# Patient Record
Sex: MALE | Race: OTHER | HISPANIC OR LATINO | Employment: OTHER | URBAN - METROPOLITAN AREA
[De-identification: names, ages, dates, MRNs, and addresses within clinical notes are randomized per-mention and may not be internally consistent; named-entity substitution may affect disease eponyms.]

---

## 2018-06-24 ENCOUNTER — HOSPITAL ENCOUNTER (EMERGENCY)
Facility: HOSPITAL | Age: 63
Discharge: HOME | End: 2018-06-24
Attending: EMERGENCY MEDICINE | Admitting: EMERGENCY MEDICINE
Payer: MEDICARE

## 2018-06-24 ENCOUNTER — APPOINTMENT (EMERGENCY)
Dept: RADIOLOGY | Facility: HOSPITAL | Age: 63
End: 2018-06-24
Payer: MEDICARE

## 2018-06-24 VITALS
HEART RATE: 96 BPM | TEMPERATURE: 98.8 F | HEIGHT: 68 IN | OXYGEN SATURATION: 94 % | DIASTOLIC BLOOD PRESSURE: 59 MMHG | SYSTOLIC BLOOD PRESSURE: 125 MMHG | RESPIRATION RATE: 22 BRPM

## 2018-06-24 DIAGNOSIS — J45.901 ASTHMA EXACERBATION: Primary | ICD-10-CM

## 2018-06-24 LAB
ALBUMIN SERPL BCP-MCNC: 3.7 G/DL (ref 3.5–5)
ALP SERPL-CCNC: 70 U/L (ref 46–116)
ALT SERPL W P-5'-P-CCNC: 29 U/L (ref 12–78)
ANION GAP SERPL CALCULATED.3IONS-SCNC: 5 MMOL/L (ref 4–13)
AST SERPL W P-5'-P-CCNC: 22 U/L (ref 5–45)
ATRIAL RATE: 83 BPM
BASOPHILS # BLD AUTO: 0.05 THOUSANDS/ΜL (ref 0–0.1)
BASOPHILS NFR BLD AUTO: 1 % (ref 0–1)
BILIRUB SERPL-MCNC: 0.27 MG/DL (ref 0.2–1)
BUN SERPL-MCNC: 13 MG/DL (ref 5–25)
CALCIUM SERPL-MCNC: 9.3 MG/DL (ref 8.3–10.1)
CHLORIDE SERPL-SCNC: 101 MMOL/L (ref 100–108)
CO2 SERPL-SCNC: 30 MMOL/L (ref 21–32)
CREAT SERPL-MCNC: 1.18 MG/DL (ref 0.6–1.3)
EOSINOPHIL # BLD AUTO: 0.21 THOUSAND/ΜL (ref 0–0.61)
EOSINOPHIL NFR BLD AUTO: 4 % (ref 0–6)
ERYTHROCYTE [DISTWIDTH] IN BLOOD BY AUTOMATED COUNT: 14.1 % (ref 11.6–15.1)
GFR SERPL CREATININE-BSD FRML MDRD: 65 ML/MIN/1.73SQ M
GLUCOSE SERPL-MCNC: 87 MG/DL (ref 65–140)
HCT VFR BLD AUTO: 43.4 % (ref 36.5–49.3)
HGB BLD-MCNC: 13.9 G/DL (ref 12–17)
HIV 1+2 AB+HIV1 P24 AG SERPL QL IA: NORMAL
HIV1 P24 AG SER QL: NORMAL
IMM GRANULOCYTES # BLD AUTO: 0.01 THOUSAND/UL (ref 0–0.2)
IMM GRANULOCYTES NFR BLD AUTO: 0 % (ref 0–2)
LYMPHOCYTES # BLD AUTO: 0.72 THOUSANDS/ΜL (ref 0.6–4.47)
LYMPHOCYTES NFR BLD AUTO: 13 % (ref 14–44)
MCH RBC QN AUTO: 28 PG (ref 26.8–34.3)
MCHC RBC AUTO-ENTMCNC: 32 G/DL (ref 31.4–37.4)
MCV RBC AUTO: 88 FL (ref 82–98)
MONOCYTES # BLD AUTO: 0.85 THOUSAND/ΜL (ref 0.17–1.22)
MONOCYTES NFR BLD AUTO: 15 % (ref 4–12)
NEUTROPHILS # BLD AUTO: 3.71 THOUSANDS/ΜL (ref 1.85–7.62)
NEUTS SEG NFR BLD AUTO: 67 % (ref 43–75)
NRBC BLD AUTO-RTO: 0 /100 WBCS
P AXIS: 51 DEGREES
PLATELET # BLD AUTO: 181 THOUSANDS/UL (ref 149–390)
PMV BLD AUTO: 11.2 FL (ref 8.9–12.7)
POTASSIUM SERPL-SCNC: 4.4 MMOL/L (ref 3.5–5.3)
PR INTERVAL: 222 MS
PROT SERPL-MCNC: 7.5 G/DL (ref 6.4–8.2)
QRS AXIS: 21 DEGREES
QRSD INTERVAL: 80 MS
QT INTERVAL: 342 MS
QTC INTERVAL: 401 MS
RBC # BLD AUTO: 4.96 MILLION/UL (ref 3.88–5.62)
SODIUM SERPL-SCNC: 136 MMOL/L (ref 136–145)
T WAVE AXIS: 44 DEGREES
TROPONIN I SERPL-MCNC: <0.02 NG/ML
VENTRICULAR RATE: 83 BPM
WBC # BLD AUTO: 5.55 THOUSAND/UL (ref 4.31–10.16)

## 2018-06-24 PROCEDURE — 93005 ELECTROCARDIOGRAM TRACING: CPT

## 2018-06-24 PROCEDURE — 94640 AIRWAY INHALATION TREATMENT: CPT

## 2018-06-24 PROCEDURE — 80053 COMPREHEN METABOLIC PANEL: CPT | Performed by: EMERGENCY MEDICINE

## 2018-06-24 PROCEDURE — 36415 COLL VENOUS BLD VENIPUNCTURE: CPT

## 2018-06-24 PROCEDURE — 71046 X-RAY EXAM CHEST 2 VIEWS: CPT

## 2018-06-24 PROCEDURE — 84484 ASSAY OF TROPONIN QUANT: CPT | Performed by: EMERGENCY MEDICINE

## 2018-06-24 PROCEDURE — 85025 COMPLETE CBC W/AUTO DIFF WBC: CPT | Performed by: EMERGENCY MEDICINE

## 2018-06-24 PROCEDURE — 86803 HEPATITIS C AB TEST: CPT | Performed by: EMERGENCY MEDICINE

## 2018-06-24 PROCEDURE — 93010 ELECTROCARDIOGRAM REPORT: CPT | Performed by: INTERNAL MEDICINE

## 2018-06-24 PROCEDURE — 87340 HEPATITIS B SURFACE AG IA: CPT | Performed by: EMERGENCY MEDICINE

## 2018-06-24 PROCEDURE — 99285 EMERGENCY DEPT VISIT HI MDM: CPT

## 2018-06-24 PROCEDURE — 87806 HIV AG W/HIV1&2 ANTB W/OPTIC: CPT | Performed by: EMERGENCY MEDICINE

## 2018-06-24 PROCEDURE — 96374 THER/PROPH/DIAG INJ IV PUSH: CPT

## 2018-06-24 RX ORDER — PREDNISONE 50 MG/1
50 TABLET ORAL DAILY
Qty: 5 TABLET | Refills: 0 | Status: SHIPPED | OUTPATIENT
Start: 2018-06-24 | End: 2018-06-29

## 2018-06-24 RX ORDER — ALBUTEROL SULFATE 90 UG/1
2 AEROSOL, METERED RESPIRATORY (INHALATION) EVERY 6 HOURS PRN
Qty: 6.7 G | Refills: 0 | Status: SHIPPED | OUTPATIENT
Start: 2018-06-24 | End: 2018-09-18 | Stop reason: SDUPTHER

## 2018-06-24 RX ORDER — METHYLPREDNISOLONE SODIUM SUCCINATE 125 MG/2ML
125 INJECTION, POWDER, LYOPHILIZED, FOR SOLUTION INTRAMUSCULAR; INTRAVENOUS ONCE
Status: COMPLETED | OUTPATIENT
Start: 2018-06-24 | End: 2018-06-24

## 2018-06-24 RX ADMIN — METHYLPREDNISOLONE SODIUM SUCCINATE 125 MG: 125 INJECTION, POWDER, FOR SOLUTION INTRAMUSCULAR; INTRAVENOUS at 20:04

## 2018-06-24 RX ADMIN — IPRATROPIUM BROMIDE 0.5 MG: 0.5 SOLUTION RESPIRATORY (INHALATION) at 19:21

## 2018-06-24 RX ADMIN — ALBUTEROL SULFATE 5 MG: 2.5 SOLUTION RESPIRATORY (INHALATION) at 19:21

## 2018-06-25 ENCOUNTER — PATIENT OUTREACH (OUTPATIENT)
Dept: INTERNAL MEDICINE CLINIC | Facility: CLINIC | Age: 63
End: 2018-06-25

## 2018-06-25 LAB
HBV SURFACE AG SER QL: NORMAL
HCV AB SER QL: NORMAL

## 2018-06-25 NOTE — DISCHARGE INSTRUCTIONS
Please take the prednisone I prescribed you, 1 pill per day, for 5 days  Use the albuterol inhaler, as needed, for wheezing and difficulty breathing  Follow up with our [facility] for further management and return to the ER for new or worrisome symptoms  Asma   CUIDADO AMBULATORIO:   Asma  es rivas enfermedad pulmonar que dificulta la respiración  La inflamación crónica y las reacciones a los desencadenantes estrechan las vías respiratorias en los pulmones  El asma puede ser de peligro mortal si no se mantiene bajo control  El asma con tos variante  es un tipo de asma que causa rivas tos seca que reaparece continuamente  La tos seca podría ser banuelos único síntoma o es posible que también sienta opresión en el pecho  Estos síntomas podrían ser provocados por el ejercicio o por la exposición a olores, alérgenos o infecciones del tracto respiratorio  El asma con tos variante se trata de la misma manera que el asma típico    Los síntomas más comunes incluyen los siguientes:   · Toser     · Sibilancias     · Dificultad para respirar     · Opresión en el pecho  Busque atención médica de inmediato si:   · Usted tiene falta de aliento severa  · Leana labios y [place] se ponen azules o grises  · La piel alrededor de banuelos cecilio y costillas se hunde con cada respiración  · Usted tiene falta de [place], incluso después de olvin banuelos medicamento a corto plazo según lo indicado  · Las lecturas numéricas del medidor de banuelos flujo magda están en la delmi claritza de banuelos plan de acción para el asma  Pregúntele a banuelos [place] vitaminas y minerales son adecuados para usted  · A usted se le acaba el medicamento antes de la fecha de banuelos próximo abastecimiento  · Leana síntomas empeoran  · Usted necesita olvin más medicamento que lo acostumbrado para controlar leana síntomas  · Usted tiene preguntas o inquietudes acerca de banuelos condición o cuidado  El tratamiento para el asma  dependerá de la gravedad del asma   Es posible que los medicamentos disminuyan la inflamación, bo las vías respiratorias y faciliten banuelos respiración  Los medicamentos se pueden inhalar, olvin en forma de píldora o ser inyectados  Los medicamentos a corto plazo alivian caterina síntomas con Jessica Alert  Los medicamentos a atilio plazo sirven para evitar ataques en un futuro  Es posible que además necesite medicamento para ayudar a controlar las alergias  Controle y evite ataques futuros de asma:   · Siga banuelos plan de acción para el asma  Ida es un plan por escrito que usted y banuelos médico medina creado  Explica cuáles medicamentos necesita usted y cuándo cambiar las dosis si fuera necesario  Además explica aleks usted puede monitorear caterina síntomas y usar un medidor del flujo magda  El medidor mide qué tan nicky están funcionando los pulmones  · Controle otras afecciones de [place] , aleks las [place], el reflujo estomacal y la apnea de sueño  · Identifique y evite factores desencadenantes  Estos podrían [employer], los ácaros del [place], el moho y las cucarachas  · No fume o esté alrededor de personas que fuman  La nicotina y otras sustancias químicas que contienen los cigarrillos y cigarros pueden dañar los pulmones  Pida información a banuelos médico si usted actualmente fuma y necesita ayuda para dejar de fumar  Los cigarrillos electrónicos o tabaco sin humo todavía contienen nicotina  Consulte con banuelos médico antes de [employer]  · Pregunte sobre la vacuna contra la gripe  La gripe puede empeorar banuelos asma  Puede que usted necesite recibir rivas vacuna anual contra la gripe  Acuda a caterina consultas de control con banuelos médico según le indicaron  Usted necesitará regresar para asegurarse que banuelos medicamento está funcionando y caterina síntomas están bajo control  Es posible que lo Arlet Catalan a un especialista del asma o de las [place]   A usted podrían pedirle que [facility] un registro de los valores de banuelos flujo magda y que lo [facility] a caterina citas  Anote caterina preguntas para que se acuerde de hacerlas jono caterina visitas  © 2017 [facility] Information is for End User's use only and may not be sold, redistributed or otherwise used for commercial purposes  All illustrations and images included in CareNotes® are the copyrighted property of A D A M , Inc  or Clint Snyder  Esta información es sólo para uso en educación  Banuelos intención no es darle un consejo médico sobre enfermedades o tratamientos  Colsulte con banuelos Samaria Cables farmacéutico antes de seguir cualquier régimen médico para saber si es seguro y efectivo para usted  Prednisone (By mouth)   Prednisone (PRED-ni-sone)  Treats many diseases and conditions, especially problems related to inflammation  This medicine is a corticosteroid  Brand Name(s): Contrast Allergy PreMed Pack, Jaylin, predniSONE Intensol   There may be other brand names for this medicine  When This Medicine Should Not Be Used: This medicine is not right for everyone  Do not use if you had an allergic reaction to prednisone or if you are pregnant  How to Use This Medicine:   Liquid, Tablet, Delayed Release Tablet  · Take your medicine as directed  Your dose may need to be changed several times to find what works best for you  · It is best to take this medicine with food or milk  · Swallow the delayed-release tablet whole  Do not crush, break, or chew it  · Measure the oral liquid medicine with a marked measuring spoon, oral syringe, or medicine cup  · Missed dose: Take a dose as soon as you remember  If it is almost time for your next dose, wait until then and take a regular dose  Do not take extra medicine to make up for a missed dose  · Store the medicine in a closed container at room temperature, away from heat, moisture, and direct light  Do not freeze the oral liquid    Drugs and Foods to Avoid:   Ask your doctor or pharmacist before using any other medicine, including over-the-counter medicines, vitamins, and herbal products  · Tell your doctor if you use any of the following:  ¨ Aminoglutethimide, amphotericin B, carbamazepine, cholestyramine, cyclosporine, digoxin, isoniazid, ketoconazole, phenobarbital, phenytoin, or rifampin  ¨ Blood thinner, such as warfarin  ¨ NSAID pain or arthritis medicine, such as aspirin, diclofenac, ibuprofen, naproxen, celecoxib  ¨ Diuretic (water pill)  ¨ Diabetes medicine  ¨ Macrolide antibiotic, such as azithromycin, clarithromycin, erythromycin  ¨ Estrogen, including birth control pills or hormone replacement therapy  · This medicine may interfere with vaccines  Ask your doctor before you get a flu shot or any other vaccines  Warnings While Using This Medicine:   · It is not safe to take this medicine during pregnancy  It could harm an unborn baby  Tell your doctor right away if you become pregnant  · Tell your doctor if you are breastfeeding or if you have kidney problems, heart failure, high blood pressure, a recent heart attack, diabetes, glaucoma, osteoporosis, or thyroid problems  Tell your doctor about any infection you have  Also tell your doctor if you have had mental or emotional problems (such as depression) or stomach or bowel problems (such as an ulcer or diverticulitis)  · This medicine may cause the following problems:  ¨ Mood or behavior changes  ¨ Higher blood pressure, retaining water, changes in salt or potassium levels in your body  ¨ Cataracts or glaucoma (with long-term use)  ¨ Weak bones or osteoporosis (with long-term use)  ¨ Slow growth in children (with long-term use)  ¨ Muscle problems (with high doses, especially if you have myasthenia gravis or similar nerve and muscle problems)  · Do not stop using this medicine suddenly  Your doctor will need to slowly decrease your dose before you stop it completely  · This medicine could cause you to get infections more easily   Tell your doctor right away if you are exposed to chicken pox, measles, or other serious infection  Tell your doctor if you had a serious infection in the past, such as tuberculosis or herpes  · Tell your doctor about any extra stress or anxiety in your life  Your dose might need to be changed for a short time  · Tell any doctor or dentist who treats you that you are using this medicine  This medicine may affect certain medical test results  · Keep all medicine out of the reach of children  Never share your medicine with anyone  Possible Side Effects While Using This Medicine:   Call your doctor right away if you notice any of these side effects:  · Allergic reaction: Itching or hives, swelling in your face or hands, swelling or tingling in your mouth or throat, chest tightness, trouble breathing  · Dark freckles, skin color changes, coldness, weakness, tiredness, nausea, vomiting, weight loss  · Depression, unusual thoughts, feelings, or behaviors, trouble sleeping  · Fever, chills, cough, sore throat, and body aches  · Muscle pain or weakness  · Rapid weight gain, swelling in your hands, ankles, or feet  · Severe stomach pain, nausea, vomiting, or red or black stools  · Skin changes or growths  · Trouble seeing, eye pain, headache  If you notice these less serious side effects, talk with your doctor:   · Increased appetite  · Round, puffy face  · Weight gain around your neck, upper back, breast, face, or waist  If you notice other side effects that you think are caused by this medicine, tell your doctor  Call your doctor for medical advice about side effects  You may report side effects to FDA at 9-176-FDA-8793  © 2017 [address] Information is for End User's use only and may not be sold, redistributed or otherwise used for commercial purposes  The above information is an  only  It is not intended as medical advice for individual conditions or treatments   Talk to your doctor, nurse or pharmacist before following any medical regimen to see if it is safe and effective for you  Albuterol (By breathing)   Albuterol (al-BUE-ter-ol)  Treats or prevents bronchospasm  Brand Name(s): AccuNeb, ProAir HFA, Proventil HFA, ReliOn Ventolin HFA, Ventolin HFA   There may be other brand names for this medicine  When This Medicine Should Not Be Used: This medicine is not right for everyone  Do not use it if you had an allergic reaction to albuterol or milk proteins  How to Use This Medicine:   Aerosol, Powder Under Pressure, Suspension, Solution  · Your doctor will tell you how much medicine to use  Do not use more than directed  Ask your doctor or pharmacist if you have questions about how to use your inhaler, nebulizer, or medicine  · Solution:   ¨ You will use this medicine with an inhaler device called a nebulizer  The nebulizer turns the medicine into a fine mist that you breathe in through your mouth and to your lungs  Your caregiver will show you how to use your nebulizer  ¨ Store unopened vials of this medicine at room temperature, away from heat and direct light  Do not freeze  An open vial of medicine must be used right away  · Aerosol inhaler:   ¨ Shake the inhaler well just before each use  Avoid spraying this medicine into your eyes  ¨ Test spray in the air before using for the first time or if the inhaler has not been used for a while  ¨ If you are supposed to use more than one puff, wait 1 to 2 minutes before inhaling the second puff  Repeat these steps for the next puff, starting with shaking the inhaler  ¨ Clean the inhaler mouthpiece at least once a week with warm running water for 30 seconds  Let it air dry completely  ¨ Store the canister at room temperature, away from heat and direct light  Do not freeze  Do not keep this medicine inside a car where it could be exposed to extreme heat or cold  Do not poke holes in the canister or throw it into a fire, even if the canister is empty    · ProAir® Respiclick® inhaler: ¨ Make sure the cap is closed  Do not open the cap unless you are going to use the medicine  ¨ Hold the inhaler upright  Open the cap fully until you hear a click  Your inhaler is now ready to use  ¨ Close the cap firmly over the mouthpiece after each use  ¨ Keep the inhaler clean and dry at all times  Do not wash or put any part of the inhaler in water  ¨ To clean the mouthpiece, wipe it gently with a dry cloth or tissue  ¨ Keep the medicine in the foil pouch until you are ready to use it  Store at room temperature, away from heat and direct light  Do not freeze  · Read and follow the patient instructions that come with this medicine  Talk to your doctor or pharmacist if you have any questions  · Missed dose: Take a dose as soon as you remember  If it is almost time for your next dose, wait until then and take a regular dose  Do not take extra medicine to make up for a missed dose  Drugs and Foods to Avoid:   Ask your doctor or pharmacist before using any other medicine, including over-the-counter medicines, vitamins, and herbal products  · Some foods and medicines can affect how albuterol works  Tell your doctor if you are using any of the following:  ¨ Digoxin  ¨ Beta-blocker medicine  ¨ Diuretic (water pill)  ¨ Medicine for depression or an MAO inhibitor within the past 2 weeks  Warnings While Using This Medicine:   · Tell your doctor if you are pregnant or breastfeeding, or if you have kidney disease, diabetes, heart disease, heart rhythm problems, high blood pressure, overactive thyroid, or a history of seizures  · This medicine may cause the following problems:   ¨ Paradoxical bronchospasm (increased trouble breathing right after use)  ¨ Low potassium levels in the blood  · If you use a corticosteroid medicine to control your asthma, keep using it as instructed by your doctor  · Call your doctor if your symptoms do not improve or if they get worse    · If any of your asthma medicines do not seem to be working as well as usual, call your doctor right away  Do not change your doses or stop using your medicines without asking your doctor  · Your doctor will check your progress and the effects of this medicine at regular visits  Keep all appointments  · Keep all medicine out of the reach of children  Never share your medicine with anyone  Possible Side Effects While Using This Medicine:   Call your doctor right away if you notice any of these side effects:  · Allergic reaction: Itching or hives, swelling in your face or hands, swelling or tingling in your mouth or throat, chest tightness, trouble breathing  · Dry mouth, increased thirst, muscle cramps, nausea, vomiting  · Fast, pounding, or uneven heartbeat, chest pain  · Lightheadedness, dizziness, or fainting  · Trouble breathing, increased wheezing, cough, chest tightness  If you notice these less serious side effects, talk with your doctor:   · Cough, sore throat, runny or stuffy nose  · Headache  · Tremors, nervousness  If you notice other side effects that you think are caused by this medicine, tell your doctor  Call your doctor for medical advice about side effects  You may report side effects to FDA at 6-724-FDA-7552  © 2017 [address] Information is for End User's use only and may not be sold, redistributed or otherwise used for commercial purposes  The above information is an  only  It is not intended as medical advice for individual conditions or treatments  Talk to your doctor, nurse or pharmacist before following any medical regimen to see if it is safe and effective for you

## 2018-06-25 NOTE — ED PROVIDER NOTES
History  Chief Complaint   Patient presents with    Shortness of Breath     hx of asthma, increasing shortness of breath x 2-3 days increasing today  has been out of all medications since march  states that he has an englarged heart and when he has been short of breath before it is d/t his heart  78-year-old man with a history of asthma, diabetes, and hypertension presents for evaluation of dyspnea  Patient reports a 2-3 day history of dry cough, wheezing, and dyspnea  He recently moved from [facility] and has not been able to fill his prescriptions or see a primary doctor  No associated fevers, rhinorrhea, sore throat, chest pain, nausea, vomiting, diarrhea, abdominal pain, or urinary symptoms  No previous history of CAD or VTE  No sick contacts  On arrival, patient is afebrile hypertensive to 162/85 with otherwise normal vital signs  Physical exam shows decreased air movement throughout with diffuse wheezing  Likely asthma exacerbation in etiology  Given risk factors, will perform cardiac workup including chest x-ray to evaluate for underlying pneumonia  Heart score of 1  Will treat symptomatically with DuoNeb, Solu-Medrol, and reassess  None       Past Medical History:   Diagnosis Date    Asthma     Cardiac disease     Diabetes mellitus ([facility] [address] )     Hypertension        Past Surgical History:   Procedure Laterality Date    CARPAL TUNNEL RELEASE      KNEE ARTHROSCOPY Bilateral     TOTAL HIP ARTHROPLASTY Left        No family history on file  I have reviewed and agree with the history as documented  Social History   Substance Use Topics    Smoking status: Never Smoker    Smokeless tobacco: Not on file    Alcohol use No        Review of Systems   Constitutional: Negative for chills and fever  HENT: Negative for rhinorrhea and sore throat  Eyes: Negative for photophobia and visual disturbance  Respiratory: Positive for cough and shortness of breath      Cardiovascular: Negative for chest pain and leg swelling  Gastrointestinal: Negative for abdominal pain, diarrhea, nausea and vomiting  Genitourinary: Negative for dysuria, frequency and hematuria  Musculoskeletal: Negative for back pain and neck pain  Skin: Negative for rash and wound  Neurological: Negative for light-headedness and headaches  Physical Exam  ED Triage Vitals [06/24/18 1652]   Temperature Pulse Respirations Blood Pressure SpO2   98 8 °F (37 1 °C) 84 18 138/65 95 %      Temp Source Heart Rate Source Patient Position - Orthostatic VS BP Location FiO2 (%)   Tympanic Monitor Sitting Right arm --      Pain Score       5           Orthostatic Vital Signs  Vitals:    06/24/18 1834 06/24/18 1914 06/24/18 2000 06/24/18 2015   BP: 150/66 162/85  125/59   Pulse: 82 84 92 96   Patient Position - Orthostatic VS: Lying   Sitting       Physical Exam   Constitutional: He is oriented to person, place, and time  He appears well-developed and well-nourished  No distress  HENT:   Head: Normocephalic and atraumatic  Eyes: Conjunctivae are normal  Pupils are equal, round, and reactive to light  No scleral icterus  Neck: Neck supple  No JVD present  Cardiovascular: Normal rate, regular rhythm and normal heart sounds  Exam reveals no gallop and no friction rub  No murmur heard  Pulmonary/Chest: No respiratory distress  He has wheezes (Diffuse)  He has no rales  Decreased air movement throughout   Abdominal: Soft  He exhibits no distension  There is no tenderness  There is no rebound and no guarding  Musculoskeletal: He exhibits edema (Mild nonpitting bilateral lower extremity)  He exhibits no tenderness  Neurological: He is alert and oriented to person, place, and time  Skin: Skin is warm and dry  He is not diaphoretic  Psychiatric: He has a normal mood and affect  His behavior is normal  Thought content normal    Vitals reviewed        ED Medications  Medications   albuterol inhalation solution 5 mg (5 mg Nebulization Given 6/24/18 1921)   ipratropium (ATROVENT) 0 02 % inhalation solution 0 5 mg (0 5 mg Nebulization Given 6/24/18 1921)   methylPREDNISolone sodium succinate (Solu-MEDROL) injection 125 mg (125 mg Intravenous Given 6/24/18 2004)       Diagnostic Studies  Results Reviewed     Procedure Component Value Units Date/Time    Rapid HIV 1/2 AB-AG Combo [[id number]]  (Normal) Collected:  06/24/18 1842    Lab Status:  Final result Specimen:  Blood from Arm, Left Updated:  06/24/18 1933     Rapid HIV 1 AND 2 Non-Reactive     HIV-1 P24 Ag Screen Non-Reactive    Narrative:         Negative for HIV-1 p24 Antigen  Negative for HIV-1 and/or HIV-2 Antibody  Hepatitis B surface antigen [[id number]] Collected:  06/24/18 1842    Lab Status: In process Specimen:  Blood from Arm, Left Updated:  06/24/18 1851    Hepatitis C antibody [[id number]] Collected:  06/24/18 1842    Lab Status: In process Specimen:  Blood from Arm, Left Updated:  06/24/18 1851    Comprehensive metabolic panel [[id number]] Collected:  06/24/18 1750    Lab Status:  Final result Specimen:  Blood from Arm, Left Updated:  06/24/18 1849     Sodium 136 mmol/L      Potassium 4 4 mmol/L      Chloride 101 mmol/L      CO2 30 mmol/L      Anion Gap 5 mmol/L      BUN 13 mg/dL      Creatinine 1 18 mg/dL      Glucose 87 mg/dL      Calcium 9 3 mg/dL      AST 22 U/L      ALT 29 U/L      Alkaline Phosphatase 70 U/L      Total Protein 7 5 g/dL      Albumin 3 7 g/dL      Total Bilirubin 0 27 mg/dL      eGFR 65 ml/min/1 73sq m     Narrative:         National Kidney Disease Education Program recommendations are as follows:  GFR calculation is accurate only with a steady state creatinine  Chronic Kidney disease less than 60 ml/min/1 73 sq  meters  Kidney failure less than 15 ml/min/1 73 sq  meters      Troponin I [[id number]]  (Normal) Collected:  06/24/18 1750    Lab Status:  Final result Specimen:  Blood from Arm, Left Updated:  06/24/18 1827     Troponin I <0 02 ng/mL CBC and differential [[id number]]  (Abnormal) Collected:  06/24/18 1750    Lab Status:  Final result Specimen:  Blood from Arm, Left Updated:  06/24/18 1811     WBC 5 55 Thousand/uL      RBC 4 96 Million/uL      Hemoglobin 13 9 g/dL      Hematocrit 43 4 %      MCV 88 fL      MCH 28 0 pg      MCHC 32 0 g/dL      RDW 14 1 %      MPV 11 2 fL      Platelets 436 Thousands/uL      nRBC 0 /100 WBCs      Neutrophils Relative 67 %      Immat GRANS % 0 %      Lymphocytes Relative 13 (L) %      Monocytes Relative 15 (H) %      Eosinophils Relative 4 %      Basophils Relative 1 %      Neutrophils Absolute 3 71 Thousands/µL      Immature Grans Absolute 0 01 Thousand/uL      Lymphocytes Absolute 0 72 Thousands/µL      Monocytes Absolute 0 85 Thousand/µL      Eosinophils Absolute 0 21 Thousand/µL      Basophils Absolute 0 05 Thousands/µL                  X-ray chest 2 views   ED Interpretation by Yasmin Soriano MD (06/24 2001)   No acute disease            Procedures  Procedures      Phone Consults  ED Phone Contact    ED Course  ED Course as of Jun 24 2058   Sun Jun 24, 2018 2005 Procedure Note: EKG  Date/Time: 06/24/18 8:05 PM   Indications / Diagnosis: Dyspnea  ECG reviewed by me, the ED Provider: yes   The EKG demonstrates:  Rhythm: normal sinus  Intervals:  ms  Axis: normal axis  QRS/Blocks: normal QRS, 1st degree AV block  ST Changes: No acute ST Changes, no STD/JESSICA       2056 On re-evaluation, patient with significant symptomatic improvement after DuoNeb and Solu-Medrol  He has significantly increased air movement and decreased wheezing            HEART Risk Score      Most Recent Value   History  0 Filed at: 06/24/2018 1932   ECG  0 Filed at: 06/24/2018 1932   Age  1 Filed at: 06/24/2018 1932   Risk Factors  0 Filed at: 06/24/2018 1932   Troponin  0 Filed at: 06/24/2018 1932   Heart Score Risk Calculator   History  0 Filed at: 06/24/2018 1932   ECG  0 Filed at: 06/24/2018 1932   Age  1 Filed at: 06/24/2018 1932   Risk Factors  0 Filed at: 06/24/2018 1932   Troponin  0 Filed at: 06/24/2018 1932   HEART Score  1 Filed at: 06/24/2018 1932   HEART Score  1 Filed at: 06/24/2018 1932                            [facility]  Number of Diagnoses or Management Options  Asthma exacerbation:   Diagnosis management comments: Likely mild asthma exacerbation in etiology  Patient with significant symptomatic improvement after DuoNeb and Solu-Medrol  He was discharged with an albuterol inhaler, prednisone burst, and outpatient PCP follow-up at the [facility]  Patient and his son were given strict return precautions for worsening symptoms  Cardiac workup included chest x-ray unremarkable  CritCare Time    Disposition  Final diagnoses:   Asthma exacerbation     Time reflects when diagnosis was documented in both MDM as applicable and the Disposition within this note     Time User Action Codes Description Comment    6/24/2018  8:38 PM Darya Muñiz Add [J45 901] Asthma exacerbation       ED Disposition     ED Disposition Condition Comment    Discharge  Ollie Jalloh discharge to home/self care      Condition at discharge: Good        Follow-up Information     Follow up With Specialties Details Why Contact Info Additional Information    [facility]  Schedule an appointment as soon as possible for a visit  [facility] [address] [place] [address] Emergency Department Emergency Medicine  As needed [address]  [facility], [address] [place], [place], [address]          Patient's Medications   Discharge Prescriptions    ALBUTEROL (PROVENTIL HFA) 90 MCG/ACT INHALER    Inhale 2 puffs every 6 (six) hours as needed for wheezing or shortness of breath       Start Date: 6/24/2018 End Date: --       Order Dose: 2 puffs       Quantity: 6 7 g    Refills: 0    PREDNISONE 50 MG TABLET    Take 1 tablet (50 mg total) by mouth daily for 5 days       Start Date: 6/24/2018 End Date: 6/29/2018       Order Dose: 50 mg       Quantity: 5 tablet    Refills: 0     No discharge procedures on file  ED Provider  Attending physically available and evaluated Dyanantionette Ariasaneta AMBROCIO managed the patient along with the ED Attending      Electronically Signed by         Sammy Rosas MD  06/24/18 8890

## 2018-07-09 ENCOUNTER — OFFICE VISIT (OUTPATIENT)
Dept: INTERNAL MEDICINE CLINIC | Facility: CLINIC | Age: 63
End: 2018-07-09
Payer: MEDICARE

## 2018-07-09 ENCOUNTER — HOSPITAL ENCOUNTER (EMERGENCY)
Facility: HOSPITAL | Age: 63
Discharge: HOME | End: 2018-07-09
Attending: EMERGENCY MEDICINE
Payer: MEDICARE

## 2018-07-09 ENCOUNTER — PATIENT OUTREACH (OUTPATIENT)
Dept: INTERNAL MEDICINE CLINIC | Facility: CLINIC | Age: 63
End: 2018-07-09

## 2018-07-09 VITALS
HEIGHT: 69 IN | DIASTOLIC BLOOD PRESSURE: 64 MMHG | HEART RATE: 75 BPM | SYSTOLIC BLOOD PRESSURE: 138 MMHG | TEMPERATURE: 97.9 F | WEIGHT: 252 LBS | BODY MASS INDEX: 37.33 KG/M2 | RESPIRATION RATE: 18 BRPM | OXYGEN SATURATION: 97 %

## 2018-07-09 VITALS
DIASTOLIC BLOOD PRESSURE: 66 MMHG | TEMPERATURE: 98.2 F | HEIGHT: 68 IN | SYSTOLIC BLOOD PRESSURE: 130 MMHG | WEIGHT: 251.77 LBS | HEART RATE: 92 BPM | BODY MASS INDEX: 38.16 KG/M2
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
## 2018-07-09 DIAGNOSIS — R07.9 CHEST PAIN IN ADULT: ICD-10-CM

## 2018-07-09 DIAGNOSIS — R06.01 ORTHOPNEA: ICD-10-CM

## 2018-07-09 DIAGNOSIS — L03.90 CELLULITIS: ICD-10-CM

## 2018-07-09 DIAGNOSIS — R73.03 PREDIABETES: ICD-10-CM

## 2018-07-09 DIAGNOSIS — Z00.00 ENCOUNTER FOR MEDICAL EXAMINATION TO ESTABLISH CARE: Primary | ICD-10-CM

## 2018-07-09 DIAGNOSIS — F33.9 RECURRENT MAJOR DEPRESSIVE DISORDER, REMISSION STATUS UNSPECIFIED (HCC): ICD-10-CM

## 2018-07-09 DIAGNOSIS — J45.20 MILD INTERMITTENT ASTHMA WITHOUT COMPLICATION: ICD-10-CM

## 2018-07-09 DIAGNOSIS — Z20.3 RABIES EXPOSURE: Primary | ICD-10-CM

## 2018-07-09 DIAGNOSIS — Z23 NEED FOR DIPHTHERIA-TETANUS-PERTUSSIS (TDAP) VACCINE: ICD-10-CM

## 2018-07-09 DIAGNOSIS — N18.2 CKD (CHRONIC KIDNEY DISEASE) STAGE 2, GFR 60-89 ML/MIN: ICD-10-CM

## 2018-07-09 DIAGNOSIS — E66.9 OBESITY (BMI 30-39.9): ICD-10-CM

## 2018-07-09 PROBLEM — E11.9 DM (DIABETES MELLITUS) (HCC): Status: RESOLVED | Noted: 2018-07-09 | Resolved: 2018-07-09

## 2018-07-09 PROBLEM — I10 ESSENTIAL HYPERTENSION: Status: ACTIVE | Noted: 2018-07-09

## 2018-07-09 PROBLEM — J45.909 ASTHMA: Status: ACTIVE | Noted: 2018-07-09

## 2018-07-09 PROBLEM — E11.9 DM (DIABETES MELLITUS) (HCC): Status: ACTIVE | Noted: 2018-07-09

## 2018-07-09 PROCEDURE — 99203 OFFICE O/P NEW LOW 30 MIN: CPT | Performed by: INTERNAL MEDICINE

## 2018-07-09 PROCEDURE — 99283 EMERGENCY DEPT VISIT LOW MDM: CPT

## 2018-07-09 PROCEDURE — 90376 RABIES IG HEAT TREATED: CPT | Performed by: EMERGENCY MEDICINE

## 2018-07-09 PROCEDURE — 90715 TDAP VACCINE 7 YRS/> IM: CPT | Performed by: EMERGENCY MEDICINE

## 2018-07-09 PROCEDURE — 90675 RABIES VACCINE IM: CPT | Performed by: EMERGENCY MEDICINE

## 2018-07-09 PROCEDURE — 90472 IMMUNIZATION ADMIN EACH ADD: CPT

## 2018-07-09 PROCEDURE — 96372 THER/PROPH/DIAG INJ SC/IM: CPT

## 2018-07-09 PROCEDURE — 90471 IMMUNIZATION ADMIN: CPT

## 2018-07-09 RX ORDER — AMOXICILLIN AND CLAVULANATE POTASSIUM 875; 125 MG/1; MG/1
1 TABLET, FILM COATED ORAL
Qty: 14 TABLET | Refills: 0 | Status: SHIPPED | OUTPATIENT
Start: 2018-07-09 | End: 2018-07-15 | Stop reason: SDUPTHER

## 2018-07-09 RX ADMIN — RABIES VACCINE 1 ML: KIT at 12:32

## 2018-07-09 RX ADMIN — TETANUS TOXOID, REDUCED DIPHTHERIA TOXOID AND ACELLULAR PERTUSSIS VACCINE, ADSORBED 0.5 ML: 5; 2.5; 8; 8; 2.5 SUSPENSION INTRAMUSCULAR at 12:33

## 2018-07-09 RX ADMIN — HUMAN RABIES VIRUS IMMUNE GLOBULIN 2280 UNITS: 150 INJECTION, SOLUTION INTRAMUSCULAR at 12:45

## 2018-07-09 NOTE — PROGRESS NOTES
BREANN/CM met briefly with pt and son prior to transfer to ED  Breann provided son with a [employer] application as well as instruction   Son to complete it and mail it in  Info also provided on housing resources ie [employer] [facility] in the area   Breann has also reviewed insurance programs  Sw to remain avaialbe to support and assist as indcated

## 2018-07-09 NOTE — PROGRESS NOTES
Assessment/Plan:    1  Dog bite  -patient will need to go to the emergency department at for proper vaccination and antibiotic therapy  -patient currently afebrile, though he does refer chills    2  Pre diabetes  -patient with polyuria, polydipsia and historic diagnosis of pre diabetes in [place]  -will order A1c  -pt will need diet counselling    3  Mild intermittent asthma  -patient states he has exacerbations once every 3-6 months  -patient will need proper PFTs to rule out COPD in the setting of a 72 pack year history  -patient has albuterol inhaler at home    4  Positive depression screening with history of depression  -will restart previous depression medication Zoloft  -patient also took Cymbalta and Klonopin in the past, will reassess need to restart 2nd medication if he does not respond to Zoloft alone    5  Dyspnea on exertion and orthopnea  -differential diagnosis includes CHF versus CAD versus COPD  -patient consent only 4 stairs prior to chest pain and shortness of breath  -will order echocardiogram  -will consider cardiac stress test  -patient will need to follow up with PFTs  -will order lipid panel for ascvd risk assessment for need to start statin    6  History hypertension  -will readdress in 1 week follow-up    7  CKD stage 2  -likely in the setting of the hypertension  -will reassess at next visit    8  Need for colonoscopy  -will address at next visit    No problem-specific Assessment & Plan notes found for this encounter  Diagnoses and all orders for this visit:    Encounter for medical examination to establish care    CKD (chronic kidney disease) stage 2, GFR 60-89 ml/min    Obesity (BMI 30-39  9)    Need for diphtheria-tetanus-pertussis (Tdap) vaccine  -     TDAP VACCINE GREATER THAN OR EQUAL TO 8YO IM    Dog bite, initial encounter    Mild intermittent asthma without complication          Subjective:      Patient ID: Lakshmi Mendoza is a 57 y o  male      HPI     58 yo M with pmhx of asthma, preDM, HTN who recently moved here from [facility]  Pt was recently seen in the ED for SOB, where he was found to have asthma exacerbation  He recovered with duoneb and solumedrol  Pt was discharged with short course of oral steroids and albuterol HFA  Pt is in clinic today to establish care with PCP  Patient currently is not taking his home meds as he has not had a PCP since moving to [place]  Today in clinic, patient referred a dog bite that occurred on Saturday of an unidentified dog  Patient states he has not seek any medical treatment since this dog bite  Patient refers swelling, pain, erythema, decreased range of motion of the right 1st digit  Patient also refers chills and is unaware if he has had fevers  Patient does not know his vaccination status  In terms of chronic conditions, patient is describing orthopnea and dyspnea on exertion  Patient states that he can only ascend 4 stairs before getting dyspneic and with chest pain  Patient states he needs use 4 pillows at night so he did not become short of breath while sleeping  Patient also refers a swelling in his lower extremities  Patient states he was diagnosed with prediabetes about 2 years ago in [facility] and was taking metformin  Patient also states that he gets paresthesias in his right lower extremity  He refers polyuria and polydipsia  He also states that he has visual changes and eye pain bilaterally  Family hx significant for sister with DM and Kidney cancer on dialysis  Social hx significant for 72 pack year hx, quit in 2006  Drinks 4 beers monthly  No illicit drug use  Pt does not work  Pt states he eats whatever he wants and follows no specific diet        The following portions of the patient's history were reviewed and updated as appropriate: allergies, current medications, past family history, past medical history, past social history, past surgical history and problem list     Review of Systems   Constitutional: Positive for activity change and chills  Negative for diaphoresis and fever  Respiratory: Positive for cough, shortness of breath and wheezing  Cardiovascular: Positive for chest pain and leg swelling  Negative for palpitations  Gastrointestinal: Positive for abdominal distention  Negative for abdominal pain, diarrhea, nausea and vomiting  Endocrine: Positive for polydipsia, polyphagia and polyuria  Genitourinary: Negative for dysuria, hematuria and urgency  Musculoskeletal: Positive for back pain  Skin: Negative for color change  Neurological: Negative for dizziness, seizures, syncope, weakness, light-headedness and headaches  Psychiatric/Behavioral: Negative for confusion  Objective:      /66   Pulse 92   Temp 98 2 °F (36 8 °C)   Ht 5' 8" (1 727 m)   Wt 114 kg (251 lb 12 3 oz)   BMI 38 28 kg/m²          Physical Exam   Constitutional: He is oriented to person, place, and time  He appears well-developed and well-nourished  56-year-old male with BMI of 38 in no acute distress   HENT:   Head: Normocephalic and atraumatic  Mouth/Throat: No oropharyngeal exudate  Missing several teeth   Eyes: EOM are normal  Pupils are equal, round, and reactive to light  No scleral icterus  Conjunctival injection bilaterally   Neck: Normal range of motion  Neck supple  No JVD present  No thyromegaly present  Cardiovascular: Normal rate, regular rhythm, normal heart sounds and intact distal pulses  Exam reveals no gallop and no friction rub  No murmur heard  Pulmonary/Chest: Breath sounds normal  No respiratory distress  He has no wheezes  He has no rales  He exhibits no tenderness  Patient unable to take in large inspiratory volumes, cough with deep inspiration   Abdominal: Soft  Bowel sounds are normal  He exhibits distension  He exhibits no mass  There is no tenderness  There is no rebound and no guarding  Musculoskeletal: Normal range of motion   He exhibits edema (1+ pitting edema in the lower extremities bilaterally)  Large mobile, nontender mass consistent with lipoma on upper right back, multiple surgical scars along spine well-healed without erythema or discharge   Neurological: He is alert and oriented to person, place, and time  No cranial nerve deficit  He exhibits normal muscle tone  Skin: Skin is warm and dry  Varicose veins in the lower extremities bilateral   Psychiatric:   Depression score 12   Nursing note and vitals reviewed

## 2018-07-09 NOTE — DISCHARGE INSTRUCTIONS
- The patient has been exposed to rabies and has never been vaccinated against rabies  Therefore the patient should get 4 doses of rabies vaccine - one dose right away, and additional doses on the 3rd, 7th, and 14th days  (Rabavert, 1 Kit)  Four doses (1 mL each) of either HDCV or PCECV vaccine should be administered on days 0, 3, 7, and 14  The first dose should be administered as soon as possible after exposure  It should be given intramuscularly into the deltoid muscle of adults  In children, it should be administered into either the deltoid muscle or the anterolateral aspect of the thigh  Will not use the gluteal region, because this could result in a decreased immunologic response  - They should also get Rabies Immune Globulin at the same time as the first dose (Rabies immunoglobulin; 20 IU/kg)  If not immediately available, the HRIG should be administered as soon as it becomes available up until and including day 7 of treatment  Concentrate as much of the dose as possible in and around the wound (if wound location allows)  The remaining HRIG should be administered intramuscularly at a site distance from the vaccine administration  Case reports have documented safe administration of HRIG and HDCV during pregnancy  Mordedura de animal   LO QUE NECESITA SABER:   Las heridas resultantes de la mordedura de un animal pueden ser de un melisa superficial hasta heridas profundas que ponen en peligro la sabi  La mordedura de un animal puede cortar o perforar la piel  Puede arrancarle la piel del cuerpo  Hallman piel se puede inflamar o estar amoratada, aún si la mordida no le abrió la piel  Elizabeth Loupe de animales son más frecuentes en las harman, los brazos, las piernas y el sravan  Elizabeth Loupe de perros y gatos son las más comunes  INSTRUCCIONES SOBRE EL ILYA HOSPITALARIA:   Regrese a la carlos de emergencias si:   · Usted tiene fiebre  · Hallman herida está claritza, inflamada y drena pus      · Usted nota líneas nelson en la piel que rodea la herida  · Ya no puede  el área de la mordida  · Banuelos ritmo cardíaco y banuelos respiración son mucho más acelerados que de costumbre  · Se siente mareado y confundido  Pregúntele a banuelos [place]ie [place] vitaminas y minerales son adecuados para usted  · Banuelos dolor no mejora, incluso después de olvin el medicamento para el dolor  · Tiene pesadillas o recuerdos recurrentes sobre la mordida del animal      · Usted tiene preguntas o inquietudes acerca de banuelos condición o cuidado  Medicamentos:  Es posible que usted necesite alguno de los siguientes:  · Antibióticos  sirven para prevenir o tratar rivas infección bacteriana  · Un medicamento con receta para el dolor  podrían ser Alex Guild  Pregunte cómo olvin estos medicamentos de rivas forma burden  · Rivas vacuna antitetánica  puede aplicarse para prevenir el tétano  El tétano es rivas infección bacterial letal que afecta los nervios y los músculos  La bacteria puede contagiarse por medio de mordeduras de [place]  · Rivas vacuna contra la fernando  puede necesitarse para prevenir la fernando  La fernando es rivas infección viral letal  El virus puede contagiarse por medio de mordeduras [place]  · [place] caterina medicamentos aleks se le haya indicado  Consulte con banuelos médico si usted merrill que banuelos medicamento no le está ayudando o si presenta efectos secundarios  Infórmele si es alérgico a algún medicamento  Mantenga rivas lista actualizada de los [place], las vitaminas y los productos herbales que ace  Incluya los siguientes datos de los medicamentos: cantidad, frecuencia y motivo de administración  Traiga con usted la lista o los envases de la píldoras a caterina citas de seguimiento  Lleve la lista de los medicamentos con usted en jordi de rivas emergencia  Acuda dentro de 1 o 2 días a la consulta de control con banuelos médico:  Es posible que usted necesite regresar para que le quiten los puntos de [place]   Anote caterina preguntas para que se acuerde de hacerlas jono caterina visitas  Cuidados personales:   · Aplique un ungüento antibiótico aleks se indica  Fanshawe ayuda a prevenir rivas infección en las heridas menores de la piel  Está disponible sin receta médica  · Mantenga la herida limpia y [place]  Lave la herida todos los días con agua y jabón o rivas solución antiséptica  Pregúntele a hallman médico sobre los tipos de vendaje que usted puede usar  · Aplique hielo a hallman herida  El hielo ayuda a disminuir la inflamación y el dolor  El hielo también puede contribuir a evitar el daño de los tejidos  Use un paquete de hielo o ponga hielo molido dentro de [employer]  Cúbrala con rivas toalla y colóquela en hallman herida por 15 a 20 minutos cada hora o según indicaciones  · Eleve el área de la herida  Eleve hallman herida por encima del nivel de hallman corazón tan a menudo aleks pueda  Fanshawe va a disminuir inflamación y el dolor  [place] hallman herida sobre almohadas o cobijas dobladas para mantenerla elevada y cómoda  Prevenga otra mordedura de animal:   · Aprenda a darse cuenta de cuando rivas mascota está asustada o enojada  No bia movimientos rápidos y bruscos  · No se meta entre animales que estén peleando  · No deje a un wendi pequeño solo con [employer]  · No moleste a un animal mientras come, duerme o cuida a caterina crías  · No se acerque a un animal que no conoce, especialmente si está amarrado o en [place]  · No se acerque a los [employer] estén enfermos o que actúen de [employer] extraña  · No alimente ni atrape animales salvajes  © 2017 [address] Information is for End User's use only and may not be sold, redistributed or otherwise used for commercial purposes  All illustrations and images included in CareNotes® are the copyrighted property of A D A M , Inc  or Clint Snyder  Esta información es sólo para uso en educación  Hallman intención no es darle un consejo médico sobre enfermedades o tratamientos   Colsulte con hallman Sameul Millin o farmacéutico antes de seguir cualquier régimen médico para saber si es seguro y efectivo para usted

## 2018-07-09 NOTE — ED ATTENDING ATTESTATION
Ella Thorpe MD, saw and evaluated the patient  All available labs and X-rays were ordered by me or the resident and have been reviewed by myself  I discussed the patient with the resident / non-physician and agree with the resident's / non-physician practitioner's findings and plan as documented in the resident's / non-physician practicitioner's note, except where noted  At this point, I agree with the current assessment done in the ED  Chief Complaint   Patient presents with    Dog Bite     Baljinderchaz Webster to pet a dog on Saturday and it bit him  He was out on a walk  Did not get any information from owner  Baez mix  Bite to right hand below thumb     This is a 57-year-old male presents for evaluation of dog bite  The patient states that he was walking, he saw Mann Sanju the he wanted to Mick Murguia, the dog and bit him on the right hand  It but then over the thenar eminence  He had immediate onset of pain  He was able to move the thumb but since then he has had increased swelling, pain, warmth  His tetanus is unknown, rabies status of the dog is unknown  He saw his primary care doctor today because of the symptoms so was sent here for further evaluation  No fevers chills nausea vomiting chest pain shortness of breath  PE:  Vitals:    07/09/18 1029   BP: 138/64   BP Location: Left arm   Pulse: 75   Resp: 18   Temp: 97 9 °F (36 6 °C)   TempSrc: Tympanic   SpO2: 97%   Weight: 114 kg (252 lb)   Height: 5' 9" (1 753 m)   General: VS reviewed  Appears in NAD  awake, alert  Well-nourished, well-developed  Appears stated age  Speaking normally in full sentences  Head: Normocephalic, atraumatic, nontender  Eyes: EOM-I  No diplopia  No hyphema  No subconjunctival hemorrhages  Symmetrical lids  ENT: Atraumatic external nose and ears  MMM  No malocclusion  No stridor  Normal phonation  No drooling  Normal swallowing  Neck: No JVD  CV: No pallor noted  Peripheral pulses +2 throughout     No chest wall tenderness  Lungs:   No tachypnea  No respiratory distress  MSK:   RIGHT:  M/U/R/A nerve sensation intact   strength 5/5  Able to 1+2 normally  Limit to 1+5 2/2 swelling preventing him from doing it  Finger abduction/adduction/opposition intact  Suppination/Pronation intact without reproduction of pain  Good capillary refill  2+ radial pulses, bilaterally equal    WE/WF/WRD/WUD 5/5, intact, without pain  BICEPS/TRICEPS 5/5  Shoulder abduction/adduction 5/5  Skin: Dry, intact  Neuro: Awake, alert, GCS15, CN II-XII grossly intact  Motor grossly intact  Psychiatric/Behavioral: Appropriate mood and affect   Exam: deferred  A:  - dog bite  P:  - tetanus  - rabies  - abx  - close return precautions / RTER reviewed orally  - 13 point ROS was performed and all are normal unless stated in the history above  - Nursing note reviewed  Vitals reviewed  - Orders placed by myself and/or advanced practitioner / resident     - Previous chart was not reviewed  - No language barrier    - History obtained from patient  - There are no limitations to the history obtained  - Critical care time: Not applicable for this patient  Final Diagnosis:  1  Rabies exposure    2  Dog bite    3  Cellulitis         Medications   tetanus-diphtheria-acellular pertussis (BOOSTRIX) IM injection 0 5 mL (0 5 mL Intramuscular Given 7/9/18 1233)   rabies vaccine, chick embryo (RABAVERT) IM injection 1 mL (1 mL Intramuscular Given 7/9/18 1232)   rabies immune globulin, human (IMOGAM RABIES-HT) IM injection 2,280 Units (2,280 Units Infiltration Given 7/9/18 1245)     No orders to display     No orders of the defined types were placed in this encounter      Labs Reviewed - No data to display  Time reflects when diagnosis was documented in both MDM as applicable and the Disposition within this note     Time User Action Codes Description Comment    7/9/2018 12:35 PM Marval Sprain Add [Z20 3] Rabies exposure 7/9/2018 12:35 PM [place] Ee Add Erato Ana  0HUU] Dog bite     7/9/2018 12:35 PM Jose Holder Add [L03 90] Cellulitis       ED Disposition     ED Disposition Condition Comment    Discharge  Nicolas Ferguson discharge to home/self care  Condition at discharge: Stable        Follow-up Information     Follow up With Specialties Details Why [address] Emergency Department Emergency Medicine Go in 3 days for second dose of rabies vaccine [address] [facility] ED, [address], [address]        Discharge Medication List as of 7/9/2018 12:39 PM      START taking these medications    Details   amoxicillin-clavulanate (AUGMENTIN) 875-125 mg per tablet Take 1 tablet by mouth 2 (two) times a day for 7 days, Starting Mon 7/9/2018, Until Mon 7/16/2018, Print         CONTINUE these medications which have NOT CHANGED    Details   albuterol (PROVENTIL HFA) 90 mcg/act inhaler Inhale 2 puffs every 6 (six) hours as needed for wheezing or shortness of breath, Starting Sun 6/24/2018, Print      sertraline (ZOLOFT) 50 mg tablet Take 1 tablet (50 mg total) by mouth daily, Starting Mon 7/9/2018, Normal           No discharge procedures on file  Prior to Admission Medications   Prescriptions Last Dose Informant Patient Reported? Taking? albuterol (PROVENTIL HFA) 90 mcg/act inhaler 7/9/2018 at 0900  No Yes   Sig: Inhale 2 puffs every 6 (six) hours as needed for wheezing or shortness of breath   sertraline (ZOLOFT) 50 mg tablet 7/9/2018 at 0900  No Yes   Sig: Take 1 tablet (50 mg total) by mouth daily      Facility-Administered Medications: None       Portions of the record may have been created with voice recognition software  Occasional wrong word or "sound a like" substitutions may have occurred due to the inherent limitations of voice recognition software   Read the chart carefully and recognize, using context, where substitutions have occurred      Electronically signed by:  Becki Machuca

## 2018-07-09 NOTE — ED PROVIDER NOTES
History  Chief Complaint   Patient presents with    Dog Bite     Went to pet a dog on Saturday and it bit him  He was out on a walk  Did not get any information from owner  Baez mix  Bite to right hand below thumb     HPI   patient comes in for evaluation of swollen right hand  He went to pet and unknown dog that was on a leash and bit him in his right hand  Patient denies any fevers chills or discharge from the wounds  Patient denies any rabies vaccination  Patient otherwise denies fevers chills sweats  Denies any history of diabetes  Prior to Admission Medications   Prescriptions Last Dose Informant Patient Reported? Taking? albuterol (PROVENTIL HFA) 90 mcg/act inhaler 7/9/2018 at 0900  No Yes   Sig: Inhale 2 puffs every 6 (six) hours as needed for wheezing or shortness of breath   sertraline (ZOLOFT) 50 mg tablet 7/9/2018 at 0900  No Yes   Sig: Take 1 tablet (50 mg total) by mouth daily      Facility-Administered Medications: None       Past Medical History:   Diagnosis Date    Asthma     Cardiac disease     [facility] mellitus ([facility] [address] )     Hypertension        Past Surgical History:   Procedure Laterality Date    CARPAL TUNNEL RELEASE      KNEE ARTHROSCOPY Bilateral     TOTAL HIP ARTHROPLASTY Left        Family History   Problem Relation Age of Onset    Cancer Sister      I have reviewed and agree with the history as documented  Social History   Substance Use Topics    Smoking status: Former Smoker    Smokeless tobacco: Never Used    Alcohol use Yes      Comment: occasionally        Review of Systems   Constitutional: Negative  HENT: Negative  Eyes: Negative  Respiratory: Negative  Cardiovascular: Negative  Gastrointestinal: Negative  Endocrine: Negative  Genitourinary: Negative  Musculoskeletal: Positive for joint swelling and myalgias  Skin: Positive for wound  Allergic/Immunologic: Negative  Neurological: Negative  Hematological: Negative  Psychiatric/Behavioral: Negative  Physical Exam  ED Triage Vitals [07/09/18 1029]   Temperature Pulse Respirations Blood Pressure SpO2   97 9 °F (36 6 °C) 75 18 138/64 97 %      Temp Source Heart Rate Source Patient Position - Orthostatic VS BP Location FiO2 (%)   Tympanic Monitor Sitting Left arm --      Pain Score       8           Orthostatic Vital Signs  Vitals:    07/09/18 1029   BP: 138/64   Pulse: 75   Patient Position - Orthostatic VS: Sitting       Physical Exam   Constitutional: He is oriented to person, place, and time  He appears well-developed and well-nourished  No distress  HENT:   Head: Normocephalic and atraumatic  Right Ear: External ear normal    Left Ear: External ear normal    Mouth/Throat: Oropharynx is clear and moist    Eyes: Conjunctivae and EOM are normal  Pupils are equal, round, and reactive to light  Right eye exhibits no discharge  Left eye exhibits no discharge  No scleral icterus  Neck: Normal range of motion  Neck supple  No tracheal deviation present  No thyromegaly present  Cardiovascular: Normal rate, regular rhythm and intact distal pulses  Exam reveals no gallop and no friction rub  No murmur heard  Pulmonary/Chest: Effort normal and breath sounds normal  No stridor  No respiratory distress  He has no wheezes  He has no rales  Abdominal: Soft  Bowel sounds are normal  He exhibits no distension  There is no tenderness  There is no rebound and no guarding  Musculoskeletal: He exhibits edema and tenderness  He exhibits no deformity  Right hand is red hot and tender  There is a puncture on the right thenar eminence as well as dorsum of the right hand just adjacent to the thumb, there is no induration or discharge  Neurological: He is alert and oriented to person, place, and time  No cranial nerve deficit  Skin: Skin is warm and dry  No rash noted  He is not diaphoretic  No erythema  Psychiatric: He has a normal mood and affect   His behavior is normal  Thought content normal    Nursing note and vitals reviewed  ED Medications  Medications   tetanus-diphtheria-acellular pertussis (BOOSTRIX) IM injection 0 5 mL (0 5 mL Intramuscular Given 7/9/18 1233)   rabies vaccine, chick embryo (RABAVERT) IM injection 1 mL (1 mL Intramuscular Given 7/9/18 1232)   rabies immune globulin, human (IMOGAM RABIES-HT) IM injection 2,280 Units (2,280 Units Infiltration Given 7/9/18 1245)       Diagnostic Studies  Results Reviewed     None                 No orders to display         Procedures  Procedures      Phone Consults  ED Phone Contact    ED Course       Tetanus was updated, patient received the 1st dose of rabies vaccination, rabies immunoglobulin was injected around the wounds as well as remainder given an IM injections  Return precautions were discussed the patient verbalized understanding patient was discharged  MDM  Number of Diagnoses or Management Options  Cellulitis:   Dog bite:   Rabies exposure:   Diagnosis management comments: This 1year-old man comes in with red hot swollen and he to his right hand  Will treat with Augmentin  Will also treat for rabies exposure given patient does not know the dog and cannot find the dog  The    - The patient has been exposed to rabies and has never been vaccinated against rabies  Therefore the patient should get 4 doses of rabies vaccine - one dose right away, and additional doses on the 3rd, 7th, and 14th days  (Rabavert, 1 Kit)  Four doses (1 mL each) of either HDCV or PCECV vaccine should be administered on days 0, 3, 7, and 14  The first dose should be administered as soon as possible after exposure  It should be given intramuscularly into the deltoid muscle of adults  In children, it should be administered into either the deltoid muscle or the anterolateral aspect of the thigh  Will not use the gluteal region, because this could result in a decreased immunologic response       - They should also get Rabies Immune Globulin at the same time as the first dose (Rabies immunoglobulin; 20 IU/kg)  If not immediately available, the HRIG should be administered as soon as it becomes available up until and including day 7 of treatment  Concentrate as much of the dose as possible in and around the wound (if wound location allows)  The remaining HRIG should be administered intramuscularly at a site distance from the vaccine administration  Case reports have documented safe administration of HRIG and HDCV during pregnancy  patient will follow up in 3 days  CritCare Time    Disposition  Final diagnoses:   Rabies exposure   Dog bite   Cellulitis     Time reflects when diagnosis was documented in both MDM as applicable and the Disposition within this note     Time User Action Codes Description Comment    7/9/2018 12:35 PM Kathlynn Brain Add [Z20 3] Rabies exposure     7/9/2018 12:35 PM Kathlynn Brain Add Marry Zuritaon  1OLR] Dog bite     7/9/2018 12:35 PM Kathlynn Brain Add [L03 90] Cellulitis       ED Disposition     ED Disposition Condition Comment    Discharge  Nini Colbert discharge to home/self care      Condition at discharge: Stable        Follow-up Information     Follow up With Specialties Details Why 1503 [facility] Emergency Department Emergency Medicine Go in 3 days for second dose of rabies vaccine [address] [facility] 809 [facility] ED, [address] [place] [address], [id number]          Discharge Medication List as of 7/9/2018 12:39 PM      START taking these medications    Details   amoxicillin-clavulanate (AUGMENTIN) 875-125 mg per tablet Take 1 tablet by mouth 2 (two) times a day for 7 days, Starting Mon 7/9/2018, Until Mon 7/16/2018, Print         CONTINUE these medications which have NOT CHANGED    Details   albuterol (PROVENTIL HFA) 90 mcg/act inhaler Inhale 2 puffs every 6 (six) hours as needed for wheezing or shortness of breath, Starting Sun 6/24/2018, Print      sertraline (ZOLOFT) 50 mg tablet Take 1 tablet (50 mg total) by mouth daily, Starting Mon 7/9/2018, Normal           No discharge procedures on file  ED Provider  Attending physically available and evaluated Wynetta Cowden I managed the patient along with the ED Attending      Electronically Signed by         Claudia Valenzuela MD  07/12/18 6625

## 2018-07-10 ENCOUNTER — APPOINTMENT (OUTPATIENT)
Dept: LAB | Facility: CLINIC | Age: 63
End: 2018-07-10
Payer: MEDICARE

## 2018-07-10 DIAGNOSIS — E66.9 OBESITY (BMI 30-39.9): ICD-10-CM

## 2018-07-10 DIAGNOSIS — R73.03 PREDIABETES: ICD-10-CM

## 2018-07-10 LAB
CHOLEST SERPL-MCNC: 253 MG/DL (ref 50–200)
HDLC SERPL-MCNC: 46 MG/DL (ref 40–60)
LDLC SERPL CALC-MCNC: 182 MG/DL (ref 0–100)
NONHDLC SERPL-MCNC: 207 MG/DL
TRIGL SERPL-MCNC: 124 MG/DL

## 2018-07-10 PROCEDURE — 80061 LIPID PANEL: CPT

## 2018-07-10 PROCEDURE — 36415 COLL VENOUS BLD VENIPUNCTURE: CPT

## 2018-07-10 PROCEDURE — 83036 HEMOGLOBIN GLYCOSYLATED A1C: CPT

## 2018-07-11 LAB
EST. AVERAGE GLUCOSE BLD GHB EST-MCNC: 140 MG/DL
HBA1C MFR BLD: 6.5 % (ref 4.2–6.3)

## 2018-07-12 ENCOUNTER — HOSPITAL ENCOUNTER (EMERGENCY)
Facility: HOSPITAL | Age: 63
Discharge: HOME | End: 2018-07-12
Admitting: EMERGENCY MEDICINE
Payer: MEDICARE

## 2018-07-12 VITALS
WEIGHT: 251.32 LBS | SYSTOLIC BLOOD PRESSURE: 136 MMHG | TEMPERATURE: 97.6 F | OXYGEN SATURATION: 97 % | RESPIRATION RATE: 18 BRPM | BODY MASS INDEX: 37.11 KG/M2 | HEART RATE: 81 BPM | DIASTOLIC BLOOD PRESSURE: 80 MMHG
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
## 2018-07-12 DIAGNOSIS — Z23 NEED FOR RABIES VACCINATION: Primary | ICD-10-CM

## 2018-07-12 PROCEDURE — 99282 EMERGENCY DEPT VISIT SF MDM: CPT

## 2018-07-12 PROCEDURE — 90675 RABIES VACCINE IM: CPT | Performed by: PHYSICIAN ASSISTANT

## 2018-07-12 PROCEDURE — 90471 IMMUNIZATION ADMIN: CPT

## 2018-07-12 RX ORDER — AMOXICILLIN AND CLAVULANATE POTASSIUM 875; 125 MG/1; MG/1
1 TABLET, FILM COATED ORAL EVERY 12 HOURS SCHEDULED
Qty: 14 TABLET | Refills: 0 | Status: SHIPPED | OUTPATIENT
Start: 2018-07-12 | End: 2018-07-19

## 2018-07-12 RX ADMIN — RABIES VACCINE 1 ML: KIT at 09:21

## 2018-07-12 NOTE — ED PROVIDER NOTES
History  Chief Complaint   Patient presents with    Follow Up Rabies     here for second rabies vaccination      Patient is a 49-year-old male who presents for his 2nd rabies vaccination  He states was here 07/09/2018 after he was bit on the right hand by a dog  He had the rabies immune globulin as well as the vaccine  He was given a prescription for Augmentin  There is still some mild swelling noted surrounding the puncture wounds  No redness, warmth, bleeding or drainage  He states that he was told to return today for her 2nd rabies vaccination shot  He states that he did not get the antibiotics filled because they are too expensive  He denies any complications after the rabies vaccination  He denies fever, chills, nausea vomiting diarrhea, chest pain, shortness breath, abdominal pain, rash, numbness or weakness  He is right-handed  Prior to Admission Medications   Prescriptions Last Dose Informant Patient Reported? Taking? albuterol (PROVENTIL HFA) 90 mcg/act inhaler   No No   Sig: Inhale 2 puffs every 6 (six) hours as needed for wheezing or shortness of breath   amoxicillin-clavulanate (AUGMENTIN) 875-125 mg per tablet   No No   Sig: Take 1 tablet by mouth 2 (two) times a day for 7 days   sertraline (ZOLOFT) 50 mg tablet   No No   Sig: Take 1 tablet (50 mg total) by mouth daily      Facility-Administered Medications: None       Past Medical History:   Diagnosis Date    Asthma     Cardiac disease     Diabetes mellitus ([facility] [address] )     Hypertension        Past Surgical History:   Procedure Laterality Date    CARPAL TUNNEL RELEASE      KNEE ARTHROSCOPY Bilateral     TOTAL HIP ARTHROPLASTY Left        Family History   Problem Relation Age of Onset    Cancer Sister      I have reviewed and agree with the history as documented      Social History   Substance Use Topics    Smoking status: Former Smoker    Smokeless tobacco: Never Used    Alcohol use Yes      Comment: occasionally Review of Systems   Constitutional: Negative for chills and fever  Respiratory: Negative for shortness of breath  Cardiovascular: Negative for chest pain  Gastrointestinal: Negative for constipation, diarrhea and vomiting  Musculoskeletal: Positive for joint swelling  Negative for arthralgias  Skin: Positive for wound  Neurological: Negative for weakness and numbness  All other systems reviewed and are negative  Physical Exam  Physical Exam   Constitutional: He appears well-developed and well-nourished  No distress  HENT:   Head: Normocephalic and atraumatic  Right Ear: External ear normal    Left Ear: External ear normal    Nose: Nose normal    Cardiovascular: Normal rate, regular rhythm and normal heart sounds  Exam reveals no gallop and no friction rub  No murmur heard  Pulmonary/Chest: Effort normal and breath sounds normal  No respiratory distress  He has no wheezes  He has no rales  Musculoskeletal: Normal range of motion  Neurological: He is alert  Skin: Skin is warm and dry  Capillary refill takes less than 2 seconds  He is not diaphoretic  No erythema  Right hand with two puncture wounds noted at the base of the thumb  There is mild swelling without erythema, warmth, bleeding or drainage  NVI distally  Capillary refill intact  Radial pulse intact   strength intact  No sign of tenosynovitis  Psychiatric: He has a normal mood and affect  His behavior is normal    Nursing note and vitals reviewed        Vital Signs  ED Triage Vitals [07/12/18 0743]   Temperature Pulse Respirations Blood Pressure SpO2   97 6 °F (36 4 °C) 81 18 136/80 97 %      Temp Source Heart Rate Source Patient Position - Orthostatic VS BP Location FiO2 (%)   Oral Monitor Sitting Right arm --      Pain Score       --           Vitals:    07/12/18 0743   BP: 136/80   Pulse: 81   Patient Position - Orthostatic VS: Sitting       Visual Acuity      ED Medications  Medications   rabies vaccine, chick embryo (RABAVERT) IM injection 1 mL (1 mL Intramuscular Given 7/12/18 7057)       Diagnostic Studies  Results Reviewed     None                 No orders to display              Procedures  Procedures       Phone Contacts  ED Phone Contact    ED Course                               MDM  Number of Diagnoses or Management Options  Dog bite, initial encounter:   Need for rabies vaccination:   Diagnosis management comments: Plan will be to give the 2nd rabies vaccination shot here  Consulted with case management to get the patient's Augmentin prescription covered  He was given a voucher and prescription for the Augmentin and instructed to start taking immediately  Discussed return for 3rd rabies vaccination (day 7)  Return to the ED sooner if symptoms worsen or new symptoms arise  Patient states understanding and agrees with plan  Risk of Complications, Morbidity, and/or Mortality  Presenting problems: low  Diagnostic procedures: low  Management options: low    Patient Progress  Patient progress: stable    CritCare Time    Disposition  Final diagnoses:   Need for rabies vaccination   Dog bite, initial encounter     Time reflects when diagnosis was documented in both MDM as applicable and the Disposition within this note     Time User Action Codes Description Comment    7/12/2018  9:53 AM Americo Public Add [Z23] Need for rabies vaccination     7/12/2018  9:53 AM Americo Public Add Hermaphroditus Ran  0XXA] Dog bite, initial encounter       ED Disposition     ED Disposition Condition Comment    Discharge  iM Taylor discharge to home/self care      Condition at discharge: Good        Follow-up Information     Follow up With Specialties Details Why Contact Info Additional [address] Emergency Department Emergency Medicine  return on the 16th for your next rabies shot  [address] [place] [address] ED, [address] [place] [address], [address] Discharge Medication List as of 7/12/2018  9:54 AM      START taking these medications    Details   !! amoxicillin-clavulanate (AUGMENTIN) 875-125 mg per tablet Take 1 tablet by mouth every 12 (twelve) hours for 7 days, Starting Thu 7/12/2018, Until Thu 7/19/2018, Print       !! - Potential duplicate medications found  Please discuss with provider  CONTINUE these medications which have NOT CHANGED    Details   albuterol (PROVENTIL HFA) 90 mcg/act inhaler Inhale 2 puffs every 6 (six) hours as needed for wheezing or shortness of breath, Starting Sun 6/24/2018, Print      !! amoxicillin-clavulanate (AUGMENTIN) 875-125 mg per tablet Take 1 tablet by mouth 2 (two) times a day for 7 days, Starting Mon 7/9/2018, Until Mon 7/16/2018, Print      sertraline (ZOLOFT) 50 mg tablet Take 1 tablet (50 mg total) by mouth daily, Starting Mon 7/9/2018, Normal       !! - Potential duplicate medications found  Please discuss with provider  No discharge procedures on file      ED Provider  Electronically Signed by           mEa Rg PA-C  07/12/18 5811

## 2018-07-12 NOTE — SOCIAL WORK
CM received a consult that patient cannot afford the antibiotic medication  CM provided GoodRx discount prescription at preferred pharmacy, [facility]  Patient was instructed to give the prescription and discount print out to the pharmacy  Patient identified no other needs at this time

## 2018-07-12 NOTE — DISCHARGE INSTRUCTIONS
Mordedura de animal   LO QUE NECESITA SABER:   Las heridas resultantes de la mordedura de un animal pueden ser de un melisa superficial hasta heridas profundas que ponen en peligro la sabi  La mordedura de un animal puede cortar o perforar la piel  Puede arrancarle la piel del cuerpo  Banuelos piel se puede inflamar o estar amoratada, aún si la mordida no le abrió la piel  Estrellita Dash de animales son más frecuentes en las harman, los brazos, las piernas y el sravan  Estrellita Dash de perros y gatos son las más comunes  INSTRUCCIONES SOBRE EL ILYA HOSPITALARIA:   Regrese a la carlos de emergencias si:   · Usted tiene fiebre  · Banuelos herida está claritza, inflamada y drena pus  · Usted nota líneas nelson en la piel que rodea la herida  · Ya no puede  el área de la mordida  · Banuelos ritmo cardíaco y banuelos respiración son mucho más acelerados que de costumbre  · Se siente mareado y confundido  Pregúntele a banuelos Quenten Mighty vitaminas y minerales son adecuados para usted  · Banuelos dolor no mejora, incluso después de olvin el medicamento para el dolor  · Tiene pesadillas o recuerdos recurrentes sobre la mordida del animal      · Usted tiene preguntas o inquietudes acerca de banuelos condición o cuidado  Medicamentos:  Es posible que usted necesite alguno de los siguientes:  · Antibióticos  sirven para prevenir o tratar rivas infección bacteriana  · Un medicamento con receta para el dolor  podrían ser [place]  Pregunte cómo olvin estos medicamentos de rivas forma burden  · Rivas vacuna antitetánica  puede aplicarse para prevenir el tétano  El tétano es rivas infección bacterial letal que afecta los nervios y los músculos  La bacteria puede contagiarse por medio de mordeduras de [place]  · Rivas vacuna contra la fernando  puede necesitarse para prevenir la fernando  La fernando es rivas infección viral letal  El virus puede contagiarse por medio de mordeduras [place]  · [place] caterina medicamentos aleks se le haya indicado    Consulte con banuelos médico si usted merrill que banuelos medicamento no le está ayudando o si presenta efectos secundarios  Infórmele si es alérgico a algún medicamento  Mantenga rivas lista actualizada de los [place], las vitaminas y los productos herbales que ace  Incluya los siguientes datos de los medicamentos: cantidad, frecuencia y motivo de administración  Traiga con usted la lista o los envases de la píldoras a caterina citas de seguimiento  Lleve la lista de los medicamentos con usted en jordi de rivas emergencia  Acuda dentro de 1 o 2 días a la consulta de control con banuelos médico:  Es posible que usted necesite regresar para que le quiten los puntos de [place]  Anote caterina preguntas para que se acuerde de hacerlas jono caterina visitas  Cuidados personales:   · Aplique un ungüento antibiótico aleks se indica  [place] ayuda a prevenir rivas infección en las heridas menores de la piel  Está disponible sin receta médica  · Mantenga la herida limpia y [place]  Lave la herida todos los días con agua y jabón o rivas solución antiséptica  Pregúntele a banuelos médico sobre los tipos de vendaje que usted puede usar  · Aplique hielo a banuelos herida  El hielo ayuda a disminuir la inflamación y el dolor  El hielo también puede contribuir a evitar el daño de los tejidos  Use un paquete de hielo o ponga hielo molido dentro de [employer]  Cúbrala con rivas toalla y colóquela en banuelos herida por 15 a 20 minutos cada hora o según indicaciones  · Eleve el área de la herida  Eleve banuelos herida por encima del nivel de banuelos corazón tan a menudo aleks pueda  [place] va a disminuir inflamación y el dolor  Apoy[place] banuelos herida sobre almohadas o cobijas dobladas para mantenerla elevada y cómoda  Prevenga otra mordedura de animal:   · Aprenda a darse cuenta de cuando rivas mascota está asustada o enojada  No bia movimientos rápidos y bruscos  · No se meta entre animales que estén peleando  · No deje a un wendi pequeño solo con [employer]      · No moleste a un animal mientras come, duerme o cuida a caterina crías  · No se acerque a un animal que no conoce, especialmente si está amarrado o en [place]  · No se acerque a los [employer] estén enfermos o que actúen de El[place] extraña  · No alimente ni atrape animales salvajes  © 2017 [address] Information is for End User's use only and may not be sold, redistributed or otherwise used for commercial purposes  All illustrations and images included in CareNotes® are the copyrighted property of A D A M , Inc  or Clint Snyder  Esta información es sólo para uso en educación  [place] intención no es darle un consejo médico sobre enfermedades o tratamientos  Colsulte con hallman Ladean Artist farmacéutico antes de seguir cualquier régimen médico para saber si es seguro y efectivo para usted

## 2018-07-14 ENCOUNTER — HOSPITAL ENCOUNTER (EMERGENCY)
Facility: HOSPITAL | Age: 63
Discharge: HOME | End: 2018-07-14
Attending: EMERGENCY MEDICINE | Admitting: EMERGENCY MEDICINE
Payer: MEDICARE

## 2018-07-14 ENCOUNTER — APPOINTMENT (EMERGENCY)
Dept: RADIOLOGY | Facility: HOSPITAL | Age: 63
End: 2018-07-14
Payer: MEDICARE

## 2018-07-14 VITALS
SYSTOLIC BLOOD PRESSURE: 136 MMHG | OXYGEN SATURATION: 96 % | DIASTOLIC BLOOD PRESSURE: 74 MMHG | HEART RATE: 66 BPM | TEMPERATURE: 97.9 F | RESPIRATION RATE: 18 BRPM

## 2018-07-14 DIAGNOSIS — M25.50 ARTHRALGIA: Primary | ICD-10-CM

## 2018-07-14 DIAGNOSIS — M19.90 OSTEOARTHRITIS: ICD-10-CM

## 2018-07-14 PROCEDURE — 99284 EMERGENCY DEPT VISIT MOD MDM: CPT

## 2018-07-14 PROCEDURE — 96372 THER/PROPH/DIAG INJ SC/IM: CPT

## 2018-07-14 PROCEDURE — 73560 X-RAY EXAM OF KNEE 1 OR 2: CPT

## 2018-07-14 PROCEDURE — 73502 X-RAY EXAM HIP UNI 2-3 VIEWS: CPT

## 2018-07-14 RX ORDER — SENNOSIDES 8.6 MG
650 CAPSULE ORAL EVERY 8 HOURS PRN
Qty: 15 TABLET | Refills: 0 | Status: SHIPPED | OUTPATIENT
Start: 2018-07-14 | End: 2018-07-19

## 2018-07-14 RX ORDER — ACETAMINOPHEN 325 MG/1
650 TABLET ORAL ONCE
Status: COMPLETED | OUTPATIENT
Start: 2018-07-14 | End: 2018-07-14

## 2018-07-14 RX ORDER — KETOROLAC TROMETHAMINE 30 MG/ML
15 INJECTION, SOLUTION INTRAMUSCULAR; INTRAVENOUS ONCE
Status: COMPLETED | OUTPATIENT
Start: 2018-07-14 | End: 2018-07-14

## 2018-07-14 RX ADMIN — KETOROLAC TROMETHAMINE 15 MG: 30 INJECTION, SOLUTION INTRAMUSCULAR at 14:05

## 2018-07-14 RX ADMIN — ACETAMINOPHEN 650 MG: 325 TABLET, FILM COATED ORAL at 14:04

## 2018-07-14 NOTE — ED ATTENDING ATTESTATION
Valentine Zhu DO, saw and evaluated the patient  I have discussed the patient with the resident/non-physician practitioner and agree with the resident's/non-physician practitioner's findings, Plan of Care, and MDM as documented in the resident's/non-physician practitioner's note, except where noted  All available labs and Radiology studies were reviewed  At this point I agree with the current assessment done in the Emergency Department  I have conducted an independent evaluation of this patient a history and physical is as follows:    58 yo male presents for evaluation of acute onset sharp L sided knee pain which has been progressively worse over last 10 days which is when it started  Pain improves throughout the day  radiation of pain to L hip, fever, focal weakness/numbness/tingling  No other c/o at this time  Painful ROM L knee with +patellar grind  No skin color changes  Minimal if any L knee effusion  Imp: L knee pain, subacute plan: films, analgesia, reassess        Critical Care Time  CritCare Time    Procedures

## 2018-07-14 NOTE — DISCHARGE INSTRUCTIONS
Osteoartritis, cuidados ambulatorios   INFORMACIÓN GENERAL:   La osteoartritis  ocurre cuando el cartílago (tejido que amortigua rivas articulación) se desgasta lentamente y causa que los huesos rocen entre ellos  La osteoartritis es rivas condición que por lo general afecta las harman, cecilio, lumbago, rodillas y caderas  La osteoartritis también se conoce aleks artrosis o enfermedad degenerativa de las articulaciones  Los siguientes son los síntomas más comunes:   · Dolor en la articulación que empeora cuando usted mueve la articulación    · Rigidez en la articulación que disminuye después que usted mueve la articulación    · Disminución del rango de movimiento     · Un crecimiento lisette y huesudo de los dedos de las harman y pies    · Un celi de raspado o crujido cuando usted mueve banuelos articulación  Busque atención médica inmediatamente al presentar los siguientes síntomas:   · Dolor intenso    · Incapaz de  banuelos articulación  El tratamiento para la osteoartritis  puede incluir cualquiera de los siguientes:  · El acetaminofén  se [place] para disminuir el dolor  Está disponible sin receta médica  Pregunte cuál es la cantidad que debe olvin, así aleks la frecuencia  Siga indicaciones  El acetaminofén puede provocar daño al hígado al no tomarlo correctamente  · Los antiiflamatorios no esteroideos (AINEs) ayudan a reducir inflamación y dolor o [place]  Ida medicamento esta disponible con o sin rivas receta médica  Los AINEs podrían causar sangrado estomacal o problemas en los riñones en [employer]  Si usted esta tomando un anticoágulante, siempre  pregunte a banuelos proveedor de katherine si los AINEs son seguros para usted  Antes de [employer], mamta siempre la etiqueta de información y siga caterina indicaciones  · La crema de capsaicina  puede disminuir el dolor de caterina articulaciones      · El medicamento para el dolor con receta  puede ser administrado para disminuir el dolor intenso si otros medicamentos no funcionan  [place] indicaciones  No espere hasta que el dolor sea intenso para entonces olvin el medicamento  · Rivas inyección de esteroides  puede ser administrada si caterina síntomas empeoran  · La fisioterapia  puede ser Clement Rung por banuelos proveedor de [place]  Un fisioterapeuta le enseña los ejercicios para mejorar el rango de movimiento y la fortaleza con el fin de disminuir el dolor en las articulaciones  · Jaime Lessen cirugía  puede ser necesaria si otros tratamientos no funcionan  Controle banuelos osteoartritis   · Manténgase activo  La actividad física puede reducir el dolor y mejorar banuelos habilidad de hacer caterina actividades diarias  Evite actividades que causan dolor  Pregúntele a banuelos proveedor de katherine qué tipos de ejercicios son los adecuados para usted  · Mantenga un peso saludable  [place] ayuda a disminuir la presión en las articulaciones en banuelos espalda, caderas, rodillas, tobillos y pies  Pregúntele a banuelos proveedor de katherine cuál debería ser [employer]  [place] que le ayude a crear un plan para perder peso si usted tiene sobrepeso  · Aplique calor o hielo  en las articulaciones según indicaciones  El calor o el hielo pueden ayudar a disminuir el dolor, la inflamación y los espasmos musculares  Use rivas almohadilla eléctrica en temperatura baja o tome rivas ducha tibia  Use rivas compresa de hielo o coloque hielo bob en rivas bolsa plástica y cúbrala con irvas toalla  · Dé un masaje  a los músculos alrededor de la articulación para aliviar el dolor y la rigidez  · Use un bastón, muletas o un caminador  para proteger y aliviar la presión en banuelos Hailey Netters y articulaciones de la cadera  También le pueden ordenar plantillas ortopédicas para caterina zapatos para disminuir la presión de caterina articulaciones  · Use zapatos sin tacones o de tacones bajos  [place] ayudará a disminuir el dolor y reducirá la presión en banuelos Hailey Netters y articulaciones de caterina [address]    Programe rivas cesar con banuelos proveedor de katherine aleks se le haya indicado: Anote caterina preguntas para que se acuerde de hacerlas jono caterina visitas  ACUERDOS SOBRE ROMERO CUIDADO:   Usted tiene el derecho de participar en la planificación de romero cuidado  Aprenda todo lo que pueda sobre romero condición y aleks darle tratamiento  Discuta con caterina médicos caterina opciones de tratamiento para juntos decidir el cuidado que usted quiere recibir  Usted siempre tiene el derecho a rechazar romero tratamiento  Esta información es sólo para uso en educación  Romero intención no es darle un consejo médico sobre enfermedades o tratamientos  Colsulte con romero Elmo Peña farmacéutico antes de seguir cualquier régimen médico para saber si es seguro y efectivo para usted  © 2014 [address] is for End User's use only and may not be sold, redistributed or otherwise used for commercial purposes  All illustrations and images included in CareNotes® are the copyrighted property of A D A NAMRATA , Inc  or Clint Snyder

## 2018-07-14 NOTE — ED PROVIDER NOTES
History  Chief Complaint   Patient presents with    Hip Pain     L hip pain after getting out of bed  Swelling to L knee  S/P hip replacement  HPI     77-year-old male presenting with left hip pain and left knee pain  Patient states this has been going on for number of weeks  Patient does have history of gout  Patient states this feels like a flare of his arthritis  Patient states the pain is worse in the morning  Patient states the pain is better throughout the day  Pain is described as dull and achy pain in the morning and becomes more sharp when he attempts to move his knee  Patient states he also has left hip pain  Left hip pain is chronic  Patient has a left hip arthroplasty  Hip pain is lateral nature  It radiates down to his lateral thigh  The pain is global   Currently the pain while sitting in bed is 5/10 with attempting walk from is a 10/10  Patient has prior injections to his knee down in [facility]   Patient does have a history of CKD CAD and asthma  Patient admits the left knee swelling  Patient denies fever chills rigors headache lightheadedness dizziness chest pain palpitations shortness of breath cough pleurisy abdominal pain nausea vomiting diarrhea constipation urinary symptoms motor weakness numbness and tingling  Prior to Admission Medications   Prescriptions Last Dose Informant Patient Reported? Taking?    albuterol (PROVENTIL HFA) 90 mcg/act inhaler   No Yes   Sig: Inhale 2 puffs every 6 (six) hours as needed for wheezing or shortness of breath   amoxicillin-clavulanate (AUGMENTIN) 875-125 mg per tablet   No No   Sig: Take 1 tablet by mouth every 12 (twelve) hours for 7 days   sertraline (ZOLOFT) 50 mg tablet   No No   Sig: Take 1 tablet (50 mg total) by mouth daily      Facility-Administered Medications: None       Past Medical History:   Diagnosis Date    Asthma     Cardiac disease     Diabetes mellitus ([facility] [address] )     Hypertension        Past Surgical History: Procedure Laterality Date    CARPAL TUNNEL RELEASE      KNEE ARTHROSCOPY Bilateral     TOTAL HIP ARTHROPLASTY Left        Family History   Problem Relation Age of Onset    Cancer Sister      I have reviewed and agree with the history as documented  Social History   Substance Use Topics    Smoking status: Former Smoker    Smokeless tobacco: Never Used    Alcohol use Yes      Comment: occasionally        Review of Systems   Constitutional: Negative for activity change, appetite change, chills, diaphoresis, fatigue, fever and unexpected weight change  HENT: Negative for congestion, ear discharge, ear pain, facial swelling, hearing loss, nosebleeds, postnasal drip, rhinorrhea, sinus pressure, sneezing, sore throat, tinnitus and trouble swallowing  Eyes: Negative for photophobia, pain, redness, itching and visual disturbance  Respiratory: Negative for cough, chest tightness, shortness of breath, wheezing and stridor  Cardiovascular: Negative for chest pain, palpitations and leg swelling  Gastrointestinal: Negative for abdominal distention, abdominal pain, blood in stool, constipation, diarrhea, nausea and vomiting  Endocrine: Negative for polydipsia and polyuria  Genitourinary: Negative for decreased urine volume, difficulty urinating, dysuria, enuresis, flank pain, frequency, hematuria and urgency  Musculoskeletal: Positive for arthralgias and gait problem  Negative for back pain, joint swelling, myalgias, neck pain and neck stiffness  Skin: Negative for rash and wound  Allergic/Immunologic: Negative for immunocompromised state  Neurological: Negative for dizziness, seizures, syncope, facial asymmetry, speech difficulty, weakness, light-headedness, numbness and headaches  Hematological: Negative for adenopathy  Psychiatric/Behavioral: Negative for agitation, behavioral problems, confusion, dysphoric mood, hallucinations, self-injury and suicidal ideas   The patient is not nervous/anxious and is not hyperactive  Physical Exam  ED Triage Vitals   Temperature Pulse Respirations Blood Pressure SpO2   07/14/18 1201 07/14/18 1201 07/14/18 1201 07/14/18 1201 07/14/18 1201   97 9 °F (36 6 °C) 61 18 134/61 97 %      Temp Source Heart Rate Source Patient Position - Orthostatic VS BP Location FiO2 (%)   07/14/18 1201 07/14/18 1201 -- 07/14/18 1201 --   Tympanic Monitor  Left arm       Pain Score       07/14/18 1404       Worst Possible Pain           Orthostatic Vital Signs  Vitals:    07/14/18 1201 07/14/18 1409   BP: 134/61 136/74   Pulse: 61 66       Physical Exam   Constitutional: He is oriented to person, place, and time  He appears well-developed and well-nourished  No distress  HENT:   Head: Normocephalic and atraumatic  Right Ear: External ear normal    Left Ear: External ear normal    Nose: Nose normal    Mouth/Throat: Oropharynx is clear and moist  No oropharyngeal exudate  Eyes: Conjunctivae and EOM are normal  Pupils are equal, round, and reactive to light  Right eye exhibits no discharge  Left eye exhibits no discharge  No scleral icterus  Neck: Normal range of motion  Neck supple  No JVD present  No tracheal deviation present  No thyromegaly present  Cardiovascular: Normal rate, regular rhythm, normal heart sounds and intact distal pulses  No murmur heard  Pulmonary/Chest: Effort normal and breath sounds normal  No stridor  No respiratory distress  He has no wheezes  He has no rales  Abdominal: Soft  Bowel sounds are normal  He exhibits no distension and no mass  There is no tenderness  There is no rebound and no guarding  Musculoskeletal: Normal range of motion  He exhibits no edema or deformity  Right hip: He exhibits normal range of motion, normal strength, no tenderness, no bony tenderness, no swelling, no crepitus, no deformity and no laceration  Left hip: He exhibits tenderness   He exhibits normal range of motion, normal strength, no bony tenderness, no swelling, no crepitus, no deformity and no laceration  Right knee: He exhibits normal range of motion, no swelling, no effusion, no ecchymosis, no deformity, no laceration, no erythema, normal alignment, no LCL laxity, normal patellar mobility, no bony tenderness, normal meniscus and no MCL laxity  No tenderness found  No medial joint line, no lateral joint line, no MCL, no LCL and no patellar tendon tenderness noted  Left knee: He exhibits swelling and effusion  He exhibits normal range of motion, no ecchymosis, no deformity, no laceration, no erythema, normal alignment, no LCL laxity, normal patellar mobility, no bony tenderness, normal meniscus and no MCL laxity  Tenderness found  Medial joint line and lateral joint line tenderness noted  No MCL, no LCL and no patellar tendon tenderness noted  Cervical back: He exhibits normal range of motion, no tenderness, no bony tenderness and no swelling  Thoracic back: He exhibits normal range of motion, no tenderness and no bony tenderness  Lumbar back: He exhibits normal range of motion, no tenderness and no bony tenderness  Legs:  Calves are nontender bilaterally, Homans sign negative bilaterally, symmetrical calf diameter   Lymphadenopathy:     He has no cervical adenopathy  Neurological: He is alert and oriented to person, place, and time  No cranial nerve deficit  He exhibits normal muscle tone  Coordination normal    Skin: Skin is warm and dry  No rash noted  He is not diaphoretic  No erythema  Psychiatric: He has a normal mood and affect  His behavior is normal  Judgment and thought content normal    Nursing note and vitals reviewed        ED Medications  Medications   ketorolac (TORADOL) injection 15 mg (15 mg Intramuscular Given 7/14/18 1405)   acetaminophen (TYLENOL) tablet 650 mg (650 mg Oral Given 7/14/18 1404)       Diagnostic Studies  Results Reviewed     None                 XR hip/pelv 2-3 vws left if performed   ED Interpretation by Rebeka White DO (07/14 1606)   Intact hardware, no fracture      Final Result by Marilyn Field MD (07/15 2229)      No acute osseous abnormality  Satisfactory alignment and appearance of left total hip arthroplasty            Workstation performed: [id number]         XR knee 1 or 2 vw left   ED Interpretation by Rebeka White DO (07/14 1606)   Osteoarthritis, distal femur scarring       Final Result by Marilyn Field MD (07/15 8742)      There is evidence of a joint effusion without discrete fracture  Clinically warranted further evaluation with MRI could be performed  Workstation performed: [id number]               Procedures  Procedures      Phone Consults  ED Phone Contact    ED Course  ED Course as of Jul 15 1207   Sat Jul 14, 2018   1604 Pain feels better                                MDM  Number of Diagnoses or Management Options  Arthralgia:   Osteoarthritis:   Diagnosis management comments: 70-year-old male presenting with chief complaint of left knee pain  Patient has a history of osteoarthritis and gout  On exam vital signs are adequate  Patient has a mild joint effusion to left knee  Patient also has left hip pain  Exam consistent with arthritis, doubt gout at this time  Doubt septic arthritis  Joint effusion related to inflammatory arthropathy from advancing arthritis most likely, x-ray support this finding  Doubt ligamentous injury, knee is stable  This is atraumatic pain  No signs of DVT  Clinically excluded  Patient will follow up with Orthopedics  Patient given Toradol Tylenol, patient felt better  Patient given Tylenol for home, considered NSAIDs reviewed prior GFR 65 will try Tylenol at this time for therapy  Patient recommended follow up with Orthopedics and  physical therapy  Physical therapy prescription given from the emergency department      CritCare Time    Disposition  Final diagnoses:   Arthralgia Osteoarthritis     Time reflects when diagnosis was documented in both MDM as applicable and the Disposition within this note     Time User Action Codes Description Comment    7/14/2018  4:08 PM Robisharif Rafa Add [M25 50] Arthralgia     7/14/2018  4:08 PM Regan Ware 17 [M19 90] Osteoarthritis       ED Disposition     ED Disposition Condition Comment    Discharge  Francene Heimlich discharge to home/self care  Condition at discharge: Good    Return precautions were discussed with patient  Patient understands when to return to  Emergency department  Patient agrees to discharge plan and follow up care             Follow-up Information     Follow up With Specialties Details Why Contact Info Additional Information    Puneet Daniel MD Orthopedic Surgery Go in 1 week  [address] Emergency Department Emergency Medicine Go to As needed, If symptoms worsen [address]  [phone]  ED, [address] [place] [address], [id number]          Discharge Medication List as of 7/14/2018  4:10 PM      START taking these medications    Details   acetaminophen (TYLENOL) 650 mg CR tablet Take 1 tablet (650 mg total) by mouth every 8 (eight) hours as needed for mild pain for up to 5 days, Starting Sat 7/14/2018, Until Thu 7/19/2018, Print         CONTINUE these medications which have NOT CHANGED    Details   albuterol (PROVENTIL HFA) 90 mcg/act inhaler Inhale 2 puffs every 6 (six) hours as needed for wheezing or shortness of breath, Starting Sun 6/24/2018, Print      !! amoxicillin-clavulanate (AUGMENTIN) 875-125 mg per tablet Take 1 tablet by mouth every 12 (twelve) hours for 7 days, Starting Thu 7/12/2018, Until Thu 7/19/2018, Print      sertraline (ZOLOFT) 50 mg tablet Take 1 tablet (50 mg total) by mouth daily, Starting Mon 7/9/2018, Normal      !! amoxicillin-clavulanate (AUGMENTIN) 875-125 mg per tablet Take 1 tablet by mouth 2 (two) times a day for 7 days, Starting Mon 7/9/2018, Until Mon 7/16/2018, Print       !! - Potential duplicate medications found  Please discuss with provider  Outpatient Discharge Orders  Ambulatory referral to Physical Therapy   Standing Status: Future  Standing Exp  Date: 01/14/19         ED Provider  Attending physically available and evaluated Konstantin Ramirez I managed the patient along with the ED Attending      Electronically Signed by         Jonny North DO  07/15/18 7302

## 2018-07-15 ENCOUNTER — HOSPITAL ENCOUNTER (EMERGENCY)
Facility: HOSPITAL | Age: 63
Discharge: HOME | End: 2018-07-15
Attending: EMERGENCY MEDICINE
Payer: MEDICARE

## 2018-07-15 VITALS
RESPIRATION RATE: 16 BRPM | OXYGEN SATURATION: 99 % | BODY MASS INDEX: 37.66 KG/M2 | WEIGHT: 255 LBS | DIASTOLIC BLOOD PRESSURE: 63 MMHG | SYSTOLIC BLOOD PRESSURE: 114 MMHG | TEMPERATURE: 97.7 F | HEART RATE: 56 BPM

## 2018-07-15 DIAGNOSIS — M10.9 GOUT ATTACK: Primary | ICD-10-CM

## 2018-07-15 PROCEDURE — 99283 EMERGENCY DEPT VISIT LOW MDM: CPT

## 2018-07-15 RX ORDER — IBUPROFEN 600 MG/1
600 TABLET ORAL ONCE
Status: COMPLETED | OUTPATIENT
Start: 2018-07-15 | End: 2018-07-15

## 2018-07-15 RX ADMIN — IBUPROFEN 600 MG: 600 TABLET ORAL at 10:54

## 2018-07-15 NOTE — DISCHARGE INSTRUCTIONS
Gota   LO QUE NECESITA SABER:   La gota es un tipo de artritis que causa un intenso dolor y rigidez en las articulaciones  El dolor de gota aguda empieza repentinamente, se empeora rápidamente y cesa por banuelos Alejandro Favia  La gota aguda puede llegar a ser Genora Singh y causar daño permanente en las articulaciones  INSTRUCCIONES SOBRE EL ILYA HOSPITALARIA:   Regrese a la carlos de emergencias si:   · Usted tiene dolor intenso en rivas o más articulaciones que no puede tolerar  · Usted tiene fiebre o enrojecimiento que se propaga más allá del área de la articulación  Pregúntele a banuelos Abbott Foyer vitaminas y minerales son adecuados para usted  · Usted tiene síntomas nuevos, aleks sarpullido, después de comenzar el tratamiento para la gota  · El dolor e inflamación en banuelos articulación no se desaparece, aún después del tratamiento  · Usted no está orinando tanto o con la frecuencia con que suele hacerlo  · Usted tiene problemas para olvin caterina medicamentos para la gota  · Usted tiene preguntas o inquietudes acerca de banuelos condición o cuidado  Medicamentos:  Es posible que usted necesite alguno de los siguientes:  · Un medicamento con receta para el dolor  podrían ser Amarilys Sample  Pregunte al médico cómo debe olvin annita medicamento de forma burden  Algunos medicamentos recetados para el dolor contienen acetaminofén  No tome otros medicamentos que contengan acetaminofén sin consultarlo con banuelos médico  Demasiado acetaminofeno puede causar daño al hígado  Los medicamentos recetados para el dolor podrían causar estreñimiento  Pregunte a banuelos médico aleks prevenir o tratar estreñimiento  · AINEs (Analgésicos antiinflamatorios no esteroides) aleks el ibuprofeno, ayudan a disminuir la inflamación, el dolor y la [place]  Annita medicamento esta disponible con o sin rivas receta médica  Los AINEs pueden causar sangrado estomacal o problemas renales en ciertas personas   Si usted ace un medicamento anticoagulante, siempre pregúntele a banuelos médico si los [place] son seguros para usted  Siempre mamta la etiqueta de annita medicamento y [place] instrucciones  · El medicamento para la gota  disminuye el dolor e inflamación en las articulaciones  También se puede administrar para prevenir nuevos ataques de gota  · Esteroides  reducen la inflamación y pueden ayudarlo con la rigidez y el dolor en caterina articulaciones jono los ataques de [place]  · El medicamento para ácido úrico  puede administrarse para reducir la cantidad de ácido úrico que banuelos cuerpo produce  Algunos medicamentos pueden ayudar a eliminar más ácido úrico al [place]  · [place] caterina medicamentos aleks se le haya indicado  Consulte con banuelos médico si usted merrill que banuelos medicamento no le está ayudando o si presenta efectos secundarios  Infórmele si es alérgico a cualquier medicamento  Mantenga rivas lista actualizada de los [place], las vitaminas y los productos herbales que ace  Incluya los siguientes datos de los medicamentos: cantidad, frecuencia y motivo de administración  Traiga con usted la lista o los envases de la píldoras a caterina citas de seguimiento  Lleve la lista de los medicamentos con usted en jordi de rivas emergencia  Acuda a caterina consultas de control con banuelos médico según le indicaron  Anote caterina preguntas para que se acuerde de hacerlas jono caterina visitas  Controle la gota:   · Descanse banuelos articulación adolorida para que pueda recuperarse  Banueols médico podría recomendarle que use muletas o un caminador si la articulación afectada está en rivas pierna  · Aplique hielo a banuelos articulación  El hielo disminuye el dolor y la inflamación  Use un paquete de hielo o ponga hielo molido dentro de [employer]  [place] la bolsa o el paquete de hielo con rivas toalla antes de aplicarla en la articulación adolorida  Aplique hielo jono 15 a 20 minutos por hora o según indicaciones  · Eleve banuelos articulación  [place] le ayudará a disminuir la inflamación y el dolor   Eleve banuelos articulación por encima del nivel del corazón con la frecuencia que pueda  [place] banuelos articulación adolorida sobre almohadas para mantenerla cómodamente por encima del nivel del corazón  · Vaya a fisioterapia según le indicaron  Un fisioterapeuta le puede enseñar ejercicios para mejorar la flexibilidad y el rango de [place]  Prevenga ataques de gota:   · No consuma alimentos altos en purinas  Estos alimentos incluyen:amarilis, mariscos, espárragos, espinacas, coliflor, y algunos tipos de legumbres  Puede que los [employer] indiquen que coma más productos lácteos bajos en grasa, aleks el yogur  Los productos lácteos pueden disminuir el riesgo de presentar ataques de [place]  La vitamina C y el café también puedan ayudar  Banuelos médico o un dietista puede ayudarle a crear un plan de comidas  · [employer] líquidos aleks se le indique  Los líquidos aleks el agua ayudan a eliminar el ácido úrico del cuerpo  Pregunte cuánto líquido debe olvin cada día y cuáles líquidos son los más adecuados para usted  · Controle banuelos peso  Bajar de peso puede disminuir la cantidad de ácido úrico en banuelos cuerpo  El ejercicio lo puede ayudar con la pérdida de [place]  Consulte con banuelos médico acerca de los ejercicios adecuados para usted  · Controle caterina niveles de azúcar en la pramod si usted tiene diabetes  Mantenga el nivel de azúcar en banuelos pramod en un margen normal  [place] puede ayudar a prevenir ataques de gota  · Limite o no consuma bebidas alcohólicas, según indicaciones  El alcohol puede provocar un ataque de [place]  El alcohol también aumenta el riesgo de presentar deshidratación  Pregúntele a banuelos médico si usted puede olvin alcohol  © 2017 [address]  Information is for End User's use only and may not be sold, redistributed or otherwise used for commercial purposes  All illustrations and images included in CareNotes® are the copyrighted property of A D A M , Inc  or Thermalin Diabetes    Esta información es sólo para uso en educación  Banuelos intención no es darle un consejo médico sobre enfermedades o tratamientos  Colsulte con banuelos Dewain Racer farmacéutico antes de seguir cualquier régimen médico para saber si es seguro y efectivo para usted

## 2018-07-15 NOTE — ED PROVIDER NOTES
History  Chief Complaint   Patient presents with    Knee Pain     per EMS "left knee paIn, was seen treated and released yesterday for the same"     Pt is a 56 yo M w/ PMH of gout presenting to the ED today for left knee pain, the pain started yesterday and he was seen here in the ED where he was given toradol/tyleno which he states did not help at all  Patient states he has a long history of gout when he was living in [facility] and this feels exactly the same and is in the same location as where he always used to get his attacks  The patient states that he moved to the [facility] approximately 2 months ago and stopped taking his allopurinol a little before moving here because he doesn't have a doctor and therefore does not have a prescription for it anymore, he states that it maintained him well there  When seen yesterday in the ED he was given toradol/tylenol and states that it really did not help him very much with the pain, he has an appointment with Ortho on the 20th        History provided by:  Medical records and patient   used: Yes Rowan Parra, [id number])        Prior to Admission Medications   Prescriptions Last Dose Informant Patient Reported?  Taking?   acetaminophen (TYLENOL) 650 mg CR tablet 7/14/2018 at Unknown time  No Yes   Sig: Take 1 tablet (650 mg total) by mouth every 8 (eight) hours as needed for mild pain for up to 5 days   albuterol (PROVENTIL HFA) 90 mcg/act inhaler Unknown at Unknown time  No No   Sig: Inhale 2 puffs every 6 (six) hours as needed for wheezing or shortness of breath   amoxicillin-clavulanate (AUGMENTIN) 875-125 mg per tablet 7/14/2018 at Unknown time  No Yes   Sig: Take 1 tablet by mouth every 12 (twelve) hours for 7 days   sertraline (ZOLOFT) 50 mg tablet 7/14/2018 at Unknown time  No Yes   Sig: Take 1 tablet (50 mg total) by mouth daily      Facility-Administered Medications: None       Past Medical History:   Diagnosis Date    Asthma     Cardiac disease     Diabetes mellitus ([facility] [address] )     Hypertension        Past Surgical History:   Procedure Laterality Date    CARPAL TUNNEL RELEASE      KNEE ARTHROSCOPY Bilateral     TOTAL HIP ARTHROPLASTY Left        Family History   Problem Relation Age of Onset    Cancer Sister      I have reviewed and agree with the history as documented  Social History   Substance Use Topics    Smoking status: Former Smoker    Smokeless tobacco: Never Used    Alcohol use Yes      Comment: occasionally        Review of Systems   Constitutional: Negative for appetite change, chills, diaphoresis, fatigue and fever  HENT: Negative for congestion, ear discharge, ear pain, nosebleeds, postnasal drip, rhinorrhea, sinus pain, sinus pressure and sore throat  Eyes: Negative for pain  Respiratory: Negative for apnea, cough, chest tightness, shortness of breath, wheezing and stridor  Cardiovascular: Negative for chest pain, palpitations and leg swelling  Gastrointestinal: Negative for abdominal distention, abdominal pain, anal bleeding, constipation, diarrhea, nausea and vomiting  Musculoskeletal: Positive for arthralgias, gait problem and joint swelling  Negative for back pain, myalgias, neck pain and neck stiffness  Skin: Negative for color change, pallor and wound  Neurological: Negative for dizziness, weakness, light-headedness, numbness and headaches  Physical Exam  ED Triage Vitals [07/15/18 0758]   Temperature Pulse Respirations Blood Pressure SpO2   97 7 °F (36 5 °C) 66 18 114/55 100 %      Temp Source Heart Rate Source Patient Position - Orthostatic VS BP Location FiO2 (%)   Oral Monitor -- Right arm --      Pain Score       7           Orthostatic Vital Signs  Vitals:    07/15/18 0930 07/15/18 0945 07/15/18 1000 07/15/18 1015   BP:       Pulse: 58 58 (!) 54 (!) 54       Physical Exam   Constitutional: He appears well-developed and well-nourished  No distress  HENT:   Head: Normocephalic and atraumatic     Right Ear: External ear normal    Left Ear: External ear normal    Nose: Nose normal    Eyes: Conjunctivae are normal  Right eye exhibits no discharge  Left eye exhibits no discharge  No scleral icterus  Neck: Normal range of motion  No tracheal deviation present  Cardiovascular: Normal rate, regular rhythm, normal heart sounds and intact distal pulses  Exam reveals no gallop and no friction rub  No murmur heard  Pulses:       Dorsalis pedis pulses are 2+ on the left side  Posterior tibial pulses are 2+ on the left side  Pulmonary/Chest: Effort normal and breath sounds normal  No respiratory distress  He has no wheezes  He has no rales  He exhibits no tenderness  Abdominal: Soft  Bowel sounds are normal  He exhibits no distension and no mass  There is no tenderness  There is no guarding  Musculoskeletal: He exhibits tenderness  He exhibits no deformity  Left knee: He exhibits decreased range of motion (due to pain), swelling and bony tenderness  He exhibits no effusion, no ecchymosis, no deformity, no laceration and no erythema  Tenderness found  No medial joint line, no lateral joint line, no MCL, no LCL and no patellar tendon tenderness noted  Feet:   Right Foot:   Skin Integrity: Negative for ulcer, blister, skin breakdown or erythema  Left Foot:   Skin Integrity: Negative for ulcer, blister, skin breakdown or erythema  Neurological: He is alert  No sensory deficit  Skin: Skin is warm and dry  He is not diaphoretic  No erythema  Psychiatric: He has a normal mood and affect   His behavior is normal  Judgment and thought content normal        ED Medications  Medications   ibuprofen (MOTRIN) tablet 600 mg (not administered)       Diagnostic Studies  Results Reviewed     None                 No orders to display         Procedures  Procedures      Phone Consults  ED Phone Contact    ED Course                               MDM  Number of Diagnoses or Management Options  Gout attack:   Risk of Complications, Morbidity, and/or Mortality  Presenting problems: low  Diagnostic procedures: low  Management options: low  General comments: Pt presenting to ed after being seen yesterday in the ED for  Gouty attack, patient given 600mg ibuprofen and knee immobilizer  Has follow-up tomorrow with [facility] ortho kalao-kira 7/21    Patient Progress  Patient progress: stable    CritCare Time    Disposition  Final diagnoses:   Gout attack     Time reflects when diagnosis was documented in both MDM as applicable and the Disposition within this note     Time User Action Codes Description Comment    7/15/2018 10:27 AM Richie Amaya Add [M10 9] Gout attack       ED Disposition     ED Disposition Condition Comment    Discharge  Francene Heimlich discharge to home/self care  Condition at discharge: Stable        Follow-up Information     Follow up With Specialties Details Why Contact Info Additional [address] Emergency Department Emergency Medicine  If symptoms worsen, As needed 1982 On license of [facility] ED, [facility]  Schedule an appointment as soon as possible for a visit in 2 days  [facility] [address] [place] [address] [address]           Patient's Medications   Discharge Prescriptions    No medications on file     No discharge procedures on file  ED Provider  Attending physically available and evaluated Francene Heimlich I managed the patient along with the ED Attending      Electronically Signed by         Karina Keene DO  07/15/18 3161

## 2018-07-15 NOTE — ED ATTENDING ATTESTATION
Robert Gannon MD, saw and evaluated the patient  I have discussed the patient with the resident/non-physician practitioner and agree with the resident's/non-physician practitioner's findings, Plan of Care, and MDM as documented in the resident's/non-physician practitioner's note, except where noted  All available labs and Radiology studies were reviewed  At this point I agree with the current assessment done in the Emergency Department  I have conducted an independent evaluation of this patient including a focused history and a physical exam       77-year-old male, history of gout, in the past has taken allopurinol, colchicine, indomethacin for his gout, presents to the emergency department for a  1 day history of left knee pain  Patient states that this is the same knee that he typically gets gouty flares in  Patient was seen in the emergency department yesterday, was evaluated with an x-ray that demonstrated an infusion but no acute bony abnormalities, was treated in the emergency department with Toradol and prescribed Tylenol  Patient states that he had not gotten his prescription filled  Patient states that he in the past had not been able to get allopurinol, colchicine, indomethacin because of his Medicaid insurance not covering it  Ten systems reviewed negative except as noted in the history of present illness  On examination patient is lying comfortably in the stretcher no acute distress  Examination of the left lower extremity reveals intact dorsalis pedis and posterior tibial pulses  Sensation and motor in the left lower extremity is intact  The patient has a moderate left knee effusion with limited range of motion of the left knee joint secondary to pain  There is mild warmth of the left knee  There is no overlying erythema  Given history of previous gouty flares with this presenting with similar presentation to gouty flares, this likely represents an acute flare of gout    We contacted [facility] pharmacy to see about getting appropriate prescriptions for the patient, and they stated that they had no insurance information on file  Patient will be given a prescription for ibuprofen with instructions to follow up in the [address]

## 2018-07-18 ENCOUNTER — HOSPITAL ENCOUNTER (EMERGENCY)
Facility: HOSPITAL | Age: 63
Discharge: HOME | End: 2018-07-18
Attending: EMERGENCY MEDICINE
Payer: MEDICARE

## 2018-07-18 VITALS
WEIGHT: 254 LBS | DIASTOLIC BLOOD PRESSURE: 75 MMHG | OXYGEN SATURATION: 99 % | HEART RATE: 80 BPM | RESPIRATION RATE: 18 BRPM | SYSTOLIC BLOOD PRESSURE: 115 MMHG | BODY MASS INDEX: 37.51 KG/M2 | TEMPERATURE: 98.8 F

## 2018-07-18 DIAGNOSIS — M10.9 GOUT ATTACK: Primary | ICD-10-CM

## 2018-07-18 LAB — GLUCOSE SERPL-MCNC: 98 MG/DL (ref 65–140)

## 2018-07-18 PROCEDURE — 82948 REAGENT STRIP/BLOOD GLUCOSE: CPT

## 2018-07-18 PROCEDURE — 96372 THER/PROPH/DIAG INJ SC/IM: CPT

## 2018-07-18 PROCEDURE — 99284 EMERGENCY DEPT VISIT MOD MDM: CPT

## 2018-07-18 RX ORDER — BLOOD-GLUCOSE METER
EACH MISCELLANEOUS DAILY
Qty: 1 EACH | Refills: 0 | Status: SHIPPED | OUTPATIENT
Start: 2018-07-18 | End: 2019-03-08

## 2018-07-18 RX ORDER — NAPROXEN 375 MG/1
375 TABLET ORAL 2 TIMES DAILY WITH MEALS
Qty: 14 TABLET | Refills: 0 | Status: SHIPPED | OUTPATIENT
Start: 2018-07-18 | End: 2018-10-03

## 2018-07-18 RX ORDER — PREDNISONE 10 MG/1
40 TABLET ORAL DAILY
Qty: 16 TABLET | Refills: 0 | Status: SHIPPED | OUTPATIENT
Start: 2018-07-18 | End: 2018-09-18

## 2018-07-18 RX ORDER — LANCETS
EACH MISCELLANEOUS
Qty: 100 EACH | Refills: 0 | Status: SHIPPED | OUTPATIENT
Start: 2018-07-18 | End: 2019-03-08

## 2018-07-18 RX ORDER — KETOROLAC TROMETHAMINE 30 MG/ML
15 INJECTION, SOLUTION INTRAMUSCULAR; INTRAVENOUS ONCE
Status: COMPLETED | OUTPATIENT
Start: 2018-07-18 | End: 2018-07-18

## 2018-07-18 RX ADMIN — KETOROLAC TROMETHAMINE 15 MG: 30 INJECTION, SOLUTION INTRAMUSCULAR at 15:04

## 2018-07-18 NOTE — DISCHARGE INSTRUCTIONS
Gota   LO QUE NECESITA SABER:   La gota es un tipo de artritis que causa un intenso dolor y rigidez en las articulaciones  El dolor de gota aguda empieza repentinamente, se empeora rápidamente y cesa por banuelos Manny Mele  La gota aguda puede llegar a ser Plummer Grates y causar daño permanente en las articulaciones  INSTRUCCIONES SOBRE EL ILYA HOSPITALARIA:   Regrese a la carlos de emergencias si:   · Usted tiene dolor intenso en rivas o más articulaciones que no puede tolerar  · Usted tiene fiebre o enrojecimiento que se propaga más allá del área de la articulación  Pregúntele a banuelos Pam Hernandez vitaminas y minerales son adecuados para usted  · Usted tiene síntomas nuevos, aleks sarpullido, después de comenzar el tratamiento para la gota  · El dolor e inflamación en banuelos articulación no se desaparece, aún después del tratamiento  · Usted no está orinando tanto o con la frecuencia con que suele hacerlo  · Usted tiene problemas para olvin caterina medicamentos para la gota  · Usted tiene preguntas o inquietudes acerca de banuelos condición o cuidado  Medicamentos:  Es posible que usted necesite alguno de los siguientes:  · Un medicamento con receta para el dolor  podrían ser Brett Resides  Pregunte al médico cómo debe olvin annita medicamento de forma burden  Algunos medicamentos recetados para el dolor contienen acetaminofén  No tome otros medicamentos que contengan acetaminofén sin consultarlo con banuelos médico  Demasiado acetaminofeno puede causar daño al hígado  Los medicamentos recetados para el dolor podrían causar estreñimiento  Pregunte a banuelos médico aleks prevenir o tratar estreñimiento  · AINEs (Analgésicos antiinflamatorios no esteroides) aleks el ibuprofeno, ayudan a disminuir la inflamación, el dolor y la [place]  Annita medicamento esta disponible con o sin rivas receta médica  Los AINEs pueden causar sangrado estomacal o problemas renales en ciertas personas   Si usted ace un medicamento anticoagulante, siempre pregúntele a banuelos médico si los [place] son seguros para usted  Siempre mamta la etiqueta de annita medicamento y [place] instrucciones  · El medicamento para la gota  disminuye el dolor e inflamación en las articulaciones  También se puede administrar para prevenir nuevos ataques de gota  · Esteroides  reducen la inflamación y pueden ayudarlo con la rigidez y el dolor en caterina articulaciones jono los ataques de [place]  · El medicamento para ácido úrico  puede administrarse para reducir la cantidad de ácido úrico que banuelos cuerpo produce  Algunos medicamentos pueden ayudar a eliminar más ácido úrico al Zaynab Sravan  · [place] caterina medicamentos aleks se le haya indicado  Consulte con banuelos médico si usted merrill que banuelos medicamento no le está ayudando o si presenta efectos secundarios  Infórmele si es alérgico a cualquier medicamento  Mantenga rivas lista actualizada de los [place], las vitaminas y los productos herbales que ace  Incluya los siguientes datos de los medicamentos: cantidad, frecuencia y motivo de administración  Traiga con usted la lista o los envases de la píldoras a caterina citas de seguimiento  Lleve la lista de los medicamentos con usted en jordi de rivas emergencia  Acuda a caterina consultas de control con banuelos médico según le indicaron  Anote caterina preguntas para que se acuerde de hacerlas jono caterina visitas  Controle la gota:   · Descanse banuelos articulación adolorida para que pueda recuperarse  Banuelos médico podría recomendarle que use muletas o un caminador si la articulación afectada está en rivas pierna  · Aplique hielo a banuelos articulación  El hielo disminuye el dolor y la inflamación  Use un paquete de hielo o ponga hielo molido dentro de [employer]  Theoplis Righter la bolsa o el paquete de hielo con rivas toalla antes de aplicarla en la articulación adolorida  Aplique hielo jono 15 a 20 minutos por hora o según indicaciones  · Eleve banuelos articulación  [place] le ayudará a disminuir la inflamación y el dolor   Eleve banuelos articulación por encima del nivel del corazón con la frecuencia que pueda  [place] banuelos articulación adolorida sobre almohadas para mantenerla cómodamente por encima del nivel del corazón  · Vaya a fisioterapia según le indicaron  Un fisioterapeuta le puede enseñar ejercicios para mejorar la flexibilidad y el rango de [place]  Prevenga ataques de gota:   · No consuma alimentos altos en purinas  Estos alimentos incluyen:amarilis, mariscos, espárragos, espinacas, coliflor, y algunos tipos de legumbres  Puede que los [employer] indiquen que coma más productos lácteos bajos en grasa, aleks el yogur  Los productos lácteos pueden disminuir el riesgo de presentar ataques de [place]  La vitamina C y el café también puedan ayudar  Banuelos médico o un dietista puede ayudarle a crear un plan de comidas  · [employer] líquidos aleks se le indique  Los líquidos aleks el agua ayudan a eliminar el ácido úrico del cuerpo  Pregunte cuánto líquido debe olvin cada día y cuáles líquidos son los más adecuados para usted  · Controle banuelos peso  Bajar de peso puede disminuir la cantidad de ácido úrico en banuelos cuerpo  El ejercicio lo puede ayudar con la pérdida de [place]  Consulte con banuelos médico acerca de los ejercicios adecuados para usted  · Controle caterina niveles de azúcar en la pramod si usted tiene diabetes  Mantenga el nivel de azúcar en banuelos pramod en un margen normal  [place] puede ayudar a prevenir ataques de gota  · Limite o no consuma bebidas alcohólicas, según indicaciones  El alcohol puede provocar un ataque de [place]  El alcohol también aumenta el riesgo de presentar deshidratación  Pregúntele a banuelos médico si usted puede olvin alcohol  © 2017 [address] Richie Chen Information is for End User's use only and may not be sold, redistributed or otherwise used for commercial purposes  All illustrations and images included in CareNotes® are the copyrighted property of A ULISES A M , Inc  or Clint Snyder    Esta información es sólo para uso en educación  Banuelos intención no es darle un consejo médico sobre enfermedades o tratamientos  Colsulte con banuelos Alben Edil farmacéutico antes de seguir cualquier régimen médico para saber si es seguro y efectivo para usted

## 2018-07-18 NOTE — ED PROVIDER NOTES
History  Chief Complaint   Patient presents with    Gout Pain     Patient reports he was seen on Saturday and Sunday for gout pain in E, states he was told to follow up with his PCP but if he could not get appointment to come back, patient could not get appointment with PCP, patient also could not get his prescription filled       Knee Pain   Location:  Knee  Time since incident:  5 days  Injury: no    Knee location:  L knee  Pain details:     Quality:  Aching    Radiates to:  Does not radiate    Severity:  Moderate    Onset quality:  Gradual    Duration:  5 days    Timing:  Constant    Progression:  Worsening  Chronicity:  Recurrent  Dislocation: no    Prior injury to area:  No  Relieved by:  None tried  Worsened by:  Bearing weight and flexion  Ineffective treatments:  None tried  Associated symptoms: swelling    Associated symptoms: no back pain, no decreased ROM, no fatigue, no fever, no itching, no muscle weakness, no neck pain, no numbness, no stiffness and no tingling        Prior to Admission Medications   Prescriptions Last Dose Informant Patient Reported?  Taking?   acetaminophen (TYLENOL) 650 mg CR tablet   No No   Sig: Take 1 tablet (650 mg total) by mouth every 8 (eight) hours as needed for mild pain for up to 5 days   albuterol (PROVENTIL HFA) 90 mcg/act inhaler   No No   Sig: Inhale 2 puffs every 6 (six) hours as needed for wheezing or shortness of breath   amoxicillin-clavulanate (AUGMENTIN) 875-125 mg per tablet   No No   Sig: Take 1 tablet by mouth every 12 (twelve) hours for 7 days   sertraline (ZOLOFT) 50 mg tablet   No No   Sig: Take 1 tablet (50 mg total) by mouth daily      Facility-Administered Medications: None       Past Medical History:   Diagnosis Date    Asthma     Cardiac disease     Diabetes mellitus ([facility] [address] )     Hypertension        Past Surgical History:   Procedure Laterality Date    CARPAL TUNNEL RELEASE      KNEE ARTHROSCOPY Bilateral     TOTAL HIP ARTHROPLASTY Left Family History   Problem Relation Age of Onset    Cancer Sister      I have reviewed and agree with the history as documented  Social History   Substance Use Topics    Smoking status: Former Smoker    Smokeless tobacco: Never Used    Alcohol use Yes      Comment: occasionally        Review of Systems   Constitutional: Negative for activity change, appetite change, chills, fatigue and fever  HENT: Negative for ear pain, sneezing and sore throat  Eyes: Negative for pain and visual disturbance  Respiratory: Negative for cough and shortness of breath  Cardiovascular: Negative for chest pain and palpitations  Gastrointestinal: Negative for abdominal pain, blood in stool, constipation, diarrhea, nausea and vomiting  Genitourinary: Negative for dysuria and hematuria  Musculoskeletal: Negative for arthralgias, back pain, neck pain, neck stiffness and stiffness  Skin: Negative for itching, rash and wound  Neurological: Negative for dizziness, weakness, light-headedness, numbness and headaches  All other systems reviewed and are negative  Physical Exam  Physical Exam   Constitutional: He is oriented to person, place, and time  He appears well-developed and well-nourished  No distress  HENT:   Head: Normocephalic and atraumatic  Nose: Nose normal    Eyes: Conjunctivae and EOM are normal  Pupils are equal, round, and reactive to light  Neck: Normal range of motion  Neck supple  Cardiovascular: Normal rate, regular rhythm, normal heart sounds and intact distal pulses  Exam reveals no gallop and no friction rub  No murmur heard  Pulses:       Dorsalis pedis pulses are 2+ on the right side, and 2+ on the left side  Pulmonary/Chest: Effort normal and breath sounds normal  No respiratory distress  He has no wheezes  He has no rales  Abdominal: Soft  He exhibits no distension  There is no tenderness  Musculoskeletal: He exhibits tenderness  He exhibits no edema or deformity  Right hip: Normal         Left hip: Normal  He exhibits normal range of motion, normal strength, no tenderness and no bony tenderness  Right knee: Normal         Left knee: He exhibits swelling  He exhibits normal range of motion, no effusion, no ecchymosis, no erythema and no bony tenderness  Tenderness found  Right ankle: Normal         Left ankle: He exhibits decreased range of motion and swelling  Tenderness  Right upper leg: Normal         Left upper leg: Normal         Right lower leg: Normal         Left lower leg: Normal         Legs:       Feet:    Swelling noted to left suprapatellar pouch, no erythema or brusing noted  Joint not hot to touch  Mild swelling noted at left ankle  No swelling of calf  Limited ROM of left knee 2/2 pain  Lymphadenopathy:     He has no cervical adenopathy  Neurological: He is alert and oriented to person, place, and time  No cranial nerve deficit or sensory deficit  He exhibits normal muscle tone  Skin: Skin is warm and dry  Capillary refill takes less than 2 seconds  He is not diaphoretic  No erythema  No pallor  Nursing note and vitals reviewed        Vital Signs  ED Triage Vitals [07/18/18 1306]   Temperature Pulse Respirations Blood Pressure SpO2   98 8 °F (37 1 °C) 74 18 118/66 96 %      Temp Source Heart Rate Source Patient Position - Orthostatic VS BP Location FiO2 (%)   Tympanic Monitor Sitting Right arm --      Pain Score       9           Vitals:    07/18/18 1306 07/18/18 1508 07/18/18 1608   BP: 118/66 122/67 115/75   Pulse: 74 76 80   Patient Position - Orthostatic VS: Sitting Lying Sitting       Visual Acuity      ED Medications  Medications   ketorolac (TORADOL) injection 15 mg (15 mg Intramuscular Given 7/18/18 1504)       Diagnostic Studies  Results Reviewed     Procedure Component Value Units Date/Time    Fingerstick Glucose (POCT) [[id number]]  (Normal) Collected:  07/18/18 1506    Lab Status:  Final result Updated:  07/18/18 1508     POC Glucose 98 mg/dl                  No orders to display              Procedures  Procedures       Phone Contacts  ED Phone Contact    ED Course  ED Course as of Jul 19 1113   Wed Jul 18, 2018   1540  I spoke with provider who saw patient at last visit  He states that the discussed multiple options of treatment  The or unable to start colchicine as patient did not have insurance and was too expensive  They discussed steroids but also decided to not start that this time as well, there was a question about patient diabetes status  Patient was given ibuprofen to go home with which she did not start taking  Patient states that he has been having worsening pain                                MDM  Number of Diagnoses or Management Options  Gout attack: new and requires workup  Diagnosis management comments:   Patient is a 77-year-old male past medical history of gout, hypertension, hyperlipidemia,  Prediabetes presents to the emergency department for evaluation of gout pain  Patient has been seen twice this past week for the same pain  He states he was prescribed multiple medications but was not able to pick them up  He states that he has not taken anything for this pain  He states that when he woke up this morning could not get out of bed secondary to pain and was able to go to his PCP appointment to address it gout, he called ambulance come to the emergency department  Patient states that   The pain is the same just more severe and he has some swelling is of ankle that he did not have yesterday  He states in [place]  He would be treated with colchicine and allopurinol for preventative  He has been of allopurinol recently  No fevers, chills, night sweats  On exam patient does have swelling noted to left suprapatellar pouch  Mild tenderness palpation  Mild swelling noted left ankle  No swelling to left lower leg     After discussion with provider who saw patient during his last visit  And treatment options currently the patient we developed a plan  He would not like culture seen now he does not have insurance and is too expensive  We will treat with naproxen as NSAID and give patient short course of steroids  Upon chart review patient is only a prediabetic not a current diabetic  His random blood sugar was 98 with a recent fasting sugar in the 80s  However I will give patient a glucose testing kit to take home to test his blood sugar daily while on steroids  Patient should follow up with his PCP  I will give patient shot of Toradol in the emergency department to help with pain now  He will do naproxen twice a day for 5-7 days as well as steroids for 4 days  He should follow up with his PCP to start allopurinol again  Patient is comfortable with this plan and will follow up with his PCP  Risk of Complications, Morbidity, and/or Mortality  Presenting problems: low  Diagnostic procedures: low  Management options: low  General comments: Pt witnessed standing and able to wear weight and ambulate to wheelchair prior to discharge after toradol       CritCare Time    Disposition  Final diagnoses:   Gout attack     Time reflects when diagnosis was documented in both MDM as applicable and the Disposition within this note     Time User Action Codes Description Comment    7/18/2018  3:55 PM Rk Lee Add [M10 9] Gout attack       ED Disposition     ED Disposition Condition Comment    Discharge  Paralee Seed discharge to home/self care      Condition at discharge: Stable        Follow-up Information     Follow up With Specialties Details Why Contact Info Additional [address] Internal Medicine Schedule an appointment as soon as possible for a visit in 3 days ED follow up  [address] [place] [address] [address] [place] [phone] [address] Emergency Department Emergency Medicine  If symptoms worsen [address] [place] [address]  [phone]  ED, [address] [address], [place], [place], [address]          Discharge Medication List as of 7/18/2018  3:59 PM      START taking these medications    Details   Blood Glucose Monitoring Suppl (ONE TOUCH ULTRA 2) w/Device KIT by Does not apply route daily, Starting Wed 7/18/2018, Print      Lancets (ONETOUCH ULTRASOFT) lancets Use as instructed, Normal      naproxen (NAPROSYN) 375 mg tablet Take 1 tablet (375 mg total) by mouth 2 (two) times a day with meals for 7 days, Starting Wed 7/18/2018, Until Wed 7/25/2018, Print      predniSONE 10 mg tablet Take 4 tablets (40 mg total) by mouth daily, Starting Wed 7/18/2018, Print         CONTINUE these medications which have NOT CHANGED    Details   acetaminophen (TYLENOL) 650 mg CR tablet Take 1 tablet (650 mg total) by mouth every 8 (eight) hours as needed for mild pain for up to 5 days, Starting Sat 7/14/2018, Until Thu 7/19/2018, Print      albuterol (PROVENTIL HFA) 90 mcg/act inhaler Inhale 2 puffs every 6 (six) hours as needed for wheezing or shortness of breath, Starting Sun 6/24/2018, Print      amoxicillin-clavulanate (AUGMENTIN) 875-125 mg per tablet Take 1 tablet by mouth every 12 (twelve) hours for 7 days, Starting Thu 7/12/2018, Until Thu 7/19/2018, Print      sertraline (ZOLOFT) 50 mg tablet Take 1 tablet (50 mg total) by mouth daily, Starting Mon 7/9/2018, Normal           No discharge procedures on file      ED Provider  Electronically Signed by           Leelee Crocker PA-C  07/19/18 5834

## 2018-07-22 NOTE — PROGRESS NOTES
INTERNAL MEDICINE FOLLOW-UP OFFICE VISIT  [facility]  [address] [address]    NAME: Gricel Rosales  AGE: 57 y o  SEX: male    DATE OF ENCOUNTER: 7/23/2018    Assessment and Plan     1  CKD (chronic kidney disease) stage 2, GFR 60-89 ml/min  Order microalbumin at next visit as patient cannot afford lab testing at this time     2  Essential hypertension  Follow up BP at next visit   - lisinopril (ZESTRIL) 10 mg tablet; Take 1 tablet (10 mg total) by mouth daily  Dispense: 90 tablet; Refill: 0    3  Asthma  PFTs at future visit     4  Diabetes (HCC)  HgA1c 6 5  Recheck at next visit   - metFORMIN (GLUCOPHAGE-XR) 500 mg 24 hr tablet; Take 1 tablet (500 mg total) by mouth daily with dinner  Dispense: 90 tablet; Refill: 0    5  Dog bite  Continue to obtain vaccination series through ED    6  Depression  Patient unable to afford medications    to offer assistance and mental health options    Will closely follow at next visit   Any SI/HI patient to go to ED    7  Hyperlipidemia  Unable to afford high intensity statin, [facility] 4$ list is option at this point     - lovastatin (MEVACOR) 20 mg tablet; Take 1 tablet (20 mg total) by mouth daily at bedtime  Dispense: 90 tablet;  Refill: 0    Patient is to make follow up appointment for 5 weeks from now     No orders of the defined types were placed in this encounter       - Counseling Documentation: patient was counseled regarding: diagnostic results, instructions for management and risks and benefits of treatment options    Chief Complaint     Chief Complaint   Patient presents with    Follow-up     Manuel Allen paperwork"       History of Present Illness     15-year-old male with past medical history asthma, pre diabetes, and hypertension who recently moved to [place] from [facility]   Patient 1st presented to clinic 7/9/2018 follow-up from ED visit for asthma exacerbation which resolved with DuoNeb and Solu-Medrol  That time, patient noted that he received a dog bite  Patient was sent to ED to start rabies vaccination series as well as immunoglobulin  Additionally, patient described orthopnea, dyspnea on exertion, chest pain with minimal exertion, pillow orthopnea of 4 pillows, swelling of lower extremities  Patient also notes that he had pre diabetes 2 years ago and was taking metformin, complains of paresthesias in right lower extremity, polyuria, polydipsia as well as chronic visual changes  Today, He describes that his legs hurt due to his gout  Monday and Tuesday he was home in pain and returned to ED and received Toradol which helped his pain  Patient now notes improvement in pain following steroids and naprosyn  Reviewed labs with patient and discussed that he would benefit from multiple prescription medications   discussed with patient his prescription plan  At this time Will print prescriptions that are available on the [facility] list           The following portions of the patient's history were reviewed and updated as appropriate: allergies, current medications, past family history, past medical history, past social history, past surgical history and problem list     Review of Systems     Review of Systems   Constitutional: Negative for chills, diaphoresis and fever  HENT: Negative for sore throat and trouble swallowing  Eyes: Negative for photophobia and visual disturbance  Respiratory: Positive for shortness of breath  Negative for wheezing  Cardiovascular: Negative for chest pain and palpitations  Gastrointestinal: Negative for abdominal distention, nausea and vomiting  Genitourinary: Negative for difficulty urinating  Musculoskeletal: Positive for arthralgias, gait problem, joint swelling and myalgias  Skin: Negative for rash and wound  Neurological: Negative for dizziness and headaches         Active Problem List     Patient Active Problem List   Diagnosis    Essential hypertension    Diabetes ([facility] [address] )    Asthma    CKD (chronic kidney disease) stage 2, GFR 60-89 ml/min    Obesity (BMI 30-39  9)    Prediabetes    Hyperlipidemia    Depression    Dog bite       Objective     /86 (BP Location: Left arm, Patient Position: Sitting, Cuff Size: Adult)   Pulse 60   Temp 98 °F (36 7 °C) (Oral)   Ht 5' 9 5" (1 765 m)   Wt 116 kg (255 lb 11 7 oz)   BMI 37 22 kg/m²     Physical Exam   Constitutional: He appears well-developed and well-nourished  No distress  HENT:   Head: Normocephalic and atraumatic  Mouth/Throat: No oropharyngeal exudate  Eyes: EOM are normal  No scleral icterus  B/l injected conjunctivae    Cardiovascular: Normal rate and regular rhythm  No murmur heard  Pulmonary/Chest: Effort normal  He has no wheezes  He has no rales  Abdominal: Soft  Bowel sounds are normal  He exhibits no distension  There is no tenderness  Musculoskeletal: He exhibits no edema, tenderness or deformity  Skin: Skin is warm and dry  He is not diaphoretic  Psychiatric: He has a normal mood and affect   His behavior is normal  Judgment and thought content normal        Pertinent Laboratory/Diagnostic Studies:  Reviewed     Current Medications     Current Outpatient Prescriptions:     albuterol (PROVENTIL HFA) 90 mcg/act inhaler, Inhale 2 puffs every 6 (six) hours as needed for wheezing or shortness of breath, Disp: 6 7 g, Rfl: 0    Blood Glucose Monitoring Suppl (ONE TOUCH ULTRA 2) w/Device KIT, by Does not apply route daily, Disp: 1 each, Rfl: 0    Lancets (ONETOUCH ULTRASOFT) lancets, Use as instructed, Disp: 100 each, Rfl: 0    lisinopril (ZESTRIL) 10 mg tablet, Take 1 tablet (10 mg total) by mouth daily, Disp: 90 tablet, Rfl: 0    lovastatin (MEVACOR) 20 mg tablet, Take 1 tablet (20 mg total) by mouth daily at bedtime, Disp: 90 tablet, Rfl: 0    metFORMIN (GLUCOPHAGE-XR) 500 mg 24 hr tablet, Take 1 tablet (500 mg total) by mouth daily with dinner, Disp: 90 tablet, Rfl: 0    naproxen (NAPROSYN) 375 mg tablet, Take 1 tablet (375 mg total) by mouth 2 (two) times a day with meals for 7 days, Disp: 14 tablet, Rfl: 0    predniSONE 10 mg tablet, Take 4 tablets (40 mg total) by mouth daily, Disp: 16 tablet, Rfl: 0    sertraline (ZOLOFT) 50 mg tablet, Take 1 tablet (50 mg total) by mouth daily, Disp: 90 tablet, Rfl: 1    Health Maintenance     Health Maintenance   Topic Date Due    HIV SCREENING  1955    CRC Screening: Colonoscopy  1955    INFLUENZA VACCINE  09/01/2018    HEMOGLOBIN A1C  01/10/2019    DTaP,Tdap,and Td Vaccines (2 - Td) 07/09/2028    Hepatitis C Screening  Completed     Immunization History   Administered Date(s) Administered    Rabies 07/09/2018, 07/12/2018    Rabies, Intramuscular 07/09/2018, 07/12/2018    Tdap 07/09/2018       Aftab Browne  Internal Medicine PGY-2  [address] [address] [address]   [facility] , [address] [address] [facility], [address] [place] [address]  Office: [phone]  Fax: [phone]

## 2018-07-23 ENCOUNTER — PATIENT OUTREACH (OUTPATIENT)
Dept: INTERNAL MEDICINE CLINIC | Facility: CLINIC | Age: 63
End: 2018-07-23

## 2018-07-23 ENCOUNTER — OFFICE VISIT (OUTPATIENT)
Dept: INTERNAL MEDICINE CLINIC | Facility: CLINIC | Age: 63
End: 2018-07-23
Payer: MEDICARE

## 2018-07-23 VITALS
BODY MASS INDEX: 36.61 KG/M2 | DIASTOLIC BLOOD PRESSURE: 86 MMHG | HEART RATE: 60 BPM | WEIGHT: 255.73 LBS | HEIGHT: 70 IN | TEMPERATURE: 98 F | SYSTOLIC BLOOD PRESSURE: 144 MMHG
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
## 2018-07-23 DIAGNOSIS — E11.9 DIABETES (HCC): ICD-10-CM

## 2018-07-23 DIAGNOSIS — J45.909 ASTHMA: ICD-10-CM

## 2018-07-23 DIAGNOSIS — E78.5 HYPERLIPIDEMIA: ICD-10-CM

## 2018-07-23 DIAGNOSIS — I10 ESSENTIAL HYPERTENSION: ICD-10-CM

## 2018-07-23 DIAGNOSIS — N18.2 CKD (CHRONIC KIDNEY DISEASE) STAGE 2, GFR 60-89 ML/MIN: Primary | ICD-10-CM

## 2018-07-23 DIAGNOSIS — F32.A DEPRESSION: ICD-10-CM

## 2018-07-23 PROCEDURE — 99213 OFFICE O/P EST LOW 20 MIN: CPT | Performed by: INTERNAL MEDICINE

## 2018-07-23 RX ORDER — LOVASTATIN 20 MG/1
20 TABLET ORAL
Qty: 90 TABLET | Refills: 0 | Status: SHIPPED | OUTPATIENT
Start: 2018-07-23 | End: 2018-07-23 | Stop reason: ALTCHOICE

## 2018-07-23 RX ORDER — ATORVASTATIN CALCIUM 40 MG/1
40 TABLET, FILM COATED ORAL DAILY
Qty: 90 TABLET | Refills: 0 | Status: SHIPPED | OUTPATIENT
Start: 2018-07-23 | End: 2018-10-14 | Stop reason: SDUPTHER

## 2018-07-23 RX ORDER — LISINOPRIL 10 MG/1
10 TABLET ORAL DAILY
Qty: 90 TABLET | Refills: 0 | Status: ON HOLD | OUTPATIENT
Start: 2018-07-23 | End: 2019-01-10

## 2018-07-23 RX ORDER — METFORMIN HYDROCHLORIDE 500 MG/1
500 TABLET, EXTENDED RELEASE ORAL
Qty: 90 TABLET | Refills: 0 | Status: SHIPPED | OUTPATIENT
Start: 2018-07-23 | End: 2019-03-08

## 2018-07-23 NOTE — Clinical Note
Dr John Miller, Pt should  his medications by tomorrow and he does have [address] scheduled  SW has referred him for housing help will remain avialblle

## 2018-07-23 NOTE — PROGRESS NOTES
ALEXUS/DION has met with pt as per his request to f/u with a [facility] application  Sw spoke via [employer]  SW has reviewed application and will mail it into [employer] this date  SW also spoke with pt re his malika to obtain prescriptions  He has not taken any due to cost and he thought he had no coverage  SW assisted with call to 1 -800 MEDICARE and they relates pt does have coverage  At present pt has coverage from [facility]) [facility] # [id number] PCN # [id number] ID # [id number]  Co pays are $1 25 and $3 70  Pt and MD notified of same  Come September 1 st he will have [facility] # [id number] PCN # [id number]  ID # [id number]  Group # [id number]  [facility] pharmacist [phone]  informed of same and pt to go and get prescriptions tomorrow  Pt also refereed to Jenny Campos our Financial counselor for [place] application after Richi VAUGHAN also spoke to pt re MH f/u  Pt admits to feeling depressed and to prior [address] receptive to OP [address] referral and pt has obtained an appointment  at [address] [place] [address] for 8/6/18 at 10:00 am       In addition, ALEXUS has discussed housing options with pt and has suggested meeting with  at the [address]  Pt has lost his home in [place] due to the [place] last year  Supportive counseling provided  ALEXUS also requested he f/u with SW re additional housing or other needs

## 2018-07-23 NOTE — PROGRESS NOTES
at [employer] was able to attain information for prescription insurance for patient  Due to this change, will prescribe atorvastatin 40 mg daily as now this will be an affordable option and he meets indications for high-intensity statin

## 2018-09-18 ENCOUNTER — OFFICE VISIT (OUTPATIENT)
Dept: INTERNAL MEDICINE CLINIC | Facility: CLINIC | Age: 63
End: 2018-09-18
Payer: COMMERCIAL

## 2018-09-18 VITALS
BODY MASS INDEX: 37.24 KG/M2 | DIASTOLIC BLOOD PRESSURE: 62 MMHG | SYSTOLIC BLOOD PRESSURE: 118 MMHG | HEIGHT: 70 IN | HEART RATE: 68 BPM | TEMPERATURE: 97.9 F | WEIGHT: 260.14 LBS

## 2018-09-18 DIAGNOSIS — R06.00 DYSPNEA ON EXERTION: ICD-10-CM

## 2018-09-18 DIAGNOSIS — J45.901 EXACERBATION OF ASTHMA, UNSPECIFIED ASTHMA SEVERITY, UNSPECIFIED WHETHER PERSISTENT: ICD-10-CM

## 2018-09-18 DIAGNOSIS — M19.90 OSTEOARTHRITIS, UNSPECIFIED OSTEOARTHRITIS TYPE, UNSPECIFIED SITE: ICD-10-CM

## 2018-09-18 DIAGNOSIS — Z12.12 ENCOUNTER FOR COLORECTAL CANCER SCREENING: ICD-10-CM

## 2018-09-18 DIAGNOSIS — Z23 NEED FOR PNEUMOCOCCAL VACCINATION: ICD-10-CM

## 2018-09-18 DIAGNOSIS — E11.42 DIABETIC POLYNEUROPATHY ASSOCIATED WITH TYPE 2 DIABETES MELLITUS (HCC): ICD-10-CM

## 2018-09-18 DIAGNOSIS — Z23 NEED FOR PROPHYLACTIC VACCINATION AND INOCULATION AGAINST INFLUENZA: ICD-10-CM

## 2018-09-18 DIAGNOSIS — Z11.4 ENCOUNTER FOR SCREENING FOR HIV: ICD-10-CM

## 2018-09-18 DIAGNOSIS — M1A.0620 CHRONIC GOUT OF LEFT KNEE, UNSPECIFIED CAUSE: Primary | ICD-10-CM

## 2018-09-18 DIAGNOSIS — Z12.11 ENCOUNTER FOR COLORECTAL CANCER SCREENING: ICD-10-CM

## 2018-09-18 PROCEDURE — G0009 ADMIN PNEUMOCOCCAL VACCINE: HCPCS | Performed by: INTERNAL MEDICINE

## 2018-09-18 PROCEDURE — 90670 PCV13 VACCINE IM: CPT | Performed by: INTERNAL MEDICINE

## 2018-09-18 PROCEDURE — 94150 VITAL CAPACITY TEST: CPT | Performed by: INTERNAL MEDICINE

## 2018-09-18 PROCEDURE — 94060 EVALUATION OF WHEEZING: CPT | Performed by: INTERNAL MEDICINE

## 2018-09-18 PROCEDURE — 99214 OFFICE O/P EST MOD 30 MIN: CPT | Performed by: INTERNAL MEDICINE

## 2018-09-18 PROCEDURE — 90662 IIV NO PRSV INCREASED AG IM: CPT | Performed by: INTERNAL MEDICINE

## 2018-09-18 PROCEDURE — G0008 ADMIN INFLUENZA VIRUS VAC: HCPCS | Performed by: INTERNAL MEDICINE

## 2018-09-18 RX ORDER — GABAPENTIN 100 MG/1
300 CAPSULE ORAL 3 TIMES DAILY
Qty: 90 CAPSULE | Refills: 2 | Status: SHIPPED | OUTPATIENT
Start: 2018-09-18 | End: 2019-03-08

## 2018-09-18 RX ORDER — ALBUTEROL SULFATE 90 UG/1
2 AEROSOL, METERED RESPIRATORY (INHALATION) EVERY 6 HOURS PRN
Qty: 6.7 G | Refills: 5 | Status: SHIPPED | OUTPATIENT
Start: 2018-09-18 | End: 2018-10-23 | Stop reason: SDUPTHER

## 2018-09-18 RX ORDER — ALLOPURINOL 100 MG/1
100 TABLET ORAL DAILY
Qty: 30 TABLET | Refills: 5 | Status: SHIPPED | OUTPATIENT
Start: 2018-09-18 | End: 2019-02-05 | Stop reason: SDUPTHER

## 2018-09-18 RX ORDER — ALBUTEROL SULFATE 2.5 MG/3ML
2.5 SOLUTION RESPIRATORY (INHALATION) ONCE
Status: COMPLETED | OUTPATIENT
Start: 2018-09-18 | End: 2018-09-18

## 2018-09-18 RX ADMIN — ALBUTEROL SULFATE 2.5 MG: 2.5 SOLUTION RESPIRATORY (INHALATION) at 15:51

## 2018-09-18 NOTE — PROGRESS NOTES
Assessment/Plan:      Chronic gout of left knee, unspecified cause  -     allopurinol (ZYLOPRIM) 100 mg tablet; Take 1 tablet (100 mg total) by mouth daily  -     Uric acid; Future    Need for prophylactic vaccination and inoculation against influenza  -     influenza vaccine, 3603-6493, high-dose, PF 0 5 mL, for patients 65 yr+ (FLUZONE HIGH-DOSE)    Need for pneumococcal vaccination  -     PNEUMOCOCCAL CONJUGATE VACCINE 13-VALENT GREATER THAN 6 MONTHS    Diabetic polyneuropathy associated with type 2 [facility] ([facility] )  -     Ambulatory referral to Podiatry; Future  -     gabapentin (NEURONTIN) 100 mg capsule; Take 3 capsules (300 mg total) by mouth 3 (three) times a day Start with one pill HS x1d, then 1 pill BID x1d, then 1 pill TID    Dyspnea on exertion        -      Will order POC spirometry for this visit    Osteoarthritis, unspecified osteoarthritis type, unspecified site  -     Ambulatory referral to Orthopedic Surgery; Future    Encounter for screening for HIV  -     HIV 1/2 AG-AB combo; Future    Encounter for colorectal cancer screening  -     Ambulatory referral to Gastroenterology; Future for colonoscopy      Patient will need AAA screening with ultrasound and low-dose CT for lung cancer screening  Would also consider polysomnography  Subjective:      Patient ID: Genaro Doty is a 70 y o  male  Case of a 58-year-old male with past medical history significant for asthma, pre diabetes, hypertension, 70 pack year history of smoking with tobacco cessation 2006  Patient recently moved to [place] from [facility]   Patient was last seen on 07/09/2018 to establish care  At the time the patient had a dog bite from around on for which he was since emergency department where he was given Augmentin x7 days every piece immunoglobulin and rabies vaccines  Patient states he followed up with to the for rabies vaccines    At the time patient was found to have an A1c of 6 5 for which he was started on metformin 500 mg daily  Patient was started on atorvastatin 40 mg daily the setting of ASCVD risk 37 9% over the next 10 years  Patient was found to have dyspnea on exertion and orthopnea for which an echocardiogram was scheduled however was not completed yet  Patient went to the emergency department on 07/18/2018 for gout of the left knee  Patient states he was diagnosed with gout in [facility] and was on allopurinol  In the emergency department there was an effusion however no arthrocentesis was performed secondary to fear insurance would not cover procedure  Patient was given naproxen x7 days and prednisone 40 mg x4 days  On examination, patient states that his dog bite has completely healed  Patient states he still needs 4 pillows at night to sleep  Patient states he becomes dyspneic on exertion, stating that he becomes very short of breath when climbing 1 flight of stairs  Patient denies any PND  Patient states he is not using his albuterol inhaler at home when he gets short of breath  Patient states he would like to be tested for HIV, though he states he has no risk factors  Patient refers peripheral neuropathy in the lower extremities bilaterally  The following portions of the patient's history were reviewed and updated as appropriate: allergies, current medications, past family history, past medical history, past social history, past surgical history and problem list     Review of Systems   Constitutional: Negative for appetite change, chills, diaphoresis, fatigue, fever and unexpected weight change  HENT: Negative for sore throat  Eyes: Negative for visual disturbance  Respiratory: Positive for shortness of breath (On exertion)  Negative for cough, chest tightness and wheezing  Cardiovascular: Negative for chest pain, palpitations and leg swelling     Gastrointestinal: Negative for abdominal distention, abdominal pain, blood in stool, constipation, diarrhea, nausea and vomiting  Genitourinary: Negative for difficulty urinating, flank pain and urgency  Musculoskeletal: Negative for arthralgias and myalgias  Skin: Negative for pallor and rash  Neurological: Negative for dizziness, weakness, light-headedness and headaches  Objective:      /62   Pulse 68   Temp 97 9 °F (36 6 °C)   Ht 5' 9 5" (1 765 m)   Wt 118 kg (260 lb 2 3 oz)   BMI 37 87 kg/m²          Physical Exam   Constitutional: He is oriented to person, place, and time  He appears well-developed and well-nourished  No distress  HENT:   Head: Normocephalic and atraumatic  Mouth/Throat: No oropharyngeal exudate  Eyes: Conjunctivae are normal  No scleral icterus  Neck: Normal range of motion  Neck supple  No JVD present  No thyromegaly present  Cardiovascular: Normal rate, regular rhythm, normal heart sounds and intact distal pulses  Exam reveals no gallop and no friction rub  Pulses are no weak pulses  No murmur heard  Pulses:       Dorsalis pedis pulses are 2+ on the right side, and 2+ on the left side  Posterior tibial pulses are 2+ on the right side, and 2+ on the left side  Pulmonary/Chest: Effort normal and breath sounds normal  No respiratory distress  He has no wheezes  Abdominal: Soft  Bowel sounds are normal  He exhibits no distension  There is no tenderness  Musculoskeletal: Normal range of motion  He exhibits edema (Trace pitting edema lower extremities, varicose veins)  He exhibits no deformity  Feet:   Right Foot:   Skin Integrity: Negative for ulcer, skin breakdown, erythema, warmth, callus or dry skin  Left Foot:   Skin Integrity: Negative for ulcer, skin breakdown, erythema, warmth, callus or dry skin  Lymphadenopathy:     He has no cervical adenopathy  Neurological: He is alert and oriented to person, place, and time  No cranial nerve deficit  He exhibits normal muscle tone  Skin: Skin is warm and dry  No rash noted   He is not diaphoretic  No erythema  No pallor  Dog bite wound well healed without erythema or discharge, warmth   Psychiatric: He has a normal mood and affect  His behavior is normal    Nursing note and vitals reviewed  Patient's shoes and socks removed  Right Foot/Ankle   Right Foot Inspection  Skin Exam: skin normal and skin intact no dry skin, no warmth, no callus, no erythema, no maceration, no abnormal color, no pre-ulcer, no ulcer and no callus                          Toe Exam: ROM and strength within normal limits  Sensory   Vibration: absent  Proprioception: absent   Monofilament testing: absent  Vascular  Capillary refills: < 3 seconds  The right DP pulse is 2+  The right PT pulse is 2+  Left Foot/Ankle  Left Foot Inspection  Skin Exam: skin normal and skin intactno dry skin, no warmth, no erythema, no maceration, normal color, no pre-ulcer, no ulcer and no callus                         Toe Exam: ROM and strength within normal limits                   Sensory   Vibration: absent  Proprioception: absent  Monofilament: absent  Vascular  Capillary refills: < 3 seconds  The left DP pulse is 2+  The left PT pulse is 2+  Assign Risk Category:  No deformity present; Loss of protective sensation;  No weak pulses       Risk: 1

## 2018-09-19 ENCOUNTER — APPOINTMENT (OUTPATIENT)
Dept: LAB | Facility: CLINIC | Age: 63
End: 2018-09-19
Payer: COMMERCIAL

## 2018-09-19 DIAGNOSIS — M1A.0620 CHRONIC GOUT OF LEFT KNEE, UNSPECIFIED CAUSE: ICD-10-CM

## 2018-09-19 DIAGNOSIS — Z11.4 ENCOUNTER FOR SCREENING FOR HIV: ICD-10-CM

## 2018-09-19 LAB — URATE SERPL-MCNC: 7 MG/DL (ref 4.2–8)

## 2018-09-19 PROCEDURE — 84550 ASSAY OF BLOOD/URIC ACID: CPT

## 2018-09-19 PROCEDURE — 87389 HIV-1 AG W/HIV-1&-2 AB AG IA: CPT

## 2018-09-19 PROCEDURE — 36415 COLL VENOUS BLD VENIPUNCTURE: CPT

## 2018-09-20 ENCOUNTER — OFFICE VISIT (OUTPATIENT)
Dept: OBGYN CLINIC | Facility: HOSPITAL | Age: 63
End: 2018-09-20
Payer: COMMERCIAL

## 2018-09-20 ENCOUNTER — HOSPITAL ENCOUNTER (OUTPATIENT)
Dept: RADIOLOGY | Facility: HOSPITAL | Age: 63
Discharge: HOME | End: 2018-09-20
Payer: COMMERCIAL

## 2018-09-20 ENCOUNTER — HOSPITAL ENCOUNTER (OUTPATIENT)
Dept: RADIOLOGY | Facility: HOSPITAL | Age: 63
Discharge: HOME | End: 2018-09-20

## 2018-09-20 VITALS
BODY MASS INDEX: 38.51 KG/M2 | HEIGHT: 69 IN | DIASTOLIC BLOOD PRESSURE: 71 MMHG | HEART RATE: 59 BPM | SYSTOLIC BLOOD PRESSURE: 123 MMHG | WEIGHT: 260 LBS

## 2018-09-20 DIAGNOSIS — M25.561 RIGHT KNEE PAIN, UNSPECIFIED CHRONICITY: ICD-10-CM

## 2018-09-20 DIAGNOSIS — M25.562 LEFT KNEE PAIN, UNSPECIFIED CHRONICITY: ICD-10-CM

## 2018-09-20 DIAGNOSIS — M25.562 LEFT KNEE PAIN, UNSPECIFIED CHRONICITY: Primary | ICD-10-CM

## 2018-09-20 PROCEDURE — 20610 DRAIN/INJ JOINT/BURSA W/O US: CPT | Performed by: PHYSICIAN ASSISTANT

## 2018-09-20 PROCEDURE — 99203 OFFICE O/P NEW LOW 30 MIN: CPT | Performed by: PHYSICIAN ASSISTANT

## 2018-09-20 PROCEDURE — 73562 X-RAY EXAM OF KNEE 3: CPT

## 2018-09-20 RX ORDER — TRIAMCINOLONE ACETONIDE 40 MG/ML
40 INJECTION, SUSPENSION INTRA-ARTICULAR; INTRAMUSCULAR
Status: COMPLETED | OUTPATIENT
Start: 2018-09-20 | End: 2018-09-20

## 2018-09-20 RX ORDER — BUPIVACAINE HYDROCHLORIDE 5 MG/ML
2 INJECTION, SOLUTION EPIDURAL; INTRACAUDAL
Status: COMPLETED | OUTPATIENT
Start: 2018-09-20 | End: 2018-09-20

## 2018-09-20 RX ADMIN — BUPIVACAINE HYDROCHLORIDE 2 ML: 5 INJECTION, SOLUTION EPIDURAL; INTRACAUDAL at 19:31

## 2018-09-20 RX ADMIN — BUPIVACAINE HYDROCHLORIDE 2 ML: 5 INJECTION, SOLUTION EPIDURAL; INTRACAUDAL at 19:32

## 2018-09-20 RX ADMIN — TRIAMCINOLONE ACETONIDE 40 MG: 40 INJECTION, SUSPENSION INTRA-ARTICULAR; INTRAMUSCULAR at 19:31

## 2018-09-20 RX ADMIN — TRIAMCINOLONE ACETONIDE 40 MG: 40 INJECTION, SUSPENSION INTRA-ARTICULAR; INTRAMUSCULAR at 19:32

## 2018-09-20 NOTE — PATIENT INSTRUCTIONS
Ice Pack Application   WHAT YOU NEED TO KNOW:   Ice can be used to decrease swelling and pain after an injury or surgery  Common injuries that may benefit from ice therapy are sprains, strains, and bruises  The use of ice is most effective in the first 1 to 3 days after an injury  DISCHARGE INSTRUCTIONS:   How to apply ice:   · Fill a bag with crushed ice about half full  Remove the air from the bag before you close it  You can also use a bag of frozen vegetables  · Wrap the ice pack in a cloth to protect your skin from frostbite or other injury  · Put the ice over the injured area for 20 to 30 minutes or as long as directed  · Check your skin after about 30 seconds for color changes or blistering  Remove the ice if you notice skin changes or you feel burning or numbness in the area  · Throw the ice pack away after use  · Apply ice to your injured area 4 times each day or as directed  Ask your healthcare provider how many days you should apply ice  Contact your healthcare provider if:   · You see blisters, whitening of your skin, or a bluish color to your skin after using ice  · You feel burning or numbness when using ice  · You have questions about the use of ice packs  © 2017 [facility] Information is for End User's use only and may not be sold, redistributed or otherwise used for commercial purposes  All illustrations and images included in CareNotes® are the copyrighted property of [employer] , [employer]  or [facility]  The above information is an  only  It is not intended as medical advice for individual conditions or treatments  Talk to your doctor, nurse or pharmacist before following any medical regimen to see if it is safe and effective for you  Safe Use of NSAIDs   WHAT YOU NEED TO KNOW:   NSAIDs are medicines that are used to decrease pain, swelling, and fever  NSAIDs are available with or without a doctor's order   NSAIDs that you can buy without a doctor's order include aspirin, ibuprofen, and naproxen  DISCHARGE INSTRUCTIONS:   Return to the emergency department if:   · You have swelling around your mouth or trouble breathing  · You are breathing fast or you have a fast heartbeat  · You have nausea, vomiting, or abdominal pain  · You have blood in your vomit or bowel movements  · You have a seizure  Contact your healthcare provider if:   · You have a headache or become confused  · You develop hearing loss or ringing in your ears  · You develop itching, a rash, or hives  · You have swelling around your lower legs, feet, ankles, and hands  · You do not know how much NSAIDs to give to your child  · You have questions or concerns about your condition or care  How to give NSAIDs to your child safely:   · Read the directions on the label  Find out if the medicine is right for your child's age and how much to give to your child  The dose for your child's weight or age should be listed  Do not  give your child more than the recommended amount  · Use the measuring tool that came with the medicine  Do not  use another measuring tool, such as a kitchen spoon  Other measuring tools do not provide the right amount of medicine  How to take NSAIDs safely:   · Read the directions on the label to learn how much medicine you should take and often to take it  Do not take more than the recommended amount  · Talk to your healthcare provider if you need take NSAIDs for more than 30 days  The longer you take NSAIDs, the higher your risk of side effects will be  You may need to take other medicines to decrease your risk of side effects such as stomach bleeding  · Do not take an over-the-counter NSAIDs with prescription NSAIDs  The combined amount of NSAIDs may be too high  · Tell your healthcare provider about other medicines you take  Some medicines can increase the risk of side effects from NSAIDs   Your healthcare provider will tell you if it is okay to take NSAIDs and how to take them  Who should not take NSAIDs:  Certain people should avoid or limit NSAIDs  Do not  give NSAIDs to children under 10months of age without direction from your child's doctor  Do not give aspirin to children under 25years of age  Your child could develop Reye syndrome if he takes aspirin  Reye syndrome can cause life-threatening brain and liver damage  Check your child's medicine labels for aspirin, salicylates, or oil of wintergreen  Talk to your healthcare provider before you take NSAIDs if any of the following apply to you:  · You have reflux disease, a peptic ulcer, H pylori infection, or bleeding in your stomach or intestines  · You have a bleeding disorder, or you take blood-thinning medicine  · You are allergic to aspirin or other NSAIDs  · You have liver or kidney or disease  · You have high blood pressure or heart disease  · You have 3 or more alcoholic drinks each day  · You are pregnant  What you need to know about an NSAID overdose:  Certain health problems can occur if you take too much NSAID medicine at one time or over time  Problems include nausea, vomiting, and abdominal pain  You may develop gastritis, peptic ulcers, and stomach bleeding  You may also develop fluid retention, heart problems, and kidney problems  NSAIDs can worsen high blood pressure  You may become confused, or you may have a headache, hearing loss, or hallucinations  An overdose of aspirin may also cause rapid breathing, a rapid heartbeat, or seizures  What to do if you think you or your child took too much NSAID medicine:  Call the [facility] at [phone] immediately  © 2017 [address] [employer]  Information is for End User's use only and may not be sold, redistributed or otherwise used for commercial purposes   All illustrations and images included in CareNotes® are the copyrighted property of [employer]  or [employer]  The above information is an  only  It is not intended as medical advice for individual conditions or treatments  Talk to your doctor, nurse or pharmacist before following any medical regimen to see if it is safe and effective for you

## 2018-09-20 NOTE — PROGRESS NOTES
Assessment/Plan   Diagnoses and all orders for this visit:    Left knee pain, unspecified chronicity  -     XR knee 3 vw left non injury; Future    Right knee pain, unspecified chronicity  -     XR knee 3 vw right non injury; Future          Subjective   Patient ID: Verona Magallanes is a 70 y o  male  Vitals:    09/20/18 1123   BP: 123/71   Pulse: 61     64yo male comes in for an evaluation of his b/l knees  He has been having ongoing pain in both knees, left > right  Back in [place], he had a steroid injection last December that lasted until last month  The pain is dull in character, mild in severity, pain does not radiate and is not associated with numbness  No clicking or catching  No swelling  The following portions of the patient's history were reviewed and updated as appropriate: allergies, current medications, past family history, past medical history, past social history, past surgical history and problem list     Review of Systems  Ortho Exam  Past Medical History:   Diagnosis Date    Asthma     Cardiac disease     Diabetes mellitus [facility] )     Hypertension      Past Surgical History:   Procedure Laterality Date    CARPAL TUNNEL RELEASE      KNEE ARTHROSCOPY Bilateral     TOTAL HIP ARTHROPLASTY Left     TRACHEOSTOMY       Family History   Problem Relation Age of Onset    Cancer Sister      Social History     Occupational History    Not on file  Social History Main Topics    Smoking status: Former Smoker     Quit date: 2006    Smokeless tobacco: Never Used    Alcohol use Yes      Comment: occasionally    Drug use: No    Sexual activity: No       Review of Systems   Constitutional: Negative  HENT: Negative  Eyes: Negative  Respiratory: Negative  Cardiovascular: Negative  Gastrointestinal: Negative  Endocrine: Negative  Genitourinary: Negative  Musculoskeletal: As below      Allergic/Immunologic: Negative  Neurological: Negative  Hematological: Negative  Psychiatric/Behavioral: Negative  Objective   Physical Exam    · Constitutional: Awake, Alert, Oriented  · Eyes: EOMI  · Psych: Mood and affect appropriate  · Heart: regular rate and rhythm  · Lungs: No audible wheezing  · Abdomen: soft  · Lymph: no lymphedema   bilateral Knee:  - Appearance   No swelling, discoloration, deformity, or ecchymosis  - Effusion   none  - Palpation   No tenderness about the  lateral joint lines, patella, patellar tendon, MCL, LCL, hamstrings, or medial / lateral plateaus  + medial joint line tenderness  - ROM   Extension: 0 and Flexion: 100  - Special Tests   Singh's Test negative, Lachman's Test negative, Anterior Drawer Test negative, Posterior Drawer Test negative, Valgus Stress Test negative, Varus Stress Test negative and Patellar apprehension negative  - Motor   normal 5/5 in all planes  - NVI distally    I have personally reviewed pertinent films in PACS and my interpretation is medial joint space narrowing      Large joint arthrocentesis  Date/Time: 9/20/2018 7:31 PM  Consent given by: patient  Site marked: site marked  Timeout: Immediately prior to procedure a time out was called to verify the correct patient, procedure, equipment, support staff and site/side marked as required   Supporting Documentation  Indications: pain   Procedure Details  Location: knee - L knee  Needle size: 22 G  Ultrasound guidance: no  Approach: lateral  Medications administered: 2 mL bupivacaine (PF) 0 5 %; 40 mg triamcinolone acetonide 40 mg/mL    Patient tolerance: patient tolerated the procedure well with no immediate complications  Dressing:  Sterile dressing applied  Large joint arthrocentesis  Date/Time: 9/20/2018 7:32 PM  Consent given by: patient  Site marked: site marked  Timeout: Immediately prior to procedure a time out was called to verify the correct patient, procedure, equipment, support staff and site/side marked as required   Supporting Documentation  Indications: pain   Procedure Details  Location: knee - R knee  Needle size: 22 G  Ultrasound guidance: no  Approach: lateral  Medications administered: 2 mL bupivacaine (PF) 0 5 %; 40 mg triamcinolone acetonide 40 mg/mL    Patient tolerance: patient tolerated the procedure well with no immediate complications  Dressing:  Sterile dressing applied

## 2018-09-22 LAB — HIV 1+2 AB+HIV1 P24 AG SERPL QL IA: NORMAL

## 2018-09-25 ENCOUNTER — OFFICE VISIT (OUTPATIENT)
Dept: MULTI SPECIALTY CLINIC | Facility: CLINIC | Age: 63
End: 2018-09-25
Payer: COMMERCIAL

## 2018-09-25 VITALS
WEIGHT: 255.73 LBS | HEART RATE: 68 BPM | TEMPERATURE: 97.7 F | DIASTOLIC BLOOD PRESSURE: 80 MMHG | BODY MASS INDEX: 36.61 KG/M2 | HEIGHT: 70 IN | SYSTOLIC BLOOD PRESSURE: 132 MMHG

## 2018-09-25 DIAGNOSIS — E11.42 DIABETIC POLYNEUROPATHY ASSOCIATED WITH TYPE 2 DIABETES MELLITUS (HCC): ICD-10-CM

## 2018-09-25 DIAGNOSIS — B35.1 ONYCHOMYCOSIS OF TOENAIL: Primary | ICD-10-CM

## 2018-09-25 PROCEDURE — 99203 OFFICE O/P NEW LOW 30 MIN: CPT | Performed by: PODIATRIST

## 2018-09-25 NOTE — PROGRESS NOTES
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%  3  Xerosis    Plan:  - Patient was seen/examined  All questions and concerns addressed  - educated patient on proper diabetic foot care; checking feet every day, wearing white socks, always wearing diabetic shoes even in house, tight glycemic control, proper low-sugar diet and exercise  Greek handout given  - Toenails x10 trimmed with large nail nipper  No incidents noted  - Rx'd diabetic shoes and inserts  - Instructed patient to buy OTC cream for xerosis  - RTC in 6 months      History of Present Illness     HPI:  Gricel Rosales is a 70 y o  male who presents for diabetic foot exam upon referral by PCP  Doesn't have machine to check blood sugars; doesn't monitor regularly nor does not know his A1c  Doesn't currently have diabetic shoes  Ambulates with cane  Metformin and allopurinol; cannot remember other meds  The patient denies any nausea, vomiting, fever, chills, shortness of breath, or chest pains  Review of Systems   Constitutional: Negative  HENT: Negative  Eyes: Negative  Respiratory: Negative  Cardiovascular: Negative  Gastrointestinal: Negative  Musculoskeletal: Negative   Skin: elongated, dystrophic toenails x10  Neurological: Negative          Historical Information   Past Medical History:   Diagnosis Date    Asthma     Cardiac disease     Diabetes mellitus ([facility]     Hypertension      Past Surgical History:   Procedure Laterality Date    CARPAL TUNNEL RELEASE      KNEE ARTHROSCOPY Bilateral     TOTAL HIP ARTHROPLASTY Left     TRACHEOSTOMY       Social History   History   Alcohol Use    Yes     Comment: occasionally     History   Drug Use No     History   Smoking Status    Former Smoker    Quit date: 2006   Smokeless Tobacco    Never Used     Family History: Family History   Problem Relation Age of Onset    Cancer Sister        Meds/Allergies     (Not in a hospital admission)  No Known Allergies    Objective     Current Vitals:   Blood Pressure: 132/80 (09/25/18 0959)  Pulse: 68 (09/25/18 0959)  Temperature: 97 7 °F (36 5 °C) (09/25/18 0959)  Temp Source: Oral (09/25/18 0959)  Height: 5' 10" (177 8 cm) (09/25/18 0959)  Weight - Scale: 116 kg (255 lb 11 7 oz) (09/25/18 0959)        /80 (BP Location: Right arm, Patient Position: Sitting, Cuff Size: Large)   Pulse 68   Temp 97 7 °F (36 5 °C) (Oral)   Ht 5' 10" (1 778 m)   Wt 116 kg (255 lb 11 7 oz)   BMI 36 69 kg/m²       Lower Extremity Exam:    Musculoskeletal:  MMT is 5/5 to all compartments of the LE, +0/4 edema B/L, Digital ROM is intact,      Pulses:   R DP is +1/4, R PT is +1/4, L DP is +1/4, L PT is +1/4, CFT< 3sec to all digits  Varicosities B/L  Temperature cool  Pedal hair is Present     Skin:  Elongated, dytrophic, fungal toenails x10  Trophic skin changes; xerosis  No open Lesions  Skin of the LE is normal texture, turgor  Neurologic:  Gross sensation is intact  Protective sensation is Absent   Diminished vibratory

## 2018-09-27 ENCOUNTER — HOSPITAL ENCOUNTER (OUTPATIENT)
Dept: NON INVASIVE DIAGNOSTICS | Facility: HOSPITAL | Age: 63
Discharge: HOME | End: 2018-09-27
Payer: COMMERCIAL

## 2018-09-27 DIAGNOSIS — R07.9 CHEST PAIN IN ADULT: ICD-10-CM

## 2018-09-27 DIAGNOSIS — R06.01 ORTHOPNEA: ICD-10-CM

## 2018-09-27 PROCEDURE — 93306 TTE W/DOPPLER COMPLETE: CPT | Performed by: INTERNAL MEDICINE

## 2018-09-27 PROCEDURE — 93306 TTE W/DOPPLER COMPLETE: CPT

## 2018-10-03 ENCOUNTER — APPOINTMENT (EMERGENCY)
Dept: RADIOLOGY | Facility: HOSPITAL | Age: 63
End: 2018-10-03
Payer: COMMERCIAL

## 2018-10-03 ENCOUNTER — HOSPITAL ENCOUNTER (EMERGENCY)
Facility: HOSPITAL | Age: 63
Discharge: HOME | End: 2018-10-03
Attending: EMERGENCY MEDICINE | Admitting: EMERGENCY MEDICINE
Payer: COMMERCIAL

## 2018-10-03 VITALS
SYSTOLIC BLOOD PRESSURE: 132 MMHG | RESPIRATION RATE: 16 BRPM | BODY MASS INDEX: 36.61 KG/M2 | TEMPERATURE: 97.8 F | DIASTOLIC BLOOD PRESSURE: 70 MMHG | HEART RATE: 70 BPM | HEIGHT: 70 IN | OXYGEN SATURATION: 98 % | WEIGHT: 255.73 LBS

## 2018-10-03 DIAGNOSIS — J40 BRONCHITIS: ICD-10-CM

## 2018-10-03 DIAGNOSIS — M10.9 GOUT: Primary | ICD-10-CM

## 2018-10-03 PROCEDURE — 71046 X-RAY EXAM CHEST 2 VIEWS: CPT

## 2018-10-03 PROCEDURE — 96372 THER/PROPH/DIAG INJ SC/IM: CPT

## 2018-10-03 PROCEDURE — 99283 EMERGENCY DEPT VISIT LOW MDM: CPT

## 2018-10-03 RX ORDER — ALBUTEROL SULFATE 90 UG/1
2 AEROSOL, METERED RESPIRATORY (INHALATION) ONCE
Status: COMPLETED | OUTPATIENT
Start: 2018-10-03 | End: 2018-10-03

## 2018-10-03 RX ORDER — KETOROLAC TROMETHAMINE 30 MG/ML
15 INJECTION, SOLUTION INTRAMUSCULAR; INTRAVENOUS ONCE
Status: COMPLETED | OUTPATIENT
Start: 2018-10-03 | End: 2018-10-03

## 2018-10-03 RX ORDER — PREDNISONE 20 MG/1
40 TABLET ORAL DAILY
Qty: 8 TABLET | Refills: 0 | Status: SHIPPED | OUTPATIENT
Start: 2018-10-03 | End: 2018-10-23

## 2018-10-03 RX ADMIN — ALBUTEROL SULFATE 2 PUFF: 90 AEROSOL, METERED RESPIRATORY (INHALATION) at 11:09

## 2018-10-03 RX ADMIN — KETOROLAC TROMETHAMINE 15 MG: 30 INJECTION, SOLUTION INTRAMUSCULAR at 11:09

## 2018-10-03 NOTE — DISCHARGE INSTRUCTIONS
Bronquitis aguda   LO QUE NECESITA SABER:   La bronquitis aguda es la inflamación e irritación de leana vías respiratorias  Esta irritación puede provocar que tosa o que tenga otros problemas de respiración  La bronquitis aguda suele comenzar debido a otra enfermedad, aleks un resfriado o la gripe  La enfermedad se propaga de banuelos nariz y garganta a banuelos tráquea y Lety Saras  La bronquitis a menudo es conocida aleks resfriado de pecho  La bronquitis aguda generalmente dura alrededor de 3 a 6 semanas y no es rivas enfermedad grave  La tos podría durar por varias semanas  INSTRUCCIONES SOBRE EL ILYA HOSPITALARIA:   Regrese a la carlos de emergencias si:   · Usted expectora pramod  · Leana labios o uñas de los dedos se ponen azules  · Usted siente que no está recibiendo suficiente aire cuando respira  Pregúntele a banuelos [place] vitaminas y minerales son adecuados para usted  · Usted tiene fiebre  · Leana problemas respiratorios no desaparecen o empeoran  · Banuelos tos no mejora dentro de 4 semanas  · Usted tiene preguntas o inquietudes acerca de banuelos condición o cuidado  Cuidados personales:   · Descanse más  El descanso ayuda a banuelos cuerpo a sanar  Empiece a hacer un poco más día a día  Descanse cuando usted sienta que es necesario  · Evite irritantes en el aire  Evite productos químicos, gases y polvo  Use rivas mascarilla si tiene que trabajar alrededor de polvo o gases  Permanezca dentro cuando los niveles de contaminación danie altos  Si tiene alergias, permanezca adentro cuando el conteo de polen sea CROSSCANONBY  No use productos en aerosol, aleks desodorante, aerosol contra los insectos y aerosol para el justin  · No fume o esté alrededor de personas que fuman  La nicotina y otros químicos en los cigarrillos y cigarros dañan la cilia que saca la mucosidad de leana pulmones  Pida información a banuelos médico si usted actualmente fuma y necesita ayuda para dejar de fumar   Los cigarrillos electrónicos o tabaco sin humo todavía contienen nicotina  Consulte con banuelos médico antes de [employer]  · [address] se le haya indicado  Los líquidos ayudan a mantener humectadas caterina vías respiratorias y ayudarle a eliminar las flemas por medio de la tos  Es posible que usted necesite olvin más líquidos si tiene bronquitis Brutus  Pregunte cuánto líquido debe olvin cada día y cuáles líquidos son los más adecuados para usted  · Use un humidificador o vaporizador  Use un humidificador de marissa frío o un vaporizador para elevar la humedad en banuelos casa  [place] podría ayudarle a respirar más fácilmente y al mismo tiempo disminuir la tos  Disminuya banuelos riesgo para la bronquitis aguda:   · Vaya a que le pongan las vacunas necesarias  Pregúntele a banuelos médico si usted debería ser vacunado contra la gripe o la neumonía  · Evite la propagación de gérmenes  Usted puede disminuir banuelos riesgo de bronquitis Brutus y otras enfermedades de la siguiente manera:     ¨ [place] frecuentemente con agua y [place]  Lleve rivas loción o gel antiséptico para las harman  Usted puede usar la loción o el gel para limpiar caterina harman cuando no haya agua disponible  ¨ No se toque los ojos, la nariz o la boca a menos que se haya lavado las harman nilson  ¨ Siempre cubra banuelos boca al toser para evitar la propagación de gérmenes  Lo mejor es toser en un pañuelo desechable o en la manga de banuelos camisa, en lugar de en banuelos mano  [place] a los que le rodean que cubran caterina bocas al toser  ¨ Trate de evitar a las personas que están resfriadas o tienen gripe  Si usted está enfermo, manténgase alejado de otras personas lo más que pueda  Medicamentos:  Banuelos médico podría  darle cualquiera de lo siguiente:  · El ibuprofeno o el acetaminofeno  son medicamentos que pueden ayudar a bajar banuelos fiebre  Están disponibles sin receta médica  Consulte con banuelos médico cuál medicamento es el adecuado para usted  Pregunte la cantidad y la frecuencia con que debe tomarlos  [address]  Estos medicamentos pueden provocar sangrado estomacal si no se [place] correctamente  El ibuprofeno puede provocar daño al Laury Christopher  No tome ibuprofeno si usted tiene enfermedad de los riñones, [place] o si es alérgico a la aspirina  El acetaminofeno puede dañar el hígado  No tome más de 4,000 miligramos en 24 horas  · Los descongestionantes  ayudan a despejar la mucosidad en caterina pulmones y que sea más fácil expectorarla  [place] puede ayudarle a respirar mejor  · Los jarabes para la tos  disminuyen banuelos necesidad de toser  Si banuelos tos produce mucosidad, no tome un supresor de la tos a menos que banuelos médico se lo indique  Banuelos médico podría sugerirle que tome un supresor de la tos en la noche para que pueda descansar  · Inhaladores  podrían ser Jacky Piggs  Banuelos médico podría darle immanuel o más inhaladores para ayudarle a respirar más fácil y Vivia Bellis menos tos  Un inhalador le administra medicamento para abrir caterina vías aéreas  [place]dale a banuelos médico que le muestre cómo usar banuelos inhalador correctamente  · [place] caterina medicamentos aleks se le haya indicado  Consulte con banuelos médico si usted merrill que bnauelos medicamento no le está ayudando o si presenta efectos secundarios  Infórmele si es alérgico a algún medicamento  Mantenga rivas lista actualizada de los [place], las vitaminas y los productos herbales que ace  Incluya los siguientes datos de los medicamentos: cantidad, frecuencia y motivo de administración  Traiga con usted la lista o los envases de la píldoras a caterina citas de seguimiento  Lleve la lista de los medicamentos con usted en jordi de rivas emergencia  Acuda a caterina consultas de control con banuelos médico según le indicaron  Escriba las preguntas que tenga para que recuerde hacerlas jono caterina citas de seguimiento  © 2017 [address] Information is for End User's use only and may not be sold, redistributed or otherwise used for commercial purposes   All illustrations and images included in [facility] are the copyrighted property of A D A M , Inc  or Clint Snyder  Esta información es sólo para uso en educación  Banuelos intención no es darle un consejo médico sobre enfermedades o tratamientos  Colsulte con banuelos [place] farmacéutico antes de seguir cualquier régimen médico para saber si es seguro y efectivo para usted  Gota   CUIDADO AMBULATORIO:   Gota  es un tipo de artritis que causa un intenso dolor y rigidez en las articulaciones  El dolor de gota aguda empieza repentinamente, se empeora rápidamente y cesa por banuelos Harlen Oas  La gota aguda puede llegar a ser Kiley Reynoso y causar daño permanente en las articulaciones  Busque atención médica de inmediato si:   · Usted tiene dolor intenso en rivas o más articulaciones que no puede tolerar  · Usted tiene fiebre o enrojecimiento que se propaga más allá del área de la articulación  Pregúntele a banuelos [employer] vitaminas y minerales son adecuados para usted  · Usted tiene síntomas nuevos, aleks sarpullido, después de comenzar el tratamiento para la gota  · El dolor e inflamación en banuelos articulación no se desaparece, aún después del tratamiento  · Usted no está orinando tanto o con la frecuencia con que suele hacerlo  · Usted tiene problemas para olvin caterina medicamentos para la gota  · Usted tiene preguntas o inquietudes acerca de banuelos condición o cuidado  Las etapas de la gota:   · La hiperuricemia  empieza con niveles altos de ácido úrico  La hiperuricemia no es gota, acacia aumenta banuelos riesgo de presentar gota  Puede que usted no tenga síntomas en esta etapa y usualmente no necesita tratamiento  · La artritis gotosa aguda  empieza de repente con rivas ataque de inflamación y dolor, por lo general en rivas articulación  El ataque puede durar unos días hasta 2 semanas  · La gota intercrítica  es el tiempo entre ataques  Puede que pasen meses o años sin que usted tenga otro ataque   Usted no tendrá [employer] articulaciones o rigidez, acacia esto no significa que se ha curado de gota  Usted aún necesitará tratamiento para prevenir gota crónica  · La gota tofácea crónica  se desarrolla si la gota no recibe tratamiento  Grandes cantidades de rose de ácido úrico, conocidos aleks tofos, se acumulan alrededor de las articulaciones  [address] articulaciones  Ataques de gota ocurren más frecuentemente y pueden durar horas hasta semanas  Más de [place] articulación puede estar inflamada y dolorida  En esta etapa, los síntomas de gota no desaparecen por sí solos  El tratamiento  puede hacer que caterina síntomas paren antes, prevenir ataques y disminuir banuelos riesgo de daño en las articulaciones  Es posible que usted necesite alguno de los siguientes:  · Medicamentos:      ¨ AINEs (Analgésicos antiinflamatorios no esteroides) aleks el ibuprofeno, ayudan a disminuir la inflamación, el dolor y la fiebre  Annita medicamento esta disponible con o sin rivas receta médica  Los AINEs pueden causar sangrado estomacal o problemas renales en ciertas personas  Si usted ace un medicamento anticoagulante, siempre pregúntele a banuelos médico si los JINNY son seguros para usted  Siempre mamta la etiqueta de annita medicamento y [place] instrucciones  ¨ El medicamento para la gota  disminuye el dolor e inflamación en las articulaciones  También se puede administrar para prevenir nuevos ataques de gota  ¨ Esteroides  reducen la inflamación y pueden ayudarlo con la rigidez y el dolor en caterina articulaciones jono los ataques de [place]  ¨ El medicamento para ácido úrico  puede administrarse para reducir la cantidad de ácido úrico que banuelos cuerpo produce  Algunos medicamentos pueden ayudar a eliminar más ácido úrico al [place]  ¨ [place] caterina medicamentos aleks se le haya indicado  Consulte con banuelos médico si usted merrill que banuelos medicamento no le está ayudando o si presenta efectos secundarios   Infórmele si es alérgico a cualquier medicamento  Mantenga rivas lista actualizada de los [place], las vitaminas y los productos herbales que ace  Incluya los siguientes datos de los medicamentos: cantidad, frecuencia y motivo de administración  Traiga con usted la lista o los envases de la píldoras a caterina citas de seguimiento  Lleve la lista de los medicamentos con usted en jordi de rivas emergencia  · Cirugía  de injerto óseo podría necesitarse para tratar los tofos dolorosos o infectados  El hueso en la articulación se reemplaza con un hueso extraído de otra parte de banuelos cuerpo  Pídale a banuelos médico más información sobre la cirugía de [place]  Controle la gota:   · Descanse banuelos articulación adolorida para que pueda recuperarse  Banuelos médico podría recomendarle que use muletas o un caminador si la articulación afectada está en rivas pierna  · Aplique hielo a banuelos articulación  El hielo disminuye el dolor y la inflamación  Use un paquete de hielo o ponga hielo molido dentro de [employer]  [place] la bolsa o el paquete de hielo con rivas toalla antes de aplicarla en la articulación adolorida  Aplique hielo jono 15 a 20 minutos por hora o según indicaciones  · Eleve banuelos articulación  [place] le ayudará a disminuir la inflamación y el dolor  Eleve banuelos articulación por encima del nivel del corazón con la frecuencia que pueda  [place] banuelos articulación adolorida sobre almohadas para mantenerla cómodamente por encima del nivel del corazón  · Vaya a fisioterapia según le indicaron  Un fisioterapeuta le puede enseñar ejercicios para mejorar la flexibilidad y el rango de [address]  Prevenga ataques de gota:   · No consuma alimentos altos en purinas  Estos alimentos incluyen:amarilis, mariscos, espárragos, espinacas, coliflor, y algunos tipos de legumbres  Puede que los [employer] indiquen que coma más productos lácteos bajos en grasa, aleks el yogur  Los productos lácteos pueden disminuir el riesgo de presentar ataques de [place]   La vitamina C y el café también puedan ayudar  Banuelos médico o un dietista puede ayudarle a crear un plan de comidas  · [employer] líquidos aleks se le indique  Los líquidos aleks el agua ayudan a eliminar el ácido úrico del cuerpo  Pregunte cuánto líquido debe olvin cada día y cuáles líquidos son los más adecuados para usted  · Controle banuelos peso  Bajar de peso puede disminuir la cantidad de ácido úrico en banuelos cuerpo  El ejercicio lo puede ayudar con la pérdida de [place]  Consulte con banuelos médico acerca de los ejercicios adecuados para usted  · Controle caterina niveles de azúcar en la pramod si usted tiene diabetes  Mantenga el nivel de azúcar en banuelos pramod en un margen normal  [place] puede ayudar a prevenir ataques de gota  · Limite o no consuma bebidas alcohólicas, según indicaciones  El alcohol puede provocar un ataque de [place]  El alcohol también aumenta el riesgo de presentar deshidratación  Pregúntele a banuelos médico si usted puede olvin alcohol  Acuda a caterina consultas de control con banuelos médico según le indicaron  Anote caterina preguntas para que se acuerde de hacerlas jono caterina visitas  © 2017 [facility] [facility] Information is for End User's use only and may not be sold, redistributed or otherwise used for commercial purposes  All illustrations and images included in CareNotes® are the copyrighted property of A D A M , Inc  or Clint Snyder  Esta información es sólo para uso en educación  Banuelos intención no es darle un consejo médico sobre enfermedades o tratamientos  Colsulte con banuelos Светлана Stark farmacéutico antes de seguir cualquier régimen médico para saber si es seguro y efectivo para usted

## 2018-10-03 NOTE — ED PROVIDER NOTES
Emergency Department Note- Richard López 70 y o  male MRN: [id number]    Unit/Bed#: ED 24 Encounter: [id number]        History of Present Illness   HPI:  Richard López is a 70 y o  male who presents with right foot pain  Patient states he has been having right foot pain for the last 2 days similar to his previous gout attacks  Patient states and previous visits he has gotten Toradol which has improved his pain  Patient is also complaining of a cough congestion and some chest tightness which started 2 nights ago  Patient's cough is worse at night but there is no sputum production  Patient denies any fever  Patient with no difficulty ambulating  No abdominal pain no nausea vomiting diarrhea  REVIEW OF SYSTEMS    Constitutional: Negative for chills, fatigue and fever  HENT: Negative for ear pain, sore throat and trouble swallowing  Eyes: Negative for photophobia, pain and visual disturbance  Respiratory:   Positive for cough, chest tightness and shortness of breath  Cardiovascular: Negative for chest pain and palpitations  Gastrointestinal: Negative for abdominal pain, constipation, diarrhea, nausea and vomiting  Genitourinary: Negative for dysuria, flank pain, frequency and hematuria  Musculoskeletal: Negative for back pain and neck pain  positive right foot pain  Skin: Negative for color change and rash  Neurological: Negative for dizziness, weakness, light-headedness and headaches  Psychiatric/Behavioral: Negative for confusion  The patient is not nervous/anxious      All systems reviewed and negative except as noted above or in HPI         Historical Information   Past Medical History:   Diagnosis Date    Asthma     Cardiac disease     [facility] [address] )     Hypertension      Past Surgical History:   Procedure Laterality Date    CARPAL TUNNEL RELEASE      KNEE ARTHROSCOPY Bilateral     TOTAL HIP ARTHROPLASTY Left     TRACHEOSTOMY       Social History History   Alcohol Use    Yes     Comment: occasionally     History   Drug Use No     History   Smoking Status    Former Smoker    Quit date: 2006   Smokeless Tobacco    Never Used     Family History:   Family History   Problem Relation Age of Onset    Cancer Sister        Meds/Allergies     (Not in a hospital admission)  No Known Allergies    Objective   Vitals: Blood pressure 144/69, pulse 77, temperature 97 8 °F (36 6 °C), temperature source Tympanic, resp  rate 18, height 5' 10" (1 778 m), weight 116 kg (255 lb 11 7 oz), SpO2 97 %  PHYSICAL EXAM     General Appearance: alert and oriented, nad, non toxic appearing  Skin:  Warm, dry, intact  HEENT: atraumatic, normocephalic, eomi, perll   Neck: Supple, no JVD, no lymphadenopathy, trachea midline, no bruit  Cardiac: rrr, no murmurs, rub, gallops  Pulmonary: lungs cta, no wheezes, rales, rhonchi  Gastrointestinal: abdomen soft nontender, good bs, no mass or bruits, no cva tenderness  Extremities: no pedal edema, good pulses, no calf tenderness, no clubbing, no cyanosis mild right great toe and right foot tenderness no swelling noted neurovascularly intact  Neuro:  no focal motor or sensory deficits, cn intact  Psych:  Normal mood and affect, normal judgement and insight         Imaging: I have personally reviewed pertinent reports  Assessment/Plan     ED Medical Decision Making:  Give the patient Toradol for his pain as it has helped his gout flare previously  Will also obtain chest x-ray to rule out pneumonia  Will give the patient albuterol and prednisone  Will instruct the patient that the prednisone will increase his blood sugars  Portions of the record may have been created with voice recognition software  Occasional wrong word or "sound a like" substitutions may have occurred due to the inherent limitations of voice recognition software  Read the chart carefully and recognize, using context, where substitutions have occurred         [place] December Jane Sands MD  10/03/18 3574

## 2018-10-14 DIAGNOSIS — E78.5 HYPERLIPIDEMIA: ICD-10-CM

## 2018-10-15 RX ORDER — ATORVASTATIN CALCIUM 40 MG/1
TABLET, FILM COATED ORAL
Qty: 90 TABLET | Refills: 0 | Status: SHIPPED | OUTPATIENT
Start: 2018-10-15 | End: 2018-12-28 | Stop reason: SDUPTHER

## 2018-10-17 ENCOUNTER — OFFICE VISIT (OUTPATIENT)
Dept: GASTROENTEROLOGY | Facility: CLINIC | Age: 63
End: 2018-10-17
Payer: COMMERCIAL

## 2018-10-17 VITALS
TEMPERATURE: 98.3 F | BODY MASS INDEX: 37.28 KG/M2 | WEIGHT: 260.4 LBS | HEART RATE: 76 BPM | DIASTOLIC BLOOD PRESSURE: 69 MMHG | HEIGHT: 70 IN | SYSTOLIC BLOOD PRESSURE: 107 MMHG

## 2018-10-17 DIAGNOSIS — Z12.12 ENCOUNTER FOR COLORECTAL CANCER SCREENING: ICD-10-CM

## 2018-10-17 DIAGNOSIS — Z12.11 ENCOUNTER FOR COLORECTAL CANCER SCREENING: ICD-10-CM

## 2018-10-17 PROCEDURE — 99202 OFFICE O/P NEW SF 15 MIN: CPT | Performed by: INTERNAL MEDICINE

## 2018-10-17 NOTE — PROGRESS NOTES
[facility] - Outpatient Consultation  Jaswinder Guajardo 70 y o  male MRN: [id number]  Encounter: [id number]          ASSESSMENT AND PLAN:      1  Encounter for colorectal cancer screening  -discussed options of colorectal cancer screening and patient elected to undergo colonoscopy for this  -discussed risks of colonoscopy including infection, perforation, bleeding as well as missed lesions  -will proceed with colonoscopy  - Hepatitis C antibody; Future    ______________________________________________________________________    HPI:  15-year-old male seen in consultation for screening colonoscopy  Patient recently moved to the area from [facility] 7 months ago  Patient has never had a colonoscopy in the past     Patient denies family history of colon cancer  Denies abdominal pain, changes in bowel habits, diarrhea, constipation, blood in stool  Denies unintentional weight loss  Denies other GI symptoms including heartburn, reflux, difficulty swallowing or painful swallowing  REVIEW OF SYSTEMS:    CONSTITUTIONAL: Denies any fever, chills, rigors, and weight loss  HEENT: No earache or tinnitus  Denies hearing loss or visual disturbances  CARDIOVASCULAR: No chest pain or palpitations  RESPIRATORY: Denies any cough, hemoptysis, shortness of breath or dyspnea on exertion  GASTROINTESTINAL: As noted in the History of Present Illness  GENITOURINARY: No problems with urination  Denies any hematuria or dysuria  NEUROLOGIC: No dizziness or vertigo, denies headaches  MUSCULOSKELETAL: Denies any muscle or joint pain  SKIN: Denies skin rashes or itching  ENDOCRINE: Denies excessive thirst  Denies intolerance to heat or cold  PSYCHOSOCIAL: Denies depression or anxiety  Denies any recent memory loss         Historical Information   Past Medical History:   Diagnosis Date    Asthma     Cardiac disease     Diabetes mellitus ([facility] [address] )     Hypertension      Past Surgical History:   Procedure Laterality Date    CARPAL TUNNEL RELEASE      KNEE ARTHROSCOPY Bilateral     TOTAL HIP ARTHROPLASTY Left     TRACHEOSTOMY       Social History   History   Alcohol Use    Yes     Comment: occasionally     History   Drug Use No     History   Smoking Status    Former Smoker    Quit date: 2006   Smokeless Tobacco    Never Used     Family History   Problem Relation Age of Onset    Cancer Sister        Meds/Allergies       Current Outpatient Prescriptions:     albuterol (PROVENTIL HFA) 90 mcg/act inhaler    allopurinol (ZYLOPRIM) 100 mg tablet    atorvastatin (LIPITOR) 40 mg tablet    Blood Glucose Monitoring Suppl (ONE TOUCH ULTRA 2) w/Device KIT    gabapentin (NEURONTIN) 100 mg capsule    Lancets (ONETOUCH ULTRASOFT) lancets    lisinopril (ZESTRIL) 10 mg tablet    metFORMIN (GLUCOPHAGE-XR) 500 mg 24 hr tablet    predniSONE 20 mg tablet    No Known Allergies        Objective     Blood pressure 107/69, pulse 76, temperature 98 3 °F (36 8 °C), temperature source Tympanic, height 5' 10" (1 778 m), weight 118 kg (260 lb 6 4 oz)  Body mass index is 37 36 kg/m²  PHYSICAL EXAM:      General Appearance:   Alert, cooperative, no distress   HEENT:   Normocephalic, atraumatic, anicteric      Neck:  Supple, symmetrical, trachea midline   Lungs:   Clear to auscultation bilaterally; no rales, rhonchi or wheezing; respirations unlabored    Heart[de-identified]   Regular rate and rhythm; no murmur, rub, or gallop  Abdomen:   Soft, non-tender, non-distended; normal bowel sounds; no masses, no organomegaly    Genitalia:   Deferred    Rectal:   Deferred    Extremities:  No cyanosis, clubbing or edema    Pulses:  2+ and symmetric    Skin:  No jaundice, rashes, or lesions    Lymph nodes:  No palpable cervical lymphadenopathy        Lab Results:   No visits with results within 1 Day(s) from this visit     Latest known visit with results is:   Appointment on 09/19/2018   Component Date Value    HIV-1/HIV-2 Ab 09/19/2018 Non-Reactive     Uric Acid 09/19/2018 7 0

## 2018-10-17 NOTE — PATIENT INSTRUCTIONS
Colon scheduled 12/6/2018 with Madhuri Joseph at [facility]  Rescheduled pt for 01/10/2019 with Bhakti Morales at [facility] [address]  Unable to schedule during [facility]'s month due to no availability  Mailed pt instructions to home address

## 2018-10-23 ENCOUNTER — OFFICE VISIT (OUTPATIENT)
Dept: INTERNAL MEDICINE CLINIC | Facility: CLINIC | Age: 63
End: 2018-10-23
Payer: COMMERCIAL

## 2018-10-23 ENCOUNTER — TELEPHONE (OUTPATIENT)
Dept: INTERNAL MEDICINE CLINIC | Facility: CLINIC | Age: 63
End: 2018-10-23

## 2018-10-23 VITALS
HEIGHT: 70 IN | DIASTOLIC BLOOD PRESSURE: 70 MMHG | SYSTOLIC BLOOD PRESSURE: 112 MMHG | WEIGHT: 262.35 LBS | BODY MASS INDEX: 37.56 KG/M2 | TEMPERATURE: 97.9 F | HEART RATE: 68 BPM

## 2018-10-23 DIAGNOSIS — M25.571 ARTHRALGIA OF BOTH ANKLES: ICD-10-CM

## 2018-10-23 DIAGNOSIS — M25.472 EFFUSION OF LEFT ANKLE: ICD-10-CM

## 2018-10-23 DIAGNOSIS — M25.471 EFFUSION OF RIGHT ANKLE: ICD-10-CM

## 2018-10-23 DIAGNOSIS — M25.462 EFFUSION OF BOTH KNEE JOINTS: ICD-10-CM

## 2018-10-23 DIAGNOSIS — Z87.891 SMOKING HISTORY: ICD-10-CM

## 2018-10-23 DIAGNOSIS — E13.8 DIABETES MELLITUS OF OTHER TYPE WITH COMPLICATION, UNSPECIFIED WHETHER LONG TERM INSULIN USE: Primary | ICD-10-CM

## 2018-10-23 DIAGNOSIS — M25.572 ARTHRALGIA OF BOTH ANKLES: ICD-10-CM

## 2018-10-23 DIAGNOSIS — M25.562 ARTHRALGIA OF BOTH KNEES: ICD-10-CM

## 2018-10-23 DIAGNOSIS — J45.20 MILD INTERMITTENT ASTHMA WITHOUT COMPLICATION: ICD-10-CM

## 2018-10-23 DIAGNOSIS — M25.461 EFFUSION OF BOTH KNEE JOINTS: ICD-10-CM

## 2018-10-23 DIAGNOSIS — M25.561 ARTHRALGIA OF BOTH KNEES: ICD-10-CM

## 2018-10-23 DIAGNOSIS — J45.901 EXACERBATION OF ASTHMA, UNSPECIFIED ASTHMA SEVERITY, UNSPECIFIED WHETHER PERSISTENT: ICD-10-CM

## 2018-10-23 LAB
LEFT EYE DIABETIC RETINOPATHY: NORMAL
LEFT EYE IMAGE QUALITY: NORMAL
RIGHT EYE DIABETIC RETINOPATHY: NORMAL
RIGHT EYE IMAGE QUALITY: NORMAL
SEVERITY (EYE EXAM): NORMAL

## 2018-10-23 PROCEDURE — 1036F TOBACCO NON-USER: CPT | Performed by: INTERNAL MEDICINE

## 2018-10-23 PROCEDURE — 99213 OFFICE O/P EST LOW 20 MIN: CPT | Performed by: INTERNAL MEDICINE

## 2018-10-23 PROCEDURE — 92250 FUNDUS PHOTOGRAPHY W/I&R: CPT | Performed by: INTERNAL MEDICINE

## 2018-10-23 RX ORDER — ALBUTEROL SULFATE 90 UG/1
2 AEROSOL, METERED RESPIRATORY (INHALATION) EVERY 6 HOURS PRN
Qty: 6.7 G | Refills: 5 | Status: SHIPPED | OUTPATIENT
Start: 2018-10-23 | End: 2019-01-11 | Stop reason: SDUPTHER

## 2018-10-23 NOTE — PROGRESS NOTES
Assessment/Plan:    Diabetes mellitus of other type with complication, unspecified whether long term insulin use (HCC)  -     POCT diabetic eye exam    Mild intermittent asthma without complication   - refill albuterol    Effusion of both knee joints  -     Lyme Antibody Profile with reflex to WB; Future  -     RF Screen w/ Reflex to Titer; Future  -     MILLI Screen w/ Reflex to Titer/Pattern; Future  -     Sedimentation rate, automated; Future  -     C-reactive protein; Future    Effusion of left ankle  -     Lyme Antibody Profile with reflex to WB; Future  -     RF Screen w/ Reflex to Titer; Future  -     MILLI Screen w/ Reflex to Titer/Pattern; Future  -     Sedimentation rate, automated; Future  -     C-reactive protein; Future    Effusion of right ankle  -     Lyme Antibody Profile with reflex to WB; Future  -     RF Screen w/ Reflex to Titer; Future  -     MILLI Screen w/ Reflex to Titer/Pattern; Future  -     Sedimentation rate, automated; Future  -     C-reactive protein; Future    Arthralgia of both knees  -     Lyme Antibody Profile with reflex to WB; Future  -     RF Screen w/ Reflex to Titer; Future  -     MILLI Screen w/ Reflex to Titer/Pattern; Future  -     Sedimentation rate, automated; Future  -     C-reactive protein; Future    Arthralgia of both ankles  -     Lyme Antibody Profile with reflex to WB; Future  -     RF Screen w/ Reflex to Titer; Future  -     MILLI Screen w/ Reflex to Titer/Pattern; Future  -     Sedimentation rate, automated; Future  -     C-reactive protein; Future    Exacerbation of asthma, unspecified asthma severity, unspecified whether persistent  -     albuterol (PROVENTIL HFA) 90 mcg/act inhaler; Inhale 2 puffs every 6 (six) hours as needed for wheezing or shortness of breath        Will order low-dose CT scan for lung cancer, AAA screening with abdominal ultrasound  Eye exam performed today  Subjective:      Patient ID: Myraim Escobedo is a 70 y o  male      24-year-old male with past medical history difficult for diabetes, asthma hypertension, 72 pack year history of smoking quit 2006  Patient comes to the clinic today for bilateral ankle and knee swelling and arthralgias  Patient states swelling in the left knee is worse than the right and is present in his ankles bilaterally  Patient states the pain is worst in the morning and lasts for about 30 min  Patient states the pain is increased with motion  Patient recently went to Orthopedics or had arthrocentesis of the right and left knees without fluid studies  Patient had steroid injections of the knees bilaterally, which he states did not help  Since last visit, patient had echocardiogram performed which showed an estimated EF 72% with grade 1 diastolic dysfunction, no RRW MA, trace MR, TR, AL  The following portions of the patient's history were reviewed and updated as appropriate: allergies, current medications, past family history, past medical history, past social history, past surgical history and problem list     Review of Systems   Constitutional: Negative for appetite change, chills, diaphoresis, fatigue, fever and unexpected weight change  HENT: Negative for sore throat  Eyes: Negative for visual disturbance  Respiratory: Positive for shortness of breath (On exertion)  Negative for cough, chest tightness and wheezing  Cardiovascular: Negative for chest pain, palpitations and leg swelling  Gastrointestinal: Negative for abdominal distention, abdominal pain, blood in stool, constipation, diarrhea, nausea and vomiting  Genitourinary: Negative for difficulty urinating, flank pain and urgency  Musculoskeletal: Positive for arthralgias and joint swelling  Negative for myalgias  Skin: Negative for pallor and rash  Neurological: Negative for dizziness, weakness, light-headedness and headaches           Objective:      /70   Pulse 68   Temp 97 9 °F (36 6 °C)   Ht 5' 10" (1 778 m)   Wt 119 kg (262 lb 5 6 oz)   BMI 37 64 kg/m²          Physical Exam   Constitutional: He is oriented to person, place, and time  He appears well-developed and well-nourished  No distress  BMI 38   HENT:   Head: Normocephalic and atraumatic  Mouth/Throat: No oropharyngeal exudate  Eyes: Conjunctivae are normal  No scleral icterus  Neck: Normal range of motion  Neck supple  No JVD present  No thyromegaly present  Cardiovascular: Normal rate and regular rhythm  Exam reveals no gallop and no friction rub  No murmur heard  Pulmonary/Chest: Effort normal and breath sounds normal  No respiratory distress  He has no wheezes  Abdominal: Soft  He exhibits no distension  There is no tenderness  Musculoskeletal:   Left knee effusion without erythema, warmth, right knee effusion without erythema or warmth, bilateral ankle effusion without erythema or warmth  Arthralgias in the ankles and knees bilaterally made worse with passive and active flexion and extension  Lymphadenopathy:     He has no cervical adenopathy  Neurological: He is alert and oriented to person, place, and time  Skin: He is not diaphoretic  Nursing note and vitals reviewed

## 2018-10-23 NOTE — TELEPHONE ENCOUNTER
----- Message from Va Velasquez MD sent at 10/23/2018  1:16 PM EDT -----  Attempted to call patient multiple times to make patient aware that his letter for housing is available and that he has lab orders waiting for him to be drawn  Please continue to reach patient at phone number listed so that he can complete labs and  his letter  Also, please make him aware that I am going to tasked orthopedics to see the patient soon so that they can tap his knee for fluid analysis    Thank you very much

## 2018-10-29 ENCOUNTER — PATIENT OUTREACH (OUTPATIENT)
Dept: INTERNAL MEDICINE CLINIC | Facility: CLINIC | Age: 63
End: 2018-10-29

## 2018-10-29 DIAGNOSIS — Z78.9 NEEDS ASSISTANCE WITH COMMUNITY RESOURCES: Primary | ICD-10-CM

## 2018-10-29 NOTE — PROGRESS NOTES
ALEXUS/DION has returned to this 71 y/o Venezuelan speaking male pt   SW called via  ID  # [id number]  Pt is requesting additional help with housing   He relates he is finally on the [employer]   He relates someone from the [facility] the [facility] is helping him with the [employer] application  SW has offered to call  [place] [facility] to see if they can help with any other options  Pt has agreed to referral to AAA    SW has also provided pt with the # and Address for [address] [place] [address] [facility] program in case they can help further  Pt is thinking about trying to rent a room  Pt relates he has also been feeling depressed since having to leave [place]   He is living with his son but his housing is poor  He relates he has difficulty ambulating and there are many steps  He sleeps in he basement which is difficult  He relates he has had some SI but no current plans  ALEXUS has again given pt the # for [facility] and encouraged him to re-shedule an appointment with them  He denies any current SI and reates he will call [facility] tomorrow  He also has the Crisis # and has instructed to call them o  report to the ED if symptoms worsen  Pt relates he now has [employer]  He also has been informed of his [employer] benefits

## 2018-10-30 ENCOUNTER — PATIENT OUTREACH (OUTPATIENT)
Dept: INTERNAL MEDICINE CLINIC | Facility: CLINIC | Age: 63
End: 2018-10-30

## 2018-10-30 NOTE — PROGRESS NOTES
ALEXUS/DION has called [facility] to make a AAA referral as per pt request   ALEXUS spoke with MS Sivakumar espinal same  [facility] aware pt is requesting visit and is in need of help with  housing, social and MH needs  Pt reporting depression but denies any SI  Pt to f/u with MH with [facility]  SW also spoke with pt to via  ID # [id number] to inform him of AAA referral and SW also assisted with call to [facility] with pt's permission  [facility] indicates there is a problem with his insurance  They will need  to call pt back with an appointment  Addendum:  ALEXUS called pt back via  ID # [id number]  SW reviewed with pt that AAA referral made, pt relates he is working with the [facility] on other Housing applications  Sw also provided pt with [place] as well  Another call made to [place]  They have given him another appointment 11/5/18 at 4pm   Pt to f/u with SW as needed

## 2018-11-06 NOTE — PROGRESS NOTES
OVERDUE RESULTS REMINDER  Per patient's chart, CT and US ordered by Dr Mai Miller are scheduled for 11/8/18     CT LUNG SCREENING PROGRAM; and US ABDOMINAL AORTA SCREENING AAA

## 2018-11-08 ENCOUNTER — HOSPITAL ENCOUNTER (OUTPATIENT)
Dept: RADIOLOGY | Age: 63
Discharge: HOME | End: 2018-11-08
Payer: COMMERCIAL

## 2018-11-08 DIAGNOSIS — Z87.891 SMOKING HISTORY: ICD-10-CM

## 2018-11-08 PROCEDURE — 76706 US ABDL AORTA SCREEN AAA: CPT

## 2018-11-21 ENCOUNTER — TELEPHONE (OUTPATIENT)
Dept: GASTROENTEROLOGY | Facility: AMBULARY SURGERY CENTER | Age: 63
End: 2018-11-21

## 2018-11-21 NOTE — TELEPHONE ENCOUNTER
I can't find any information on who called the patient, I did not call him and it doesn't appear that anyone reached out to him

## 2018-11-30 RX ORDER — BUSPIRONE HYDROCHLORIDE 10 MG/1
TABLET ORAL
Refills: 0 | COMMUNITY
Start: 2018-11-28 | End: 2019-08-01

## 2018-11-30 RX ORDER — HYDROXYZINE PAMOATE 50 MG/1
CAPSULE ORAL
Refills: 0 | COMMUNITY
Start: 2018-11-28 | End: 2019-03-08

## 2018-12-03 ENCOUNTER — OFFICE VISIT (OUTPATIENT)
Dept: INTERNAL MEDICINE CLINIC | Facility: CLINIC | Age: 63
End: 2018-12-03
Payer: COMMERCIAL

## 2018-12-03 VITALS
WEIGHT: 268.08 LBS | HEIGHT: 70 IN | TEMPERATURE: 97.9 F | DIASTOLIC BLOOD PRESSURE: 66 MMHG | BODY MASS INDEX: 38.38 KG/M2 | SYSTOLIC BLOOD PRESSURE: 120 MMHG | HEART RATE: 88 BPM

## 2018-12-03 DIAGNOSIS — M17.0 OSTEOARTHRITIS OF BOTH KNEES: Primary | ICD-10-CM

## 2018-12-03 DIAGNOSIS — M17.4 OTHER SECONDARY OSTEOARTHRITIS OF BOTH KNEES: ICD-10-CM

## 2018-12-03 PROBLEM — M10.9 GOUT: Status: ACTIVE | Noted: 2018-12-03

## 2018-12-03 PROCEDURE — 4040F PNEUMOC VAC/ADMIN/RCVD: CPT | Performed by: HOSPITALIST

## 2018-12-03 PROCEDURE — 3008F BODY MASS INDEX DOCD: CPT | Performed by: HOSPITALIST

## 2018-12-03 PROCEDURE — 99213 OFFICE O/P EST LOW 20 MIN: CPT | Performed by: HOSPITALIST

## 2018-12-03 RX ORDER — TRAMADOL HYDROCHLORIDE 50 MG/1
50 TABLET ORAL EVERY 8 HOURS PRN
Qty: 50 TABLET | Refills: 0 | Status: SHIPPED | OUTPATIENT
Start: 2018-12-03 | End: 2019-01-11

## 2018-12-03 RX ORDER — COLCHICINE 0.6 MG/1
0.6 TABLET ORAL DAILY
Qty: 30 TABLET | Refills: 0 | Status: CANCELLED | OUTPATIENT
Start: 2018-12-03

## 2018-12-03 RX ORDER — TRAMADOL HYDROCHLORIDE 50 MG/1
50 TABLET ORAL EVERY 8 HOURS PRN
Qty: 90 TABLET | Refills: 0 | Status: SHIPPED | OUTPATIENT
Start: 2018-12-03 | End: 2018-12-03 | Stop reason: SDUPTHER

## 2018-12-03 RX ORDER — ACETAMINOPHEN 325 MG/1
650 TABLET ORAL EVERY 6 HOURS PRN
Qty: 240 TABLET | Refills: 0 | Status: SHIPPED | OUTPATIENT
Start: 2018-12-03 | End: 2019-01-03 | Stop reason: SDUPTHER

## 2018-12-03 NOTE — PROGRESS NOTES
INTERNAL MEDICINE FOLLOW-UP OFFICE VISIT  [facility]  [address] [place] [address]    NAME: Jaswinder Guajardo  AGE: 70 y o  SEX: male    DATE OF ENCOUNTER: 12/3/2018    Assessment and Plan     Problem List Items Addressed This Visit        Musculoskeletal and Integument    Osteoarthritis of both knees - Primary     Patient reports history of gout, though his physical exam findings and history more consistent with osteoarthritis  He is on allopurinol chronically  Will continue allopurinol at this time is do not want to precipitate acute gout flare patient does indeed have concurrent gout  Follow-up labs ordered by Dr Yulissa Anderson - what antibody, RF screen, MILLI screen, ESR, CRP  X-ray of his bilateral knees showed small bilateral knee joint effusions and right mild OA  Patient was given 2 week course of tramadol 50 mg Q 8 p r n  for severe pain and Tylenol 650 mg q6 hours p r n  for mild and moderate pain         Relevant Medications    acetaminophen (TYLENOL) 325 mg tablet    traMADol (ULTRAM) 50 mg tablet          No orders of the defined types were placed in this encounter       - Counseling Documentation: patient was counseled regarding: diagnostic results, instructions for management, risk factor reductions, prognosis, patient and family education, impressions, risks and benefits of treatment options and importance of compliance with treatment  - Counseling Time: counseling time more than 50% of visit: 15 minutes  - Barriers to treatment: None  - Medication Side Effects: Adverse side effects of medications were reviewed with the patient/guardian today  - Self Referrals: No    Chief Complaint     Chief Complaint   Patient presents with    Follow-up     test results       History of Present Illness     Mr Rafaela Rivas is a 40-year-old male who was recently seen in clinic on 10/23 by Dr garcia for bilateral ankle and knee swelling with a frail chills    He was told he had a diagnosis of gout in the past while [facility]   Patient is maintained on allopurinol  Patient does report only chronic bilateral knee pain that is more consistent with OA  rather than "flares" of gout  Patient is here today for review his lab work  Patient was ordered likely antibody, RF screen, MILLI screen, ESR, CRP which were not completed  Patient also had abdominal aortic ultrasound to screen for AAA which was negative  If since CT lung cancer screening which was negative as well  Patient has C scope scheduled for 1/10/19        The following portions of the patient's history were reviewed and updated as appropriate: allergies, current medications, past family history, past medical history, past social history, past surgical history and problem list     Review of Systems     Review of Systems   Constitutional: Negative for chills, fatigue and fever  HENT: Negative  Eyes: Negative  Respiratory: Positive for shortness of breath  Negative for cough and wheezing  Cardiovascular: Negative for chest pain, palpitations and leg swelling  Gastrointestinal: Negative for constipation, diarrhea and nausea  Endocrine: Negative  Genitourinary: Negative  Musculoskeletal: Positive for arthralgias, back pain, gait problem and joint swelling  Skin: Negative  Allergic/Immunologic: Negative  Neurological: Negative for dizziness, tremors, seizures, syncope, weakness, light-headedness and headaches  Hematological: Negative  Active Problem List     Patient Active Problem List   Diagnosis    Essential hypertension    Diabetes ([address] )    Asthma    CKD (chronic kidney disease) stage 2, GFR 60-89 ml/min    Obesity (BMI 30-39  9)    Prediabetes    Hyperlipidemia    Depression    Dog bite    Encounter for colorectal cancer screening    Osteoarthritis of both knees       Objective     /66   Pulse 88   Temp 97 9 °F (36 6 °C)   Ht 5' 10" (1 778 m)   Wt 122 kg (268 lb 1 3 oz)   BMI 38 47 kg/m²     Physical Exam   Constitutional: He is oriented to person, place, and time  He appears well-developed and well-nourished  HENT:   Head: Normocephalic and atraumatic  Eyes: Pupils are equal, round, and reactive to light  Neck: Normal range of motion  No JVD present  Cardiovascular: Normal rate and regular rhythm  Exam reveals no gallop and no friction rub  No murmur heard  Pulmonary/Chest: No respiratory distress  He has no wheezes  Abdominal: Soft  Bowel sounds are normal  He exhibits no distension  There is no tenderness  Obese habitus   Musculoskeletal: He exhibits tenderness  Bilateral knee swelling  No palpable joint effusion noted  No erythema or warmth noted   Neurological: He is alert and oriented to person, place, and time  Skin: Skin is warm and dry  Vitals reviewed        Pertinent Laboratory/Diagnostic Studies:  CBC:   Lab Results   Component Value Date/Time    WBC 5 55 06/24/2018 05:50 PM    RBC 4 96 06/24/2018 05:50 PM    HGB 13 9 06/24/2018 05:50 PM    HCT 43 4 06/24/2018 05:50 PM    MCV 88 06/24/2018 05:50 PM    MCH 28 0 06/24/2018 05:50 PM    MCHC 32 0 06/24/2018 05:50 PM    RDW 14 1 06/24/2018 05:50 PM    MPV 11 2 06/24/2018 05:50 PM     06/24/2018 05:50 PM    NRBC 0 06/24/2018 05:50 PM    NEUTOPHILPCT 67 06/24/2018 05:50 PM    LYMPHOPCT 13 (L) 06/24/2018 05:50 PM    MONOPCT 15 (H) 06/24/2018 05:50 PM    EOSPCT 4 06/24/2018 05:50 PM    BASOPCT 1 06/24/2018 05:50 PM    NEUTROABS 3 71 06/24/2018 05:50 PM    LYMPHSABS 0 72 06/24/2018 05:50 PM    MONOSABS 0 85 06/24/2018 05:50 PM    EOSABS 0 21 06/24/2018 05:50 PM     Chemistry Profile:   Lab Results   Component Value Date/Time    K 4 4 06/24/2018 05:50 PM     06/24/2018 05:50 PM    CO2 30 06/24/2018 05:50 PM    BUN 13 06/24/2018 05:50 PM    CREATININE 1 18 06/24/2018 05:50 PM    GLUC 87 06/24/2018 05:50 PM    CALCIUM 9 3 06/24/2018 05:50 PM    AST 22 06/24/2018 05:50 PM    ALT 29 06/24/2018 05:50 PM    ALKPHOS 70 06/24/2018 05:50 PM    EGFR 65 06/24/2018 05:50 PM     Coagulation Studies: No results found for: PROTIME, INR, PTT  Cardiac Studies:   Lab Results   Component Value Date/Time    TROPONINI <0 02 06/24/2018 05:50 PM         Current Medications     Current Outpatient Prescriptions:     albuterol (PROVENTIL HFA) 90 mcg/act inhaler, Inhale 2 puffs every 6 (six) hours as needed for wheezing or shortness of breath, Disp: 6 7 g, Rfl: 5    atorvastatin (LIPITOR) 40 mg tablet, TAKE 1 TABLET BY MOUTH EVERY DAY, Disp: 90 tablet, Rfl: 0    gabapentin (NEURONTIN) 100 mg capsule, Take 3 capsules (300 mg total) by mouth 3 (three) times a day Start with one pill HS x1d, then 1 pill BID x1d, then 1 pill TID, Disp: 90 capsule, Rfl: 2    Lancets (ONETOUCH ULTRASOFT) lancets, Use as instructed, Disp: 100 each, Rfl: 0    metFORMIN (GLUCOPHAGE-XR) 500 mg 24 hr tablet, Take 1 tablet (500 mg total) by mouth daily with dinner, Disp: 90 tablet, Rfl: 0    sertraline (ZOLOFT) 50 mg tablet, TOMNA JEREL TABLETA POR VIA ORAL DIARIAMENTE, Disp: , Rfl: 2    acetaminophen (TYLENOL) 325 mg tablet, Take 2 tablets (650 mg total) by mouth every 6 (six) hours as needed for mild pain or moderate pain, Disp: 240 tablet, Rfl: 0    allopurinol (ZYLOPRIM) 100 mg tablet, Take 1 tablet (100 mg total) by mouth daily, Disp: 30 tablet, Rfl: 5    Blood Glucose Monitoring Suppl (ONE TOUCH ULTRA 2) w/Device KIT, by Does not apply route daily (Patient not taking: Reported on 12/3/2018 ), Disp: 1 each, Rfl: 0    busPIRone (BUSPAR) 10 mg tablet, TAKE 1 TABLET POR VIA ORAL DOS VECES AL CORAL, Disp: , Rfl: 0    hydrOXYzine pamoate (VISTARIL) 50 mg capsule, TOME JEREL CAPSULA POR VIA ORAL ANTES DE DORMIR EN LA NOCHE, Disp: , Rfl: 0    lisinopril (ZESTRIL) 10 mg tablet, Take 1 tablet (10 mg total) by mouth daily, Disp: 90 tablet, Rfl: 0    PHARMACIST CHOICE ALCOHOL 70 % PADS, Use as directed, Disp: , Rfl: 0    traMADol (ULTRAM) 50 mg tablet, Take 1 tablet (50 mg total) by mouth every 8 (eight) hours as needed for severe pain, Disp: 50 tablet, Rfl: 0    Health Maintenance     Health Maintenance   Topic Date Due    CRC Screening: Colonoscopy  1955    DM Eye Exam  09/08/1963    URINE MICROALBUMIN  09/08/1963    Fall Risk  09/08/2018    HEMOGLOBIN A1C  01/10/2019    Diabetic Foot Exam  09/18/2019    Pneumococcal PPSV23/PCV13 65+ Years / Low and Medium Risk (2 of 2 - PPSV23) 09/18/2019    DTaP,Tdap,and Td Vaccines (2 - Td) 07/09/2028    Hepatitis C Screening  Completed    INFLUENZA VACCINE  Completed     Immunization History   Administered Date(s) Administered    Influenza, high dose seasonal 0 5 mL 09/18/2018    Pneumococcal Conjugate 13-Valent 09/18/2018    Rabies 07/09/2018, 07/12/2018    Rabies, Intramuscular 07/09/2018, 07/12/2018    Tdap 07/09/2018       Tony Dean Cardio  12/3/2018 5:55 PM

## 2018-12-03 NOTE — ASSESSMENT & PLAN NOTE
Patient reports history of gout, though his physical exam findings and history more consistent with osteoarthritis  He is on allopurinol chronically  Will continue allopurinol at this time is do not want to precipitate acute gout flare patient does indeed have concurrent gout  Follow-up labs ordered by Dr Dieudonne Montero - what antibody, RF screen, MILLI screen, ESR, CRP  X-ray of his bilateral knees showed small bilateral knee joint effusions and right mild OA    Patient was given 2 week course of tramadol 50 mg Q 8 p r n  for severe pain and Tylenol 650 mg q6 hours p r n  for mild and moderate pain

## 2018-12-20 ENCOUNTER — OFFICE VISIT (OUTPATIENT)
Dept: OBGYN CLINIC | Facility: HOSPITAL | Age: 63
End: 2018-12-20
Payer: COMMERCIAL

## 2018-12-20 VITALS
SYSTOLIC BLOOD PRESSURE: 143 MMHG | HEIGHT: 69 IN | HEART RATE: 87 BPM | WEIGHT: 268 LBS | DIASTOLIC BLOOD PRESSURE: 74 MMHG | BODY MASS INDEX: 39.69 KG/M2

## 2018-12-20 DIAGNOSIS — M17.12 PRIMARY OSTEOARTHRITIS OF LEFT KNEE: Primary | ICD-10-CM

## 2018-12-20 DIAGNOSIS — M25.572 PAIN, JOINT, ANKLE AND FOOT, LEFT: ICD-10-CM

## 2018-12-20 DIAGNOSIS — M17.11 OSTEOARTHRITIS OF RIGHT KNEE, UNSPECIFIED OSTEOARTHRITIS TYPE: ICD-10-CM

## 2018-12-20 PROCEDURE — 20610 DRAIN/INJ JOINT/BURSA W/O US: CPT | Performed by: ORTHOPAEDIC SURGERY

## 2018-12-20 PROCEDURE — 99214 OFFICE O/P EST MOD 30 MIN: CPT | Performed by: ORTHOPAEDIC SURGERY

## 2018-12-20 RX ORDER — METHYLPREDNISOLONE ACETATE 40 MG/ML
2 INJECTION, SUSPENSION INTRA-ARTICULAR; INTRALESIONAL; INTRAMUSCULAR; SOFT TISSUE
Status: COMPLETED | OUTPATIENT
Start: 2018-12-20 | End: 2018-12-20

## 2018-12-20 RX ORDER — BUPIVACAINE HYDROCHLORIDE 2.5 MG/ML
2 INJECTION, SOLUTION INFILTRATION; PERINEURAL
Status: COMPLETED | OUTPATIENT
Start: 2018-12-20 | End: 2018-12-20

## 2018-12-20 RX ADMIN — METHYLPREDNISOLONE ACETATE 2 ML: 40 INJECTION, SUSPENSION INTRA-ARTICULAR; INTRALESIONAL; INTRAMUSCULAR; SOFT TISSUE at 13:20

## 2018-12-20 RX ADMIN — BUPIVACAINE HYDROCHLORIDE 2 ML: 2.5 INJECTION, SOLUTION INFILTRATION; PERINEURAL at 13:20

## 2018-12-20 RX ADMIN — BUPIVACAINE HYDROCHLORIDE 2 ML: 2.5 INJECTION, SOLUTION INFILTRATION; PERINEURAL at 13:19

## 2018-12-20 RX ADMIN — METHYLPREDNISOLONE ACETATE 2 ML: 40 INJECTION, SUSPENSION INTRA-ARTICULAR; INTRALESIONAL; INTRAMUSCULAR; SOFT TISSUE at 13:19

## 2018-12-20 NOTE — PROGRESS NOTES
Assessment:  1  Primary osteoarthritis of left knee  Large joint arthrocentesis   2  Osteoarthritis of right knee, unspecified osteoarthritis type  Large joint arthrocentesis   3  Pain, joint, ankle and foot, left  Ankle Air Cast       Plan:      Weight Bearing  as Tolerated   Bilateral knee steroid injections in the office today  Advised patient to check his sugar levels throughout the day due to cortisone injection increasing sugar levels   Continue taking tramadol, gabapentin, Tylenol p r n  for pain   Provided patient air cast for left ankle  Will obtain new x-rays of the left ankle at follow-up   Follow-up in 3 months   Discussed treatment plan with patient and he is in agreement treatment plan  Thank you        The above stated was discussed in layman's terms and the patient expressed understanding  All questions were answered to the patient's satisfaction  Subjective: Debbie Sanchez is a 70 y o  male who presents bilateral knee osteoarthritis  Patient states he was treating previously in [facility] for bilateral knee and left ankle  Patient is starting have increased bilateral knee pain a few months ago and was seen by [facility] and had bilateral knee steroid injections on 9/20/2018 with some relief  Patient states he is having constant achiness and soreness bilateral medial knee  Pain is worse with going up and down steps, walking, and standing to standing    Patient did have a right knee arthroscopic surgery many years ago in [place]   Patient also states he fractured his left ankle many years ago while living  in [address] [place] [address] of systems negative unless otherwise specified in HPI    Past Medical History:   Diagnosis Date    Asthma     Cardiac disease     Diabetes mellitus ([address] )     Hypertension        Past Surgical History:   Procedure Laterality Date    CARPAL TUNNEL RELEASE      KNEE ARTHROSCOPY Bilateral     TOTAL HIP ARTHROPLASTY Left     TRACHEOSTOMY         Family History   Problem Relation Age of Onset    Cancer Sister        Social History     Occupational History    Not on file       Social History Main Topics    Smoking status: Former Smoker     Quit date: 2006    Smokeless tobacco: Never Used    Alcohol use Yes      Comment: occasionally    Drug use: No    Sexual activity: No         Current Outpatient Prescriptions:     acetaminophen (TYLENOL) 325 mg tablet, Take 2 tablets (650 mg total) by mouth every 6 (six) hours as needed for mild pain or moderate pain, Disp: 240 tablet, Rfl: 0    albuterol (PROVENTIL HFA) 90 mcg/act inhaler, Inhale 2 puffs every 6 (six) hours as needed for wheezing or shortness of breath, Disp: 6 7 g, Rfl: 5    allopurinol (ZYLOPRIM) 100 mg tablet, Take 1 tablet (100 mg total) by mouth daily, Disp: 30 tablet, Rfl: 5    atorvastatin (LIPITOR) 40 mg tablet, TAKE 1 TABLET BY MOUTH EVERY DAY, Disp: 90 tablet, Rfl: 0    Blood Glucose Monitoring Suppl (ONE TOUCH ULTRA 2) w/Device KIT, by Does not apply route daily, Disp: 1 each, Rfl: 0    busPIRone (BUSPAR) 10 mg tablet, TAKE 1 TABLET POR VIA ORAL DOS VECES AL CORAL, Disp: , Rfl: 0    gabapentin (NEURONTIN) 100 mg capsule, Take 3 capsules (300 mg total) by mouth 3 (three) times a day Start with one pill HS x1d, then 1 pill BID x1d, then 1 pill TID, Disp: 90 capsule, Rfl: 2    hydrOXYzine pamoate (VISTARIL) 50 mg capsule, TOME JEREL CAPSULA POR VIA ORAL ANTES DE DORMIR EN LA NOCHE, Disp: , Rfl: 0    Lancets (ONETOUCH ULTRASOFT) lancets, Use as instructed, Disp: 100 each, Rfl: 0    lisinopril (ZESTRIL) 10 mg tablet, Take 1 tablet (10 mg total) by mouth daily, Disp: 90 tablet, Rfl: 0    metFORMIN (GLUCOPHAGE-XR) 500 mg 24 hr tablet, Take 1 tablet (500 mg total) by mouth daily with dinner, Disp: 90 tablet, Rfl: 0    PHARMACIST CHOICE ALCOHOL 70 % PADS, Use as directed, Disp: , Rfl: 0    sertraline (ZOLOFT) 50 mg tablet, TOMNA JEREL TABLETA POR VIA ORAL DIARIAMENTE, Disp: , Rfl: 2    traMADol (ULTRAM) 50 mg tablet, Take 1 tablet (50 mg total) by mouth every 8 (eight) hours as needed for severe pain, Disp: 50 tablet, Rfl: 0    No Known Allergies         Vitals:    12/20/18 1303   BP: 143/74   Pulse: 87       Objective:            Physical Exam  · General: Awake, Alert, Oriented  · Eyes: Pupils equal, round and reactive to light  · Heart: regular rate and rhythm  · Lungs: No audible wheezing  · Abdomen: soft                    Ortho Exam  Right knee  Full ROM   TTP over medial joint line  Stable to varus/valgus stress  Minimal tenderness to palpation over the lateral joint line  Minimal effusion  Neurovascularly Intact Distally       Left knee   TTP over medial joint line   Full ROM   Mild effusion  He mild lateral joint line tenderness  Stable to varus/vaglus stress  NVID       Left ankle:  TTP over medial joint line   TTP over distal fibula  No crepitus   No pain with manipulation of ankle   Good dorsiflexion and plantar flexion ankle  Brisk capillary refill  The patient does have sensation intact over superficial and deep peroneal nerves        Diagnostics, reviewed and taken today if performed as documented:    None performed      The attending physician has personally reviewed the pertinent films in PACS and interpretation is as follows:  XR Bilateral knee findings moderate arthritis   Medial joint space narrowing is noted on both knees compared to the lateral joint space narrowing; no osteophyte formation noted  Procedures, if performed today:    Large joint arthrocentesis  Date/Time: 12/20/2018 1:19 PM  Consent given by: patient  Site marked: site marked  Timeout: Immediately prior to procedure a time out was called to verify the correct patient, procedure, equipment, support staff and site/side marked as required   Supporting Documentation  Indications: pain   Procedure Details  Location: knee - R knee  Needle size: 22 G  Approach: anterolateral  Medications administered: 2 mL bupivacaine 0 25 %; 2 mL methylPREDNISolone acetate 40 mg/mL    Patient tolerance: patient tolerated the procedure well with no immediate complications  Dressing:  Sterile dressing applied  Large joint arthrocentesis  Date/Time: 12/20/2018 1:20 PM  Consent given by: patient  Site marked: site marked  Timeout: Immediately prior to procedure a time out was called to verify the correct patient, procedure, equipment, support staff and site/side marked as required   Supporting Documentation  Indications: pain   Procedure Details  Location: knee - L knee  Preparation: Patient was prepped and draped in the usual sterile fashion  Needle size: 22 G  Approach: anterolateral  Medications administered: 2 mL bupivacaine 0 25 %; 2 mL methylPREDNISolone acetate 40 mg/mL    Patient tolerance: patient tolerated the procedure well with no immediate complications  Dressing:  Sterile dressing applied            Scribe Attestation    I,:   Christina Gallo am acting as a scribe while in the presence of the attending physician :        I,:   Shaunna Alcantar MD personally performed the services described in this documentation    as scribed in my presence :              Portions of the record may have been created with voice recognition software  Occasional wrong word or "sound a like" substitutions may have occurred due to the inherent limitations of voice recognition software  Read the chart carefully and recognize, using context, where substitutions have occurred

## 2018-12-28 DIAGNOSIS — E78.5 HYPERLIPIDEMIA: ICD-10-CM

## 2018-12-28 RX ORDER — ATORVASTATIN CALCIUM 40 MG/1
TABLET, FILM COATED ORAL
Qty: 90 TABLET | Refills: 0 | Status: SHIPPED | OUTPATIENT
Start: 2018-12-28 | End: 2018-12-28 | Stop reason: SDUPTHER

## 2018-12-28 RX ORDER — ATORVASTATIN CALCIUM 40 MG/1
TABLET, FILM COATED ORAL
Qty: 90 TABLET | Refills: 0 | Status: SHIPPED | OUTPATIENT
Start: 2018-12-28 | End: 2019-03-08

## 2018-12-29 DIAGNOSIS — M17.0 OSTEOARTHRITIS OF BOTH KNEES, UNSPECIFIED OSTEOARTHRITIS TYPE: ICD-10-CM

## 2018-12-29 DIAGNOSIS — M17.0 OSTEOARTHRITIS OF BOTH KNEES: ICD-10-CM

## 2018-12-30 RX ORDER — ACETAMINOPHEN 325 MG/1
650 TABLET ORAL EVERY 6 HOURS PRN
Qty: 240 TABLET | OUTPATIENT
Start: 2018-12-30

## 2019-01-03 RX ORDER — ACETAMINOPHEN 325 MG/1
650 TABLET ORAL EVERY 6 HOURS PRN
Qty: 240 TABLET | Refills: 0 | Status: SHIPPED | OUTPATIENT
Start: 2019-01-03 | End: 2019-08-01

## 2019-01-04 ENCOUNTER — TELEPHONE (OUTPATIENT)
Dept: GASTROENTEROLOGY | Facility: CLINIC | Age: 64
End: 2019-01-04

## 2019-01-09 ENCOUNTER — APPOINTMENT (OUTPATIENT)
Dept: LAB | Facility: CLINIC | Age: 64
End: 2019-01-09
Payer: COMMERCIAL

## 2019-01-09 ENCOUNTER — ANESTHESIA EVENT (OUTPATIENT)
Dept: GASTROENTEROLOGY | Facility: HOSPITAL | Age: 64
End: 2019-01-09
Payer: COMMERCIAL

## 2019-01-09 DIAGNOSIS — M25.561 ARTHRALGIA OF BOTH KNEES: ICD-10-CM

## 2019-01-09 DIAGNOSIS — Z12.12 ENCOUNTER FOR COLORECTAL CANCER SCREENING: ICD-10-CM

## 2019-01-09 DIAGNOSIS — Z12.11 ENCOUNTER FOR COLORECTAL CANCER SCREENING: ICD-10-CM

## 2019-01-09 DIAGNOSIS — M25.571 ARTHRALGIA OF BOTH ANKLES: ICD-10-CM

## 2019-01-09 DIAGNOSIS — M25.461 EFFUSION OF BOTH KNEE JOINTS: ICD-10-CM

## 2019-01-09 DIAGNOSIS — M25.472 EFFUSION OF LEFT ANKLE: ICD-10-CM

## 2019-01-09 DIAGNOSIS — M25.471 EFFUSION OF RIGHT ANKLE: ICD-10-CM

## 2019-01-09 DIAGNOSIS — M25.562 ARTHRALGIA OF BOTH KNEES: ICD-10-CM

## 2019-01-09 DIAGNOSIS — M25.462 EFFUSION OF BOTH KNEE JOINTS: ICD-10-CM

## 2019-01-09 DIAGNOSIS — M25.572 ARTHRALGIA OF BOTH ANKLES: ICD-10-CM

## 2019-01-09 LAB
CRP SERPL QL: <3 MG/L
ERYTHROCYTE [SEDIMENTATION RATE] IN BLOOD: 8 MM/HOUR (ref 0–10)

## 2019-01-09 PROCEDURE — 85652 RBC SED RATE AUTOMATED: CPT

## 2019-01-09 PROCEDURE — 86038 ANTINUCLEAR ANTIBODIES: CPT

## 2019-01-09 PROCEDURE — 86803 HEPATITIS C AB TEST: CPT

## 2019-01-09 PROCEDURE — 86430 RHEUMATOID FACTOR TEST QUAL: CPT

## 2019-01-09 PROCEDURE — 36415 COLL VENOUS BLD VENIPUNCTURE: CPT

## 2019-01-09 PROCEDURE — 86140 C-REACTIVE PROTEIN: CPT

## 2019-01-09 PROCEDURE — 86618 LYME DISEASE ANTIBODY: CPT

## 2019-01-10 ENCOUNTER — ANESTHESIA (OUTPATIENT)
Dept: GASTROENTEROLOGY | Facility: HOSPITAL | Age: 64
End: 2019-01-10
Payer: COMMERCIAL

## 2019-01-10 ENCOUNTER — HOSPITAL ENCOUNTER (OUTPATIENT)
Facility: HOSPITAL | Age: 64
Setting detail: OUTPATIENT SURGERY
Discharge: HOME | End: 2019-01-10
Attending: INTERNAL MEDICINE | Admitting: INTERNAL MEDICINE
Payer: COMMERCIAL

## 2019-01-10 VITALS
BODY MASS INDEX: 39.25 KG/M2 | HEART RATE: 54 BPM | SYSTOLIC BLOOD PRESSURE: 107 MMHG | HEIGHT: 69 IN | WEIGHT: 265 LBS | TEMPERATURE: 97.8 F | DIASTOLIC BLOOD PRESSURE: 56 MMHG | OXYGEN SATURATION: 99 % | RESPIRATION RATE: 16 BRPM

## 2019-01-10 DIAGNOSIS — Z12.11 ENCOUNTER FOR COLORECTAL CANCER SCREENING: ICD-10-CM

## 2019-01-10 DIAGNOSIS — Z12.12 ENCOUNTER FOR COLORECTAL CANCER SCREENING: ICD-10-CM

## 2019-01-10 LAB
HCV AB SER QL: NORMAL
RHEUMATOID FACT SER QL LA: NEGATIVE
RYE IGE QN: NEGATIVE

## 2019-01-10 PROCEDURE — 45380 COLONOSCOPY AND BIOPSY: CPT | Performed by: INTERNAL MEDICINE

## 2019-01-10 PROCEDURE — 88305 TISSUE EXAM BY PATHOLOGIST: CPT | Performed by: PATHOLOGY

## 2019-01-10 PROCEDURE — 45385 COLONOSCOPY W/LESION REMOVAL: CPT | Performed by: INTERNAL MEDICINE

## 2019-01-10 RX ORDER — KETAMINE HYDROCHLORIDE 50 MG/ML
INJECTION, SOLUTION, CONCENTRATE INTRAMUSCULAR; INTRAVENOUS AS NEEDED
Status: DISCONTINUED | OUTPATIENT
Start: 2019-01-10 | End: 2019-01-10 | Stop reason: SURG

## 2019-01-10 RX ORDER — SODIUM CHLORIDE 9 MG/ML
INJECTION, SOLUTION INTRAVENOUS CONTINUOUS PRN
Status: DISCONTINUED | OUTPATIENT
Start: 2019-01-10 | End: 2019-01-10 | Stop reason: SURG

## 2019-01-10 RX ORDER — GLYCOPYRROLATE 0.2 MG/ML
INJECTION INTRAMUSCULAR; INTRAVENOUS AS NEEDED
Status: DISCONTINUED | OUTPATIENT
Start: 2019-01-10 | End: 2019-01-10 | Stop reason: SURG

## 2019-01-10 RX ORDER — SODIUM CHLORIDE 9 MG/ML
125 INJECTION, SOLUTION INTRAVENOUS CONTINUOUS
Status: DISCONTINUED | OUTPATIENT
Start: 2019-01-10 | End: 2019-01-10 | Stop reason: HOSPADM

## 2019-01-10 RX ORDER — PROPOFOL 10 MG/ML
INJECTION, EMULSION INTRAVENOUS CONTINUOUS PRN
Status: DISCONTINUED | OUTPATIENT
Start: 2019-01-10 | End: 2019-01-10 | Stop reason: SURG

## 2019-01-10 RX ORDER — PROPOFOL 10 MG/ML
INJECTION, EMULSION INTRAVENOUS AS NEEDED
Status: DISCONTINUED | OUTPATIENT
Start: 2019-01-10 | End: 2019-01-10 | Stop reason: SURG

## 2019-01-10 RX ADMIN — SODIUM CHLORIDE: 0.9 INJECTION, SOLUTION INTRAVENOUS at 11:24

## 2019-01-10 RX ADMIN — KETAMINE HYDROCHLORIDE 20 MG: 50 INJECTION, SOLUTION INTRAMUSCULAR; INTRAVENOUS at 11:33

## 2019-01-10 RX ADMIN — SODIUM CHLORIDE 125 ML/HR: 0.9 INJECTION, SOLUTION INTRAVENOUS at 11:13

## 2019-01-10 RX ADMIN — PROPOFOL 80 MG: 10 INJECTION, EMULSION INTRAVENOUS at 11:32

## 2019-01-10 RX ADMIN — GLYCOPYRROLATE 0.1 MG: 0.2 INJECTION, SOLUTION INTRAMUSCULAR; INTRAVENOUS at 11:33

## 2019-01-10 RX ADMIN — PROPOFOL 100 MCG/KG/MIN: 10 INJECTION, EMULSION INTRAVENOUS at 11:32

## 2019-01-10 NOTE — DISCHARGE INSTR - AVS FIRST PAGE
Colonoscopy Procedure Note    Procedure: Colonoscopy    Sedation: Monitored anesthesia care, check anesthesia records      ASA Class: 3    INDICATIONS:  Colon cancer screening    POST-OP DIAGNOSIS: See the impression below    Procedure Details     Prior colonoscopy: No prior colonoscopy  Informed consent was obtained for the procedure, including sedation  Risks of perforation, hemorrhage, adverse drug reaction and aspiration were discussed  The patient was placed in the left lateral decubitus position  Based on the pre-procedure assessment, including review of the patient's medical history, medications, allergies, and review of systems, he had been deemed to be an appropriate candidate for conscious sedation; he was therefore sedated with the medications listed below  The patient was monitored continuously with telemetry, pulse oximetry, blood pressure monitoring, and direct observations  A rectal examination was performed  The colonoscope was inserted into the rectum and advanced under direct vision to the cecum, which was identified by the ileocecal valve and appendiceal orifice  The quality of the colonic preparation was good  A careful inspection was made as the colonoscope was withdrawn, including a retroflexed view of the rectum; findings and interventions are described below  Findings: There was a 3 mm sessile polyp in the cecum that was removed with cold forceps polypectomy  There was a 6 mm sessile polyp in the sigmoid colon that was removed with cold snare polypectomy  There were multiple tiny polyps in the rectum that were removed with cold forceps polypectomy  Retroflexed view of the rectum was unremarkable  Few scattered sigmoid diverticula were noted  Complications: None; patient tolerated the procedure well  Impression:    Colon polyps removed  Mild sigmoid diverticulosis    Recommendations:  Repeat colonoscopy in 5 years or sooner if clinically indicated      Repeat colonoscopy is being recommended at an interval of less than 10 years, this is because of a personal history of polyps or colon cancer  COMPLICATIONS:  None; patient tolerated the procedure well      SPECIMENS:    ID Type Source Tests Collected by Time Destination   1 : cecal polyp Tissue Polyp, Colorectal TISSUE EXAM Hunter Vazquez MD 1/10/2019 1138    2 : sigmoid polyp- cold snare Tissue Polyp, Colorectal TISSUE EXAM Huntre Vazquez MD 1/10/2019 1151    3 : multiple rectal polyps Tissue Polyp, Colorectal TISSUE EXAM Hunter Vazquez MD 1/10/2019 1152        ESTIMATED BLOOD LOSS:  Minimal

## 2019-01-10 NOTE — ANESTHESIA POSTPROCEDURE EVALUATION
Post-Op Assessment Note      CV Status:  Stable    Mental Status:  Somnolent    Hydration Status:  Euvolemic and stable    PONV Controlled:  None    Airway Patency:  Patent    Post Op Vitals Reviewed: Yes          Staff: CRNA           /65 (01/10/19 1201)    Temp      Pulse 78 (01/10/19 1201)   Resp 22 (01/10/19 1201)    SpO2 98 % (01/10/19 1201)

## 2019-01-10 NOTE — ANESTHESIA PREPROCEDURE EVALUATION
Review of Systems/Medical History  Patient summary reviewed  Chart reviewed  No history of anesthetic complications     Cardiovascular  EKG reviewed, Hyperlipidemia, Hypertension ,    Pulmonary  Asthma , well controlled/ stable ,        GI/Hepatic    Bowel prep            Endo/Other  Diabetes ,   Obesity    GYN       Hematology  Negative hematology ROS      Musculoskeletal    Arthritis     Neurology  Negative neurology ROS      Psychology   Anxiety, Depression ,              Physical Exam    Airway    Mallampati score: I  TM Distance: >3 FB  Neck ROM: full     Dental       Cardiovascular      Pulmonary      Other Findings        Anesthesia Plan  ASA Score- 2     Anesthesia Type- IV sedation with anesthesia with ASA Monitors  Additional Monitors:   Airway Plan:         Plan Factors-  Patient did not smoke on day of surgery  Induction- intravenous  Postoperative Plan-     Informed Consent- Anesthetic plan and risks discussed with patient  I personally reviewed this patient with the CRNA  Discussed and agreed on the Anesthesia Plan with the CRNA  Lorie Scherer

## 2019-01-10 NOTE — OP NOTE
Colonoscopy Procedure Note    Procedure: Colonoscopy    Sedation: Monitored anesthesia care, check anesthesia records      ASA Class: 3    INDICATIONS:  Colon cancer screening    POST-OP DIAGNOSIS: See the impression below    Procedure Details     Prior colonoscopy: No prior colonoscopy  Informed consent was obtained for the procedure, including sedation  Risks of perforation, hemorrhage, adverse drug reaction and aspiration were discussed  The patient was placed in the left lateral decubitus position  Based on the pre-procedure assessment, including review of the patient's medical history, medications, allergies, and review of systems, he had been deemed to be an appropriate candidate for conscious sedation; he was therefore sedated with the medications listed below  The patient was monitored continuously with telemetry, pulse oximetry, blood pressure monitoring, and direct observations  A rectal examination was performed  The colonoscope was inserted into the rectum and advanced under direct vision to the cecum, which was identified by the ileocecal valve and appendiceal orifice  The quality of the colonic preparation was good  A careful inspection was made as the colonoscope was withdrawn, including a retroflexed view of the rectum; findings and interventions are described below  Findings: There was a 3 mm sessile polyp in the cecum that was removed with cold forceps polypectomy  There was a 6 mm sessile polyp in the sigmoid colon that was removed with cold snare polypectomy  There were multiple tiny polyps in the rectum that were removed with cold forceps polypectomy  Retroflexed view of the rectum was unremarkable  Few scattered sigmoid diverticula were noted  Complications: None; patient tolerated the procedure well  Impression:    Colon polyps removed  Mild sigmoid diverticulosis    Recommendations:  Repeat colonoscopy in 5 years or sooner if clinically indicated      Repeat colonoscopy is being recommended at an interval of less than 10 years, this is because of a personal history of polyps or colon cancer  COMPLICATIONS:  None; patient tolerated the procedure well      SPECIMENS:    ID Type Source Tests Collected by Time Destination   1 : cecal polyp Tissue Polyp, Colorectal TISSUE EXAM Michelle Milligan MD 1/10/2019 1138    2 : sigmoid polyp- cold snare Tissue Polyp, Colorectal TISSUE EXAM Michelle Milligan MD 1/10/2019 1151    3 : multiple rectal polyps Tissue Polyp, Colorectal TISSUE EXAM Michelle Milligan MD 1/10/2019 1152        ESTIMATED BLOOD LOSS:  Minimal

## 2019-01-11 ENCOUNTER — OFFICE VISIT (OUTPATIENT)
Dept: INTERNAL MEDICINE CLINIC | Facility: CLINIC | Age: 64
End: 2019-01-11

## 2019-01-11 ENCOUNTER — APPOINTMENT (OUTPATIENT)
Dept: LAB | Facility: CLINIC | Age: 64
End: 2019-01-11
Payer: COMMERCIAL

## 2019-01-11 ENCOUNTER — TELEPHONE (OUTPATIENT)
Dept: GASTROENTEROLOGY | Facility: HOSPITAL | Age: 64
End: 2019-01-11

## 2019-01-11 VITALS
BODY MASS INDEX: 38.66 KG/M2 | WEIGHT: 261.02 LBS | SYSTOLIC BLOOD PRESSURE: 120 MMHG | TEMPERATURE: 97.9 F | HEIGHT: 69 IN | HEART RATE: 74 BPM | DIASTOLIC BLOOD PRESSURE: 100 MMHG

## 2019-01-11 DIAGNOSIS — M10.9 GOUT, UNSPECIFIED CAUSE, UNSPECIFIED CHRONICITY, UNSPECIFIED SITE: ICD-10-CM

## 2019-01-11 DIAGNOSIS — M25.551 PAIN OF RIGHT HIP JOINT: Primary | ICD-10-CM

## 2019-01-11 DIAGNOSIS — I10 HYPERTENSION, UNSPECIFIED TYPE: ICD-10-CM

## 2019-01-11 DIAGNOSIS — J45.901 EXACERBATION OF ASTHMA, UNSPECIFIED ASTHMA SEVERITY, UNSPECIFIED WHETHER PERSISTENT: ICD-10-CM

## 2019-01-11 PROBLEM — R73.03 PREDIABETES: Status: RESOLVED | Noted: 2018-07-09 | Resolved: 2019-01-11

## 2019-01-11 LAB
B BURGDOR IGG SER IA-ACNC: 0.03
B BURGDOR IGM SER IA-ACNC: 0.63
URATE SERPL-MCNC: 6.7 MG/DL (ref 4.2–8)

## 2019-01-11 PROCEDURE — 36415 COLL VENOUS BLD VENIPUNCTURE: CPT

## 2019-01-11 PROCEDURE — 84550 ASSAY OF BLOOD/URIC ACID: CPT

## 2019-01-11 PROCEDURE — 99214 OFFICE O/P EST MOD 30 MIN: CPT | Performed by: INTERNAL MEDICINE

## 2019-01-11 RX ORDER — CYCLOBENZAPRINE HCL 5 MG
5 TABLET ORAL
Qty: 30 TABLET | Refills: 0 | Status: SHIPPED | OUTPATIENT
Start: 2019-01-11 | End: 2019-08-01

## 2019-01-11 RX ORDER — BUSPIRONE HYDROCHLORIDE 5 MG/1
TABLET ORAL
Refills: 0 | COMMUNITY
Start: 2019-01-02 | End: 2019-08-01

## 2019-01-11 RX ORDER — NEBULIZER AND COMPRESSOR
EACH MISCELLANEOUS 2 TIMES DAILY
Qty: 1 EACH | Refills: 0 | Status: SHIPPED | OUTPATIENT
Start: 2019-01-11 | End: 2019-03-08

## 2019-01-11 RX ORDER — ALBUTEROL SULFATE 90 UG/1
2 AEROSOL, METERED RESPIRATORY (INHALATION) EVERY 6 HOURS PRN
Qty: 6.7 G | Refills: 5 | Status: SHIPPED | OUTPATIENT
Start: 2019-01-11 | End: 2019-12-18 | Stop reason: SDUPTHER

## 2019-01-11 NOTE — PATIENT INSTRUCTIONS
Gota   CUIDADO AMBULATORIO:   Gota  es un tipo de artritis que causa un intenso dolor y rigidez en las articulaciones  El dolor de gota aguda empieza repentinamente, se empeora rápidamente y cesa por banuelos [facility]  La gota aguda puede llegar a ser Ruthann Fortis y causar daño permanente en las articulaciones  Busque atención médica de inmediato si:   · Usted tiene dolor intenso en rivas o más articulaciones que no puede tolerar  · Usted tiene fiebre o enrojecimiento que se propaga más allá del área de la articulación  Pregúntele a banuelos [place] vitaminas y minerales son adecuados para usted  · Usted tiene síntomas nuevos, aleks sarpullido, después de comenzar el tratamiento para la gota  · El dolor e inflamación en banuelos articulación no se desaparece, aún después del tratamiento  · Usted no está orinando tanto o con la frecuencia con que suele hacerlo  · Usted tiene problemas para olvin caterina medicamentos para la gota  · Usted tiene preguntas o inquietudes acerca de banuelos condición o cuidado  Las etapas de la gota:   · La hiperuricemia  empieza con niveles altos de ácido úrico  La hiperuricemia no es gota, acacia aumenta banuelos riesgo de presentar gota  Puede que usted no tenga síntomas en esta etapa y usualmente no necesita tratamiento  · La artritis gotosa aguda  empieza de repente con rivas ataque de inflamación y dolor, por lo general en rivas articulación  El ataque puede durar unos días hasta 2 semanas  · La gota intercrítica  es el tiempo entre ataques  Puede que pasen meses o años sin que usted tenga otro ataque  Usted no tendrá [employer] articulaciones o rigidez, acacia esto no significa que se ha curado de gota  Usted aún necesitará tratamiento para prevenir gota crónica  · La gota tofácea crónica  se desarrolla si la gota no recibe tratamiento  Grandes cantidades de rose de ácido úrico, conocidos aleks tofos, se acumulan alrededor de las articulaciones   [address] articulaciones  Ataques de gota ocurren más frecuentemente y pueden durar horas hasta semanas  Más de [place] [employer] articulación puede estar inflamada y dolorida  En esta etapa, los síntomas de gota no desaparecen por sí solos  El tratamiento  puede hacer que caterina síntomas paren antes, prevenir ataques y disminuir banuelos riesgo de daño en las articulaciones  Es posible que usted necesite alguno de los siguientes:  · Medicamentos:      ¨ AINEs (Analgésicos antiinflamatorios no esteroides) aleks el ibuprofeno, ayudan a disminuir la inflamación, el dolor y la fiebre  Ida medicamento esta disponible con o sin rivas receta médica  Los AINEs pueden causar sangrado estomacal o problemas renales en ciertas personas  Si usted ace un medicamento anticoagulante, siempre pregúntele a banuelos médico si los JINNY son seguros para usted  Siempre mamta la etiqueta de ida medicamento y [place] instrucciones  ¨ El medicamento para la gota  disminuye el dolor e inflamación en las articulaciones  También se puede administrar para prevenir nuevos ataques de gota  ¨ Esteroides  reducen la inflamación y pueden ayudarlo con la rigidez y el dolor en caterina articulaciones jnoo los ataques de [place]  ¨ El medicamento para ácido úrico  puede administrarse para reducir la cantidad de ácido úrico que banuelos cuerpo produce  Algunos medicamentos pueden ayudar a eliminar más ácido úrico al [place]  ¨ [place] caterina medicamentos aleks se le haya indicado  Consulte con banuelos médico si usted merrill que banuelos medicamento no le está ayudando o si presenta efectos secundarios  Infórmele si es alérgico a cualquier medicamento  Mantenga rivas lista actualizada de los [place], las vitaminas y los productos herbales que ace  Incluya los siguientes datos de los medicamentos: cantidad, frecuencia y motivo de administración  Traiga con usted la lista o los envases de la píldoras a caterina citas de seguimiento   Lleve la lista de los medicamentos con usted en jordi de [place] Coins emergencia  · Cirugía  de injerto óseo podría necesitarse para tratar los tofos dolorosos o infectados  El hueso en la articulación se reemplaza con un hueso extraído de otra parte de banuelos cuerpo  [place] a banuelos médico más información sobre la cirugía de [place]  Controle la gota:   · Descanse banuelos articulación adolorida para que pueda recuperarse  Banuelos médico podría recomendarle que use muletas o un caminador si la articulación afectada está en rivas pierna  · Aplique hielo a banuelos articulación  El hielo disminuye el dolor y la inflamación  Use un paquete de hielo o ponga hielo molido dentro de [employer] [place] la bolsa o el paquete de hielo con rivas toalla antes de aplicarla en la articulación adolorida  Aplique hielo jono 15 a 20 minutos por hora o según indicaciones  · Eleve banuelos articulación  [place] le ayudará a disminuir la inflamación y el dolor  Eleve banuelos articulación por encima del nivel del corazón con la frecuencia que pueda  [place] banuelos articulación adolorida sobre almohadas para mantenerla cómodamente por encima del nivel del corazón  · Vaya a fisioterapia según le indicaron  Un fisioterapeuta le puede enseñar ejercicios para mejorar la flexibilidad y el rango de [place]  Prevenga atadavid de gota:   · No consuma alimentos altos en purinas  Estos alimentos incluyen:amarilis, mariscos, espárragos, espinacas, coliflor, y algunos tipos de legumbres  Puede que los [employer] indiquen que coma más productos lácteos bajos en grasa, aleks el yogur  Los productos lácteos pueden disminuir el riesgo de presentar ataques de [place]  La vitamina C y el café también puedan ayudar  Banuelos médico o un dietista puede ayudarle a crear un plan de comidas  · [employer] líquidos aleks se le indique  Los líquidos aleks el agua ayudan a eliminar el ácido úrico del cuerpo  Pregunte cuánto líquido debe olvin cada día y cuáles líquidos son los más adecuados para usted  · Controle banuelos peso    [place] de peso puede disminuir la cantidad de ácido úrico en hallman cuerpo  El ejercicio lo puede ayudar con la pérdida de [place]  Consulte con hallman médico acerca de los ejercicios adecuados para usted  · Controle caterina niveles de azúcar en la pramod si usted tiene diabetes  Mantenga el nivel de azúcar en hallman pramod en un margen normal  [place] puede ayudar a prevenir ataques de gota  · Limite o no consuma bebidas alcohólicas, según indicaciones  El alcohol puede provocar un ataque de [place]  El alcohol también aumenta el riesgo de presentar deshidratación  Pregúntele a hallman médico si usted puede olvin alcohol  Acuda a caterina consultas de control con hallman médico según le indicaron  Anote caterina preguntas para que se acuerde de hacerlas jono caterina visitas  © 2017 [address] Information is for End User's use only and may not be sold, redistributed or otherwise used for commercial purposes  All illustrations and images included in CareNotes® are the copyrighted property of A D A M , Inc  or Clint Snyder  Esta información es sólo para uso en educación  Hallman intención no es darle un consejo médico sobre enfermedades o tratamientos  Colsulte con hallman Elke Poncho farmacéutico antes de seguir cualquier régimen médico para saber si es seguro y efectivo para usted

## 2019-01-11 NOTE — TELEPHONE ENCOUNTER
Patient called the [place] office, I did look up if I could help him, but he did not know why the office called him  There was no results in the computer   Or any notes that the office called him, He was seen in the [facility],  thank you

## 2019-01-11 NOTE — PROGRESS NOTES
INTERNAL MEDICINE FOLLOW-UP OFFICE VISIT  [facility]  [address] [place] [address]    NAME: Mercedez Donald  AGE: 70 y o  SEX: male    DATE OF ENCOUNTER: 1/11/2019    Assessment and Plan     Sacroiliitis:   With 3-4 months of chronic posterior hip pain worse with activity weight-bearing  Patient has been taking Tylenol and tramadol with relief of symptoms  Denies skin changes, deformities, has dramatic onset, signs of systemic infection or local infection  Prevents him from dancing at his senior center makes climbing stairs difficulty  With positive Fabry test  -will order PT  -advised to continue Tylenol, use NSAIDs sparingly given his CKD  -added Flexeril for HS symptomatic relief    Hypertension:  Not on meds currently  With blood pressure 120/100 presentation today  Patient does not check blood pressure at home   -ordered blood pressure cuff  Gout:  On allopurinol 100 daily  With last uric acid of 7  No acute gout related complaints at this time   -will recheck uric acid, and consider altering management if his uric acid is above 6  Osteoarthritis:  Recently seen by doctors cardio and below  He reports significant improvement in his symptoms secondary to bilateral knee injections   -no alterations in management at this time  Orders Placed This Encounter   Procedures    Uric acid    Ambulatory referral to Physical Therapy       Chief Complaint     Chief Complaint   Patient presents with    Follow-up     one month follow up        History of Present Illness     HPI     78-year-old male with medical history of diabetes, hyperlipidemia, asthma, hypertension, bilateral knee OA, CKD 2, gout  Patient presents for follow-up for bilateral osteoarthritis  His autoimmune workup has been negative today  He reports significant improvement in symptoms secondary to bilateral knee injections 1 month ago    No acute complaints related to OA at this time   He does report right hip pain during this time  He states that the pain is not traumatic in origin, being 3-4 years ago  Pain has been coming and going, without ever completely improving  Pain is reported is in the posterior hip and worse with weight-bearing and activity  Without radiation  Relieved with Tylenol and tramadol  No skin changes, no signs of systemic infection, patient is prevented from dancing with a friend at the [employer] [facility] by this pain  He has a difficult time walking up and down stairs secondary to this pain  Otherwise ADLs are intact at this time  He has a history of hypertension though does not check his blood pressure regularly at home, and is not on a antihypertensive medication  He reports that he would be willing to check his blood pressure if he was given a blood pressure cuff  The following portions of the patient's history were reviewed and updated as appropriate: allergies, current medications, past family history, past medical history, past social history, past surgical history and problem list     Review of Systems     Review of Systems   Constitutional: Negative for activity change, appetite change, chills, fatigue and fever  Respiratory: Negative for apnea, cough, choking, chest tightness, shortness of breath, wheezing and stridor  Cardiovascular: Negative for chest pain, palpitations and leg swelling  Gastrointestinal: Negative for abdominal distention, abdominal pain, anal bleeding, blood in stool, constipation, diarrhea, nausea, rectal pain and vomiting  Musculoskeletal: Positive for back pain  Negative for arthralgias, gait problem, joint swelling, myalgias, neck pain and neck stiffness  Skin: Negative for color change, pallor, rash and wound         Active Problem List     Patient Active Problem List   Diagnosis    Essential hypertension    Diabetes ([facility] [address] )    Asthma    CKD (chronic kidney disease) stage 2, GFR 60-89 ml/min    Obesity (BMI 30-39  9)    Prediabetes    Hyperlipidemia    Depression    Dog bite    Encounter for colorectal cancer screening    Osteoarthritis of both knees       Objective     /100 (BP Location: Right arm, Patient Position: Sitting, Cuff Size: Adult)   Pulse 74   Temp 97 9 °F (36 6 °C) (Oral)   Ht 5' 9" (1 753 m)   Wt 118 kg (261 lb 0 4 oz)   BMI 38 55 kg/m²     Physical Exam   Constitutional: He appears well-developed and well-nourished  No distress  HENT:   Head: Normocephalic and atraumatic  Cardiovascular: Normal rate, regular rhythm, normal heart sounds and intact distal pulses  Exam reveals no gallop and no friction rub  No murmur heard  Pulmonary/Chest: Effort normal and breath sounds normal  No respiratory distress  He has no wheezes  He has no rales  He exhibits no tenderness  Abdominal: Soft  Bowel sounds are normal  He exhibits no distension and no mass  There is no tenderness  There is no rebound and no guarding  Musculoskeletal: Normal range of motion  He exhibits tenderness (posterior hip ttp)  He exhibits no edema or deformity  Trendelenburg test equivocal   Hip grind test negative for signs of OA  The pretest positive for sacral of iliac joint pain  Skin: He is not diaphoretic  Vitals reviewed        Pertinent Laboratory/Diagnostic Studies:  CBC:   Lab Results   Component Value Date/Time    WBC 5 55 06/24/2018 05:50 PM    RBC 4 96 06/24/2018 05:50 PM    HGB 13 9 06/24/2018 05:50 PM    HCT 43 4 06/24/2018 05:50 PM    MCV 88 06/24/2018 05:50 PM    MCH 28 0 06/24/2018 05:50 PM    MCHC 32 0 06/24/2018 05:50 PM    RDW 14 1 06/24/2018 05:50 PM    MPV 11 2 06/24/2018 05:50 PM     06/24/2018 05:50 PM    NRBC 0 06/24/2018 05:50 PM    NEUTOPHILPCT 67 06/24/2018 05:50 PM    LYMPHOPCT 13 (L) 06/24/2018 05:50 PM    MONOPCT 15 (H) 06/24/2018 05:50 PM    EOSPCT 4 06/24/2018 05:50 PM    BASOPCT 1 06/24/2018 05:50 PM    NEUTROABS 3 71 06/24/2018 05:50 PM    LYMPHSABS 0 72 06/24/2018 05:50 PM    MONOSABS 0 85 06/24/2018 05:50 PM    EOSABS 0 21 06/24/2018 05:50 PM     Chemistry Profile:   Lab Results   Component Value Date/Time    K 4 4 06/24/2018 05:50 PM     06/24/2018 05:50 PM    CO2 30 06/24/2018 05:50 PM    BUN 13 06/24/2018 05:50 PM    CREATININE 1 18 06/24/2018 05:50 PM    GLUC 87 06/24/2018 05:50 PM    CALCIUM 9 3 06/24/2018 05:50 PM    AST 22 06/24/2018 05:50 PM    ALT 29 06/24/2018 05:50 PM    ALKPHOS 70 06/24/2018 05:50 PM    EGFR 65 06/24/2018 05:50 PM         Current Medications     Current Outpatient Prescriptions:     acetaminophen (TYLENOL) 325 mg tablet, Take 2 tablets (650 mg total) by mouth every 6 (six) hours as needed for mild pain or moderate pain, Disp: 240 tablet, Rfl: 0    albuterol (PROVENTIL HFA) 90 mcg/act inhaler, Inhale 2 puffs every 6 (six) hours as needed for wheezing or shortness of breath, Disp: 6 7 g, Rfl: 5    allopurinol (ZYLOPRIM) 100 mg tablet, Take 1 tablet (100 mg total) by mouth daily, Disp: 30 tablet, Rfl: 5    atorvastatin (LIPITOR) 40 mg tablet, TAKE 1 TABLET POR VIA ORAL DIARIAMENTE, Disp: 90 tablet, Rfl: 0    Blood Glucose Monitoring Suppl (ONE TOUCH ULTRA 2) w/Device KIT, by Does not apply route daily, Disp: 1 each, Rfl: 0    Blood Pressure Monitoring (ADULT BLOOD PRESSURE CUFF LG) KIT, by Does not apply route 2 (two) times a day, Disp: 1 each, Rfl: 0    busPIRone (BUSPAR) 10 mg tablet, TAKE 1 TABLET POR VIA ORAL DOS VECES AL CORAL, Disp: , Rfl: 0    busPIRone (BUSPAR) 5 mg tablet, TAKE 2 TABLETS POR VIA ORAL DOS VECES AL CORAL, Disp: , Rfl: 0    cyclobenzaprine (FLEXERIL) 5 mg tablet, Take 1 tablet (5 mg total) by mouth daily at bedtime, Disp: 30 tablet, Rfl: 0    gabapentin (NEURONTIN) 100 mg capsule, Take 3 capsules (300 mg total) by mouth 3 (three) times a day Start with one pill HS x1d, then 1 pill BID x1d, then 1 pill TID, Disp: 90 capsule, Rfl: 2    hydrOXYzine pamoate (VISTARIL) 50 mg capsule, TOME JEREL CAPSULA POR VIA ORAL ANTES DE DORMIR EN LA NOCHE, Disp: , Rfl: 0    Lancets (ONETOUCH ULTRASOFT) lancets, Use as instructed, Disp: 100 each, Rfl: 0    metFORMIN (GLUCOPHAGE-XR) 500 mg 24 hr tablet, Take 1 tablet (500 mg total) by mouth daily with dinner, Disp: 90 tablet, Rfl: 0    PHARMACIST CHOICE ALCOHOL 70 % PADS, Use as directed, Disp: , Rfl: 0    sertraline (ZOLOFT) 50 mg tablet, TOMNA JEREL TABLETA POR VIA ORAL DIARIAMENTE, Disp: , Rfl: 2  No current facility-administered medications for this visit       Health Maintenance     Health Maintenance   Topic Date Due    Medicare Annual Wellness Visit (AWV)  1955    URINE MICROALBUMIN  09/08/1963    Fall Risk  09/08/2018    HEMOGLOBIN A1C  01/10/2019    Diabetic Foot Exam  09/18/2019    Pneumococcal PPSV23/PCV13 65+ Years / Low and Medium Risk (2 of 2 - PPSV23) 09/18/2019    DM Eye Exam  10/23/2019    DTaP,Tdap,and Td Vaccines (2 - Td) 07/09/2028    CRC Screening: Colonoscopy  01/10/2029    Hepatitis C Screening  Completed    INFLUENZA VACCINE  Completed     Immunization History   Administered Date(s) Administered    Influenza 09/18/2018    Influenza, high dose seasonal 0 5 mL 09/18/2018    Pneumococcal Conjugate 13-Valent 09/18/2018    Rabies 07/09/2018, 07/12/2018    Rabies, Intramuscular 07/09/2018, 07/12/2018    Tdap 07/09/2018       Chelsey Quintero  1/11/2019 10:06 AM

## 2019-01-22 ENCOUNTER — EVALUATION (OUTPATIENT)
Dept: PHYSICAL THERAPY | Facility: REHABILITATION | Age: 64
End: 2019-01-22
Payer: COMMERCIAL

## 2019-01-22 DIAGNOSIS — M54.50 CHRONIC BILATERAL LOW BACK PAIN WITHOUT SCIATICA: Primary | ICD-10-CM

## 2019-01-22 DIAGNOSIS — G89.29 CHRONIC BILATERAL LOW BACK PAIN WITHOUT SCIATICA: Primary | ICD-10-CM

## 2019-01-22 DIAGNOSIS — M25.551 PAIN OF RIGHT HIP JOINT: ICD-10-CM

## 2019-01-22 PROCEDURE — G8979 MOBILITY GOAL STATUS: HCPCS | Performed by: PHYSICAL THERAPIST

## 2019-01-22 PROCEDURE — 97162 PT EVAL MOD COMPLEX 30 MIN: CPT | Performed by: PHYSICAL THERAPIST

## 2019-01-22 PROCEDURE — G8978 MOBILITY CURRENT STATUS: HCPCS | Performed by: PHYSICAL THERAPIST

## 2019-01-22 NOTE — PROGRESS NOTES
PT Evaluation     Today's date: 2019  Patient name: Edna Bennett  : 1955  MRN: [id number]  Referring provider: Yennifer Harris MD  Dx: No diagnosis found  Assessment  Assessment details: Edna Bennett is a pleasant 70 y o  male who presents with chronic low back pain and left hip pain  The patient's greatest concerns are fear of not being able to keep active and not being able to walk to the [employer]  No further referral appears necessary at this time based upon examination results  Primary movement impairment diagnosis of lumbar spine aberrant movement secondary to L hip hypomobility resulting in pathoanatomical symptoms of lumbar spine pain and L hip pain, limiting his ability to exercise, walk to the [employer], sit, and walk  Impairments include:  1) Decreased strength   2) Decreased range of motion   3) Pain with function     Etiologic factors include none recalled by the patient  Impairments: abnormal coordination, abnormal muscle firing, abnormal muscle tone, abnormal or restricted ROM, abnormal movement, activity intolerance, difficulty understanding, impaired physical strength, lacks appropriate home exercise program, pain with function, poor posture  and poor body mechanics    Symptom irritability: moderateUnderstanding of Dx/Px/POC: good   Prognosis: good  Prognosis details: Positive prognostic indicators include positive attitude toward recovery  Negative prognostic indicators include chronicity of symptoms, depression, hypertension, high symptom irritability and obesity  Goals  Short Term Goal 1: Patient will be independent with home exercise program    Short Term Goal 2: Patient will be able to manage symptoms independently  Long Term Goal 1: Patient will be able to walk to and from the senior center independently without low back and hip pain    Long Term Goal 2: Patient will be codi to walk up and down stairs both in his home and community confidently with minimal back and hip pain  Plan  Plan details: Prognosis above is given PT services 2x/week tapering to 1x/week over the next 2 months and home program adherence  Patient would benefit from: skilled physical therapy  Planned modality interventions: thermotherapy: hydrocollator packs  Planned therapy interventions: activity modification, joint mobilization, manual therapy, motor coordination training, neuromuscular re-education, patient education, self care, therapeutic activities, therapeutic exercise, graded activity, home exercise program and behavior modification  Plan of Care beginning date: 2019  Plan of Care expiration date: 3/19/2019  Treatment plan discussed with: patient        Subjective Evaluation    Pain  Current pain ratin  At best pain ratin  At worst pain ratin  Quality: sharp, throbbing, dull ache and discomfort  Relieving factors: rest  Aggravating factors: sitting, standing, walking and stair climbing  Progression: worsening    Treatments  Previous treatment: medication  Current treatment: physical therapy  Patient Goals  Patient goals for therapy: decreased pain, increased motion, increased strength and independence with ADLs/IADLs  Patient goal: Patient would like to be able to walk to the senior center without pain, and also be able to go up and down the stairs without pain         Objective     General Comments     Lumbar Comments  History of Pertinent Illness: Patient reports he has had ongoing low back and left sided hip pain that has been occurring over the past couple of years, but has recently been exacerbated over the past month  He states that he moved over to the [place] from [facility] 10 months ago  He reports that he had a hip replacement of his left hip about 3 years ago in [facility]  He states that he was better after the replacement, but now he is having hip pain again and also pain in his low back       Functional Limitations: Standing, walking, and negotiating stairs, especially going down stairs  Social History: Patient lives with his son in a two-story home, and he resides in the basement  He states there are 12 steps to get into the home, and another 12 steps to get down to the basement, with railings present  Occupation: Patient does not work currently  Leisure Activities: Patient enjoys going to the [place] during the afternoons and taking walks  Lumbar Spine ROM:   Flexion: 50% Limited with pain   Extension: 75% limited with pain   Right Sidebendin% limited with pain   Left Sidebendin% limited with pain     Hip Special Tests:   Hip Scour: Positive bilaterally   Denny: Negative bilaterally     Hip AROM:   Hip IR Right: 15 degrees   Hip IR Left: 17 degrees   Hip ER Right: 12 degrees   Hip ER Left: 10 degrees     Palpation: Tenderness to palpation of L L4 and L5 facets  Red Flags: No Night Pain, No history of Cancer    Manual Muscle Testing:   Hip Flexion: 3+/5 bilaterally   Hip IR: 4-/5 bilaterally   Hip ER: 4/5 bilaterally   Hip Abduction: 3+/5 bilaterally   Hip Adduction: 3+/5 bilaterally     Standing Posture: Decreased cervical lordosis, rounded shoulders, and forward head     Gait: Patient ambulates with SPC using R UE for support  When ambulating without assistive device, he has signficant lack of hip extension, walks with wide base of support, lacks reciprocal movement at the hips, and also lacks arm swing bilaterally  Compensated trendelenburg L side noted when utilizing SPC             Precautions: N/A    Daily Treatment Diary     Manual                                                                                   Exercise Diary Modalities

## 2019-01-29 ENCOUNTER — OFFICE VISIT (OUTPATIENT)
Dept: PHYSICAL THERAPY | Facility: REHABILITATION | Age: 64
End: 2019-01-29
Payer: COMMERCIAL

## 2019-01-29 DIAGNOSIS — G89.29 CHRONIC BILATERAL LOW BACK PAIN WITHOUT SCIATICA: Primary | ICD-10-CM

## 2019-01-29 DIAGNOSIS — M54.50 CHRONIC BILATERAL LOW BACK PAIN WITHOUT SCIATICA: Primary | ICD-10-CM

## 2019-01-29 PROCEDURE — 97140 MANUAL THERAPY 1/> REGIONS: CPT | Performed by: PHYSICAL THERAPIST

## 2019-01-29 PROCEDURE — 97112 NEUROMUSCULAR REEDUCATION: CPT | Performed by: PHYSICAL THERAPIST

## 2019-01-29 PROCEDURE — 97110 THERAPEUTIC EXERCISES: CPT | Performed by: PHYSICAL THERAPIST

## 2019-01-29 NOTE — PROGRESS NOTES
Daily Note     Today's date: 2019  Patient name: Jennifer Astudillo  : 1955  MRN: [id number]  Referring provider: Filipe Nicholson MD  Dx:   Encounter Diagnosis     ICD-10-CM    1  Chronic bilateral low back pain without sciatica M54 5     G89 29                   Subjective: Patient reports the exercise has been going well at home that was initially prescribed, but he is having a lot of hip pain as well as back pain still  Objective: See treatment diary below     Manual  1 29            L L4 UPA's Grade III  SE                                                                     Exercise Diary  1 29            Single Knee to Chest  2x10 bilateral (5 sec hold)             Double Knee to Chest  2x10 (5 sec hold)             Lower trunk rotation  1x10 (10" hold)            Recumbent bike  10'            Standing lumbar flexion  2x10             Mini squats  2x8            physioball seated lumbar roll outs  forward, right, left 5x (10" hold)                                                                                                                                                                                          Modalities                                                             Assessment: Tolerated treatment well  He does require frequent rest breaks due to fatigue with exercise and had labored breathing throughout the session  Patient was educated on proper breathing from the diaphragm, and to also breath continuously with each repetition of exercise  He did respond well to initial manual therapy of the lumbar spine, which alleviated low back symptoms  He did well with an introduction to mobility exercises for the hips and low back, as well as a strengthening exercise  He needed consistent cueing for corret form with mini squats, and a wall was used for tactile feedback to keep his spine neutral during the movement   Patient demonstrated fatigue post treatment      Plan: Continue per plan of care

## 2019-01-31 ENCOUNTER — OFFICE VISIT (OUTPATIENT)
Dept: PHYSICAL THERAPY | Facility: REHABILITATION | Age: 64
End: 2019-01-31
Payer: COMMERCIAL

## 2019-01-31 DIAGNOSIS — G89.29 CHRONIC BILATERAL LOW BACK PAIN WITHOUT SCIATICA: Primary | ICD-10-CM

## 2019-01-31 DIAGNOSIS — M54.50 CHRONIC BILATERAL LOW BACK PAIN WITHOUT SCIATICA: Primary | ICD-10-CM

## 2019-01-31 PROCEDURE — 97140 MANUAL THERAPY 1/> REGIONS: CPT | Performed by: PHYSICAL THERAPIST

## 2019-01-31 PROCEDURE — 97110 THERAPEUTIC EXERCISES: CPT | Performed by: PHYSICAL THERAPIST

## 2019-01-31 PROCEDURE — 97112 NEUROMUSCULAR REEDUCATION: CPT | Performed by: PHYSICAL THERAPIST

## 2019-01-31 NOTE — PROGRESS NOTES
PT Discharge     Today's date: 2019  Patient name: Sheree Ariza  : 1955  MRN: [id number]  Referring provider: Rodriguez Brewer MD  Dx:   Encounter Diagnosis     ICD-10-CM    1  Chronic bilateral low back pain without sciatica M54 5     G89 29          Patient has failed to return to physical therapy sessions after this visit due to financial reasons, and as a result, has been formally discharged from physical therapy  Subjective: Patient reports that he has been feeling better in his back and left hip, and he feels that he is able to do more activities  He states that when he gets up in the morning, he doesn't have as much pain and does not feel as stiff  Objective: See treatment diary below  Treadmill walking rated at 8/10 on RADHA RPE   Manual  1  1 31           L L4 UPA's Grade III  SE             Femoral-acetabular long axis distraction mobilization B Grade III   SE                                                      Exercise Diary   31           Single Knee to Chest  2x10 bilateral (5 sec hold)  2x10 bilateral (5 sec hold)            Double Knee to Chest  2x10 (5 sec hold)             Lower trunk rotation  1x10 (10" hold) 1x10 (10" hold)            Recumbent bike  10'            Standing lumbar flexion  2x10  2x10           Mini squats  2x8 2x10            physioball seated lumbar roll outs  forward, right, left 5x (10" hold)  forward, right, left 5x (10" hold)            Treadmill walking   1 2 mph 10'           Sit to stands   2x10           Standing hip abduction and extension   B 1x10                                                                                                                                                  Modalities                                                             Assessment: Tolerated treatment well  An intention tremor of the right upper extremity was seen by PT at beginning of visit as well as periods throughout the visit   PT discussed with patient how long he has noticed his hand shaking, and if he has spoken to his primary care physician about it  Patient stated it has been going on for about 7-9 months, but has not talked to his doctor about it  PT recommended that patient should follow-up with primary care physician to discuss these episodes of his R UE shaking  With his PT treatment session today, the patient was motivated and compliant  He highly enjoyed the treadmill, yet had a high RPE rating, and had labored breathing  He had good form with sit to stands and was able to perform this intervention without UE support  He also tolerated an introduction of standing hip abduction and extension with UE support  His HEP was progressed today  PT also discussed with patient importance of being active, and trying to take walks outside when the weather is warmer  Patient agreed and is willing to try walking around his neighborhood  Patient demonstrated fatigue post treatment      Plan: Continue per plan of care

## 2019-02-01 ENCOUNTER — APPOINTMENT (EMERGENCY)
Dept: RADIOLOGY | Facility: HOSPITAL | Age: 64
End: 2019-02-01
Payer: COMMERCIAL

## 2019-02-01 ENCOUNTER — HOSPITAL ENCOUNTER (EMERGENCY)
Facility: HOSPITAL | Age: 64
Discharge: HOME | End: 2019-02-01
Attending: EMERGENCY MEDICINE | Admitting: EMERGENCY MEDICINE
Payer: COMMERCIAL

## 2019-02-01 ENCOUNTER — PATIENT OUTREACH (OUTPATIENT)
Dept: INTERNAL MEDICINE CLINIC | Facility: CLINIC | Age: 64
End: 2019-02-01

## 2019-02-01 VITALS
WEIGHT: 265 LBS | BODY MASS INDEX: 39.13 KG/M2 | TEMPERATURE: 98 F | SYSTOLIC BLOOD PRESSURE: 131 MMHG | OXYGEN SATURATION: 95 % | DIASTOLIC BLOOD PRESSURE: 66 MMHG | RESPIRATION RATE: 16 BRPM | HEART RATE: 66 BPM

## 2019-02-01 DIAGNOSIS — M25.552 LEFT HIP PAIN: Primary | ICD-10-CM

## 2019-02-01 PROCEDURE — 99284 EMERGENCY DEPT VISIT MOD MDM: CPT

## 2019-02-01 PROCEDURE — 73502 X-RAY EXAM HIP UNI 2-3 VIEWS: CPT

## 2019-02-01 PROCEDURE — 96372 THER/PROPH/DIAG INJ SC/IM: CPT

## 2019-02-01 RX ORDER — KETOROLAC TROMETHAMINE 30 MG/ML
15 INJECTION, SOLUTION INTRAMUSCULAR; INTRAVENOUS ONCE
Status: COMPLETED | OUTPATIENT
Start: 2019-02-01 | End: 2019-02-01

## 2019-02-01 RX ADMIN — KETOROLAC TROMETHAMINE 15 MG: 30 INJECTION, SOLUTION INTRAMUSCULAR at 11:14

## 2019-02-01 NOTE — PROGRESS NOTES
SW has made to call to pt this date via  ID # [id number] to f/u re housing issues  Pt relates that he is still having housing issues and is working with   for help  In fact he was at their office at this time  Pt continues to relate the housing conditions are very poor (toilets not working  etc) He relates he has backed his bag and is not returning  [facility]US has provided pt with info re the [facility] in [place] as pt did not say where he was going to stay  The  at [employer] wrote down the info re the shelter for pt  Pt is on the [facility] but was not clear about any other list   He indicates AAA did not help and he forgot about the [address]      Pt was able to get established at [facility]   He reports he is attending [facility]       Support offered  SW encouraged pt to f/u with SW as needed

## 2019-02-01 NOTE — ED NOTES
Attempted to discharge pt  Pt states "I live here now"  Pt states he became homeless today and cannot go outside  Dr Berny Stratton aware and [facility] aware       Brittany Sutton RN  02/01/19 [id number]

## 2019-02-01 NOTE — ED NOTES
Plan is to have pt transported to police station to get a background check and a voucher to go to the [facility] shelter when it opens        Monique Rush RN  02/01/19 [id number]
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
## 2019-02-01 NOTE — DISCHARGE INSTRUCTIONS
Artralgia   LO QUE NECESITA SABER:   La artralgia es dolor en rivas o más articulaciones, acacia sin inflamación  El dolor podría ser de corta duración y mejorar dentro de 6 a 8 semanas  La artralgia puede ser rivas señal temprana de artritis  La artralgia puede ser causada por rivas condición médica aleks un trastorno hormonal o un tumor  Además podría ser la consecuencia de rivas infección o Shanel Sharper  INSTRUCCIONES SOBRE EL ILYA HOSPITALARIA:   Medicamentos:  A usted le pueden  prescribir los siguientes medicamentos:  · El acetaminofén  mikaela el dolor  Pregunte la cantidad y la frecuencia con que debe tomarlos  [address]  El acetaminofén puede causar daño en el hígado cuando no se ace de forma correcta  · AINEs (Analgésicos antiinflamatorios no esteroides)  disminuyen el dolor y previenen la inflamación  Consulte con banuelos médico cuál medicamento es el adecuado para usted  Pregunte cuánto olvin y cuándo  Tómelos aleks se le indique  Cuando no se chantelle de la [employer], los medicamentos antiinflamatorios no esteroides pueden causar sangrado estomacal y problemas renales  · La crema para el alivio del dolor  mikaela el dolor  [address]  · [address] caterina medicamentos aleks se le haya indicado  Consulte con banuelos médico si usted merrill que banuelos medicamento no le está ayudando o si presenta efectos secundarios  Infórmele si es alérgico a algún medicamento  Mantenga rivas lista actualizada de los [employer], las vitaminas y los productos herbales que ace  Incluya los siguientes datos de los medicamentos: cantidad, frecuencia y motivo de administración  Traiga con usted la lista o los envases de la píldoras a caterina citas de seguimiento  Lleve la lista de los medicamentos con usted en jordi de rivas emergencia  Salo rivas cesar de control con banuelos médico o especialista aleks se lo indicaron:  Anote caterina preguntas para que se acuerde de hacerlas jono caterina visitas     Cuidados personales:   · La aplicación de calor  para ayudar a reducir el dolor  Use rivas compresa o envoltura caliente  Aplíquese calor de 20 a 30 minutos cada 2 horas jono los días que le indiquen  · Descanse  lo más posible  Evite actividades que causan [employer] articulaciones  · Aplique hielo  para ayudar a bajar la inflamación y el dolor  El hielo también puede contribuir a evitar el daño de los tejidos  Use un paquete de hielo o ponga hielo molido dentro de [employer]  Cúbrala con rivas toalla y póngasela en la articulación adolorida cada hora jono 15 o 20 minutos o según se le haya indicado  · [place]  la articulación con rivas férula o envoltura elástica según indicaciones  · Eleve  la articulación de [place] que quede por encima del nivel de banuelos corazón tan seguido aleks pueda para ayudar a reducir la inflamación y el dolor  Use almohadas o cobijas dobladas para elevar la articulación de forma cómoda  · Pierda peso  si tiene sobrepeso  El peso extra puede ejercer presión sobre caterina articulaciones y causarle más dolor  Consulte con banuelos médico cuánto debería pesar  También pídale que le ayude a diseñar un buen plan de pérdida de peso  · El ejercicio  con regularidad para ayudar a mejorar el movimiento de las articulaciones y disminuir el dolor  Pida más información acerca de un plan de ejercicio adecuado para usted  Los ejercicios de bajo impacto pueden ayudar a quitar algo de la presión en las articulaciones  Henry Finical de ejercicios de bajo impacto son caminar, nadar y practicar aeróbicos acuáticos  Fisioterapia:  Un fisioterapeuta le puede enseñar ejercicios para ayudarle a mejorar el movimiento y la fuerza, y para disminuir el dolor  Pregúntele a banuelos médico si la fisioterapia es apropiada para usted  Comuníquese con banuelos especialista o médico sí:   · Usted tiene fiebre  · Usted continúa sintiendo dolor en las articulaciones y el dolor no se le mikaela con calor, hielo o medicamento      · Usted tiene dolor e inflamación alrededor de la articulación  · Usted tiene preguntas o inquietudes acerca de hallman condición o cuidado  Regrese a la carlos de emergencias si:   · Usted de repente siente un dolor severo cuando mueve la articulación  · Usted tiene fiebre y escalofríos carla  · Usted no puede  la articulación  · Usted pierde sensibilidad en el lado del cuerpo donde está la articulación adolorida  © 2017 [address] Information is for End User's use only and may not be sold, redistributed or otherwise used for commercial purposes  All illustrations and images included in CareNotes® are the copyrighted property of A D A M , Inc  or Clint Snyder  Esta información es sólo para uso en educación  Hallman intención no es darle un consejo médico sobre enfermedades o tratamientos  Colsulte con hallman Sonya Aburto farmacéutico antes de seguir cualquier régimen médico para saber si es seguro y efectivo para usted  Dolor de Anupam Anne   LO QUE NECESITA SABER:   ¿Qué provoca el dolor de cadera? El dolor de cadera puede ser causado por varias condiciones, aleks la bursitis, artritis o un esguince en el músculo o tendón  Es posible que usted presente inflamación en los sacos llenos de líquido que protegen a caterina músculos y tendones  El dolor de cadera también puede ser a causa de un problema en la parte inferior de la espalda  El dolor de cadera podría ser causado por un trauma, la práctica de deportes o correr  Hallman dolor podría comenzar en hallman Rebecca Saad y pasarse a hallman muslo, glúteo o rodolfo  ¿Cómo se trata el dolor de cadera? Es posible que usted necesite radiografías para asegurarse que no haya huesos fracturados  Es posible que le den AINEs para ayudar a disminuir el dolor y la inflamación  ¿Cómo puedo controlar el dolor de cadera? · Descanse  hallman cadera lesionada para que pueda sanar  Posiblemente necesite evitar poner peso sobre hallman cadera jono al menos 48 horas   Regrese a caterina [employer] cotidianas según las indicaciones  · El hielo  a la lesión por 20 minutos cada 4 horas o según le indicaron  Use un paquete de hielo o ponga hielo molido dentro de [employer]  Cúbrala con rivas toalla para proteger banuelos piel  El hielo ayuda a evitar daño al tejido y a disminuir la inflamación y el dolor  · Eleve  banuelos cadera lesionada arriba del nivel de banuelos corazón con la mayor frecuencia posible  [place] va a disminuir inflamación y el dolor  Si es posible, apoye banuelos cadera y pierna sobre almohadas o cobijas para mantener el área elevada cómodamente  · Mantenga un peso saludable  El peso corporal adicional puede provocar presión y dolor en las articulaciones de banuelos Sharlett Dux y tobillo  Consulte con banuelos médico cuánto debería pesar  Pida que le ayude a crear un plan para bajar de peso si usted tiene sobrepeso  · Use dispositivos de apoyo aleks se le indique  Es posible que usted necesite usar un bastón o [place]  Los dispositivos ortopédicos ayudan a disminuir el dolor y la presión en banuelos cadera cuando usted camina  Pregunte a banuelos médico por más [employer] dispositivos de asistencia y cómo se usan de forma correcta  ¿Cuándo christine buscar atención inmediata? · Banuelos dolor empeora  · Usted tiene entumecimiento en caterina piernas o dedos de los pies  · Usted no puede poner Irion Kalen banuelos cadera o no puede moverla  ¿Cuándo christine comunicarme con mi médico?   · Usted tiene fiebre  · Banuelos dolor no disminuye, aún después del [place]  · Usted tiene preguntas o inquietudes acerca de banuelos condición o cuidado  ACUERDOS SOBRE BANUELOS CUIDADO:   Usted tiene el derecho de ayudar a planear banuelos cuidado  Aprenda todo lo que pueda sobre banuelos condición y aleks darle tratamiento  Discuta caterina opciones de tratamiento con caterina médicos para decidir el cuidado que usted desea recibir  Usted siempre tiene el derecho de rechazar el tratamiento  Esta información es sólo para uso en educación   Banuelos intención no es darle un consejo [employer] o tratamientos  Colsulte con banuelos Sonya Aburto farmacéutico antes de seguir cualquier régimen médico para saber si es seguro y efectivo para usted  © 2017 [address] Information is for End User's use only and may not be sold, redistributed or otherwise used for commercial purposes  All illustrations and images included in CareNotes® are the copyrighted property of A D A M , Inc  or Clint Snyder
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
## 2019-02-05 DIAGNOSIS — M1A.0620 CHRONIC GOUT OF LEFT KNEE, UNSPECIFIED CAUSE: ICD-10-CM

## 2019-02-05 RX ORDER — ALLOPURINOL 100 MG/1
TABLET ORAL
Qty: 30 TABLET | Refills: 11 | Status: SHIPPED | OUTPATIENT
Start: 2019-02-05 | End: 2019-02-11 | Stop reason: SDUPTHER

## 2019-02-09 ENCOUNTER — APPOINTMENT (EMERGENCY)
Dept: RADIOLOGY | Facility: HOSPITAL | Age: 64
End: 2019-02-09
Payer: COMMERCIAL

## 2019-02-09 ENCOUNTER — HOSPITAL ENCOUNTER (EMERGENCY)
Facility: HOSPITAL | Age: 64
Discharge: HOME | End: 2019-02-09
Attending: EMERGENCY MEDICINE | Admitting: EMERGENCY MEDICINE
Payer: COMMERCIAL

## 2019-02-09 VITALS
BODY MASS INDEX: 39.25 KG/M2 | RESPIRATION RATE: 20 BRPM | WEIGHT: 265 LBS | OXYGEN SATURATION: 94 % | DIASTOLIC BLOOD PRESSURE: 73 MMHG | TEMPERATURE: 98.5 F | HEIGHT: 69 IN | SYSTOLIC BLOOD PRESSURE: 112 MMHG | HEART RATE: 60 BPM

## 2019-02-09 DIAGNOSIS — R05.9 COUGH: Primary | ICD-10-CM

## 2019-02-09 PROCEDURE — 94640 AIRWAY INHALATION TREATMENT: CPT

## 2019-02-09 PROCEDURE — 99283 EMERGENCY DEPT VISIT LOW MDM: CPT

## 2019-02-09 PROCEDURE — 71046 X-RAY EXAM CHEST 2 VIEWS: CPT

## 2019-02-09 RX ORDER — IPRATROPIUM BROMIDE AND ALBUTEROL SULFATE 2.5; .5 MG/3ML; MG/3ML
3 SOLUTION RESPIRATORY (INHALATION)
Status: DISCONTINUED | OUTPATIENT
Start: 2019-02-09 | End: 2019-02-09

## 2019-02-09 RX ORDER — SODIUM CHLORIDE FOR INHALATION 0.9 %
3 VIAL, NEBULIZER (ML) INHALATION
Status: DISCONTINUED | OUTPATIENT
Start: 2019-02-09 | End: 2019-02-09 | Stop reason: HOSPADM

## 2019-02-09 RX ORDER — LEVALBUTEROL 1.25 MG/.5ML
1.25 SOLUTION, CONCENTRATE RESPIRATORY (INHALATION)
Status: DISCONTINUED | OUTPATIENT
Start: 2019-02-09 | End: 2019-02-09 | Stop reason: HOSPADM

## 2019-02-09 RX ORDER — ALBUTEROL SULFATE 90 UG/1
2 AEROSOL, METERED RESPIRATORY (INHALATION) EVERY 4 HOURS PRN
Status: DISCONTINUED | OUTPATIENT
Start: 2019-02-09 | End: 2019-02-09 | Stop reason: HOSPADM

## 2019-02-09 RX ADMIN — IPRATROPIUM BROMIDE AND ALBUTEROL SULFATE 3 ML: 2.5; .5 SOLUTION RESPIRATORY (INHALATION) at 02:23
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
## 2019-02-09 NOTE — DISCHARGE INSTRUCTIONS
Tos aguda   LO QUE NECESITA SABER:   David Jones tos aguda puede durar hasta 3 semanas  Las causas comunes de rivas tos aguda incluyen un resfriado, alergias o rivas infección pulmonar  INSTRUCCIONES SOBRE EL ILYA HOSPITALARIA:   Regrese a la carlos de emergencias si:   · Tiene dificultad para respirar o le falta el aire  · Usted tose pramod o nota pramod en banuelos mucosidad  · Se desmaya, se siente débil o mareado  · Le duele el pecho cuando respira hondo o tose  · Tiene respiración silbante  Pregúntele a banuelos Vandana Candelario vitaminas y minerales son adecuados para usted  · Usted tiene fiebre  · La tos dura más de 4 semanas  · Leana síntomas no mejoran con el tratamiento  · Usted tiene preguntas o inquietudes acerca de banuelos condición o cuidado  Medicamentos:   · Medicamentos,  para detener la tos, disminuir la inflamación de las vías respiratorias o ayudar a abrirlas  Los medicamentos también pueden despejar las vías respiratorias  Pregunte a banuelos médico qué medicamentos de venta adelia puede olvin  Si tiene rivas infección causada por bacterias, es posible que necesite antibióticos  · [place] leana medicamentos aleks se le haya indicado  Consulte con banuelos médico si usted merrill que banuelos medicamento no le está ayudando o si presenta efectos secundarios  Infórmele si es alérgico a cualquier medicamento  Mantenga rivas lista actualizada de los [place], las vitaminas y los productos herbales que ace  Incluya los siguientes datos de los medicamentos: cantidad, frecuencia y motivo de administración  Traiga con usted la lista o los envases de la píldoras a leana citas de seguimiento  Lleve la lista de los medicamentos con usted en jordi de rivas emergencia  El manejo de banuelos síntomas:   · No fume y permanezca lejos de otras personas que fuman  La nicotina y otros químicos contenidos en los cigarrillos y puros pueden causar daño pulmonar y empeorar banuelos tos   Pida información a banuelos médico si usted actualmente fuma y necesita ayuda para dejar de fumar  Los cigarrillos electrónicos o tabaco sin humo todavía contienen nicotina  Consulte con hallman médico antes de [employer]  · Ch[place] líquidos adicionales aleks se le indique  Los líquidos le ayudarán a aflojar y disolver la mucosidad para que la pueda expectorar  Los líquidos ayudarán a evitar la deshidratación  Los mejores líquidos para olvin son el agua, el jugo y el caldo  No tome líquidos que contienen cafeína  La cafeína puede aumentar el riesgo de deshidratación  Pregúntele a hallman médico cuál es la cantidad de líquido que necesita ingerir a diario  · Repose según le indicaron  No realice actividades que empeoren la tos, aleks el ejercicio físico      · Use un humidificador o vaporizador  Use un humidificador de marissa frío o un vaporizador para elevar la humedad en hallman casa  [place] podría ayudarle a respirar más fácilmente y al mismo tiempo disminuir la tos  · Ingiera de 2 a 5 ml de [employer] [facility] [employer] al día  La miel puede ayudar a diluir los mocos y [employer] tos  · Utilice gotas o pastillas para la tos  Estas pueden ayudar a disminuir la irritación de la garganta y la tos  Acuda a caterina consultas de control con hallman médico según le indicaron  Anote caterina preguntas para que se acuerde de hacerlas jono caterina visitas  © 2017 [address] [employer] [employer] Information is for End User's use only and may not be sold, redistributed or otherwise used for commercial purposes  All illustrations and images included in CareNotes® are the copyrighted property of A D A M , Inc  or Clint Snyder  Esta información es sólo para uso en educación  Hallman intención no es darle un consejo médico sobre enfermedades o tratamientos  Colsulte con hallman Randhawa [place] farmacéutico antes de seguir cualquier régimen médico para saber si es seguro y efectivo para usted  Síntomas de resfriado   LO QUE NECESITA SABER:   Un resfriado es rivas infección causada por un virus   Velma Dollar causa la inflamación del sistema respiratorio superior  Algunos síntomas comunes de un resfriado incluyen estornudos, garganta seca, congestión nasal, dolor de lillie, ojos llorosos, y tos  La tos podría ser seca o incluso contener flemas  Usted también podría sentir chelsea musculares, dolor en las articulaciones y cansancio  La fiebre no es un síntoma común del resfrío acacia podría suceder  La mayoría de los resfriados se [place] sin tratamiento  INSTRUCCIONES SOBRE EL ILYA HOSPITALARIA:   Regrese a la carlos de emergencias si:   · Usted siente más cansancio y debilidad  · Usted no puede comer  · Banuelos corazón está latiendo [employer] rápido de lo normal en banuelos jordi  · Usted nota manchas viviana en la parte de atrás de banuelos garganta y banuelos cecilio está inflamado y adolorido al tacto  · Usted nota puntos rojos o morados pequeños o grandes en banuelos piel  Pregúntele a banuelos Reyne Rave vitaminas y minerales son adecuados para usted  · Usted tiene fiebre por encima de los 102°F (38 9°C)  · Usted tiene falta de la aire que es reciente o que JACQUIE  · Usted tiene rivas secreción espesa saliéndole de la nariz por más de 2 días  · Leana síntomas no mejoran o más nicky empeoran en cuestión de 5 días  · Usted tiene preguntas o inquietudes acerca de banuelos condición o cuidado  Medicamentos:  Los [employer]  podrían  ser parte de los medicamentos sugeridos por banuelos médico para disminuir los síntomas de banuelos resfrío  Estos medicamentos están disponibles sin receta médica  Pregunte cuáles medicamentos olvin y cuándo tomarlos  [phone]  · Los AINEs o el acetaminofén  ayudan a bajar la fiebre o disminuir el dolor  Los descongestionantes    · Los descongestionantes  ayudan a disminuir la congestión nasal      · Los antihistamínicos  ayudan a disminuir los estornudos y el flujo nasal      · Los jarabes para la tos  ayudan a disminuir la tos      · Los expectorantes  ayudan a aflojar las flemas para que las pueda toser  · [place] caterina medicamentos aleks se le haya indicado  Consulte con banuelos médico si usted merrill que banuelos medicamento no le está ayudando o si presenta efectos secundarios  Infórmele si es alérgico a algún medicamento  Mantenga rivas lista actualizada de los [place], las vitaminas y los productos herbales que ace  Incluya los siguientes datos de los medicamentos: cantidad, frecuencia y motivo de administración  Traiga con usted la lista o los envases de la píldoras a caterina citas de seguimiento  Lleve la lista de los medicamentos con usted en jordi de rivas emergencia  Alivio de los síntomas:  Los siguientes podría ayudar a aliviar los síntomas del resfrío, aleks por ejemplo la garganta seca y la congestión:  · Gárgaras con enjuague bucal o agua salada tibia según las instrucciones  · Pastillas para la garganta o golosinas duras  · Vaporizador de ruby fría o tibia o humidificador para aliviar la respiración  · [place] de por lo menos 2 días y luego según lo necesario para ir eliminando el cansancio y la debilidad  · Póngase crema a base de petrolato alrededor de las fosas nasales para disminuir la irritación causada por estarse limpiando tanto la nariz  [place] líquidos:  Los líquidos le ayudarán a aflojar y disolver la mucosidad espesa para que la pueda expectorar  Los líquidos también lo mantendrán hidratado  Pregúntele a banuelos médico cuáles líquidos le recomienda y cuánta cantidad debe olvin cada día  Prevenga la propagación de gérmenes:  Es posible propagar los gérmenes del resfriado a otras personas por lo menos por 3 días después de getachew iniciado los síntomas  Lávese las harman frecuentemente  No comparta artículos, aleks utensilios de cocina  Cúbrase la nariz y la boca cuando tose o estornuda usando la parte de adentro del codo en vez de las harman  Tire en la basura las toallas desechables que usó para limpiarse la nariz    No fume:  El fumar podría empeorar caterina síntomas y aumentar la duración de hallman resfriado  Hable con hallman médico si necesita ayuda para dejar de fumar  Acuda a caterina consultas de control con hallman médico según le indicaron  Anote caterina preguntas para que se acuerde de hacerlas jono caterina visitas  © 2017 26[address] Richie Chen Information is for End User's use only and may not be sold, redistributed or otherwise used for commercial purposes  All illustrations and images included in CareNotes® are the copyrighted property of A D A M  Inc  or Clint Snyder  Esta información es sólo para uso en educación  Hallman intención no es darle un consejo médico sobre enfermedades o tratamientos  Colsulte con hallman Jose Elias Pinta farmacéutico antes de seguir cualquier régimen médico para saber si es seguro y efectivo para usted

## 2019-02-09 NOTE — RESPIRATORY THERAPY NOTE
RT Protocol Note  Sheree Ariza 70 y o  male MRN: [id number]  Unit/Bed#: ED 21 Encounter: [id number]    Assessment    Active Problems:    * No active hospital problems  *      Home Pulmonary Medications:  MDI albuterol PRN       Past Medical History:   Diagnosis Date    Anxiety     Asthma     Cardiac disease     Diabetes mellitus ([address] )     Gout     Hyperlipidemia     Hypertension     Shortness of breath     with exertion     Social History     Social History    Marital status: Single     Spouse name: N/A    Number of children: N/A    Years of education: N/A     Social History Main Topics    Smoking status: Former Smoker     Quit date: 2006    Smokeless tobacco: Never Used    Alcohol use Yes      Comment: occasionally    Drug use: No    Sexual activity: No     Other Topics Concern    None     Social History Narrative    None       Subjective         Objective    Physical Exam:   Assessment Type: (P) Assess only  General Appearance: (P) Alert, Awake  Respiratory Pattern: (P) Normal  Chest Assessment: (P) Chest expansion symmetrical  Bilateral Breath Sounds: (P) Diminished  O2 Device: (P) RA  Vitals:  Blood pressure 122/58, pulse 70, temperature 98 5 °F (36 9 °C), temperature source Oral, resp  rate 20, height 5' 9" (1 753 m), weight 120 kg (265 lb), SpO2 95 %  Imaging and other studies: I have personally reviewed pertinent reports  O2 Device: (P) RA  Plan    Respiratory Plan: (P) Mild Distress pathway        Resp Comments: (P) Pt was ordered on Txs so Resp  Protocol was done at the bedside  Pt came in with a cough  Pt has hx of asthma where he takes PRN MDI's at home (Albuterol) pt stated he is in midl distress at this time breathing  BS are Diminished  CXR shows R  Mid/Upper airspace disease  Will make pt TID txs while he is here with prn MDI per protocol

## 2019-02-11 DIAGNOSIS — M1A.0620 CHRONIC GOUT OF LEFT KNEE, UNSPECIFIED CAUSE: ICD-10-CM

## 2019-02-13 RX ORDER — ALLOPURINOL 100 MG/1
TABLET ORAL
Qty: 30 TABLET | Refills: 11 | Status: SHIPPED | OUTPATIENT
Start: 2019-02-13 | End: 2019-03-12 | Stop reason: SDUPTHER

## 2019-03-08 ENCOUNTER — HOSPITAL ENCOUNTER (EMERGENCY)
Facility: HOSPITAL | Age: 64
Discharge: HOME | End: 2019-03-08
Attending: EMERGENCY MEDICINE
Payer: COMMERCIAL

## 2019-03-08 ENCOUNTER — APPOINTMENT (EMERGENCY)
Dept: RADIOLOGY | Facility: HOSPITAL | Age: 64
End: 2019-03-08
Payer: COMMERCIAL

## 2019-03-08 VITALS
TEMPERATURE: 98.1 F | DIASTOLIC BLOOD PRESSURE: 64 MMHG | HEIGHT: 69 IN | SYSTOLIC BLOOD PRESSURE: 128 MMHG | OXYGEN SATURATION: 99 % | WEIGHT: 270 LBS | BODY MASS INDEX: 39.99 KG/M2 | HEART RATE: 74 BPM | RESPIRATION RATE: 18 BRPM

## 2019-03-08 DIAGNOSIS — M10.9 GOUT OF LEFT KNEE: ICD-10-CM

## 2019-03-08 DIAGNOSIS — M25.562 LEFT KNEE PAIN: Primary | ICD-10-CM

## 2019-03-08 LAB
BASOPHILS # BLD AUTO: 0.05 THOUSANDS/ΜL (ref 0–0.1)
BASOPHILS NFR BLD AUTO: 1 % (ref 0–1)
CRP SERPL QL: 26 MG/L
CRYSTALS SNV QL MICRO: NORMAL
EOSINOPHIL # BLD AUTO: 0.16 THOUSAND/ΜL (ref 0–0.61)
EOSINOPHIL NFR BLD AUTO: 2 % (ref 0–6)
ERYTHROCYTE [DISTWIDTH] IN BLOOD BY AUTOMATED COUNT: 14.6 % (ref 11.6–15.1)
ERYTHROCYTE [SEDIMENTATION RATE] IN BLOOD: 13 MM/HOUR (ref 0–10)
HCT VFR BLD AUTO: 42.4 % (ref 36.5–49.3)
HGB BLD-MCNC: 13.3 G/DL (ref 12–17)
IMM GRANULOCYTES # BLD AUTO: 0.02 THOUSAND/UL (ref 0–0.2)
IMM GRANULOCYTES NFR BLD AUTO: 0 % (ref 0–2)
LYMPHOCYTES # BLD AUTO: 1.35 THOUSANDS/ΜL (ref 0.6–4.47)
LYMPHOCYTES # SNV MANUAL: 49 %
LYMPHOCYTES NFR BLD AUTO: 16 % (ref 14–44)
MCH RBC QN AUTO: 27.4 PG (ref 26.8–34.3)
MCHC RBC AUTO-ENTMCNC: 31.4 G/DL (ref 31.4–37.4)
MCV RBC AUTO: 87 FL (ref 82–98)
MONOCYTES # BLD AUTO: 0.94 THOUSAND/ΜL (ref 0.17–1.22)
MONOCYTES NFR BLD AUTO: 11 % (ref 4–12)
MONOCYTES NFR SNV MANUAL: 29 %
NEUTROPHILS # BLD AUTO: 5.88 THOUSANDS/ΜL (ref 1.85–7.62)
NEUTROPHILS NFR SNV MANUAL: 22 %
NEUTS SEG NFR BLD AUTO: 70 % (ref 43–75)
NRBC BLD AUTO-RTO: 0 /100 WBCS
PLATELET # BLD AUTO: 204 THOUSANDS/UL (ref 149–390)
PMV BLD AUTO: 10.9 FL (ref 8.9–12.7)
RBC # BLD AUTO: 4.85 MILLION/UL (ref 3.88–5.62)
TOTAL CELLS COUNTED SPEC: 100
WBC # BLD AUTO: 8.4 THOUSAND/UL (ref 4.31–10.16)
WBC # FLD MANUAL: 5618 /UL (ref 0–200)

## 2019-03-08 PROCEDURE — 96374 THER/PROPH/DIAG INJ IV PUSH: CPT

## 2019-03-08 PROCEDURE — 85025 COMPLETE CBC W/AUTO DIFF WBC: CPT | Performed by: EMERGENCY MEDICINE

## 2019-03-08 PROCEDURE — 73564 X-RAY EXAM KNEE 4 OR MORE: CPT

## 2019-03-08 PROCEDURE — 89051 BODY FLUID CELL COUNT: CPT | Performed by: EMERGENCY MEDICINE

## 2019-03-08 PROCEDURE — 87070 CULTURE OTHR SPECIMN AEROBIC: CPT | Performed by: EMERGENCY MEDICINE

## 2019-03-08 PROCEDURE — 89060 EXAM SYNOVIAL FLUID CRYSTALS: CPT | Performed by: EMERGENCY MEDICINE

## 2019-03-08 PROCEDURE — 85652 RBC SED RATE AUTOMATED: CPT | Performed by: EMERGENCY MEDICINE

## 2019-03-08 PROCEDURE — 86140 C-REACTIVE PROTEIN: CPT | Performed by: EMERGENCY MEDICINE

## 2019-03-08 PROCEDURE — 36415 COLL VENOUS BLD VENIPUNCTURE: CPT | Performed by: EMERGENCY MEDICINE

## 2019-03-08 PROCEDURE — 99284 EMERGENCY DEPT VISIT MOD MDM: CPT

## 2019-03-08 PROCEDURE — 87205 SMEAR GRAM STAIN: CPT | Performed by: EMERGENCY MEDICINE

## 2019-03-08 RX ORDER — BLOOD SUGAR DIAGNOSTIC
STRIP MISCELLANEOUS
Refills: 0 | COMMUNITY
Start: 2019-02-07

## 2019-03-08 RX ORDER — ACETAMINOPHEN 325 MG/1
975 TABLET ORAL ONCE
Status: COMPLETED | OUTPATIENT
Start: 2019-03-08 | End: 2019-03-08

## 2019-03-08 RX ORDER — KETOROLAC TROMETHAMINE 30 MG/ML
15 INJECTION, SOLUTION INTRAMUSCULAR; INTRAVENOUS ONCE
Status: COMPLETED | OUTPATIENT
Start: 2019-03-08 | End: 2019-03-08

## 2019-03-08 RX ORDER — LIDOCAINE HYDROCHLORIDE 10 MG/ML
10 INJECTION, SOLUTION EPIDURAL; INFILTRATION; INTRACAUDAL; PERINEURAL ONCE
Status: COMPLETED | OUTPATIENT
Start: 2019-03-08 | End: 2019-03-08

## 2019-03-08 RX ORDER — NAPROXEN 500 MG/1
500 TABLET ORAL 2 TIMES DAILY WITH MEALS
Qty: 30 TABLET | Refills: 0 | Status: SHIPPED | OUTPATIENT
Start: 2019-03-08 | End: 2019-08-01

## 2019-03-08 RX ORDER — SERTRALINE HYDROCHLORIDE 100 MG/1
TABLET, FILM COATED ORAL
Refills: 0 | COMMUNITY
Start: 2019-02-05 | End: 2019-08-01

## 2019-03-08 RX ADMIN — ACETAMINOPHEN 975 MG: 325 TABLET ORAL at 07:03

## 2019-03-08 RX ADMIN — KETOROLAC TROMETHAMINE 30 MG: 30 INJECTION, SOLUTION INTRAMUSCULAR at 07:04

## 2019-03-08 RX ADMIN — LIDOCAINE HYDROCHLORIDE 10 ML: 10 INJECTION, SOLUTION EPIDURAL; INFILTRATION; INTRACAUDAL; PERINEURAL at 08:02

## 2019-03-08 NOTE — ED ATTENDING ATTESTATION
Gabriele Talavera MD, saw and evaluated the patient  I have discussed the patient with the resident/non-physician practitioner and agree with the resident's/non-physician practitioner's findings, Plan of Care, and MDM as documented in the resident's/non-physician practitioner's note, except where noted  All available labs and Radiology studies were reviewed  I was present for key portions of any procedure(s) performed by the resident/non-physician practitioner and I was immediately available to provide assistance  At this point I agree with the current assessment done in the Emergency Department  I have conducted an independent evaluation of this patient a history and physical is as follows:    33-year-old man with history of gout presenting with left knee pain, atraumatic  Started taking his allopurinol again yesterday but has had pain over the past 3 days  There is pain with ambulation  Patient denies fever, chills, nausea, vomiting, or any other complaints  On examination the left knee is mildly swollen with effusion present, it is somewhat warm to the touch but there is no overlying erythema  Neurovascular intact  Patient generally well-appearing  Heart regular rate and rhythm, no murmurs rubs or gallops  Lungs clear to auscultation bilaterally  Plan labs, arthrocentesis, symptomatic treatment        Critical Care Time  Procedures
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
## 2019-03-11 LAB
BACTERIA SPEC BFLD CULT: NO GROWTH
GRAM STN SPEC: NORMAL
GRAM STN SPEC: NORMAL

## 2019-03-12 ENCOUNTER — OFFICE VISIT (OUTPATIENT)
Dept: INTERNAL MEDICINE CLINIC | Facility: CLINIC | Age: 64
End: 2019-03-12

## 2019-03-12 VITALS
BODY MASS INDEX: 40.42 KG/M2 | WEIGHT: 272.93 LBS | DIASTOLIC BLOOD PRESSURE: 62 MMHG | SYSTOLIC BLOOD PRESSURE: 110 MMHG | HEART RATE: 84 BPM | HEIGHT: 69 IN | TEMPERATURE: 97.3 F

## 2019-03-12 DIAGNOSIS — E11.9 TYPE 2 DIABETES MELLITUS WITHOUT COMPLICATION, WITHOUT LONG-TERM CURRENT USE OF INSULIN (HCC): ICD-10-CM

## 2019-03-12 DIAGNOSIS — R22.1 NECK MASS: ICD-10-CM

## 2019-03-12 DIAGNOSIS — I50.31 ACUTE DIASTOLIC HEART FAILURE (HCC): ICD-10-CM

## 2019-03-12 DIAGNOSIS — M1A.0620 CHRONIC GOUT OF LEFT KNEE, UNSPECIFIED CAUSE: Primary | ICD-10-CM

## 2019-03-12 LAB — SL AMB POCT HEMOGLOBIN AIC: 6.3 (ref ?–6.5)

## 2019-03-12 PROCEDURE — 83036 HEMOGLOBIN GLYCOSYLATED A1C: CPT | Performed by: INTERNAL MEDICINE

## 2019-03-12 PROCEDURE — 99213 OFFICE O/P EST LOW 20 MIN: CPT | Performed by: INTERNAL MEDICINE

## 2019-03-12 RX ORDER — FUROSEMIDE 20 MG/1
20 TABLET ORAL DAILY
Qty: 7 TABLET | Refills: 0 | Status: SHIPPED | OUTPATIENT
Start: 2019-03-12 | End: 2019-08-01

## 2019-03-12 RX ORDER — ALLOPURINOL 100 MG/1
200 TABLET ORAL DAILY
Qty: 30 TABLET | Refills: 11 | Status: SHIPPED | OUTPATIENT
Start: 2019-03-12 | End: 2019-08-01

## 2019-03-12 NOTE — PROGRESS NOTES
Assessment/Plan:     Diagnoses and all orders for this visit:    Chronic gout of left knee, unspecified cause  -     allopurinol (ZYLOPRIM) 100 mg tablet; Increase to 2 tablets (200 mg total) by mouth daily    Type 2 diabetes mellitus without complication, without long-term current use of insulin (HCC)  -     Microalbumin / creatinine urine ratio  -     POCT hemoglobin A1c - 6 3  -     continue with lifestyle modifications at this time    Acute diastolic heart failure (HCC)  -     furosemide (LASIX) 20 mg tablet; Take 1 tablet (20 mg total) by mouth daily  -     Monitor daily weights, call office with weight changes    Neck mass        -     monitor size, re-evaluate at next appointment for possible further imaging/removal    Given patient's morbid obesity and thick neck, consider future polysomnography  Consider adding ICS versus ics/laba if respiratory status does not improve with Lasix alone  Subjective:      Patient ID: Maria Elena Holley is a 70 y o  male  25-year-old male with past medical history significant for diabetes mellitus last A1c of 6 5 on 07/10/2018, asthma, hypertension, 72 pack year smoking history quit in 2006  Patient was last seen in clinic on 01/11/2019 for sacroiliitis for which he was ordered PT, Flexeril at night and Tylenol  Patient was also seen for hypertension which was well controlled at the time and continued to be monitored off blood pressure medications  For the patient's gout patient was continued on allopurinol 100 mg daily with a recheck of a uric acid level which resulted of 6 7  MILLI and RF at the time were negative  Since last appointment, patient has been in the emergency department 3 times  On 02/01/2019 patient went in for left hip pain  On 02/09/2019 patient went in for cough  On 03/08/2019 patient went in for left knee pain at which time workup showed ESR of 13 and CRP of 26    Patient had arthrocentesis performed which showed 5600 WBC with 49% lymphocytes and positive monosodium urate crystals  Patient comes to the clinic this morning for 3 month follow-up  Patient has no acute complaints this morning  Blood pressure 110/62, heart rate 84, oxygen saturation 94% on room air  Patient appeared slightly short of breath on examination  Not tachypneic  Patient states that he has had difficulty walking up stairs due to shortness of breath  Patient also refers some mild lower extremity swelling  On chart review, it appears patient has gained about 17 lb in the last 1 5 months  Patient states he has not been taking his naproxen as he did not pick it up from the pharmacy  Patient states he is using his albuterol daily  A1c 6 3 in office  Colonoscopy performed on 01/10/2019 with multiple tubular adenomas and hyperplastic polyps, some sessile though all subcentimeter  Recommend repeat colonoscopy in 3 years  The following portions of the patient's history were reviewed and updated as appropriate: allergies, current medications, past family history, past medical history, past social history, past surgical history and problem list     Review of Systems   Constitutional: Negative for appetite change, chills, diaphoresis, fatigue, fever and unexpected weight change  HENT: Negative for sore throat  Eyes: Negative for visual disturbance  Respiratory: Positive for shortness of breath (On exertion)  Negative for cough, chest tightness and wheezing  Cardiovascular: Negative for chest pain, palpitations and leg swelling  Gastrointestinal: Negative for abdominal distention, abdominal pain, blood in stool, constipation, diarrhea, nausea and vomiting  Genitourinary: Negative for difficulty urinating, flank pain and urgency  Musculoskeletal: Positive for arthralgias  Negative for myalgias  Skin: Negative for pallor and rash  Neurological: Negative for dizziness, weakness, light-headedness and headaches           Objective:      /62 Pulse 84   Temp (!) 97 3 °F (36 3 °C)   Ht 5' 9" (1 753 m)   Wt 124 kg (272 lb 14 9 oz)   BMI 40 30 kg/m²          Physical Exam   Constitutional: He is oriented to person, place, and time  He appears well-developed and well-nourished  No distress  HENT:   Head: Normocephalic and atraumatic  Mouth/Throat: No oropharyngeal exudate  Eyes: Pupils are equal, round, and reactive to light  EOM are normal  No scleral icterus  Neck: Normal range of motion  Neck supple  No JVD present  Cardiovascular: Normal rate, regular rhythm, normal heart sounds and intact distal pulses  Exam reveals no gallop and no friction rub  No murmur heard  Pulmonary/Chest: Effort normal  No respiratory distress  He has no wheezes  Very mild crackles in the lung bases bilaterally   Abdominal: Soft  Bowel sounds are normal  He exhibits no distension  There is no tenderness  Musculoskeletal: Normal range of motion  He exhibits edema (Trace pitting edema in lower extremities bilaterally)  He exhibits no deformity  Mobile posterior neck mass measuring approximately 3 cm, left knee tender to palpation   Neurological: He is alert and oriented to person, place, and time  No cranial nerve deficit  Skin: Skin is warm and dry  Capillary refill takes less than 2 seconds  No rash noted  He is not diaphoretic  No erythema  Psychiatric: He has a normal mood and affect  His behavior is normal    Nursing note and vitals reviewed

## 2019-03-16 DIAGNOSIS — I50.31 ACUTE DIASTOLIC HEART FAILURE (HCC): ICD-10-CM

## 2019-03-17 ENCOUNTER — HOSPITAL ENCOUNTER (EMERGENCY)
Facility: HOSPITAL | Age: 64
Discharge: HOME | End: 2019-03-17
Attending: EMERGENCY MEDICINE | Admitting: EMERGENCY MEDICINE
Payer: COMMERCIAL

## 2019-03-17 VITALS
BODY MASS INDEX: 40.17 KG/M2 | WEIGHT: 272 LBS | DIASTOLIC BLOOD PRESSURE: 61 MMHG | OXYGEN SATURATION: 95 % | TEMPERATURE: 97.9 F | HEART RATE: 92 BPM | SYSTOLIC BLOOD PRESSURE: 139 MMHG | RESPIRATION RATE: 20 BRPM

## 2019-03-17 DIAGNOSIS — J06.9 VIRAL URI WITH COUGH: Primary | ICD-10-CM

## 2019-03-17 PROCEDURE — 99283 EMERGENCY DEPT VISIT LOW MDM: CPT

## 2019-03-17 RX ORDER — FUROSEMIDE 20 MG/1
20 TABLET ORAL DAILY
Qty: 7 TABLET | Refills: 0 | OUTPATIENT
Start: 2019-03-17

## 2019-03-17 NOTE — ED NOTES
Patient provided with remedios for transportation home     Martha Schneider, [address] [facility]  03/17/19 0318
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
## 2019-03-19 DIAGNOSIS — I50.31 ACUTE DIASTOLIC HEART FAILURE (HCC): ICD-10-CM

## 2019-03-20 ENCOUNTER — TELEPHONE (OUTPATIENT)
Dept: INTERNAL MEDICINE CLINIC | Facility: CLINIC | Age: 64
End: 2019-03-20

## 2019-03-20 DIAGNOSIS — E13.8 DIABETES MELLITUS OF OTHER TYPE WITH COMPLICATION, UNSPECIFIED WHETHER LONG TERM INSULIN USE: ICD-10-CM

## 2019-03-20 DIAGNOSIS — E11.9 TYPE 2 DIABETES MELLITUS WITHOUT COMPLICATION, WITHOUT LONG-TERM CURRENT USE OF INSULIN (HCC): Primary | ICD-10-CM

## 2019-03-20 RX ORDER — FUROSEMIDE 20 MG/1
20 TABLET ORAL DAILY
Qty: 7 TABLET | Refills: 0 | OUTPATIENT
Start: 2019-03-20

## 2019-03-23 DIAGNOSIS — E78.5 HYPERLIPIDEMIA: ICD-10-CM

## 2019-03-23 RX ORDER — ATORVASTATIN CALCIUM 40 MG/1
TABLET, FILM COATED ORAL
Qty: 90 TABLET | Refills: 0 | Status: SHIPPED | OUTPATIENT
Start: 2019-03-23 | End: 2019-04-04 | Stop reason: SDUPTHER

## 2019-03-26 ENCOUNTER — TELEPHONE (OUTPATIENT)
Dept: INTERNAL MEDICINE CLINIC | Facility: CLINIC | Age: 64
End: 2019-03-26

## 2019-03-30 DIAGNOSIS — E78.5 HYPERLIPIDEMIA: ICD-10-CM

## 2019-04-01 RX ORDER — ATORVASTATIN CALCIUM 40 MG/1
TABLET, FILM COATED ORAL
Qty: 90 TABLET | Refills: 0 | OUTPATIENT
Start: 2019-04-01

## 2019-04-04 RX ORDER — ATORVASTATIN CALCIUM 40 MG/1
40 TABLET, FILM COATED ORAL DAILY
Qty: 90 TABLET | Refills: 3 | Status: SHIPPED | OUTPATIENT
Start: 2019-04-04 | End: 2021-10-26

## 2019-04-18 ENCOUNTER — OFFICE VISIT (OUTPATIENT)
Dept: INTERNAL MEDICINE CLINIC | Facility: CLINIC | Age: 64
End: 2019-04-18

## 2019-04-18 VITALS
TEMPERATURE: 97.5 F | DIASTOLIC BLOOD PRESSURE: 64 MMHG | HEART RATE: 80 BPM | SYSTOLIC BLOOD PRESSURE: 100 MMHG | HEIGHT: 69 IN | BODY MASS INDEX: 41.14 KG/M2 | WEIGHT: 277.78 LBS

## 2019-04-18 DIAGNOSIS — R60.0 LOWER LEG EDEMA: ICD-10-CM

## 2019-04-18 DIAGNOSIS — J45.40 MODERATE PERSISTENT ASTHMA WITHOUT COMPLICATION: Primary | ICD-10-CM

## 2019-04-18 DIAGNOSIS — N18.30 CKD (CHRONIC KIDNEY DISEASE) STAGE 3, GFR 30-59 ML/MIN (HCC): ICD-10-CM

## 2019-04-18 PROCEDURE — 3066F NEPHROPATHY DOC TX: CPT | Performed by: INTERNAL MEDICINE

## 2019-04-18 PROCEDURE — 99213 OFFICE O/P EST LOW 20 MIN: CPT | Performed by: INTERNAL MEDICINE

## 2019-04-23 ENCOUNTER — TELEPHONE (OUTPATIENT)
Dept: INTERNAL MEDICINE CLINIC | Facility: CLINIC | Age: 64
End: 2019-04-23

## 2019-05-07 ENCOUNTER — HOSPITAL ENCOUNTER (OUTPATIENT)
Dept: RADIOLOGY | Facility: HOSPITAL | Age: 64
Discharge: HOME | End: 2019-05-07
Attending: ORTHOPAEDIC SURGERY
Payer: COMMERCIAL

## 2019-05-07 ENCOUNTER — OFFICE VISIT (OUTPATIENT)
Dept: OBGYN CLINIC | Facility: HOSPITAL | Age: 64
End: 2019-05-07
Payer: COMMERCIAL

## 2019-05-07 DIAGNOSIS — M17.0 PRIMARY OSTEOARTHRITIS OF BOTH KNEES: Primary | ICD-10-CM

## 2019-05-07 DIAGNOSIS — M25.572 PAIN, JOINT, ANKLE AND FOOT, LEFT: ICD-10-CM

## 2019-05-07 PROCEDURE — 20610 DRAIN/INJ JOINT/BURSA W/O US: CPT | Performed by: ORTHOPAEDIC SURGERY

## 2019-05-07 PROCEDURE — 99214 OFFICE O/P EST MOD 30 MIN: CPT | Performed by: ORTHOPAEDIC SURGERY

## 2019-05-07 PROCEDURE — 73600 X-RAY EXAM OF ANKLE: CPT

## 2019-05-07 RX ORDER — BUPIVACAINE HYDROCHLORIDE 2.5 MG/ML
2 INJECTION, SOLUTION INFILTRATION; PERINEURAL
Status: COMPLETED | OUTPATIENT
Start: 2019-05-07 | End: 2019-05-07

## 2019-05-07 RX ORDER — METHYLPREDNISOLONE ACETATE 40 MG/ML
2 INJECTION, SUSPENSION INTRA-ARTICULAR; INTRALESIONAL; INTRAMUSCULAR; SOFT TISSUE
Status: COMPLETED | OUTPATIENT
Start: 2019-05-07 | End: 2019-05-07

## 2019-05-07 RX ADMIN — BUPIVACAINE HYDROCHLORIDE 2 ML: 2.5 INJECTION, SOLUTION INFILTRATION; PERINEURAL at 15:03

## 2019-05-07 RX ADMIN — METHYLPREDNISOLONE ACETATE 2 ML: 40 INJECTION, SUSPENSION INTRA-ARTICULAR; INTRALESIONAL; INTRAMUSCULAR; SOFT TISSUE at 15:03

## 2019-06-14 ENCOUNTER — HOSPITAL ENCOUNTER (EMERGENCY)
Facility: HOSPITAL | Age: 64
Discharge: HOME | End: 2019-06-14
Attending: EMERGENCY MEDICINE | Admitting: EMERGENCY MEDICINE
Payer: COMMERCIAL

## 2019-06-14 ENCOUNTER — APPOINTMENT (EMERGENCY)
Dept: RADIOLOGY | Facility: HOSPITAL | Age: 64
End: 2019-06-14
Payer: COMMERCIAL

## 2019-06-14 VITALS
DIASTOLIC BLOOD PRESSURE: 90 MMHG | OXYGEN SATURATION: 98 % | RESPIRATION RATE: 20 BRPM | SYSTOLIC BLOOD PRESSURE: 165 MMHG | HEART RATE: 70 BPM | TEMPERATURE: 97.9 F | BODY MASS INDEX: 41.47 KG/M2 | HEIGHT: 69 IN | WEIGHT: 280 LBS

## 2019-06-14 DIAGNOSIS — L02.11 CELLULITIS AND ABSCESS OF NECK: Primary | ICD-10-CM

## 2019-06-14 DIAGNOSIS — L03.221 CELLULITIS AND ABSCESS OF NECK: Primary | ICD-10-CM

## 2019-06-14 LAB
ANION GAP SERPL CALCULATED.3IONS-SCNC: 3 MMOL/L (ref 4–13)
BASOPHILS # BLD AUTO: 0.04 THOUSANDS/ΜL (ref 0–0.1)
BASOPHILS NFR BLD AUTO: 0 % (ref 0–1)
BUN SERPL-MCNC: 18 MG/DL (ref 5–25)
CALCIUM SERPL-MCNC: 8.9 MG/DL (ref 8.3–10.1)
CHLORIDE SERPL-SCNC: 103 MMOL/L (ref 100–108)
CO2 SERPL-SCNC: 29 MMOL/L (ref 21–32)
CREAT SERPL-MCNC: 1.08 MG/DL (ref 0.6–1.3)
CRP SERPL QL: 25.3 MG/L
EOSINOPHIL # BLD AUTO: 0.18 THOUSAND/ΜL (ref 0–0.61)
EOSINOPHIL NFR BLD AUTO: 2 % (ref 0–6)
ERYTHROCYTE [DISTWIDTH] IN BLOOD BY AUTOMATED COUNT: 15.3 % (ref 11.6–15.1)
GFR SERPL CREATININE-BSD FRML MDRD: 72 ML/MIN/1.73SQ M
GLUCOSE SERPL-MCNC: 188 MG/DL (ref 65–140)
HCT VFR BLD AUTO: 42.2 % (ref 36.5–49.3)
HGB BLD-MCNC: 13.3 G/DL (ref 12–17)
IMM GRANULOCYTES # BLD AUTO: 0.03 THOUSAND/UL (ref 0–0.2)
IMM GRANULOCYTES NFR BLD AUTO: 0 % (ref 0–2)
LYMPHOCYTES # BLD AUTO: 1.43 THOUSANDS/ΜL (ref 0.6–4.47)
LYMPHOCYTES NFR BLD AUTO: 15 % (ref 14–44)
MCH RBC QN AUTO: 26.9 PG (ref 26.8–34.3)
MCHC RBC AUTO-ENTMCNC: 31.5 G/DL (ref 31.4–37.4)
MCV RBC AUTO: 85 FL (ref 82–98)
MONOCYTES # BLD AUTO: 0.6 THOUSAND/ΜL (ref 0.17–1.22)
MONOCYTES NFR BLD AUTO: 6 % (ref 4–12)
NEUTROPHILS # BLD AUTO: 7.33 THOUSANDS/ΜL (ref 1.85–7.62)
NEUTS SEG NFR BLD AUTO: 77 % (ref 43–75)
NRBC BLD AUTO-RTO: 0 /100 WBCS
PLATELET # BLD AUTO: 242 THOUSANDS/UL (ref 149–390)
PMV BLD AUTO: 10.7 FL (ref 8.9–12.7)
POTASSIUM SERPL-SCNC: 4 MMOL/L (ref 3.5–5.3)
PROCALCITONIN SERPL-MCNC: 0.08 NG/ML
RBC # BLD AUTO: 4.94 MILLION/UL (ref 3.88–5.62)
SODIUM SERPL-SCNC: 135 MMOL/L (ref 136–145)
WBC # BLD AUTO: 9.61 THOUSAND/UL (ref 4.31–10.16)

## 2019-06-14 PROCEDURE — 87205 SMEAR GRAM STAIN: CPT | Performed by: SURGERY

## 2019-06-14 PROCEDURE — 86140 C-REACTIVE PROTEIN: CPT | Performed by: EMERGENCY MEDICINE

## 2019-06-14 PROCEDURE — 71260 CT THORAX DX C+: CPT

## 2019-06-14 PROCEDURE — 87070 CULTURE OTHR SPECIMN AEROBIC: CPT | Performed by: SURGERY

## 2019-06-14 PROCEDURE — 96366 THER/PROPH/DIAG IV INF ADDON: CPT

## 2019-06-14 PROCEDURE — 96375 TX/PRO/DX INJ NEW DRUG ADDON: CPT

## 2019-06-14 PROCEDURE — 99284 EMERGENCY DEPT VISIT MOD MDM: CPT | Performed by: SURGERY

## 2019-06-14 PROCEDURE — 99285 EMERGENCY DEPT VISIT HI MDM: CPT | Performed by: EMERGENCY MEDICINE

## 2019-06-14 PROCEDURE — 85025 COMPLETE CBC W/AUTO DIFF WBC: CPT | Performed by: EMERGENCY MEDICINE

## 2019-06-14 PROCEDURE — 80048 BASIC METABOLIC PNL TOTAL CA: CPT | Performed by: EMERGENCY MEDICINE

## 2019-06-14 PROCEDURE — 96365 THER/PROPH/DIAG IV INF INIT: CPT

## 2019-06-14 PROCEDURE — 84145 PROCALCITONIN (PCT): CPT | Performed by: EMERGENCY MEDICINE

## 2019-06-14 PROCEDURE — 10061 I&D ABSCESS COMP/MULTIPLE: CPT | Performed by: SURGERY

## 2019-06-14 PROCEDURE — 99284 EMERGENCY DEPT VISIT MOD MDM: CPT

## 2019-06-14 PROCEDURE — 36415 COLL VENOUS BLD VENIPUNCTURE: CPT | Performed by: EMERGENCY MEDICINE

## 2019-06-14 RX ORDER — HYDROMORPHONE HCL/PF 1 MG/ML
0.5 SYRINGE (ML) INJECTION EVERY 2 HOUR PRN
Status: COMPLETED | OUTPATIENT
Start: 2019-06-14 | End: 2019-06-14

## 2019-06-14 RX ORDER — DOXYCYCLINE HYCLATE 100 MG/1
100 CAPSULE ORAL EVERY 12 HOURS SCHEDULED
Qty: 14 CAPSULE | Refills: 0 | Status: SHIPPED | OUTPATIENT
Start: 2019-06-14 | End: 2019-06-21

## 2019-06-14 RX ORDER — LIDOCAINE HYDROCHLORIDE 10 MG/ML
20 INJECTION, SOLUTION EPIDURAL; INFILTRATION; INTRACAUDAL; PERINEURAL ONCE
Status: COMPLETED | OUTPATIENT
Start: 2019-06-14 | End: 2019-06-14

## 2019-06-14 RX ADMIN — IOHEXOL 85 ML: 350 INJECTION, SOLUTION INTRAVENOUS at 07:48

## 2019-06-14 RX ADMIN — HYDROMORPHONE HYDROCHLORIDE 0.5 MG: 1 INJECTION, SOLUTION INTRAMUSCULAR; INTRAVENOUS; SUBCUTANEOUS at 08:55

## 2019-06-14 RX ADMIN — LIDOCAINE HYDROCHLORIDE 20 ML: 10 INJECTION, SOLUTION EPIDURAL; INFILTRATION; INTRACAUDAL; PERINEURAL at 08:51

## 2019-06-14 RX ADMIN — VANCOMYCIN HYDROCHLORIDE 2000 MG: 1 INJECTION, POWDER, LYOPHILIZED, FOR SOLUTION INTRAVENOUS at 06:50

## 2019-06-18 ENCOUNTER — OFFICE VISIT (OUTPATIENT)
Dept: SURGERY | Facility: CLINIC | Age: 64
End: 2019-06-18

## 2019-06-18 VITALS
WEIGHT: 284 LBS | SYSTOLIC BLOOD PRESSURE: 124 MMHG | DIASTOLIC BLOOD PRESSURE: 82 MMHG | TEMPERATURE: 97.8 F | BODY MASS INDEX: 42.06 KG/M2 | HEART RATE: 68 BPM | HEIGHT: 69 IN

## 2019-06-18 DIAGNOSIS — L72.3 SEBACEOUS CYST: Primary | ICD-10-CM

## 2019-06-18 LAB
BACTERIA WND AEROBE CULT: ABNORMAL
GRAM STN SPEC: ABNORMAL
GRAM STN SPEC: ABNORMAL

## 2019-06-18 PROCEDURE — 99024 POSTOP FOLLOW-UP VISIT: CPT | Performed by: SURGERY

## 2019-06-26 DIAGNOSIS — J45.40 MODERATE PERSISTENT ASTHMA WITHOUT COMPLICATION: ICD-10-CM

## 2019-06-28 ENCOUNTER — TELEPHONE (OUTPATIENT)
Dept: INTERNAL MEDICINE CLINIC | Facility: CLINIC | Age: 64
End: 2019-06-28

## 2019-06-28 DIAGNOSIS — N18.30 CKD (CHRONIC KIDNEY DISEASE) STAGE 3, GFR 30-59 ML/MIN (HCC): Primary | ICD-10-CM

## 2019-06-28 DIAGNOSIS — K76.9 LIVER LESION, RIGHT LOBE: Primary | ICD-10-CM

## 2019-06-28 NOTE — TELEPHONE ENCOUNTER
Patient is scheduled for him MRI  Needs labs for BUN and creatinine     Can you add the order so we can liya patient and let him know to get them done

## 2019-07-08 ENCOUNTER — APPOINTMENT (OUTPATIENT)
Dept: LAB | Facility: CLINIC | Age: 64
End: 2019-07-08
Payer: COMMERCIAL

## 2019-07-08 DIAGNOSIS — E66.01 MORBID OBESITY (HCC): ICD-10-CM

## 2019-07-08 DIAGNOSIS — M10.371 GOUT DUE TO RENAL IMPAIRMENT, RIGHT ANKLE AND FOOT: ICD-10-CM

## 2019-07-08 DIAGNOSIS — E11.649 UNCONTROLLED TYPE 2 DIABETES MELLITUS WITH HYPOGLYCEMIA, UNSPECIFIED HYPOGLYCEMIA COMA STATUS (HCC): ICD-10-CM

## 2019-07-08 DIAGNOSIS — M10.372 GOUT DUE TO RENAL IMPAIRMENT, LEFT ANKLE AND FOOT: ICD-10-CM

## 2019-07-08 DIAGNOSIS — N18.30 CKD (CHRONIC KIDNEY DISEASE) STAGE 3, GFR 30-59 ML/MIN (HCC): ICD-10-CM

## 2019-07-08 DIAGNOSIS — F32.0 MAJOR DEPRESSIVE DISORDER, SINGLE EPISODE, MILD (HCC): Primary | ICD-10-CM

## 2019-07-08 DIAGNOSIS — E55.9 AVITAMINOSIS D: ICD-10-CM

## 2019-07-08 LAB
25(OH)D3 SERPL-MCNC: 19.6 NG/ML (ref 30–100)
ALBUMIN SERPL BCP-MCNC: 3.8 G/DL (ref 3.5–5)
ALP SERPL-CCNC: 76 U/L (ref 46–116)
ALT SERPL W P-5'-P-CCNC: 31 U/L (ref 12–78)
ANION GAP SERPL CALCULATED.3IONS-SCNC: 5 MMOL/L (ref 4–13)
AST SERPL W P-5'-P-CCNC: 16 U/L (ref 5–45)
BASOPHILS # BLD AUTO: 0.05 THOUSANDS/ΜL (ref 0–0.1)
BASOPHILS NFR BLD AUTO: 1 % (ref 0–1)
BILIRUB SERPL-MCNC: 0.51 MG/DL (ref 0.2–1)
BUN SERPL-MCNC: 14 MG/DL (ref 5–25)
CALCIUM SERPL-MCNC: 8.6 MG/DL (ref 8.3–10.1)
CHLORIDE SERPL-SCNC: 105 MMOL/L (ref 100–108)
CHOLEST SERPL-MCNC: 148 MG/DL (ref 50–200)
CO2 SERPL-SCNC: 29 MMOL/L (ref 21–32)
CREAT SERPL-MCNC: 0.93 MG/DL (ref 0.6–1.3)
CREAT UR-MCNC: 287 MG/DL
EOSINOPHIL # BLD AUTO: 0.23 THOUSAND/ΜL (ref 0–0.61)
EOSINOPHIL NFR BLD AUTO: 3 % (ref 0–6)
ERYTHROCYTE [DISTWIDTH] IN BLOOD BY AUTOMATED COUNT: 15.9 % (ref 11.6–15.1)
EST. AVERAGE GLUCOSE BLD GHB EST-MCNC: 151 MG/DL
GFR SERPL CREATININE-BSD FRML MDRD: 86 ML/MIN/1.73SQ M
GLUCOSE P FAST SERPL-MCNC: 103 MG/DL (ref 65–99)
HBA1C MFR BLD: 6.9 % (ref 4.2–6.3)
HCT VFR BLD AUTO: 43 % (ref 36.5–49.3)
HDLC SERPL-MCNC: 39 MG/DL (ref 40–60)
HGB BLD-MCNC: 13.3 G/DL (ref 12–17)
IMM GRANULOCYTES # BLD AUTO: 0.03 THOUSAND/UL (ref 0–0.2)
IMM GRANULOCYTES NFR BLD AUTO: 0 % (ref 0–2)
LDLC SERPL CALC-MCNC: 83 MG/DL (ref 0–100)
LYMPHOCYTES # BLD AUTO: 1.77 THOUSANDS/ΜL (ref 0.6–4.47)
LYMPHOCYTES NFR BLD AUTO: 24 % (ref 14–44)
MCH RBC QN AUTO: 26.8 PG (ref 26.8–34.3)
MCHC RBC AUTO-ENTMCNC: 30.9 G/DL (ref 31.4–37.4)
MCV RBC AUTO: 87 FL (ref 82–98)
MICROALBUMIN UR-MCNC: 15.8 MG/L (ref 0–20)
MICROALBUMIN/CREAT 24H UR: 6 MG/G CREATININE (ref 0–30)
MONOCYTES # BLD AUTO: 0.69 THOUSAND/ΜL (ref 0.17–1.22)
MONOCYTES NFR BLD AUTO: 9 % (ref 4–12)
NEUTROPHILS # BLD AUTO: 4.68 THOUSANDS/ΜL (ref 1.85–7.62)
NEUTS SEG NFR BLD AUTO: 63 % (ref 43–75)
NONHDLC SERPL-MCNC: 109 MG/DL
NRBC BLD AUTO-RTO: 0 /100 WBCS
PLATELET # BLD AUTO: 201 THOUSANDS/UL (ref 149–390)
PMV BLD AUTO: 11.9 FL (ref 8.9–12.7)
POTASSIUM SERPL-SCNC: 4 MMOL/L (ref 3.5–5.3)
PROT SERPL-MCNC: 7.1 G/DL (ref 6.4–8.2)
RBC # BLD AUTO: 4.96 MILLION/UL (ref 3.88–5.62)
SODIUM SERPL-SCNC: 139 MMOL/L (ref 136–145)
TRIGL SERPL-MCNC: 131 MG/DL
TSH SERPL DL<=0.05 MIU/L-ACNC: 1.83 UIU/ML (ref 0.36–3.74)
URATE SERPL-MCNC: 5 MG/DL (ref 4.2–8)
WBC # BLD AUTO: 7.45 THOUSAND/UL (ref 4.31–10.16)

## 2019-07-08 PROCEDURE — 84550 ASSAY OF BLOOD/URIC ACID: CPT

## 2019-07-08 PROCEDURE — 84443 ASSAY THYROID STIM HORMONE: CPT

## 2019-07-08 PROCEDURE — 36415 COLL VENOUS BLD VENIPUNCTURE: CPT

## 2019-07-08 PROCEDURE — 80061 LIPID PANEL: CPT

## 2019-07-08 PROCEDURE — 83036 HEMOGLOBIN GLYCOSYLATED A1C: CPT

## 2019-07-08 PROCEDURE — 82306 VITAMIN D 25 HYDROXY: CPT

## 2019-07-08 PROCEDURE — 3061F NEG MICROALBUMINURIA REV: CPT | Performed by: INTERNAL MEDICINE

## 2019-07-08 PROCEDURE — 85025 COMPLETE CBC W/AUTO DIFF WBC: CPT

## 2019-07-08 PROCEDURE — 82570 ASSAY OF URINE CREATININE: CPT | Performed by: STUDENT IN AN ORGANIZED HEALTH CARE EDUCATION/TRAINING PROGRAM

## 2019-07-08 PROCEDURE — 82043 UR ALBUMIN QUANTITATIVE: CPT | Performed by: STUDENT IN AN ORGANIZED HEALTH CARE EDUCATION/TRAINING PROGRAM

## 2019-07-08 PROCEDURE — 80053 COMPREHEN METABOLIC PANEL: CPT

## 2019-08-01 ENCOUNTER — OFFICE VISIT (OUTPATIENT)
Dept: INTERNAL MEDICINE CLINIC | Facility: CLINIC | Age: 64
End: 2019-08-01

## 2019-08-01 ENCOUNTER — PATIENT OUTREACH (OUTPATIENT)
Dept: INTERNAL MEDICINE CLINIC | Facility: CLINIC | Age: 64
End: 2019-08-01

## 2019-08-01 VITALS
WEIGHT: 283.07 LBS | BODY MASS INDEX: 41.93 KG/M2 | HEART RATE: 74 BPM | TEMPERATURE: 97.9 F | SYSTOLIC BLOOD PRESSURE: 128 MMHG | DIASTOLIC BLOOD PRESSURE: 62 MMHG | HEIGHT: 69 IN

## 2019-08-01 DIAGNOSIS — Z78.9 NEEDS ASSISTANCE WITH COMMUNITY RESOURCES: ICD-10-CM

## 2019-08-01 DIAGNOSIS — R60.0 LOWER EXTREMITY EDEMA: ICD-10-CM

## 2019-08-01 DIAGNOSIS — G89.29 CHRONIC PAIN OF RIGHT KNEE: Primary | ICD-10-CM

## 2019-08-01 DIAGNOSIS — M25.561 CHRONIC PAIN OF RIGHT KNEE: Primary | ICD-10-CM

## 2019-08-01 PROCEDURE — 99213 OFFICE O/P EST LOW 20 MIN: CPT | Performed by: INTERNAL MEDICINE

## 2019-08-01 RX ORDER — FUROSEMIDE 20 MG/1
20 TABLET ORAL 2 TIMES DAILY
Qty: 90 TABLET | Refills: 0 | Status: SHIPPED | OUTPATIENT
Start: 2019-08-01 | End: 2019-09-03 | Stop reason: SDUPTHER

## 2019-08-01 NOTE — PROGRESS NOTES
Assessment/Plan:    Chronic pain of right knee        -     patient has failed therapy with 2 different oral pain medications including naproxen and Tylenol        -     patient will need to follow up with Orthopedics as scheduled appointment  -     Ambulatory referral to Physical Therapy; Future  -     diclofenac sodium (VOLTAREN) 1 %; Apply 2 g topically 4 (four) times a day    Lower extremity edema        -     in the setting of diastolic dysfunction, possible lower extremity venous stasis, weight stable  -     start furosemide (LASIX) 20 mg tablet; Take 1 tablet (20 mg total) by mouth 2 (two) times a day  -     Basic metabolic panel; Future in 1 week  -     patient instructed to call clinic with any 5 lb weight gain from baseline      Patient will need annual wellness visit  Patient to complete MRI liver this afternoon  Subjective:      Patient ID: Cale Alonso is a 70 y o  male  56-year-old male with past medical history significant for non insulin-dependent diabetes mellitus with last A1c of 6 9 on 07/08/2019, gout, hypertension, moderate persistent asthma, 72 pack year smoking history who quit in 2006  Patient was last seen in clinic on 04/18/2019 for 1 month follow-up for dyspnea on exertion  Patient was previously treated with increased dose of Lasix however this did not help  Patient was started on Advair  Patient has since followed with Ortho for bilateral knee steroid injections  Patient went to the emergency department on 06/14/2019 for a right upper back mass with surrounding cellulitis  CT of the mass was performed which showed of 5 7 cm likely abscess  General surgery was consulted which drained the mass and found it to be likely an infected sebaceous cyst   Patient was discharged on doxycycline and followed up in clinic on 06/18/2019  At that time they recommended follow-up in 2 months    With this CT of the chest an incidental 11 mm right hepatic hyperintense min was noted for which an MRI of liver was recommended however this has not yet been completed  Patient comes to clinic today with complaint of chronic right knee pain  Patient states naproxen is not helping  Patient uses cane to walk  Patient states liver MRI scheduled for this afternoon  Patient denies any shortness of breath, dyspnea on exertion, orthopnea,       The following portions of the patient's history were reviewed and updated as appropriate: allergies, current medications, past family history, past medical history, past social history, past surgical history and problem list     Review of Systems   Constitutional: Negative for appetite change, chills, diaphoresis, fatigue, fever and unexpected weight change  HENT: Negative for sore throat  Eyes: Negative for visual disturbance  Respiratory: Negative for cough, chest tightness, shortness of breath and wheezing  Cardiovascular: Positive for leg swelling  Negative for chest pain and palpitations  Gastrointestinal: Negative for abdominal distention, abdominal pain, blood in stool, constipation, diarrhea, nausea and vomiting  Genitourinary: Negative for difficulty urinating, flank pain and urgency  Musculoskeletal: Positive for arthralgias  Negative for myalgias  Skin: Negative for pallor and rash  Neurological: Negative for dizziness, weakness, light-headedness and headaches  Objective:      /62 (BP Location: Right arm, Patient Position: Sitting, Cuff Size: Large)   Pulse 74   Temp 97 9 °F (36 6 °C) (Oral)   Ht 5' 9" (1 753 m)   Wt 128 kg (283 lb 1 1 oz)   BMI 41 80 kg/m²          Physical Exam   Constitutional: He is oriented to person, place, and time  He appears well-developed and well-nourished  No distress  HENT:   Head: Normocephalic and atraumatic  Mouth/Throat: Oropharynx is clear and moist  No oropharyngeal exudate  Eyes: Pupils are equal, round, and reactive to light   Conjunctivae and EOM are normal  No scleral icterus  Neck: Normal range of motion  Neck supple  Cardiovascular: Normal rate and regular rhythm  No murmur heard  Pulmonary/Chest: Effort normal and breath sounds normal  He has no wheezes  Abdominal: Soft  Bowel sounds are normal    Musculoskeletal: Normal range of motion  He exhibits edema (1+ pitting edema in lower extremities bilaterally)  He exhibits no deformity  Neurological: He is alert and oriented to person, place, and time  No cranial nerve deficit  Skin: Skin is warm and dry  No rash noted  He is not diaphoretic  No erythema  Right upper back with surgical scar well healed without induration, erythema, purulence   Psychiatric: He has a normal mood and affect  His behavior is normal    Nursing note and vitals reviewed

## 2019-08-02 NOTE — PROGRESS NOTES
ALEXUS/DION met with pt as f/u re his housing situation  Pt is now renting a room and is doing better then living in Basement room at son's home  He active with [facility]  Pt also relates he is on the [employer] and is on [facility] as well as it is now open  Pt denies any other needs at this time

## 2019-08-16 ENCOUNTER — HOSPITAL ENCOUNTER (OUTPATIENT)
Dept: RADIOLOGY | Facility: HOSPITAL | Age: 64
Discharge: HOME | End: 2019-08-16
Payer: COMMERCIAL

## 2019-08-16 DIAGNOSIS — K76.9 LIVER LESION, RIGHT LOBE: ICD-10-CM

## 2019-08-16 PROCEDURE — 74183 MRI ABD W/O CNTR FLWD CNTR: CPT

## 2019-08-16 PROCEDURE — A9585 GADOBUTROL INJECTION: HCPCS | Performed by: STUDENT IN AN ORGANIZED HEALTH CARE EDUCATION/TRAINING PROGRAM

## 2019-08-16 RX ADMIN — GADOBUTROL 12 ML: 604.72 INJECTION INTRAVENOUS at 17:25

## 2019-08-20 ENCOUNTER — OFFICE VISIT (OUTPATIENT)
Dept: SURGERY | Facility: CLINIC | Age: 64
End: 2019-08-20
Payer: COMMERCIAL

## 2019-08-20 VITALS
HEIGHT: 69 IN | TEMPERATURE: 98.1 F | WEIGHT: 285.8 LBS | SYSTOLIC BLOOD PRESSURE: 138 MMHG | HEART RATE: 63 BPM | DIASTOLIC BLOOD PRESSURE: 78 MMHG | BODY MASS INDEX: 42.33 KG/M2

## 2019-08-20 DIAGNOSIS — L72.3 SEBACEOUS CYST: Primary | ICD-10-CM

## 2019-08-20 PROCEDURE — 99212 OFFICE O/P EST SF 10 MIN: CPT | Performed by: SURGERY

## 2019-08-20 NOTE — ASSESSMENT & PLAN NOTE
I told him I do not really feel any mass left behind  I will leave things alone  Call if there is any problems
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
## 2019-09-02 DIAGNOSIS — M25.561 CHRONIC PAIN OF RIGHT KNEE: ICD-10-CM

## 2019-09-02 DIAGNOSIS — G89.29 CHRONIC PAIN OF RIGHT KNEE: ICD-10-CM

## 2019-09-03 DIAGNOSIS — R60.0 LOWER EXTREMITY EDEMA: ICD-10-CM

## 2019-09-03 RX ORDER — FUROSEMIDE 20 MG/1
20 TABLET ORAL 2 TIMES DAILY
Qty: 90 TABLET | Refills: 0 | Status: SHIPPED | OUTPATIENT
Start: 2019-09-03 | End: 2019-10-14 | Stop reason: SDUPTHER

## 2019-09-16 ENCOUNTER — OFFICE VISIT (OUTPATIENT)
Dept: INTERNAL MEDICINE CLINIC | Facility: CLINIC | Age: 64
End: 2019-09-16

## 2019-09-16 VITALS
HEIGHT: 69 IN | SYSTOLIC BLOOD PRESSURE: 128 MMHG | WEIGHT: 280.43 LBS | DIASTOLIC BLOOD PRESSURE: 82 MMHG | HEART RATE: 64 BPM | TEMPERATURE: 97.6 F | BODY MASS INDEX: 41.53 KG/M2

## 2019-09-16 DIAGNOSIS — M25.561 RIGHT KNEE PAIN, UNSPECIFIED CHRONICITY: Primary | ICD-10-CM

## 2019-09-16 DIAGNOSIS — M1A.4610 OTHER SECONDARY CHRONIC GOUT OF RIGHT KNEE WITHOUT TOPHUS: Chronic | ICD-10-CM

## 2019-09-16 DIAGNOSIS — I10 ESSENTIAL HYPERTENSION: ICD-10-CM

## 2019-09-16 DIAGNOSIS — E66.9 OBESITY (BMI 30-39.9): ICD-10-CM

## 2019-09-16 PROBLEM — M1A.0610 CHRONIC GOUT OF RIGHT KNEE: Chronic | Status: ACTIVE | Noted: 2019-09-16

## 2019-09-16 PROCEDURE — 99213 OFFICE O/P EST LOW 20 MIN: CPT | Performed by: INTERNAL MEDICINE

## 2019-09-16 PROCEDURE — 3079F DIAST BP 80-89 MM HG: CPT | Performed by: INTERNAL MEDICINE

## 2019-09-16 PROCEDURE — 3074F SYST BP LT 130 MM HG: CPT | Performed by: INTERNAL MEDICINE

## 2019-09-16 RX ORDER — BLOOD-GLUCOSE METER
KIT MISCELLANEOUS
Refills: 0 | COMMUNITY
Start: 2019-06-26 | End: 2020-09-27

## 2019-09-16 RX ORDER — BLOOD-GLUCOSE METER
KIT MISCELLANEOUS
Refills: 0 | COMMUNITY
Start: 2019-06-26

## 2019-09-16 RX ORDER — CELECOXIB 200 MG/1
200 CAPSULE ORAL 2 TIMES DAILY
Qty: 14 CAPSULE | Refills: 0 | Status: SHIPPED | OUTPATIENT
Start: 2019-09-16 | End: 2019-12-18

## 2019-09-16 RX ORDER — SERTRALINE HYDROCHLORIDE 100 MG/1
TABLET, FILM COATED ORAL
Refills: 0 | COMMUNITY
Start: 2019-08-27 | End: 2019-12-18

## 2019-09-16 RX ORDER — BUSPIRONE HYDROCHLORIDE 10 MG/1
TABLET ORAL
Refills: 0 | COMMUNITY
Start: 2019-08-27 | End: 2019-12-18

## 2019-09-16 NOTE — PROGRESS NOTES
Assessment/Plan:     Diagnoses and all orders for this visit:    Right knee pain, unspecified chronicity    Possible gout flare, no degenerative changes seen on right knee x-ray from 2018 on my evaluation of the film  Small joint effusion noted at that time  Patient did have positive monosodium urate crystals on arthrocentesis of the right knee from May 2018  Will treat with 7 day course of NSAID therapy and should discontinue active this time  Recommend follow-up with PCP Dr Bebo Yeboah for re-evaluation   -     Ambulatory referral to Orthopedic Surgery; Future  -     celecoxib (CeleBREX) 200 mg capsule; Take 1 capsule (200 mg total) by mouth 2 (two) times a day for 7 days    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 MILLI  -     busPIRone (BUSPAR) 10 mg tablet; TAKE 1 TABLET POR VIA ORAL DOS VECES AL CORAL  -     Zolpidem Tartrate (AMBIEN PO); Take by mouth          Subjective:      Patient ID: Nayana White is a 75 y o  male  43-year-old male with history of gout of the right knee and May of 2018, reported bilateral knee osteoarthritis on chart review, presents for evaluation of acute on chronic worsening of his right knee pain for the past 1 week    No inciting factor, denies alcohol intake, meats or purine rich foods, recent trauma etc   Pain is relatively constant throughout the day, does hurt in the morning and feels stiffness but this is relieved after several minutes  Worse with range of motion  Ambulates with a cane  Currently using allopurinol with uric acid level of 5 0 last checked several months ago, and Voltaren gel with some relief  Was requesting tramadol as he used this in the past in [place]   Denies fever or chills, joint swelling or erythema  The following portions of the patient's history were reviewed and updated as appropriate: allergies, current medications, past family history, past medical history, past social history, past surgical history and problem list     Review of Systems   Constitutional: Negative for chills and fever  Musculoskeletal: Positive for arthralgias, back pain and gait problem  Objective:      /82 (BP Location: Left arm, Patient Position: Sitting, Cuff Size: Large)   Pulse 64   Temp 97 6 °F (36 4 °C) (Oral)   Ht 5' 9" (1 753 m)   Wt 127 kg (280 lb 6 8 oz)   BMI 41 41 kg/m²       Physical Exam:   Vital signs reviewed  General:     Obese  Appears fatigued and uncomfortable, though no acute distress  Head: normocephalic  Eyes:   Conjunctival injection present bilaterally  Neck: supple  Lungs: no labored breathing/accessory muscle use  Heart: regular rate and rhythm  Abdomen: no distension  Extremities: no cyanosis  Right knee: Moderate crepitus, mild pain with passive and active range of motion  No joint laxity, negative anterior and posterior drawer sign, no pain with varus or valgus strain  No joint line tenderness  No erythema  Mild effusion is present  Left knee:  No effusion, no tenderness, no joint swelling  Neuro: grossly intact with no obvious focal deficits  Skin: warm, dry             ULISES Youssef           Chief Resident, PGY-3  Internal Medicine     9/16/2019 9:43 AM

## 2019-09-18 DIAGNOSIS — M25.561 RIGHT KNEE PAIN, UNSPECIFIED CHRONICITY: ICD-10-CM

## 2019-09-18 DIAGNOSIS — M1A.4610 OTHER SECONDARY CHRONIC GOUT OF RIGHT KNEE WITHOUT TOPHUS: Chronic | ICD-10-CM

## 2019-09-18 RX ORDER — CELECOXIB 200 MG/1
200 CAPSULE ORAL 2 TIMES DAILY
Qty: 14 CAPSULE | Refills: 0 | OUTPATIENT
Start: 2019-09-18 | End: 2019-09-25

## 2019-09-18 NOTE — TELEPHONE ENCOUNTER
Refill for NSAID after 7 day treatment refused  This was short term therapy only due to risk of further renal impairment

## 2019-09-30 DIAGNOSIS — M25.561 CHRONIC PAIN OF RIGHT KNEE: ICD-10-CM

## 2019-09-30 DIAGNOSIS — G89.29 CHRONIC PAIN OF RIGHT KNEE: ICD-10-CM

## 2019-10-14 DIAGNOSIS — R60.0 LOWER EXTREMITY EDEMA: ICD-10-CM

## 2019-10-15 RX ORDER — FUROSEMIDE 20 MG/1
20 TABLET ORAL 2 TIMES DAILY
Qty: 90 TABLET | Refills: 0 | Status: SHIPPED | OUTPATIENT
Start: 2019-10-15 | End: 2019-11-18 | Stop reason: SDUPTHER

## 2019-10-22 DIAGNOSIS — J45.40 MODERATE PERSISTENT ASTHMA WITHOUT COMPLICATION: ICD-10-CM

## 2019-10-28 DIAGNOSIS — M25.561 CHRONIC PAIN OF RIGHT KNEE: ICD-10-CM

## 2019-10-28 DIAGNOSIS — G89.29 CHRONIC PAIN OF RIGHT KNEE: ICD-10-CM

## 2019-11-16 ENCOUNTER — HOSPITAL ENCOUNTER (EMERGENCY)
Facility: HOSPITAL | Age: 64
Discharge: HOME | End: 2019-11-16
Attending: EMERGENCY MEDICINE | Admitting: EMERGENCY MEDICINE
Payer: COMMERCIAL

## 2019-11-16 VITALS
WEIGHT: 285 LBS | BODY MASS INDEX: 42.09 KG/M2 | DIASTOLIC BLOOD PRESSURE: 85 MMHG | OXYGEN SATURATION: 98 % | SYSTOLIC BLOOD PRESSURE: 159 MMHG | RESPIRATION RATE: 20 BRPM | TEMPERATURE: 97.4 F | HEART RATE: 78 BPM

## 2019-11-16 DIAGNOSIS — M10.9 EXACERBATION OF GOUT: Primary | ICD-10-CM

## 2019-11-16 PROCEDURE — 96372 THER/PROPH/DIAG INJ SC/IM: CPT

## 2019-11-16 PROCEDURE — 99284 EMERGENCY DEPT VISIT MOD MDM: CPT | Performed by: PHYSICIAN ASSISTANT

## 2019-11-16 PROCEDURE — 99283 EMERGENCY DEPT VISIT LOW MDM: CPT

## 2019-11-16 RX ORDER — KETOROLAC TROMETHAMINE 30 MG/ML
15 INJECTION, SOLUTION INTRAMUSCULAR; INTRAVENOUS ONCE
Status: COMPLETED | OUTPATIENT
Start: 2019-11-16 | End: 2019-11-16

## 2019-11-16 RX ADMIN — KETOROLAC TROMETHAMINE 15 MG: 30 INJECTION, SOLUTION INTRAMUSCULAR at 10:47
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
## 2019-11-18 DIAGNOSIS — R60.0 LOWER EXTREMITY EDEMA: ICD-10-CM

## 2019-11-18 RX ORDER — FUROSEMIDE 20 MG/1
20 TABLET ORAL 2 TIMES DAILY
Qty: 90 TABLET | Refills: 0 | Status: SHIPPED | OUTPATIENT
Start: 2019-11-18 | End: 2019-12-18

## 2019-11-22 ENCOUNTER — OFFICE VISIT (OUTPATIENT)
Dept: INTERNAL MEDICINE CLINIC | Facility: CLINIC | Age: 64
End: 2019-11-22

## 2019-11-22 VITALS
SYSTOLIC BLOOD PRESSURE: 126 MMHG | HEART RATE: 74 BPM | BODY MASS INDEX: 41.4 KG/M2 | HEIGHT: 69 IN | DIASTOLIC BLOOD PRESSURE: 82 MMHG | WEIGHT: 279.54 LBS | OXYGEN SATURATION: 96 % | TEMPERATURE: 97.4 F

## 2019-11-22 DIAGNOSIS — Z23 NEED FOR PNEUMOCOCCAL VACCINATION: ICD-10-CM

## 2019-11-22 DIAGNOSIS — E13.69 OTHER SPECIFIED DIABETES MELLITUS WITH OTHER SPECIFIED COMPLICATION, UNSPECIFIED WHETHER LONG TERM INSULIN USE (HCC): ICD-10-CM

## 2019-11-22 DIAGNOSIS — Z23 NEED FOR PROPHYLACTIC VACCINATION AND INOCULATION AGAINST INFLUENZA: ICD-10-CM

## 2019-11-22 DIAGNOSIS — M25.561 RIGHT KNEE PAIN: ICD-10-CM

## 2019-11-22 DIAGNOSIS — M1A.4610 OTHER SECONDARY CHRONIC GOUT OF RIGHT KNEE WITHOUT TOPHUS: Primary | Chronic | ICD-10-CM

## 2019-11-22 LAB
LEFT EYE DIABETIC RETINOPATHY: NORMAL
LEFT EYE IMAGE QUALITY: NORMAL
LEFT EYE MACULAR EDEMA: NORMAL
LEFT EYE OTHER RETINOPATHY: NORMAL
RIGHT EYE DIABETIC RETINOPATHY: NORMAL
RIGHT EYE IMAGE QUALITY: NORMAL
RIGHT EYE MACULAR EDEMA: NORMAL
RIGHT EYE OTHER RETINOPATHY: NORMAL
SEVERITY (EYE EXAM): NORMAL

## 2019-11-22 PROCEDURE — 2023F DILAT RTA XM W/O RTNOPTHY: CPT | Performed by: INTERNAL MEDICINE

## 2019-11-22 PROCEDURE — 90732 PPSV23 VACC 2 YRS+ SUBQ/IM: CPT | Performed by: INTERNAL MEDICINE

## 2019-11-22 PROCEDURE — 1036F TOBACCO NON-USER: CPT | Performed by: INTERNAL MEDICINE

## 2019-11-22 PROCEDURE — 92250 FUNDUS PHOTOGRAPHY W/I&R: CPT | Performed by: INTERNAL MEDICINE

## 2019-11-22 PROCEDURE — 1160F RVW MEDS BY RX/DR IN RCRD: CPT | Performed by: INTERNAL MEDICINE

## 2019-11-22 PROCEDURE — 90662 IIV NO PRSV INCREASED AG IM: CPT | Performed by: INTERNAL MEDICINE

## 2019-11-22 PROCEDURE — 3008F BODY MASS INDEX DOCD: CPT | Performed by: INTERNAL MEDICINE

## 2019-11-22 PROCEDURE — G0008 ADMIN INFLUENZA VIRUS VAC: HCPCS | Performed by: INTERNAL MEDICINE

## 2019-11-22 PROCEDURE — 99213 OFFICE O/P EST LOW 20 MIN: CPT | Performed by: INTERNAL MEDICINE

## 2019-11-22 PROCEDURE — G0009 ADMIN PNEUMOCOCCAL VACCINE: HCPCS | Performed by: INTERNAL MEDICINE

## 2019-11-22 PROCEDURE — 4040F PNEUMOC VAC/ADMIN/RCVD: CPT | Performed by: INTERNAL MEDICINE

## 2019-11-22 RX ORDER — PREDNISONE 20 MG/1
20 TABLET ORAL DAILY
Qty: 5 TABLET | Refills: 0 | Status: SHIPPED | OUTPATIENT
Start: 2019-11-22 | End: 2019-11-27

## 2019-11-22 RX ORDER — TRAMADOL HYDROCHLORIDE 50 MG/1
50 TABLET ORAL EVERY 8 HOURS PRN
Qty: 15 TABLET | Refills: 0 | Status: SHIPPED | OUTPATIENT
Start: 2019-11-22 | End: 2019-11-27

## 2019-11-22 RX ORDER — DULOXETIN HYDROCHLORIDE 30 MG/1
30 CAPSULE, DELAYED RELEASE ORAL DAILY
Qty: 90 CAPSULE | Refills: 0 | Status: SHIPPED | OUTPATIENT
Start: 2019-11-22 | End: 2020-02-04 | Stop reason: SDUPTHER

## 2019-11-22 NOTE — PROGRESS NOTES
INTERNAL MEDICINE FOLLOW-UP OFFICE VISIT  [facility]  [address] [place] [address]    NAME: Anil Flores  AGE: 75 y o  SEX: male    DATE OF ENCOUNTER: 11/22/2019    Assessment and Plan   Patient is a 68-year-old male with a past medical history of osteoarthritis in both knees and chronic gout of right knee who presents with possible right knee gout flare    Right knee pain  More likely osteoarthritis flare  Less likely to be gout flare as patient's knee does not appear red, warm or significantly swollen  · Prednisone 20 mg x5 days  · Discontinue Zoloft and switch to Cymbalta for OA  · Continue allopurinol  · Tramadol for 5 days  · PT referral    ·   Flu PNA EYE today    Patient's shoes and socks removed  Right Foot/Ankle   Right Foot Inspection  Skin Exam: skin normal and skin intact no dry skin, no warmth, no callus, no erythema, no maceration, no abnormal color, no pre-ulcer, no ulcer and no callus                          Toe Exam: no swelling, no tenderness, erythema and  no right toe deformity  Sensory   Vibration: intact  Proprioception: intact   Monofilament testing: intact  Vascular  Capillary refills: < 3 seconds  The right DP pulse is 2+  The right PT pulse is 2+  Left Foot/Ankle  Left Foot Inspection  Skin Exam: skin normal and skin intactno dry skin, no warmth, no erythema, no maceration, normal color, no pre-ulcer, no ulcer and no callus                         Toe Exam: no swelling, no tenderness, no erythema and no left toe deformity                   Sensory   Vibration: intact  Proprioception: intact  Monofilament: intact  Vascular  Capillary refills: < 3 seconds  The left DP pulse is 2+  The left PT pulse is 2+  Assign Risk Category:  No deformity present; Loss of protective sensation;  No weak pulses       Risk: 2          Diagnoses and all orders for this visit:    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  - traMADol (ULTRAM) 50 mg tablet; Take 1 tablet (50 mg total) by mouth every 8 (eight) hours as needed for moderate pain or severe pain for up to 5 days    Right knee pain  -     traMADol (ULTRAM) 50 mg tablet; Take 1 tablet (50 mg total) by mouth every 8 (eight) hours as needed for moderate pain or severe pain for up to 5 days  -     predniSONE 20 mg tablet; Take 1 tablet (20 mg total) by mouth daily for 5 days  -     DULoxetine (CYMBALTA) 30 mg delayed release capsule; Take 1 capsule (30 mg total) by mouth daily  -     Ambulatory referral to Physical Therapy; Future        Orders Placed This Encounter   Procedures    Ambulatory referral to Physical Therapy       - Counseling Documentation: patient was counseled regarding: diagnostic results, instructions for management and risk factor reductions    Chief Complaint     Chief Complaint   Patient presents with    Follow-up     from ER - knee pain, right knee is worse       History of Present Illness     HPI  Patient 57-year-old male with bilateral knee arthritis and history of gout who presents with right knee pain  He states he has had acute worsening of right knee pain for the past 2 weeks  He states he is looking into new, 10 at 10 intensity, no radiation, worse with any kind of movement and alleviated by anything except mildly by Tylenol  It has affected his ambulation  He denies all systemic symptoms  Patient denies chest pain, palpitations, SOB, cough, abdominal pain, nausea, vomiting, constipation, diarrhea, fevers/chills, headaches, dysuria  The following portions of the patient's history were reviewed and updated as appropriate: allergies, current medications, past family history, past medical history, past social history, past surgical history and problem list     Review of Systems     ROS  CONSTITUTIONAL: Denies any fever, chills, rigors, and weight loss  HEENT: No earache or tinnitus  Denies hearing loss    CARDIOVASCULAR: No chest pain or palpitations  RESPIRATORY: Denies any cough, hemoptysis, shortness of breath or dyspnea on exertion  GASTROINTESTINAL: Denies abdominal pain, nausea, vomitng   NEUROLOGIC: No dizziness or vertigo, denies headaches  MUSCULOSKELETAL: +knee pain  SKIN: Denies skin rashes or itching  Active Problem List     Patient Active Problem List   Diagnosis    Essential hypertension    Diabetes ([facility] [address] )    Asthma    CKD (chronic kidney disease) stage 2, GFR 60-89 ml/min    Obesity (BMI 30-39  9)    Hyperlipidemia    Depression    Dog bite    Encounter for colorectal cancer screening    Osteoarthritis of both knees    Sebaceous cyst    Chronic gout of right knee       Objective     /82   Pulse 74   Temp (!) 97 4 °F (36 3 °C)   Ht 5' 9" (1 753 m)   Wt 127 kg (279 lb 8 7 oz)   SpO2 96%   BMI 41 28 kg/m²     Physical Exam:   GENERAL: NAD  HEENT:  NC/AT, no scleral icterus  CARDIAC:  RRR, +S1/S2, no S3/S4 heard, no m/g/r  PULMONARY:  CTA B/L, no wheezing/rales/rhonci, non-labored breathing  ABDOMEN:  Soft, NT/ND, +BS, no rebound/guarding/rigidity  Extremities:  2+ Pulses in DP/PT  No edema, cyanosis, or clubbing  NEUROLOGIC:  Alert/oriented x3   No motor or sensory deficits  Knee:  No obvious deformities, mildly swollen anterior, tender to palpation in the anterior, decreased ROM in every direction, not red, not warm, mildly swollen and anterior region    Pertinent Laboratory/Diagnostic Studies:  CBC:   Lab Results   Component Value Date/Time    WBC 7 45 07/08/2019 08:09 AM    RBC 4 96 07/08/2019 08:09 AM    HGB 13 3 07/08/2019 08:09 AM    HCT 43 0 07/08/2019 08:09 AM    MCV 87 07/08/2019 08:09 AM    MCH 26 8 07/08/2019 08:09 AM    MCHC 30 9 (L) 07/08/2019 08:09 AM    RDW 15 9 (H) 07/08/2019 08:09 AM    MPV 11 9 07/08/2019 08:09 AM     07/08/2019 08:09 AM    NRBC 0 07/08/2019 08:09 AM    NEUTOPHILPCT 63 07/08/2019 08:09 AM    LYMPHOPCT 24 07/08/2019 08:09 AM    MONOPCT 9 07/08/2019 08:09 AM EOSPCT 3 07/08/2019 08:09 AM    BASOPCT 1 07/08/2019 08:09 AM    NEUTROABS 4 68 07/08/2019 08:09 AM    LYMPHSABS 1 77 07/08/2019 08:09 AM    MONOSABS 0 69 07/08/2019 08:09 AM    EOSABS 0 23 07/08/2019 08:09 AM     Chemistry Profile:   Results from Last 12 Months   Lab Units 07/08/19  0809 06/14/19  0636   POTASSIUM mmol/L 4 0 4 0   CHLORIDE mmol/L 105 103   CO2 mmol/L 29 29   BUN mg/dL 14 18   CREATININE mg/dL 0 93 1 08   GLUCOSE FASTING mg/dL 103*  --    GLUCOSE RANDOM mg/dL  --  188*   CALCIUM mg/dL 8 6 8 9   AST U/L 16  --    ALT U/L 31  --    ALK PHOS U/L 76  --    EGFR ml/min/1 73sq m 86 72       Current Medications     Current Outpatient Medications:     ADVAIR DISKUS 250-50 MCG/DOSE inhaler, INHALE 1 PUFF 2 (TWO) TIMES A DAY RINSE MOUTH AFTER USE , Disp: 60 each, Rfl: 11    allopurinol (ZYLOPRIM) 300 mg tablet, JENNIFER 1 TABLETA POR VIA ORAL DIARIAMENTE, Disp: , Rfl: 2    atorvastatin (LIPITOR) 40 mg tablet, Take 1 tablet (40 mg total) by mouth daily, Disp: 90 tablet, Rfl: 3    furosemide (LASIX) 20 mg tablet, TAKE 1 TABLET (20 MG TOTAL) BY MOUTH 2 (TWO) TIMES A DAY, Disp: 90 tablet, Rfl: 0    metFORMIN (GLUCOPHAGE-XR) 750 mg 24 hr tablet, JENNIFER 1 TABLETA POR VIA ORAL DIARIAMENTE, Disp: , Rfl: 2    sertraline (ZOLOFT) 100 mg tablet, TAKE 1 TABLET POR VIA ORAL EN LA MA? MILLI, Disp: , Rfl: 0    Zolpidem Tartrate (AMBIEN PO), Take by mouth, Disp: , Rfl:     albuterol (PROVENTIL HFA) 90 mcg/act inhaler, Inhale 2 puffs every 6 (six) hours as needed for wheezing or shortness of breath (Patient not taking: Reported on 11/22/2019), Disp: 6 7 g, Rfl: 5    Alcohol Swabs (ALCOHOL PADS) 70 % PADS, Use as directed, Disp: , Rfl: 0    Blood Glucose Monitoring Suppl (FREESTYLE LITE) ANGE, Use as directed, Disp: , Rfl: 0    busPIRone (BUSPAR) 10 mg tablet, TAKE 1 TABLET POR VIA ORAL DOS VECES AL CORAL, Disp: , Rfl: 0    celecoxib (CeleBREX) 200 mg capsule, Take 1 capsule (200 mg total) by mouth 2 (two) times a day for 7 days, Disp: 14 capsule, Rfl: 0    diclofenac sodium (VOLTAREN) 1 %, APPLY 2 G TOPICALLY 4 (FOUR) TIMES A DAY (Patient not taking: Reported on 11/22/2019), Disp: 100 g, Rfl: 2    DULoxetine (CYMBALTA) 30 mg delayed release capsule, Take 1 capsule (30 mg total) by mouth daily, Disp: 90 capsule, Rfl: 0    FREESTYLE LITE test strip, Use as directed, Disp: , Rfl: 0    predniSONE 20 mg tablet, Take 1 tablet (20 mg total) by mouth daily for 5 days, Disp: 5 tablet, Rfl: 0    traMADol (ULTRAM) 50 mg tablet, Take 1 tablet (50 mg total) by mouth every 8 (eight) hours as needed for moderate pain or severe pain for up to 5 days, Disp: 15 tablet, Rfl: 0    Health Maintenance     Health Maintenance   Topic Date Due    Medicare Annual Wellness Visit (AWV)  1955    BMI: Followup Plan  09/08/1971    Hepatitis B Vaccine (1 of 3 - Risk 3-dose series) 09/08/1972    Influenza Vaccine  07/01/2019    Pneumococcal Vaccine: 65+ Years (2 of 2 - PPSV23) 09/18/2019    Diabetic Foot Exam  09/18/2019    DM Eye Exam  10/23/2019    HEMOGLOBIN A1C  01/08/2020    Fall Risk  01/22/2020    URINE MICROALBUMIN  07/08/2020    BMI: Adult  11/16/2020    DTaP,Tdap,and Td Vaccines (2 - Td) 07/09/2028    CRC Screening: Colonoscopy  01/10/2029    Hepatitis C Screening  Completed    Pneumococcal Vaccine: Pediatrics (0 to 5 Years) and At-Risk Patients (6 to 59 Years)  Aged Out    HIB Vaccine  Aged Out    IPV Vaccine  Aged Out    Hepatitis A Vaccine  Aged Out    Meningococcal ACWY Vaccine  Aged Out    HPV Vaccine  Aged [employer] History   Administered Date(s) Administered    INFLUENZA 09/18/2018    Influenza, high dose seasonal 0 5 mL 09/18/2018    Pneumococcal Conjugate 13-Valent 09/18/2018    Rabies 07/09/2018, 07/12/2018    Rabies, Intramuscular 07/09/2018, 07/12/2018    Tdap 07/09/2018       Bethany Kehr, M D    Internal Medicine PGY-3  11/22/2019 9:59 AM

## 2019-11-23 DIAGNOSIS — M25.561 RIGHT KNEE PAIN: ICD-10-CM

## 2019-11-23 DIAGNOSIS — M1A.4610 OTHER SECONDARY CHRONIC GOUT OF RIGHT KNEE WITHOUT TOPHUS: Chronic | ICD-10-CM

## 2019-11-24 RX ORDER — PREDNISONE 20 MG/1
20 TABLET ORAL DAILY
Qty: 5 TABLET | Refills: 0 | OUTPATIENT
Start: 2019-11-24 | End: 2019-11-29

## 2019-11-26 RX ORDER — TRAMADOL HYDROCHLORIDE 50 MG/1
50 TABLET ORAL EVERY 8 HOURS PRN
Qty: 15 TABLET | Refills: 0 | OUTPATIENT
Start: 2019-11-26 | End: 2019-12-01

## 2019-11-26 NOTE — TELEPHONE ENCOUNTER
Pt was provided short term tramadol for a few days to get him over an acute exacerbation of his chronic severe knee osteoarthritis  He was to see orthopedic surgery for a possible joint steroid injection  If he has ongoing pain and no orthopedic treatment, he need referral to chronic pain management  He will malika to be seen in the office for that

## 2019-12-03 ENCOUNTER — OFFICE VISIT (OUTPATIENT)
Dept: OBGYN CLINIC | Facility: HOSPITAL | Age: 64
End: 2019-12-03
Payer: COMMERCIAL

## 2019-12-03 VITALS
SYSTOLIC BLOOD PRESSURE: 115 MMHG | BODY MASS INDEX: 40.73 KG/M2 | DIASTOLIC BLOOD PRESSURE: 71 MMHG | HEART RATE: 76 BPM | WEIGHT: 275 LBS | HEIGHT: 69 IN

## 2019-12-03 DIAGNOSIS — M17.0 BILATERAL PRIMARY OSTEOARTHRITIS OF KNEE: Primary | ICD-10-CM

## 2019-12-03 PROCEDURE — 20610 DRAIN/INJ JOINT/BURSA W/O US: CPT | Performed by: PHYSICIAN ASSISTANT

## 2019-12-03 PROCEDURE — 99213 OFFICE O/P EST LOW 20 MIN: CPT | Performed by: PHYSICIAN ASSISTANT

## 2019-12-03 RX ORDER — BUPIVACAINE HYDROCHLORIDE 2.5 MG/ML
2 INJECTION, SOLUTION INFILTRATION; PERINEURAL
Status: COMPLETED | OUTPATIENT
Start: 2019-12-03 | End: 2019-12-03

## 2019-12-03 RX ORDER — METHYLPREDNISOLONE ACETATE 40 MG/ML
2 INJECTION, SUSPENSION INTRA-ARTICULAR; INTRALESIONAL; INTRAMUSCULAR; SOFT TISSUE
Status: COMPLETED | OUTPATIENT
Start: 2019-12-03 | End: 2019-12-03

## 2019-12-03 RX ADMIN — BUPIVACAINE HYDROCHLORIDE 2 ML: 2.5 INJECTION, SOLUTION INFILTRATION; PERINEURAL at 12:01

## 2019-12-03 RX ADMIN — BUPIVACAINE HYDROCHLORIDE 2 ML: 2.5 INJECTION, SOLUTION INFILTRATION; PERINEURAL at 12:02

## 2019-12-03 RX ADMIN — METHYLPREDNISOLONE ACETATE 2 ML: 40 INJECTION, SUSPENSION INTRA-ARTICULAR; INTRALESIONAL; INTRAMUSCULAR; SOFT TISSUE at 12:02

## 2019-12-03 RX ADMIN — METHYLPREDNISOLONE ACETATE 2 ML: 40 INJECTION, SUSPENSION INTRA-ARTICULAR; INTRALESIONAL; INTRAMUSCULAR; SOFT TISSUE at 12:01
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
## 2019-12-17 ENCOUNTER — TELEPHONE (OUTPATIENT)
Dept: INTERNAL MEDICINE CLINIC | Facility: CLINIC | Age: 64
End: 2019-12-17

## 2019-12-17 DIAGNOSIS — E13.69 OTHER SPECIFIED DIABETES MELLITUS WITH OTHER SPECIFIED COMPLICATION, UNSPECIFIED WHETHER LONG TERM INSULIN USE (HCC): Primary | ICD-10-CM

## 2019-12-17 RX ORDER — ERGOCALCIFEROL (VITAMIN D2) 1250 MCG
CAPSULE ORAL
COMMUNITY
Start: 2019-12-06 | End: 2019-12-18 | Stop reason: SDUPTHER

## 2019-12-18 ENCOUNTER — TELEPHONE (OUTPATIENT)
Dept: INTERNAL MEDICINE CLINIC | Facility: CLINIC | Age: 64
End: 2019-12-18

## 2019-12-18 ENCOUNTER — OFFICE VISIT (OUTPATIENT)
Dept: INTERNAL MEDICINE CLINIC | Facility: CLINIC | Age: 64
End: 2019-12-18

## 2019-12-18 VITALS
WEIGHT: 271.17 LBS | DIASTOLIC BLOOD PRESSURE: 74 MMHG | SYSTOLIC BLOOD PRESSURE: 132 MMHG | OXYGEN SATURATION: 94 % | HEIGHT: 69 IN | TEMPERATURE: 97.8 F | BODY MASS INDEX: 40.16 KG/M2 | HEART RATE: 83 BPM

## 2019-12-18 DIAGNOSIS — Z23 NEED FOR SHINGLES VACCINE: ICD-10-CM

## 2019-12-18 DIAGNOSIS — J45.901 EXACERBATION OF ASTHMA, UNSPECIFIED ASTHMA SEVERITY, UNSPECIFIED WHETHER PERSISTENT: ICD-10-CM

## 2019-12-18 DIAGNOSIS — E55.9 VITAMIN D DEFICIENCY: ICD-10-CM

## 2019-12-18 DIAGNOSIS — R60.0 LOWER EXTREMITY EDEMA: ICD-10-CM

## 2019-12-18 DIAGNOSIS — E13.69 OTHER SPECIFIED DIABETES MELLITUS WITH OTHER SPECIFIED COMPLICATION, UNSPECIFIED WHETHER LONG TERM INSULIN USE (HCC): Primary | ICD-10-CM

## 2019-12-18 LAB — SL AMB POCT HEMOGLOBIN AIC: 6.1 (ref ?–6.5)

## 2019-12-18 PROCEDURE — 90471 IMMUNIZATION ADMIN: CPT | Performed by: INTERNAL MEDICINE

## 2019-12-18 PROCEDURE — 90750 HZV VACC RECOMBINANT IM: CPT | Performed by: INTERNAL MEDICINE

## 2019-12-18 PROCEDURE — 3044F HG A1C LEVEL LT 7.0%: CPT | Performed by: INTERNAL MEDICINE

## 2019-12-18 PROCEDURE — 83036 HEMOGLOBIN GLYCOSYLATED A1C: CPT | Performed by: INTERNAL MEDICINE

## 2019-12-18 PROCEDURE — 1036F TOBACCO NON-USER: CPT | Performed by: INTERNAL MEDICINE

## 2019-12-18 PROCEDURE — 99213 OFFICE O/P EST LOW 20 MIN: CPT | Performed by: INTERNAL MEDICINE

## 2019-12-18 RX ORDER — ALBUTEROL SULFATE 90 UG/1
2 AEROSOL, METERED RESPIRATORY (INHALATION) EVERY 6 HOURS PRN
Qty: 6.7 G | Refills: 5 | Status: SHIPPED | OUTPATIENT
Start: 2019-12-18 | End: 2020-03-22

## 2019-12-18 RX ORDER — ALBUTEROL SULFATE 90 UG/1
2 AEROSOL, METERED RESPIRATORY (INHALATION) EVERY 6 HOURS PRN
Qty: 6.7 G | Refills: 5 | Status: SHIPPED | OUTPATIENT
Start: 2019-12-18 | End: 2019-12-18 | Stop reason: SDUPTHER

## 2019-12-18 RX ORDER — ERGOCALCIFEROL (VITAMIN D2) 1250 MCG
50000 CAPSULE ORAL WEEKLY
Qty: 8 CAPSULE | Refills: 0 | Status: SHIPPED | OUTPATIENT
Start: 2019-12-18 | End: 2021-10-26 | Stop reason: ALTCHOICE

## 2019-12-18 RX ORDER — FUROSEMIDE 20 MG/1
20 TABLET ORAL 2 TIMES DAILY PRN
Qty: 90 TABLET | Refills: 0 | Status: SHIPPED | OUTPATIENT
Start: 2019-12-18 | End: 2020-01-26

## 2019-12-18 NOTE — TELEPHONE ENCOUNTER
Patient has an appointment with New ophthalmologist on 1/9/20 [facility]  NPI# [id number] Provider# [id number]  Not sure if he will need a referral by then but he does need one as of now   Fax# [phone] Thanks

## 2019-12-18 NOTE — TELEPHONE ENCOUNTER
Called patient and advised him that left Eye on iris Exam was not gradable and recommendation is to see a specialist  Asked patient if he would like me to mail him a list of ophthalmology specialist he could make an appointment with and patient asked me to make the appointment for him  Appointment made with [facility] Dr Arlet Hankins on 1/9/20 1:30 PM  Patient will be made aware when he comes to his appointment with Dr Sara Johnson today

## 2019-12-18 NOTE — PROGRESS NOTES
Assessment/Plan:      Other specified diabetes mellitus with other specified complication, unspecified whether long term insulin use (HCC)  -     POCT hemoglobin A1c - 6 1  -     continue diet and exercise as tolerated  -     continue metformin and ozempic    Lower extremity edema  -     refill furosemide (LASIX) 20 mg tablet; Take 1 tablet (20 mg total) by mouth 2 (two) times a day as needed (leg swelling)    Exacerbation of asthma, unspecified asthma severity, unspecified whether persistent  -     refill albuterol (PROVENTIL HFA) 90 mcg/act inhaler; Inhale 2 puffs every 6 (six) hours as needed for lower extremity swelling or shortness of breath    Need for shingles vaccine  -     Zoster Vaccine Recombinant IM  -     patient will need 2nd zoster vaccine dose at next appointment in 4 months    Vitamin D deficiency  -     start ergocalciferol (ERGOCALCIFEROL) 1 25 MG (81504 UT) capsule; Take 1 capsule (50,000 Units total) by mouth once a week (patient originally was supposed to start taking this per Endocrinology however patient received prescription in pharmacy, will fill)      Patient's A1c improved to 6 1 today  Patient has lost 14 lb over the past month since starting ozempic and Cymbalta  Patient has appointment for Ophthalmology on 01/09/2019  Patient's microalbumin creatinine ratio will be due in 07/2020 and foot exam in November 2020  CT low-dose will be due in 06/2020  Colonoscopy due in January 2024  Patient will receive shin Gino vaccine today and will need a no other dose at next appointment in 4 months  Patient is following with Orthopedics in 3 months at which time they will decide whether not he will receive knee replacement  Pneumonia vaccine is up-to-date  Blood pressure well controlled  Subjective:      Patient ID: Samra Farmer is a 75 y o  male      78-year-old male with past medical history significant for non-insulin-dependent diabetes mellitus last A1c 6 9 on 07/08/2019, asthma, 72 pack year smoking history quit in 7906, diastolic dysfunction and gout on allopurinol  Patient's last seen in clinic on 11/22/2019 for right knee pain which was thought to be osteoarthritis versus gouty arthritis  Patient received short course of tramadol and prednisone for 5 days  At the time patient's Zoloft was switched to Cymbalta and patient was sent to physical therapy  Patient's most recent uric acid level with 5  Orthopedics saw patient on 12/03/2019 for osteoarthritis of the right knee greater than left for which he received bilateral intra-articular injections  Possible future plans including viscosupplementation versus total knee replacement were discussed  Patient comes to clinic today for follow-up  Patient states his right knee pain has significantly improved after steroid injections however still nagging  Patient will follow with Orthopedics in 3 months for definitive treatment  Patient states he is feeling well otherwise  Patient states he has lost weight over the past month since starting ozempic  Patient states he is tolerating his change to Cymbalta well and is no longer taking Zoloft  The following portions of the patient's history were reviewed and updated as appropriate: allergies, current medications, past family history, past medical history, past social history, past surgical history and problem list     Review of Systems   Constitutional: Negative for appetite change, chills, diaphoresis, fatigue, fever and unexpected weight change  HENT: Negative for sore throat  Eyes: Negative for visual disturbance  Respiratory: Negative for cough, chest tightness, shortness of breath and wheezing  Cardiovascular: Negative for chest pain, palpitations and leg swelling  Gastrointestinal: Negative for abdominal distention, abdominal pain, blood in stool, constipation, diarrhea, nausea and vomiting     Genitourinary: Negative for difficulty urinating, flank pain and urgency  Musculoskeletal: Positive for arthralgias  Negative for myalgias  Skin: Negative for pallor and rash  Neurological: Negative for dizziness, weakness, light-headedness and headaches  Objective:      /74   Pulse 83   Temp 97 8 °F (36 6 °C)   Ht 5' 9" (1 753 m)   Wt 123 kg (271 lb 2 7 oz)   SpO2 94%   BMI 40 04 kg/m²          Physical Exam   Constitutional: He is oriented to person, place, and time  He appears well-developed and well-nourished  No distress  HENT:   Head: Normocephalic and atraumatic  Mouth/Throat: Oropharynx is clear and moist  No oropharyngeal exudate  Eyes: Pupils are equal, round, and reactive to light  Conjunctivae and EOM are normal  No scleral icterus  Neck: Normal range of motion  Neck supple  No thyromegaly present  Cardiovascular: Normal rate, regular rhythm and normal heart sounds  Exam reveals no gallop and no friction rub  No murmur heard  Pulmonary/Chest: Effort normal and breath sounds normal  He has no wheezes  Abdominal: Soft  Bowel sounds are normal  He exhibits no distension  Musculoskeletal: Normal range of motion  He exhibits edema (Trace pitting edema in lower extremities bilaterally) and tenderness (Right knee tenderness to palpation and mild pain with active and passive flexion and extension)  He exhibits no deformity  Lymphadenopathy:     He has no cervical adenopathy  Neurological: He is alert and oriented to person, place, and time  Skin: Skin is warm and dry  Capillary refill takes less than 2 seconds  No rash noted  He is not diaphoretic  No pallor  Psychiatric: He has a normal mood and affect   His behavior is normal

## 2020-01-08 DIAGNOSIS — G89.29 CHRONIC PAIN OF RIGHT KNEE: ICD-10-CM

## 2020-01-08 DIAGNOSIS — M25.561 CHRONIC PAIN OF RIGHT KNEE: ICD-10-CM

## 2020-01-17 LAB
LEFT EYE DIABETIC RETINOPATHY: NORMAL
RIGHT EYE DIABETIC RETINOPATHY: NORMAL

## 2020-01-25 DIAGNOSIS — R60.0 LOWER EXTREMITY EDEMA: ICD-10-CM

## 2020-01-26 RX ORDER — FUROSEMIDE 20 MG/1
TABLET ORAL
Qty: 90 TABLET | Refills: 0 | Status: SHIPPED | OUTPATIENT
Start: 2020-01-26 | End: 2020-03-10

## 2020-02-03 DIAGNOSIS — M25.561 CHRONIC PAIN OF RIGHT KNEE: ICD-10-CM

## 2020-02-03 DIAGNOSIS — G89.29 CHRONIC PAIN OF RIGHT KNEE: ICD-10-CM

## 2020-02-03 DIAGNOSIS — M25.561 RIGHT KNEE PAIN: ICD-10-CM

## 2020-02-03 RX ORDER — DULOXETIN HYDROCHLORIDE 30 MG/1
30 CAPSULE, DELAYED RELEASE ORAL DAILY
Qty: 90 CAPSULE | Refills: 0 | OUTPATIENT
Start: 2020-02-03

## 2020-02-04 RX ORDER — DULOXETIN HYDROCHLORIDE 30 MG/1
30 CAPSULE, DELAYED RELEASE ORAL DAILY
Qty: 90 CAPSULE | Refills: 3 | Status: SHIPPED | OUTPATIENT
Start: 2020-02-04 | End: 2020-09-22

## 2020-02-17 DIAGNOSIS — E55.9 VITAMIN D DEFICIENCY: ICD-10-CM

## 2020-02-19 RX ORDER — ERGOCALCIFEROL 1.25 MG/1
CAPSULE ORAL
Qty: 8 CAPSULE | Refills: 0 | OUTPATIENT
Start: 2020-02-19

## 2020-02-19 RX ORDER — MELATONIN
1000 DAILY
Qty: 90 TABLET | Refills: 3 | Status: SHIPPED | OUTPATIENT
Start: 2020-02-19 | End: 2020-04-03

## 2020-02-19 NOTE — TELEPHONE ENCOUNTER
Please call patient to make aware that once he is done with his 8 doses of the vitamin-D 49081 units, there is an order in the pharmacy for vitamin-D 1000 units daily  I have added an order for a vitamin-D level which she can do whenever he has other labs due  He does not have to go specifically for this lab  Thank you

## 2020-02-28 DIAGNOSIS — M25.561 CHRONIC PAIN OF RIGHT KNEE: ICD-10-CM

## 2020-02-28 DIAGNOSIS — G89.29 CHRONIC PAIN OF RIGHT KNEE: ICD-10-CM

## 2020-03-05 ENCOUNTER — APPOINTMENT (OUTPATIENT)
Dept: LAB | Facility: CLINIC | Age: 65
End: 2020-03-05
Payer: COMMERCIAL

## 2020-03-05 DIAGNOSIS — E55.9 VITAMIN D DEFICIENCY: ICD-10-CM

## 2020-03-05 DIAGNOSIS — E11.69 DIABETES MELLITUS ASSOCIATED WITH HORMONAL ETIOLOGY (HCC): ICD-10-CM

## 2020-03-05 DIAGNOSIS — E78.5 HYPERLIPIDEMIA, UNSPECIFIED HYPERLIPIDEMIA TYPE: Primary | ICD-10-CM

## 2020-03-05 DIAGNOSIS — M10.061 IDIOPATHIC GOUT OF RIGHT KNEE, UNSPECIFIED CHRONICITY: ICD-10-CM

## 2020-03-05 DIAGNOSIS — R60.0 LOWER EXTREMITY EDEMA: ICD-10-CM

## 2020-03-05 LAB
25(OH)D3 SERPL-MCNC: 29.7 NG/ML (ref 30–100)
ALBUMIN SERPL BCP-MCNC: 3.5 G/DL (ref 3.5–5)
ALP SERPL-CCNC: 64 U/L (ref 46–116)
ALT SERPL W P-5'-P-CCNC: 32 U/L (ref 12–78)
ANION GAP SERPL CALCULATED.3IONS-SCNC: 4 MMOL/L (ref 4–13)
AST SERPL W P-5'-P-CCNC: 13 U/L (ref 5–45)
BASOPHILS # BLD AUTO: 0.06 THOUSANDS/ΜL (ref 0–0.1)
BASOPHILS NFR BLD AUTO: 1 % (ref 0–1)
BILIRUB SERPL-MCNC: 0.41 MG/DL (ref 0.2–1)
BUN SERPL-MCNC: 15 MG/DL (ref 5–25)
CALCIUM SERPL-MCNC: 9 MG/DL (ref 8.3–10.1)
CHLORIDE SERPL-SCNC: 105 MMOL/L (ref 100–108)
CHOLEST SERPL-MCNC: 237 MG/DL (ref 50–200)
CO2 SERPL-SCNC: 29 MMOL/L (ref 21–32)
CREAT SERPL-MCNC: 0.93 MG/DL (ref 0.6–1.3)
CREAT UR-MCNC: 135 MG/DL
EOSINOPHIL # BLD AUTO: 0.2 THOUSAND/ΜL (ref 0–0.61)
EOSINOPHIL NFR BLD AUTO: 3 % (ref 0–6)
ERYTHROCYTE [DISTWIDTH] IN BLOOD BY AUTOMATED COUNT: 14.9 % (ref 11.6–15.1)
EST. AVERAGE GLUCOSE BLD GHB EST-MCNC: 128 MG/DL
GFR SERPL CREATININE-BSD FRML MDRD: 85 ML/MIN/1.73SQ M
GLUCOSE P FAST SERPL-MCNC: 103 MG/DL (ref 65–99)
HBA1C MFR BLD: 6.1 %
HCT VFR BLD AUTO: 44.7 % (ref 36.5–49.3)
HDLC SERPL-MCNC: 43 MG/DL
HGB BLD-MCNC: 14.2 G/DL (ref 12–17)
IMM GRANULOCYTES # BLD AUTO: 0.02 THOUSAND/UL (ref 0–0.2)
IMM GRANULOCYTES NFR BLD AUTO: 0 % (ref 0–2)
LDLC SERPL CALC-MCNC: 153 MG/DL (ref 0–100)
LYMPHOCYTES # BLD AUTO: 1.84 THOUSANDS/ΜL (ref 0.6–4.47)
LYMPHOCYTES NFR BLD AUTO: 27 % (ref 14–44)
MCH RBC QN AUTO: 27.8 PG (ref 26.8–34.3)
MCHC RBC AUTO-ENTMCNC: 31.8 G/DL (ref 31.4–37.4)
MCV RBC AUTO: 88 FL (ref 82–98)
MICROALBUMIN UR-MCNC: 8.5 MG/L (ref 0–20)
MICROALBUMIN/CREAT 24H UR: 6 MG/G CREATININE (ref 0–30)
MONOCYTES # BLD AUTO: 0.76 THOUSAND/ΜL (ref 0.17–1.22)
MONOCYTES NFR BLD AUTO: 11 % (ref 4–12)
NEUTROPHILS # BLD AUTO: 3.95 THOUSANDS/ΜL (ref 1.85–7.62)
NEUTS SEG NFR BLD AUTO: 58 % (ref 43–75)
NONHDLC SERPL-MCNC: 194 MG/DL
NRBC BLD AUTO-RTO: 0 /100 WBCS
PLATELET # BLD AUTO: 215 THOUSANDS/UL (ref 149–390)
PMV BLD AUTO: 11.5 FL (ref 8.9–12.7)
POTASSIUM SERPL-SCNC: 4.2 MMOL/L (ref 3.5–5.3)
PROT SERPL-MCNC: 7 G/DL (ref 6.4–8.2)
RBC # BLD AUTO: 5.11 MILLION/UL (ref 3.88–5.62)
SODIUM SERPL-SCNC: 138 MMOL/L (ref 136–145)
TRIGL SERPL-MCNC: 205 MG/DL
TSH SERPL DL<=0.05 MIU/L-ACNC: 1.54 UIU/ML (ref 0.36–3.74)
URATE SERPL-MCNC: 5.3 MG/DL (ref 4.2–8)
WBC # BLD AUTO: 6.83 THOUSAND/UL (ref 4.31–10.16)

## 2020-03-05 PROCEDURE — 85025 COMPLETE CBC W/AUTO DIFF WBC: CPT

## 2020-03-05 PROCEDURE — 36415 COLL VENOUS BLD VENIPUNCTURE: CPT

## 2020-03-05 PROCEDURE — 82043 UR ALBUMIN QUANTITATIVE: CPT

## 2020-03-05 PROCEDURE — 3061F NEG MICROALBUMINURIA REV: CPT | Performed by: INTERNAL MEDICINE

## 2020-03-05 PROCEDURE — 84550 ASSAY OF BLOOD/URIC ACID: CPT

## 2020-03-05 PROCEDURE — 80061 LIPID PANEL: CPT

## 2020-03-05 PROCEDURE — 83036 HEMOGLOBIN GLYCOSYLATED A1C: CPT

## 2020-03-05 PROCEDURE — 82570 ASSAY OF URINE CREATININE: CPT

## 2020-03-05 PROCEDURE — 82306 VITAMIN D 25 HYDROXY: CPT

## 2020-03-05 PROCEDURE — 84443 ASSAY THYROID STIM HORMONE: CPT

## 2020-03-05 PROCEDURE — 80053 COMPREHEN METABOLIC PANEL: CPT

## 2020-03-07 DIAGNOSIS — R60.0 LOWER EXTREMITY EDEMA: ICD-10-CM

## 2020-03-10 RX ORDER — FUROSEMIDE 20 MG/1
20 TABLET ORAL DAILY
Qty: 90 TABLET | Refills: 0 | Status: SHIPPED | OUTPATIENT
Start: 2020-03-10 | End: 2020-06-03

## 2020-03-21 DIAGNOSIS — J45.901 EXACERBATION OF ASTHMA, UNSPECIFIED ASTHMA SEVERITY, UNSPECIFIED WHETHER PERSISTENT: ICD-10-CM

## 2020-03-22 RX ORDER — ALBUTEROL SULFATE 90 UG/1
2 AEROSOL, METERED RESPIRATORY (INHALATION) EVERY 6 HOURS PRN
Qty: 8.5 G | Refills: 3 | Status: SHIPPED | OUTPATIENT
Start: 2020-03-22 | End: 2020-07-15

## 2020-04-03 DIAGNOSIS — E55.9 VITAMIN D DEFICIENCY: ICD-10-CM

## 2020-04-03 RX ORDER — MELATONIN
2000 DAILY
Qty: 180 TABLET | Refills: 3 | Status: SHIPPED | OUTPATIENT
Start: 2020-04-03

## 2020-06-03 DIAGNOSIS — R60.0 LOWER EXTREMITY EDEMA: ICD-10-CM

## 2020-06-03 RX ORDER — FUROSEMIDE 20 MG/1
20 TABLET ORAL DAILY
Qty: 90 TABLET | Refills: 0 | Status: SHIPPED | OUTPATIENT
Start: 2020-06-03 | End: 2020-08-19

## 2020-07-02 ENCOUNTER — APPOINTMENT (OUTPATIENT)
Dept: LAB | Facility: HOSPITAL | Age: 65
End: 2020-07-02
Payer: COMMERCIAL

## 2020-07-02 ENCOUNTER — TRANSCRIBE ORDERS (OUTPATIENT)
Dept: LAB | Facility: HOSPITAL | Age: 65
End: 2020-07-02

## 2020-07-02 DIAGNOSIS — E11.69 TYPE 2 DIABETES MELLITUS WITH OTHER SPECIFIED COMPLICATION, WITHOUT LONG-TERM CURRENT USE OF INSULIN (HCC): ICD-10-CM

## 2020-07-02 DIAGNOSIS — E55.9 VITAMIN D DEFICIENCY: ICD-10-CM

## 2020-07-02 DIAGNOSIS — E11.69 TYPE 2 DIABETES MELLITUS WITH OTHER SPECIFIED COMPLICATION, WITHOUT LONG-TERM CURRENT USE OF INSULIN (HCC): Primary | ICD-10-CM

## 2020-07-02 DIAGNOSIS — E78.2 MIXED DYSLIPIDEMIA: ICD-10-CM

## 2020-07-02 LAB
25(OH)D3 SERPL-MCNC: 58.5 NG/ML (ref 30–100)
ALBUMIN SERPL BCP-MCNC: 3.6 G/DL (ref 3.5–5)
ALP SERPL-CCNC: 67 U/L (ref 46–116)
ALT SERPL W P-5'-P-CCNC: 28 U/L (ref 12–78)
ANION GAP SERPL CALCULATED.3IONS-SCNC: 6 MMOL/L (ref 4–13)
AST SERPL W P-5'-P-CCNC: 14 U/L (ref 5–45)
BASOPHILS # BLD AUTO: 0.05 THOUSANDS/ΜL (ref 0–0.1)
BASOPHILS NFR BLD AUTO: 1 % (ref 0–1)
BILIRUB SERPL-MCNC: 0.46 MG/DL (ref 0.2–1)
BUN SERPL-MCNC: 21 MG/DL (ref 5–25)
CALCIUM SERPL-MCNC: 8.9 MG/DL (ref 8.3–10.1)
CHLORIDE SERPL-SCNC: 106 MMOL/L (ref 100–108)
CHOLEST SERPL-MCNC: 199 MG/DL (ref 50–200)
CO2 SERPL-SCNC: 27 MMOL/L (ref 21–32)
CREAT SERPL-MCNC: 0.95 MG/DL (ref 0.6–1.3)
EOSINOPHIL # BLD AUTO: 0.21 THOUSAND/ΜL (ref 0–0.61)
EOSINOPHIL NFR BLD AUTO: 3 % (ref 0–6)
ERYTHROCYTE [DISTWIDTH] IN BLOOD BY AUTOMATED COUNT: 15.2 % (ref 11.6–15.1)
EST. AVERAGE GLUCOSE BLD GHB EST-MCNC: 131 MG/DL
GFR SERPL CREATININE-BSD FRML MDRD: 83 ML/MIN/1.73SQ M
GLUCOSE P FAST SERPL-MCNC: 96 MG/DL (ref 65–99)
HBA1C MFR BLD: 6.2 %
HCT VFR BLD AUTO: 42.8 % (ref 36.5–49.3)
HDLC SERPL-MCNC: 42 MG/DL
HGB BLD-MCNC: 13.4 G/DL (ref 12–17)
IMM GRANULOCYTES # BLD AUTO: 0.02 THOUSAND/UL (ref 0–0.2)
IMM GRANULOCYTES NFR BLD AUTO: 0 % (ref 0–2)
LDLC SERPL CALC-MCNC: 129 MG/DL (ref 0–100)
LYMPHOCYTES # BLD AUTO: 1.58 THOUSANDS/ΜL (ref 0.6–4.47)
LYMPHOCYTES NFR BLD AUTO: 23 % (ref 14–44)
MCH RBC QN AUTO: 27.6 PG (ref 26.8–34.3)
MCHC RBC AUTO-ENTMCNC: 31.3 G/DL (ref 31.4–37.4)
MCV RBC AUTO: 88 FL (ref 82–98)
MONOCYTES # BLD AUTO: 0.62 THOUSAND/ΜL (ref 0.17–1.22)
MONOCYTES NFR BLD AUTO: 9 % (ref 4–12)
NEUTROPHILS # BLD AUTO: 4.45 THOUSANDS/ΜL (ref 1.85–7.62)
NEUTS SEG NFR BLD AUTO: 64 % (ref 43–75)
NONHDLC SERPL-MCNC: 157 MG/DL
NRBC BLD AUTO-RTO: 0 /100 WBCS
PLATELET # BLD AUTO: 195 THOUSANDS/UL (ref 149–390)
PMV BLD AUTO: 12 FL (ref 8.9–12.7)
POTASSIUM SERPL-SCNC: 4 MMOL/L (ref 3.5–5.3)
PROT SERPL-MCNC: 6.8 G/DL (ref 6.4–8.2)
RBC # BLD AUTO: 4.86 MILLION/UL (ref 3.88–5.62)
SODIUM SERPL-SCNC: 139 MMOL/L (ref 136–145)
TRIGL SERPL-MCNC: 139 MG/DL
TSH SERPL DL<=0.05 MIU/L-ACNC: 1.43 UIU/ML (ref 0.36–3.74)
WBC # BLD AUTO: 6.93 THOUSAND/UL (ref 4.31–10.16)

## 2020-07-02 PROCEDURE — 3044F HG A1C LEVEL LT 7.0%: CPT | Performed by: INTERNAL MEDICINE

## 2020-07-02 PROCEDURE — 84443 ASSAY THYROID STIM HORMONE: CPT

## 2020-07-02 PROCEDURE — 83036 HEMOGLOBIN GLYCOSYLATED A1C: CPT

## 2020-07-02 PROCEDURE — 36415 COLL VENOUS BLD VENIPUNCTURE: CPT

## 2020-07-02 PROCEDURE — 85025 COMPLETE CBC W/AUTO DIFF WBC: CPT

## 2020-07-02 PROCEDURE — 82306 VITAMIN D 25 HYDROXY: CPT

## 2020-07-02 PROCEDURE — 80053 COMPREHEN METABOLIC PANEL: CPT

## 2020-07-02 PROCEDURE — 80061 LIPID PANEL: CPT

## 2020-07-10 ENCOUNTER — OFFICE VISIT (OUTPATIENT)
Dept: INTERNAL MEDICINE CLINIC | Facility: CLINIC | Age: 65
End: 2020-07-10

## 2020-07-10 VITALS
SYSTOLIC BLOOD PRESSURE: 110 MMHG | DIASTOLIC BLOOD PRESSURE: 60 MMHG | WEIGHT: 275.57 LBS | BODY MASS INDEX: 40.7 KG/M2 | HEART RATE: 73 BPM | TEMPERATURE: 97.8 F

## 2020-07-10 DIAGNOSIS — B35.4 TINEA CORPORIS: Primary | ICD-10-CM

## 2020-07-10 PROCEDURE — 3078F DIAST BP <80 MM HG: CPT | Performed by: INTERNAL MEDICINE

## 2020-07-10 PROCEDURE — 1036F TOBACCO NON-USER: CPT | Performed by: INTERNAL MEDICINE

## 2020-07-10 PROCEDURE — 1160F RVW MEDS BY RX/DR IN RCRD: CPT | Performed by: INTERNAL MEDICINE

## 2020-07-10 PROCEDURE — 4040F PNEUMOC VAC/ADMIN/RCVD: CPT | Performed by: INTERNAL MEDICINE

## 2020-07-10 PROCEDURE — 3074F SYST BP LT 130 MM HG: CPT | Performed by: INTERNAL MEDICINE

## 2020-07-10 PROCEDURE — 3044F HG A1C LEVEL LT 7.0%: CPT | Performed by: INTERNAL MEDICINE

## 2020-07-10 PROCEDURE — 99213 OFFICE O/P EST LOW 20 MIN: CPT | Performed by: INTERNAL MEDICINE

## 2020-07-10 PROCEDURE — 2022F DILAT RTA XM EVC RTNOPTHY: CPT | Performed by: INTERNAL MEDICINE

## 2020-07-10 PROCEDURE — 3066F NEPHROPATHY DOC TX: CPT | Performed by: INTERNAL MEDICINE

## 2020-07-10 RX ORDER — TERBINAFINE HYDROCHLORIDE 250 MG/1
250 TABLET ORAL DAILY
Qty: 14 TABLET | Refills: 0 | Status: SHIPPED | OUTPATIENT
Start: 2020-07-10 | End: 2020-07-24

## 2020-07-10 NOTE — PROGRESS NOTES
Assessment/Plan:    No problem-specific Assessment & Plan notes found for this encounter  Diagnoses and all orders for this visit:    Tinea corporis  -     terbinafine (LamISIL) 250 mg tablet; Take 1 tablet (250 mg total) by mouth daily for 14 days    rash appears consistent with tinea corporis  Since it is over a relatively large area, estimated at least 30 centimeter squared, will trial systemic therapy for 2 weeks  Patient will follow-up as needed  Also completed DOT forms for disability placard and disability sticker  Patient also has a form where a medical necessity letter is required for a dental procedure  Subjective:      Patient ID: Myriam Escobedo is a 75 y o  male  History of DM, gout, LE edema, OA, ambulatory dysfunction  Here today for rash  Present x 2 months  Never happened before  Itchy  Gradually grew over past few months  Has not tried any intervention, once tried rubbing alcohol  No history of allergies  Does not recall any new detergent, soaps, etc  No diet changes  No change based on time of day  Itching does not interfere with sleep  Girlfriend told him it is present on his bottom as well, and he has noticed itch there too  The following portions of the patient's history were reviewed and updated as appropriate: allergies, current medications, past family history, past medical history, past social history, past surgical history and problem list     Review of Systems   Constitutional: Negative for chills and fever  Respiratory: Positive for shortness of breath (baseline)  Cardiovascular: Positive for leg swelling  Negative for chest pain  Gastrointestinal: Negative for diarrhea, nausea and vomiting  Musculoskeletal: Positive for arthralgias (at baseline) and gait problem (at baseline)           Objective:      /60 (BP Location: Right arm, Patient Position: Sitting, Cuff Size: Large)   Pulse 73   Temp 97 8 °F (36 6 °C) (Temporal)   Wt 125 kg (275 lb 9 2 oz)   BMI 40 70 kg/m²          Physical Exam   Constitutional: He is oriented to person, place, and time  He appears well-developed and well-nourished  No distress  HENT:   Head: Normocephalic  Mouth/Throat: Oropharynx is clear and moist    Eyes: Pupils are equal, round, and reactive to light  Conjunctivae and EOM are normal    Neck: Neck supple  Cardiovascular: Normal rate and regular rhythm  Pulmonary/Chest: Effort normal  He has no wheezes  He has no rales  Abdominal: Soft  Bowel sounds are normal  He exhibits no distension  There is no tenderness  Musculoskeletal:   Ambulates with cane   Neurological: He is alert and oriented to person, place, and time  Skin: Skin is warm and dry  He is not diaphoretic  Large plaque under right axilla, serpiginous border, erythematous  Plaque also noted on right buttock near gluteal cleft and upper left leg, with overlying scale   Psychiatric: He has a normal mood and affect

## 2020-07-10 NOTE — PROGRESS NOTES
ALEXUS/DION has followed up with this 69 y/o male pt this date by making referral to [address] [place] [address]  Spoke with Ms Riley Sanchez this date to make AAA referral   AAA aware of pt's housing , social distress and reported depression  Pt denying any SI and relates he will schedule with Roxanna Wyatt  [place] relates  AAA will visit to assist with needs as able  Please make sure the patient has an appointment for a breast exam

## 2020-07-13 DIAGNOSIS — J45.901 EXACERBATION OF ASTHMA, UNSPECIFIED ASTHMA SEVERITY, UNSPECIFIED WHETHER PERSISTENT: ICD-10-CM

## 2020-07-15 RX ORDER — ALBUTEROL SULFATE 90 UG/1
2 AEROSOL, METERED RESPIRATORY (INHALATION) EVERY 6 HOURS PRN
Qty: 8.5 G | Refills: 3 | Status: SHIPPED | OUTPATIENT
Start: 2020-07-15 | End: 2020-09-09 | Stop reason: SDUPTHER

## 2020-07-20 DIAGNOSIS — B35.4 TINEA CORPORIS: ICD-10-CM

## 2020-07-20 RX ORDER — TERBINAFINE HYDROCHLORIDE 250 MG/1
TABLET ORAL
Qty: 14 TABLET | Refills: 0 | OUTPATIENT
Start: 2020-07-20

## 2020-07-31 DIAGNOSIS — J45.40 MODERATE PERSISTENT ASTHMA WITHOUT COMPLICATION: ICD-10-CM

## 2020-08-14 ENCOUNTER — APPOINTMENT (OUTPATIENT)
Dept: LAB | Facility: HOSPITAL | Age: 65
End: 2020-08-14
Payer: COMMERCIAL

## 2020-08-14 ENCOUNTER — OFFICE VISIT (OUTPATIENT)
Dept: LAB | Facility: HOSPITAL | Age: 65
End: 2020-08-14
Payer: COMMERCIAL

## 2020-08-14 ENCOUNTER — TRANSCRIBE ORDERS (OUTPATIENT)
Dept: LAB | Facility: HOSPITAL | Age: 65
End: 2020-08-14

## 2020-08-14 DIAGNOSIS — Z79.899 ENCOUNTER FOR LONG-TERM (CURRENT) USE OF OTHER MEDICATIONS: ICD-10-CM

## 2020-08-14 DIAGNOSIS — Z79.899 ENCOUNTER FOR LONG-TERM (CURRENT) USE OF OTHER MEDICATIONS: Primary | ICD-10-CM

## 2020-08-14 DIAGNOSIS — R60.0 LOWER EXTREMITY EDEMA: ICD-10-CM

## 2020-08-14 LAB
ALBUMIN SERPL BCP-MCNC: 3.6 G/DL (ref 3.5–5)
ALP SERPL-CCNC: 71 U/L (ref 46–116)
ALT SERPL W P-5'-P-CCNC: 26 U/L (ref 12–78)
ANION GAP SERPL CALCULATED.3IONS-SCNC: 5 MMOL/L (ref 4–13)
AST SERPL W P-5'-P-CCNC: 12 U/L (ref 5–45)
ATRIAL RATE: 77 BPM
BASOPHILS # BLD AUTO: 0.06 THOUSANDS/ΜL (ref 0–0.1)
BASOPHILS NFR BLD AUTO: 1 % (ref 0–1)
BILIRUB SERPL-MCNC: 0.4 MG/DL (ref 0.2–1)
BUN SERPL-MCNC: 20 MG/DL (ref 5–25)
CALCIUM SERPL-MCNC: 8.7 MG/DL (ref 8.3–10.1)
CHLORIDE SERPL-SCNC: 107 MMOL/L (ref 100–108)
CO2 SERPL-SCNC: 27 MMOL/L (ref 21–32)
CREAT SERPL-MCNC: 1.17 MG/DL (ref 0.6–1.3)
EOSINOPHIL # BLD AUTO: 0.25 THOUSAND/ΜL (ref 0–0.61)
EOSINOPHIL NFR BLD AUTO: 4 % (ref 0–6)
ERYTHROCYTE [DISTWIDTH] IN BLOOD BY AUTOMATED COUNT: 15.3 % (ref 11.6–15.1)
GFR SERPL CREATININE-BSD FRML MDRD: 65 ML/MIN/1.73SQ M
GLUCOSE P FAST SERPL-MCNC: 103 MG/DL (ref 65–99)
HCT VFR BLD AUTO: 45.2 % (ref 36.5–49.3)
HGB BLD-MCNC: 14.7 G/DL (ref 12–17)
IMM GRANULOCYTES # BLD AUTO: 0.02 THOUSAND/UL (ref 0–0.2)
IMM GRANULOCYTES NFR BLD AUTO: 0 % (ref 0–2)
LYMPHOCYTES # BLD AUTO: 1.88 THOUSANDS/ΜL (ref 0.6–4.47)
LYMPHOCYTES NFR BLD AUTO: 28 % (ref 14–44)
MCH RBC QN AUTO: 28.1 PG (ref 26.8–34.3)
MCHC RBC AUTO-ENTMCNC: 32.5 G/DL (ref 31.4–37.4)
MCV RBC AUTO: 86 FL (ref 82–98)
MONOCYTES # BLD AUTO: 0.65 THOUSAND/ΜL (ref 0.17–1.22)
MONOCYTES NFR BLD AUTO: 10 % (ref 4–12)
NEUTROPHILS # BLD AUTO: 3.79 THOUSANDS/ΜL (ref 1.85–7.62)
NEUTS SEG NFR BLD AUTO: 57 % (ref 43–75)
NRBC BLD AUTO-RTO: 0 /100 WBCS
P AXIS: 42 DEGREES
PLATELET # BLD AUTO: 226 THOUSANDS/UL (ref 149–390)
PMV BLD AUTO: 11.7 FL (ref 8.9–12.7)
POTASSIUM SERPL-SCNC: 4.4 MMOL/L (ref 3.5–5.3)
PR INTERVAL: 228 MS
PROT SERPL-MCNC: 7.3 G/DL (ref 6.4–8.2)
QRS AXIS: 5 DEGREES
QRSD INTERVAL: 84 MS
QT INTERVAL: 364 MS
QTC INTERVAL: 411 MS
RBC # BLD AUTO: 5.24 MILLION/UL (ref 3.88–5.62)
SODIUM SERPL-SCNC: 139 MMOL/L (ref 136–145)
T WAVE AXIS: 66 DEGREES
TSH SERPL DL<=0.05 MIU/L-ACNC: 2.7 UIU/ML (ref 0.36–3.74)
VENTRICULAR RATE: 77 BPM
WBC # BLD AUTO: 6.65 THOUSAND/UL (ref 4.31–10.16)

## 2020-08-14 PROCEDURE — 80053 COMPREHEN METABOLIC PANEL: CPT

## 2020-08-14 PROCEDURE — 84443 ASSAY THYROID STIM HORMONE: CPT

## 2020-08-14 PROCEDURE — 36415 COLL VENOUS BLD VENIPUNCTURE: CPT

## 2020-08-14 PROCEDURE — 85025 COMPLETE CBC W/AUTO DIFF WBC: CPT

## 2020-08-14 PROCEDURE — 93010 ELECTROCARDIOGRAM REPORT: CPT | Performed by: INTERNAL MEDICINE

## 2020-08-14 PROCEDURE — 93005 ELECTROCARDIOGRAM TRACING: CPT

## 2020-08-19 RX ORDER — FUROSEMIDE 20 MG/1
20 TABLET ORAL DAILY
Qty: 90 TABLET | Refills: 0 | Status: SHIPPED | OUTPATIENT
Start: 2020-08-19 | End: 2020-09-22

## 2020-08-20 ENCOUNTER — TELEPHONE (OUTPATIENT)
Dept: INTERNAL MEDICINE CLINIC | Facility: CLINIC | Age: 65
End: 2020-08-20

## 2020-08-20 NOTE — TELEPHONE ENCOUNTER
Folder Color-blue     Name of Form-[facility]     Form to be filled out by- DR Kendy Corado    Form to be Faxed (fax number), [phone]    Patient made aware of 10 business day policy

## 2020-08-21 NOTE — TELEPHONE ENCOUNTER
Dr George Lubin would be on clinic on 9/28  Form would be remain in [facility] clinical folder

## 2020-09-02 ENCOUNTER — TELEPHONE (OUTPATIENT)
Dept: INTERNAL MEDICINE CLINIC | Facility: CLINIC | Age: 65
End: 2020-09-02

## 2020-09-09 DIAGNOSIS — J45.901 EXACERBATION OF ASTHMA, UNSPECIFIED ASTHMA SEVERITY, UNSPECIFIED WHETHER PERSISTENT: ICD-10-CM

## 2020-09-09 RX ORDER — ALBUTEROL SULFATE 90 UG/1
2 AEROSOL, METERED RESPIRATORY (INHALATION) EVERY 6 HOURS PRN
Qty: 8.5 G | Refills: 3 | Status: SHIPPED | OUTPATIENT
Start: 2020-09-09

## 2020-09-11 DIAGNOSIS — R60.0 LOWER EXTREMITY EDEMA: ICD-10-CM

## 2020-09-22 DIAGNOSIS — M25.561 RIGHT KNEE PAIN: ICD-10-CM

## 2020-09-22 RX ORDER — DULOXETIN HYDROCHLORIDE 30 MG/1
CAPSULE, DELAYED RELEASE ORAL
Qty: 90 CAPSULE | Refills: 3 | Status: SHIPPED | OUTPATIENT
Start: 2020-09-22

## 2020-09-22 RX ORDER — FUROSEMIDE 20 MG/1
20 TABLET ORAL DAILY
Qty: 90 TABLET | Refills: 0 | Status: SHIPPED | OUTPATIENT
Start: 2020-09-22

## 2020-09-26 DIAGNOSIS — E11.9 TYPE 2 DIABETES MELLITUS WITHOUT COMPLICATION, WITHOUT LONG-TERM CURRENT USE OF INSULIN (HCC): Primary | ICD-10-CM

## 2020-09-27 RX ORDER — BLOOD-GLUCOSE METER
KIT MISCELLANEOUS
Qty: 300 EACH | Refills: 3 | Status: SHIPPED | OUTPATIENT
Start: 2020-09-27

## 2020-09-30 NOTE — TELEPHONE ENCOUNTER
Form was in clerical blue folder with signature  I attempted to fax this to [address] [place] [address]  Fax would not go through  I called and left a message at [phone] with Bob Dutta to try and get another fax number

## 2020-11-30 ENCOUNTER — CLINICAL SUPPORT (OUTPATIENT)
Dept: INTERNAL MEDICINE CLINIC | Facility: CLINIC | Age: 65
End: 2020-11-30

## 2020-11-30 DIAGNOSIS — E11.9 TYPE 2 DIABETES MELLITUS WITHOUT COMPLICATION, WITHOUT LONG-TERM CURRENT USE OF INSULIN (HCC): Primary | ICD-10-CM

## 2020-12-07 ENCOUNTER — HOSPITAL ENCOUNTER (EMERGENCY)
Facility: HOSPITAL | Age: 65
Discharge: HOME | End: 2020-12-07
Attending: EMERGENCY MEDICINE | Admitting: EMERGENCY MEDICINE
Payer: COMMERCIAL

## 2020-12-07 ENCOUNTER — APPOINTMENT (EMERGENCY)
Dept: RADIOLOGY | Facility: HOSPITAL | Age: 65
End: 2020-12-07
Payer: COMMERCIAL

## 2020-12-07 VITALS
DIASTOLIC BLOOD PRESSURE: 57 MMHG | HEIGHT: 69 IN | HEART RATE: 84 BPM | BODY MASS INDEX: 40.73 KG/M2 | RESPIRATION RATE: 18 BRPM | TEMPERATURE: 98.3 F | WEIGHT: 275 LBS | SYSTOLIC BLOOD PRESSURE: 112 MMHG | OXYGEN SATURATION: 94 %

## 2020-12-07 DIAGNOSIS — R06.02 SOB (SHORTNESS OF BREATH): ICD-10-CM

## 2020-12-07 DIAGNOSIS — J18.9 PNEUMONIA: Primary | ICD-10-CM

## 2020-12-07 DIAGNOSIS — U07.1 COVID-19: ICD-10-CM

## 2020-12-07 DIAGNOSIS — R07.1 CHEST PAIN ON BREATHING: ICD-10-CM

## 2020-12-07 LAB
ANION GAP SERPL CALCULATED.3IONS-SCNC: 5 MMOL/L (ref 4–13)
ATRIAL RATE: 87 BPM
BASOPHILS # BLD AUTO: 0.01 THOUSANDS/ΜL (ref 0–0.1)
BASOPHILS NFR BLD AUTO: 0 % (ref 0–1)
BUN SERPL-MCNC: 16 MG/DL (ref 5–25)
CALCIUM SERPL-MCNC: 9.1 MG/DL (ref 8.3–10.1)
CHLORIDE SERPL-SCNC: 102 MMOL/L (ref 100–108)
CO2 SERPL-SCNC: 30 MMOL/L (ref 21–32)
CREAT SERPL-MCNC: 0.94 MG/DL (ref 0.6–1.3)
EOSINOPHIL # BLD AUTO: 0 THOUSAND/ΜL (ref 0–0.61)
EOSINOPHIL NFR BLD AUTO: 0 % (ref 0–6)
ERYTHROCYTE [DISTWIDTH] IN BLOOD BY AUTOMATED COUNT: 14.8 % (ref 11.6–15.1)
FLUAV RNA RESP QL NAA+PROBE: NEGATIVE
FLUBV RNA RESP QL NAA+PROBE: NEGATIVE
GFR SERPL CREATININE-BSD FRML MDRD: 84 ML/MIN/1.73SQ M
GLUCOSE SERPL-MCNC: 92 MG/DL (ref 65–140)
HCT VFR BLD AUTO: 43.4 % (ref 36.5–49.3)
HGB BLD-MCNC: 13.9 G/DL (ref 12–17)
IMM GRANULOCYTES # BLD AUTO: 0.01 THOUSAND/UL (ref 0–0.2)
IMM GRANULOCYTES NFR BLD AUTO: 0 % (ref 0–2)
LYMPHOCYTES # BLD AUTO: 0.79 THOUSANDS/ΜL (ref 0.6–4.47)
LYMPHOCYTES NFR BLD AUTO: 16 % (ref 14–44)
MCH RBC QN AUTO: 27.8 PG (ref 26.8–34.3)
MCHC RBC AUTO-ENTMCNC: 32 G/DL (ref 31.4–37.4)
MCV RBC AUTO: 87 FL (ref 82–98)
MONOCYTES # BLD AUTO: 0.6 THOUSAND/ΜL (ref 0.17–1.22)
MONOCYTES NFR BLD AUTO: 12 % (ref 4–12)
NEUTROPHILS # BLD AUTO: 3.52 THOUSANDS/ΜL (ref 1.85–7.62)
NEUTS SEG NFR BLD AUTO: 72 % (ref 43–75)
NRBC BLD AUTO-RTO: 0 /100 WBCS
P AXIS: 60 DEGREES
PLATELET # BLD AUTO: 162 THOUSANDS/UL (ref 149–390)
PMV BLD AUTO: 11.3 FL (ref 8.9–12.7)
POTASSIUM SERPL-SCNC: 4.2 MMOL/L (ref 3.5–5.3)
PR INTERVAL: 218 MS
QRS AXIS: -3 DEGREES
QRSD INTERVAL: 86 MS
QT INTERVAL: 338 MS
QTC INTERVAL: 406 MS
RBC # BLD AUTO: 5 MILLION/UL (ref 3.88–5.62)
RSV RNA RESP QL NAA+PROBE: NEGATIVE
SARS-COV-2 RNA RESP QL NAA+PROBE: POSITIVE
SODIUM SERPL-SCNC: 137 MMOL/L (ref 136–145)
T WAVE AXIS: 52 DEGREES
TROPONIN I SERPL-MCNC: <0.02 NG/ML
VENTRICULAR RATE: 87 BPM
WBC # BLD AUTO: 4.93 THOUSAND/UL (ref 4.31–10.16)

## 2020-12-07 PROCEDURE — 0241U HB NFCT DS VIR RESP RNA 4 TRGT: CPT | Performed by: EMERGENCY MEDICINE

## 2020-12-07 PROCEDURE — 93005 ELECTROCARDIOGRAM TRACING: CPT

## 2020-12-07 PROCEDURE — 93010 ELECTROCARDIOGRAM REPORT: CPT | Performed by: INTERNAL MEDICINE

## 2020-12-07 PROCEDURE — 99284 EMERGENCY DEPT VISIT MOD MDM: CPT | Performed by: EMERGENCY MEDICINE

## 2020-12-07 PROCEDURE — 84484 ASSAY OF TROPONIN QUANT: CPT | Performed by: EMERGENCY MEDICINE

## 2020-12-07 PROCEDURE — 36415 COLL VENOUS BLD VENIPUNCTURE: CPT | Performed by: EMERGENCY MEDICINE

## 2020-12-07 PROCEDURE — 85025 COMPLETE CBC W/AUTO DIFF WBC: CPT | Performed by: EMERGENCY MEDICINE

## 2020-12-07 PROCEDURE — 99285 EMERGENCY DEPT VISIT HI MDM: CPT

## 2020-12-07 PROCEDURE — 71045 X-RAY EXAM CHEST 1 VIEW: CPT

## 2020-12-07 PROCEDURE — 80048 BASIC METABOLIC PNL TOTAL CA: CPT | Performed by: EMERGENCY MEDICINE

## 2020-12-07 RX ORDER — MULTIVITAMIN
1 TABLET ORAL DAILY
Qty: 10 TABLET | Refills: 0 | Status: SHIPPED | OUTPATIENT
Start: 2020-12-07

## 2020-12-07 RX ORDER — MELATONIN
2000 DAILY
Qty: 20 TABLET | Refills: 0 | Status: SHIPPED | OUTPATIENT
Start: 2020-12-07 | End: 2021-10-26

## 2020-12-07 RX ORDER — DOXYCYCLINE HYCLATE 100 MG/1
100 CAPSULE ORAL 2 TIMES DAILY
Qty: 10 CAPSULE | Refills: 0 | Status: SHIPPED | OUTPATIENT
Start: 2020-12-07 | End: 2020-12-12

## 2020-12-07 RX ORDER — MULTIVIT WITH MINERALS/LUTEIN
1000 TABLET ORAL 2 TIMES DAILY
Qty: 20 TABLET | Refills: 0 | Status: SHIPPED | OUTPATIENT
Start: 2020-12-07

## 2020-12-18 ENCOUNTER — TELEPHONE (OUTPATIENT)
Dept: OTHER | Facility: HOSPITAL | Age: 65
End: 2020-12-18

## 2020-12-28 ENCOUNTER — TELEPHONE (OUTPATIENT)
Dept: INTERNAL MEDICINE CLINIC | Facility: CLINIC | Age: 65
End: 2020-12-28

## 2021-01-05 ENCOUNTER — CLINICAL SUPPORT (OUTPATIENT)
Dept: INTERNAL MEDICINE CLINIC | Facility: CLINIC | Age: 66
End: 2021-01-05

## 2021-01-05 DIAGNOSIS — E13.69 OTHER SPECIFIED DIABETES MELLITUS WITH OTHER SPECIFIED COMPLICATION, UNSPECIFIED WHETHER LONG TERM INSULIN USE (HCC): Primary | ICD-10-CM

## 2021-01-05 NOTE — PROGRESS NOTES
Patient here today for an nurse visit for his 14 days Freestyle Dunia patch replacement  Last one placed on 12/29/2020  New patch placed subcutaneously  in left  upper arm  Patient tolerate well  Made patient to schedule an appt for next replacement in 14 days

## 2021-01-06 ENCOUNTER — TELEPHONE (OUTPATIENT)
Dept: INTERNAL MEDICINE CLINIC | Facility: CLINIC | Age: 66
End: 2021-01-06

## 2021-01-06 NOTE — TELEPHONE ENCOUNTER
Patient came into the office stating his job is requesting a letter stating he may return to work  Patient tested positive for Covid on 12/4/2020, please see previous task from Yuan  Please send to his e-mail [email or web address]  com & call him once it's been sent

## 2021-01-16 ENCOUNTER — HOSPITAL ENCOUNTER (EMERGENCY)
Facility: HOSPITAL | Age: 66
Discharge: HOME | End: 2021-01-16
Attending: EMERGENCY MEDICINE
Payer: COMMERCIAL

## 2021-01-16 VITALS
DIASTOLIC BLOOD PRESSURE: 75 MMHG | HEIGHT: 69 IN | HEART RATE: 86 BPM | TEMPERATURE: 97.9 F | OXYGEN SATURATION: 96 % | WEIGHT: 252 LBS | SYSTOLIC BLOOD PRESSURE: 129 MMHG | BODY MASS INDEX: 37.33 KG/M2 | RESPIRATION RATE: 16 BRPM

## 2021-01-16 DIAGNOSIS — E11.9 DIABETES (HCC): Primary | ICD-10-CM

## 2021-01-16 LAB — GLUCOSE SERPL-MCNC: 157 MG/DL (ref 65–140)

## 2021-01-16 PROCEDURE — 82948 REAGENT STRIP/BLOOD GLUCOSE: CPT

## 2021-01-16 PROCEDURE — 99282 EMERGENCY DEPT VISIT SF MDM: CPT | Performed by: EMERGENCY MEDICINE

## 2021-01-16 PROCEDURE — 99283 EMERGENCY DEPT VISIT LOW MDM: CPT
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
## 2021-01-16 NOTE — DISCHARGE INSTRUCTIONS
You were seen today in the Emergency Department for replacement of your diabetes monitor  Please follow up with your Primary Care Provider in the next 1-2 days to recheck your symptoms and to follow up on your visit to the Emergency Department today  Please return to the Emergency Department if you have fevers, chills, chest pain, shortness of breath, are unable to eat or drink, or have any other symptoms that concern you  Please look this over and let your nurse and/or me know if you have any further questions before you leave

## 2021-01-16 NOTE — ED ATTENDING ATTESTATION
Final Diagnosis:  1  Diabetes ([facility])           Mark Thapa MD, saw and evaluated the patient  All available labs and X-rays were ordered by me or the resident and have been reviewed by myself  I discussed the patient with the resident / non-physician and agree with the resident's / non-physician practitioner's findings and plan as documented in the resident's / non-physician practicitioner's note, except where noted  At this point, I agree with the current assessment done in the ED  I was present during key portions of all procedures performed unless otherwise stated  No chief complaint on file  This is a 77 y o  male presenting for evaluation of need for glucose sensor to be replaced  The patient has no other concerns  He just wants us to place his "freestyle adelia" patch on for him  No f/ch/n/v/cp/sob  He just wants the detector placed  PMH:   has a past medical history of Anxiety, Asthma, Cardiac disease, Diabetes mellitus ([facility]), Gout, Hyperlipidemia, Hypertension, and Shortness of breath  PSH:   has a past surgical history that includes Total hip arthroplasty (Left); Tracheostomy; Knee arthroscopy (Bilateral); Carpal tunnel release; and pr colonoscopy flx dx w/collj spec when pfrmd (N/A, 1/10/2019)  Social:  Social History     Substance and Sexual Activity   Alcohol Use Yes    Comment: occasionally     Social History     Tobacco Use   Smoking Status Former Smoker    Quit date: 2006    Years since quitting: 15 0   Smokeless Tobacco Never Used     Social History     Substance and Sexual Activity   Drug Use No     PE:  Vitals:    01/16/21 0832   BP: 129/75   Pulse: 86   Resp: 16   Temp: 97 9 °F (36 6 °C)   TempSrc: Tympanic   SpO2: 96%   Weight: 114 kg (252 lb)   Height: 5' 9" (1 753 m)   General: VS reviewed  Appears in NAD  awake, alert  Well-nourished, well-developed  Appears stated age  Speaking normally in full sentences     Head: Normocephalic, atraumatic  Eyes: EOM-I  No diplopia  No hyphema  No subconjunctival hemorrhages  Symmetrical lids  ENT: Atraumatic external nose and ears  MMM  No malocclusion  No stridor  Normal phonation  No drooling  Normal swallowing  Neck: No JVD  CV: No pallor noted  Lungs:   No tachypnea  No respiratory distress  MSK:   FROM spontaneously  Skin: Dry, intact  Neuro: Awake, alert, GCS15, CN II-XII grossly intact  Motor grossly intact  Psychiatric/Behavioral: Appropriate mood and affect   Exam: deferred  A:  - Glucose sensor placement  P:  - I guess we'll place it for him  - 13 point ROS was performed and all are normal unless stated in the history above  - Nursing note reviewed  Vitals reviewed  - Orders placed by myself and/or advanced practitioner / resident     - Previous chart was reviewed  - No language barrier    - History obtained from patient  - There are no limitations to the history obtained  - Critical care time: Not applicable for this patient  Code Status: No Order  Advance Directive and Living Will:      Power of :    POLST:      Medications - No data to display  No orders to display     No orders of the defined types were placed in this encounter  Labs Reviewed   POCT GLUCOSE - Abnormal       Result Value Ref Range Status    POC Glucose 157 (*) 65 - 140 mg/dl Final     Time reflects when diagnosis was documented in both MDM as applicable and the Disposition within this note     Time User Action Codes Description Comment    1/16/2021  8:40 AM Steve Kemp Add [E11 9] Diabetes [facility])       ED Disposition     ED Disposition Condition Date/Time Comment    Discharge Stable Sat Jan 16, 2021  8:39 AM Adam Shayan discharge to home/self care  Return precautions given and questions answered                    Follow-up Information     Follow up With Specialties Details Why Contact Info    [facility]  Call in 1 day To recheck symptoms and follow up on your ER visit [phone] Patient's Medications   Discharge Prescriptions    No medications on file     No discharge procedures on file  Prior to Admission Medications   Prescriptions Last Dose Informant Patient Reported? Taking? ADVAIR DISKUS 250-50 MCG/DOSE inhaler   No No   Sig: INHALE 1 PUFF 2 (TWO) TIMES A DAY RINSE MOUTH AFTER USE     Alcohol Swabs (ALCOHOL PADS) 70 % PADS   Yes No   Sig: Use as directed   Ascorbic Acid (vitamin C) 1000 MG tablet   No No   Sig: Take 1 tablet (1,000 mg total) by mouth 2 (two) times a day   Blood Glucose Monitoring Suppl (FREESTYLE LITE) ANGE   Yes No   Sig: Use as directed   DULoxetine (CYMBALTA) 30 mg delayed release capsule   No No   Sig: TAKE ONE CAPSULE (30 MG TOTAL) BY MOUTH DAILY   Multiple Vitamin (multivitamin) tablet   No No   Sig: Take 1 tablet by mouth daily   Semaglutide, 1 MG/DOSE, (OZEMPIC, 1 MG/DOSE,) 2 MG/1 5ML SOPN   Yes No   Sig: Inject 1 mg under the skin   Zolpidem Tartrate (AMBIEN PO)   Yes No   Sig: Take by mouth   albuterol (PROVENTIL HFA,VENTOLIN HFA) 90 mcg/act inhaler   No No   Sig: INHALE 2 PUFFS EVERY 6 (SIX) HOURS AS NEEDED FOR WHEEZING OR SHORTNESS OF BREATH   allopurinol (ZYLOPRIM) 300 mg tablet   Yes No   Sig: JENNIFER 1 TABLETA POR VIA ORAL DIARIAMENTE   atorvastatin (LIPITOR) 40 mg tablet   No No   Sig: Take 1 tablet (40 mg total) by mouth daily   cholecalciferol (VITAMIN D3) 1,000 units tablet   No No   Sig: Take 2 tablets (2,000 Units total) by mouth daily   cholecalciferol (VITAMIN D3) 1,000 units tablet   No No   Sig: Take 2 tablets (2,000 Units total) by mouth daily   diclofenac sodium (VOLTAREN) 1 %   No No   Sig: APPLY 2 G TOPICALLY 4 (FOUR) TIMES A DAY   ergocalciferol (ERGOCALCIFEROL) 1 25 MG (16317 UT) capsule   No No   Sig: Take 1 capsule (50,000 Units total) by mouth once a week   Patient not taking: Reported on 7/10/2020   furosemide (LASIX) 20 mg tablet   No No   Sig: TAKE 1 TABLET (20 MG TOTAL) BY MOUTH DAILY   glucose blood (FREESTYLE LITE) test strip No No   Sig: USE AS DIRECTED A / HASTA CHECK BLOOD GLUCOSE   metFORMIN (GLUCOPHAGE-XR) 750 mg 24 hr tablet   Yes No   Sig: JENNIFER 1 TABLETA POR VIA ORAL DIARIAMENTE      Facility-Administered Medications: None       Portions of the record may have been created with voice recognition software  Occasional wrong word or "sound a like" substitutions may have occurred due to the inherent limitations of voice recognition software  Read the chart carefully and recognize, using context, where substitutions have occurred      Electronically signed by:  Prieto Medrano

## 2021-02-04 ENCOUNTER — TRANSCRIBE ORDERS (OUTPATIENT)
Dept: LAB | Facility: HOSPITAL | Age: 66
End: 2021-02-04

## 2021-02-10 ENCOUNTER — TRANSCRIBE ORDERS (OUTPATIENT)
Dept: LAB | Facility: HOSPITAL | Age: 66
End: 2021-02-10

## 2021-02-10 ENCOUNTER — LAB (OUTPATIENT)
Dept: LAB | Facility: HOSPITAL | Age: 66
End: 2021-02-10
Payer: COMMERCIAL

## 2021-02-10 DIAGNOSIS — E55.9 AVITAMINOSIS D: ICD-10-CM

## 2021-02-10 DIAGNOSIS — E78.2 MIXED HYPERLIPIDEMIA: ICD-10-CM

## 2021-02-10 DIAGNOSIS — E08.42 DIABETIC POLYNEUROPATHY ASSOCIATED WITH DIABETES MELLITUS DUE TO UNDERLYING CONDITION (HCC): ICD-10-CM

## 2021-02-10 DIAGNOSIS — M10.9 GOUT, UNSPECIFIED CAUSE, UNSPECIFIED CHRONICITY, UNSPECIFIED SITE: ICD-10-CM

## 2021-02-10 DIAGNOSIS — M10.9 GOUT, UNSPECIFIED CAUSE, UNSPECIFIED CHRONICITY, UNSPECIFIED SITE: Primary | ICD-10-CM

## 2021-02-10 LAB
25(OH)D3 SERPL-MCNC: 91.3 NG/ML (ref 30–100)
ALBUMIN SERPL BCP-MCNC: 3.5 G/DL (ref 3.5–5)
ALP SERPL-CCNC: 71 U/L (ref 46–116)
ALT SERPL W P-5'-P-CCNC: 30 U/L (ref 12–78)
ANION GAP SERPL CALCULATED.3IONS-SCNC: 7 MMOL/L (ref 4–13)
AST SERPL W P-5'-P-CCNC: 15 U/L (ref 5–45)
BILIRUB SERPL-MCNC: 0.45 MG/DL (ref 0.2–1)
BUN SERPL-MCNC: 17 MG/DL (ref 5–25)
CALCIUM SERPL-MCNC: 8.8 MG/DL (ref 8.3–10.1)
CHLORIDE SERPL-SCNC: 111 MMOL/L (ref 100–108)
CHOLEST SERPL-MCNC: 177 MG/DL (ref 50–200)
CO2 SERPL-SCNC: 25 MMOL/L (ref 21–32)
CREAT SERPL-MCNC: 0.83 MG/DL (ref 0.6–1.3)
CREAT UR-MCNC: 143 MG/DL
EST. AVERAGE GLUCOSE BLD GHB EST-MCNC: 120 MG/DL
GFR SERPL CREATININE-BSD FRML MDRD: 91 ML/MIN/1.73SQ M
GLUCOSE P FAST SERPL-MCNC: 96 MG/DL (ref 65–99)
HBA1C MFR BLD: 5.8 %
HDLC SERPL-MCNC: 52 MG/DL
LDLC SERPL CALC-MCNC: 109 MG/DL (ref 0–100)
MICROALBUMIN UR-MCNC: 8.4 MG/L (ref 0–20)
MICROALBUMIN/CREAT 24H UR: 6 MG/G CREATININE (ref 0–30)
NONHDLC SERPL-MCNC: 125 MG/DL
POTASSIUM SERPL-SCNC: 4.1 MMOL/L (ref 3.5–5.3)
PROT SERPL-MCNC: 6.7 G/DL (ref 6.4–8.2)
SODIUM SERPL-SCNC: 143 MMOL/L (ref 136–145)
TRIGL SERPL-MCNC: 78 MG/DL
URATE SERPL-MCNC: 7.3 MG/DL (ref 4.2–8)

## 2021-02-10 PROCEDURE — 83036 HEMOGLOBIN GLYCOSYLATED A1C: CPT

## 2021-02-10 PROCEDURE — 36415 COLL VENOUS BLD VENIPUNCTURE: CPT

## 2021-02-10 PROCEDURE — 82570 ASSAY OF URINE CREATININE: CPT

## 2021-02-10 PROCEDURE — 82306 VITAMIN D 25 HYDROXY: CPT

## 2021-02-10 PROCEDURE — 82043 UR ALBUMIN QUANTITATIVE: CPT

## 2021-02-10 PROCEDURE — 80053 COMPREHEN METABOLIC PANEL: CPT

## 2021-02-10 PROCEDURE — 84550 ASSAY OF BLOOD/URIC ACID: CPT

## 2021-02-10 PROCEDURE — 80061 LIPID PANEL: CPT

## 2021-06-02 ENCOUNTER — VBI (OUTPATIENT)
Dept: ADMINISTRATIVE | Facility: OTHER | Age: 66
End: 2021-06-02

## 2021-07-12 ENCOUNTER — VBI (OUTPATIENT)
Dept: ADMINISTRATIVE | Facility: OTHER | Age: 66
End: 2021-07-12

## 2021-08-18 ENCOUNTER — APPOINTMENT (OUTPATIENT)
Dept: LAB | Facility: HOSPITAL | Age: 66
End: 2021-08-18
Payer: COMMERCIAL

## 2021-08-18 DIAGNOSIS — E08.00 DIABETES MELLITUS DUE TO UNDERLYING CONDITION WITH HYPEROSMOLARITY WITHOUT COMA, WITHOUT LONG-TERM CURRENT USE OF INSULIN (HCC): ICD-10-CM

## 2021-08-18 DIAGNOSIS — M10.372 GOUT DUE TO RENAL IMPAIRMENT, LEFT ANKLE AND FOOT: ICD-10-CM

## 2021-08-18 DIAGNOSIS — E78.00 PURE HYPERCHOLESTEROLEMIA: ICD-10-CM

## 2021-08-18 DIAGNOSIS — F52.21 ERECTILE DYSFUNCTION OF NONORGANIC ORIGIN: ICD-10-CM

## 2021-08-18 LAB
ALBUMIN SERPL BCP-MCNC: 3.3 G/DL (ref 3.5–5)
ALP SERPL-CCNC: 60 U/L (ref 46–116)
ALT SERPL W P-5'-P-CCNC: 27 U/L (ref 12–78)
ANION GAP SERPL CALCULATED.3IONS-SCNC: 1 MMOL/L (ref 4–13)
AST SERPL W P-5'-P-CCNC: 17 U/L (ref 5–45)
BILIRUB SERPL-MCNC: 0.45 MG/DL (ref 0.2–1)
BUN SERPL-MCNC: 20 MG/DL (ref 5–25)
CALCIUM ALBUM COR SERPL-MCNC: 9.2 MG/DL (ref 8.3–10.1)
CALCIUM SERPL-MCNC: 8.6 MG/DL (ref 8.3–10.1)
CHLORIDE SERPL-SCNC: 108 MMOL/L (ref 100–108)
CHOLEST SERPL-MCNC: 232 MG/DL (ref 50–200)
CO2 SERPL-SCNC: 29 MMOL/L (ref 21–32)
CREAT SERPL-MCNC: 1.06 MG/DL (ref 0.6–1.3)
EST. AVERAGE GLUCOSE BLD GHB EST-MCNC: 126 MG/DL
FSH SERPL-ACNC: 5.3 MIU/ML (ref 0.7–10.8)
GFR SERPL CREATININE-BSD FRML MDRD: 72 ML/MIN/1.73SQ M
GLUCOSE P FAST SERPL-MCNC: 87 MG/DL (ref 65–99)
HBA1C MFR BLD: 6 %
HDLC SERPL-MCNC: 42 MG/DL
LDLC SERPL CALC-MCNC: 161 MG/DL (ref 0–100)
LH SERPL-ACNC: 4.7 MIU/ML (ref 1.2–10.6)
NONHDLC SERPL-MCNC: 190 MG/DL
POTASSIUM SERPL-SCNC: 4.2 MMOL/L (ref 3.5–5.3)
PROLACTIN SERPL-MCNC: 31.2 NG/ML (ref 2.5–17.4)
PROT SERPL-MCNC: 6.8 G/DL (ref 6.4–8.2)
SODIUM SERPL-SCNC: 138 MMOL/L (ref 136–145)
TESTOST SERPL-MCNC: 166 NG/DL (ref 95–948)
TRIGL SERPL-MCNC: 147 MG/DL
TSH SERPL DL<=0.05 MIU/L-ACNC: 1.89 UIU/ML (ref 0.36–3.74)
URATE SERPL-MCNC: 8.3 MG/DL (ref 4.2–8)

## 2021-08-18 PROCEDURE — 80061 LIPID PANEL: CPT

## 2021-08-18 PROCEDURE — 84403 ASSAY OF TOTAL TESTOSTERONE: CPT

## 2021-08-18 PROCEDURE — 83036 HEMOGLOBIN GLYCOSYLATED A1C: CPT

## 2021-08-18 PROCEDURE — 83002 ASSAY OF GONADOTROPIN (LH): CPT

## 2021-08-18 PROCEDURE — 84550 ASSAY OF BLOOD/URIC ACID: CPT

## 2021-08-18 PROCEDURE — 84443 ASSAY THYROID STIM HORMONE: CPT

## 2021-08-18 PROCEDURE — 84146 ASSAY OF PROLACTIN: CPT

## 2021-08-18 PROCEDURE — 83001 ASSAY OF GONADOTROPIN (FSH): CPT

## 2021-08-18 PROCEDURE — 80053 COMPREHEN METABOLIC PANEL: CPT

## 2021-08-18 PROCEDURE — 36415 COLL VENOUS BLD VENIPUNCTURE: CPT

## 2021-10-26 ENCOUNTER — OFFICE VISIT (OUTPATIENT)
Dept: FAMILY MEDICINE CLINIC | Facility: CLINIC | Age: 66
End: 2021-10-26
Payer: COMMERCIAL

## 2021-10-26 VITALS
WEIGHT: 268 LBS | DIASTOLIC BLOOD PRESSURE: 78 MMHG | OXYGEN SATURATION: 97 % | HEART RATE: 76 BPM | TEMPERATURE: 98.7 F | SYSTOLIC BLOOD PRESSURE: 130 MMHG | BODY MASS INDEX: 40.62 KG/M2 | HEIGHT: 68 IN | RESPIRATION RATE: 20 BRPM

## 2021-10-26 DIAGNOSIS — J45.40 MODERATE PERSISTENT ASTHMA WITHOUT COMPLICATION: ICD-10-CM

## 2021-10-26 DIAGNOSIS — E11.69 TYPE 2 DIABETES MELLITUS WITH OTHER SPECIFIED COMPLICATION, WITHOUT LONG-TERM CURRENT USE OF INSULIN (HCC): ICD-10-CM

## 2021-10-26 DIAGNOSIS — N30.90 CYSTITIS: ICD-10-CM

## 2021-10-26 DIAGNOSIS — G89.29 CHRONIC PAIN OF LEFT KNEE: ICD-10-CM

## 2021-10-26 DIAGNOSIS — K59.09 OTHER CONSTIPATION: ICD-10-CM

## 2021-10-26 DIAGNOSIS — M1A.4610 OTHER SECONDARY CHRONIC GOUT OF RIGHT KNEE WITHOUT TOPHUS: ICD-10-CM

## 2021-10-26 DIAGNOSIS — E66.01 CLASS 2 SEVERE OBESITY DUE TO EXCESS CALORIES WITH SERIOUS COMORBIDITY AND BODY MASS INDEX (BMI) OF 38.0 TO 38.9 IN ADULT (HCC): ICD-10-CM

## 2021-10-26 DIAGNOSIS — Z12.5 SCREENING FOR MALIGNANT NEOPLASM OF PROSTATE: Primary | ICD-10-CM

## 2021-10-26 DIAGNOSIS — F51.01 PRIMARY INSOMNIA: ICD-10-CM

## 2021-10-26 DIAGNOSIS — M25.562 CHRONIC PAIN OF LEFT KNEE: ICD-10-CM

## 2021-10-26 PROBLEM — E66.812 CLASS 2 SEVERE OBESITY DUE TO EXCESS CALORIES WITH SERIOUS COMORBIDITY AND BODY MASS INDEX (BMI) OF 38.0 TO 38.9 IN ADULT (HCC): Status: ACTIVE | Noted: 2021-10-26

## 2021-10-26 PROCEDURE — 1160F RVW MEDS BY RX/DR IN RCRD: CPT | Performed by: FAMILY MEDICINE

## 2021-10-26 PROCEDURE — 99204 OFFICE O/P NEW MOD 45 MIN: CPT | Performed by: FAMILY MEDICINE

## 2021-10-26 PROCEDURE — 1036F TOBACCO NON-USER: CPT | Performed by: FAMILY MEDICINE

## 2021-10-26 PROCEDURE — 3725F SCREEN DEPRESSION PERFORMED: CPT | Performed by: FAMILY MEDICINE

## 2021-10-26 PROCEDURE — 3008F BODY MASS INDEX DOCD: CPT | Performed by: FAMILY MEDICINE

## 2021-10-26 RX ORDER — ATORVASTATIN CALCIUM 80 MG/1
TABLET, FILM COATED ORAL
COMMUNITY
Start: 2021-09-17

## 2021-10-26 RX ORDER — POLYETHYLENE GLYCOL 3350 17 G/17G
17 POWDER, FOR SOLUTION ORAL DAILY PRN
Qty: 507 G | Refills: 1 | Status: SHIPPED | OUTPATIENT
Start: 2021-10-26

## 2021-10-26 RX ORDER — TRAZODONE HYDROCHLORIDE 50 MG/1
50 TABLET ORAL
Qty: 30 TABLET | Refills: 1 | Status: SHIPPED | OUTPATIENT
Start: 2021-10-26

## 2021-10-26 RX ORDER — GABAPENTIN 300 MG/1
CAPSULE ORAL
COMMUNITY
Start: 2021-08-23

## 2021-10-28 ENCOUNTER — APPOINTMENT (OUTPATIENT)
Dept: LAB | Facility: HOSPITAL | Age: 66
End: 2021-10-28
Attending: FAMILY MEDICINE
Payer: COMMERCIAL

## 2021-10-28 DIAGNOSIS — N30.90 CYSTITIS: ICD-10-CM

## 2021-10-28 DIAGNOSIS — G89.29 CHRONIC PAIN OF LEFT KNEE: ICD-10-CM

## 2021-10-28 DIAGNOSIS — Z12.5 SCREENING FOR MALIGNANT NEOPLASM OF PROSTATE: ICD-10-CM

## 2021-10-28 DIAGNOSIS — M25.562 CHRONIC PAIN OF LEFT KNEE: ICD-10-CM

## 2021-10-28 LAB
BILIRUB UR QL STRIP: NEGATIVE
CLARITY UR: CLEAR
COLOR UR: YELLOW
GLUCOSE UR STRIP-MCNC: NEGATIVE MG/DL
HGB UR QL STRIP.AUTO: NEGATIVE
KETONES UR STRIP-MCNC: NEGATIVE MG/DL
LEUKOCYTE ESTERASE UR QL STRIP: NEGATIVE
NITRITE UR QL STRIP: NEGATIVE
PH UR STRIP.AUTO: 5.5 [PH]
PROT UR STRIP-MCNC: NEGATIVE MG/DL
SP GR UR STRIP.AUTO: 1.02 (ref 1–1.03)
URATE SERPL-MCNC: 8.1 MG/DL (ref 4.2–8)
UROBILINOGEN UR QL STRIP.AUTO: 0.2 E.U./DL

## 2021-10-28 PROCEDURE — 36415 COLL VENOUS BLD VENIPUNCTURE: CPT

## 2021-10-28 PROCEDURE — 81003 URINALYSIS AUTO W/O SCOPE: CPT

## 2021-10-28 PROCEDURE — 87591 N.GONORRHOEAE DNA AMP PROB: CPT

## 2021-10-28 PROCEDURE — G0103 PSA SCREENING: HCPCS

## 2021-10-28 PROCEDURE — 84550 ASSAY OF BLOOD/URIC ACID: CPT

## 2021-10-28 PROCEDURE — 87491 CHLMYD TRACH DNA AMP PROBE: CPT

## 2021-10-29 LAB
C TRACH DNA SPEC QL NAA+PROBE: NEGATIVE
N GONORRHOEA DNA SPEC QL NAA+PROBE: NEGATIVE
PSA FREE MFR SERPL: 18.5 %
PSA FREE SERPL-MCNC: 0.24 NG/ML
PSA SERPL-MCNC: 1.3 NG/ML (ref 0–4)

## 2021-11-04 ENCOUNTER — OFFICE VISIT (OUTPATIENT)
Dept: OBGYN CLINIC | Facility: CLINIC | Age: 66
End: 2021-11-04
Payer: COMMERCIAL

## 2021-11-04 VITALS — BODY MASS INDEX: 41.68 KG/M2 | WEIGHT: 275 LBS | HEIGHT: 68 IN

## 2021-11-04 DIAGNOSIS — M25.562 CHRONIC PAIN OF LEFT KNEE: ICD-10-CM

## 2021-11-04 DIAGNOSIS — G89.29 CHRONIC PAIN OF LEFT KNEE: ICD-10-CM

## 2021-11-04 DIAGNOSIS — E66.01 MORBID OBESITY (HCC): ICD-10-CM

## 2021-11-04 DIAGNOSIS — E11.69 TYPE 2 DIABETES MELLITUS WITH OTHER SPECIFIED COMPLICATION, UNSPECIFIED WHETHER LONG TERM INSULIN USE (HCC): ICD-10-CM

## 2021-11-04 DIAGNOSIS — M17.0 ARTHRITIS OF BOTH KNEES: Primary | ICD-10-CM

## 2021-11-04 DIAGNOSIS — M17.12 PRIMARY OSTEOARTHRITIS OF LEFT KNEE: ICD-10-CM

## 2021-11-04 DIAGNOSIS — M17.11 PRIMARY OSTEOARTHRITIS OF RIGHT KNEE: ICD-10-CM

## 2021-11-04 PROCEDURE — 20610 DRAIN/INJ JOINT/BURSA W/O US: CPT | Performed by: ORTHOPAEDIC SURGERY

## 2021-11-04 PROCEDURE — 99214 OFFICE O/P EST MOD 30 MIN: CPT | Performed by: ORTHOPAEDIC SURGERY

## 2021-11-04 RX ORDER — METHYLPREDNISOLONE ACETATE 40 MG/ML
2 INJECTION, SUSPENSION INTRA-ARTICULAR; INTRALESIONAL; INTRAMUSCULAR; SOFT TISSUE
Status: COMPLETED | OUTPATIENT
Start: 2021-11-04 | End: 2021-11-04

## 2021-11-04 RX ORDER — LIDOCAINE HYDROCHLORIDE 10 MG/ML
3 INJECTION, SOLUTION INFILTRATION; PERINEURAL
Status: COMPLETED | OUTPATIENT
Start: 2021-11-04 | End: 2021-11-04

## 2021-11-04 RX ORDER — NAPROXEN 500 MG/1
500 TABLET ORAL 2 TIMES DAILY PRN
Qty: 60 TABLET | Refills: 1 | Status: SHIPPED | OUTPATIENT
Start: 2021-11-04

## 2021-11-04 RX ORDER — NAPROXEN 500 MG/1
500 TABLET ORAL 2 TIMES DAILY PRN
Qty: 60 TABLET | Refills: 1 | Status: SHIPPED | OUTPATIENT
Start: 2021-11-04 | End: 2021-12-27

## 2021-11-04 RX ADMIN — METHYLPREDNISOLONE ACETATE 2 ML: 40 INJECTION, SUSPENSION INTRA-ARTICULAR; INTRALESIONAL; INTRAMUSCULAR; SOFT TISSUE at 12:38

## 2021-11-04 RX ADMIN — LIDOCAINE HYDROCHLORIDE 3 ML: 10 INJECTION, SOLUTION INFILTRATION; PERINEURAL at 12:38

## 2021-11-23 ENCOUNTER — OFFICE VISIT (OUTPATIENT)
Dept: FAMILY MEDICINE CLINIC | Facility: CLINIC | Age: 66
End: 2021-11-23
Payer: COMMERCIAL

## 2021-11-23 VITALS
OXYGEN SATURATION: 95 % | WEIGHT: 264.4 LBS | BODY MASS INDEX: 40.07 KG/M2 | TEMPERATURE: 98.4 F | SYSTOLIC BLOOD PRESSURE: 142 MMHG | HEIGHT: 68 IN | DIASTOLIC BLOOD PRESSURE: 72 MMHG | HEART RATE: 76 BPM | RESPIRATION RATE: 20 BRPM

## 2021-11-23 DIAGNOSIS — Z00.00 MEDICARE ANNUAL WELLNESS VISIT, SUBSEQUENT: Primary | ICD-10-CM

## 2021-11-23 DIAGNOSIS — Z23 ENCOUNTER FOR IMMUNIZATION: ICD-10-CM

## 2021-11-23 DIAGNOSIS — Z13.6 SCREENING FOR AAA (ABDOMINAL AORTIC ANEURYSM): ICD-10-CM

## 2021-11-23 PROCEDURE — 1125F AMNT PAIN NOTED PAIN PRSNT: CPT | Performed by: FAMILY MEDICINE

## 2021-11-23 PROCEDURE — G0008 ADMIN INFLUENZA VIRUS VAC: HCPCS

## 2021-11-23 PROCEDURE — 1170F FXNL STATUS ASSESSED: CPT | Performed by: FAMILY MEDICINE

## 2021-11-23 PROCEDURE — 3288F FALL RISK ASSESSMENT DOCD: CPT | Performed by: FAMILY MEDICINE

## 2021-11-23 PROCEDURE — 1101F PT FALLS ASSESS-DOCD LE1/YR: CPT | Performed by: FAMILY MEDICINE

## 2021-11-23 PROCEDURE — 1160F RVW MEDS BY RX/DR IN RCRD: CPT | Performed by: FAMILY MEDICINE

## 2021-11-23 PROCEDURE — G0439 PPPS, SUBSEQ VISIT: HCPCS | Performed by: FAMILY MEDICINE

## 2021-11-23 PROCEDURE — 3008F BODY MASS INDEX DOCD: CPT | Performed by: FAMILY MEDICINE

## 2021-11-23 PROCEDURE — 3725F SCREEN DEPRESSION PERFORMED: CPT | Performed by: FAMILY MEDICINE

## 2021-11-23 PROCEDURE — 1036F TOBACCO NON-USER: CPT | Performed by: FAMILY MEDICINE

## 2021-11-23 PROCEDURE — 90662 IIV NO PRSV INCREASED AG IM: CPT

## 2021-11-26 ENCOUNTER — TRANSCRIBE ORDERS (OUTPATIENT)
Dept: LAB | Facility: HOSPITAL | Age: 66
End: 2021-11-26

## 2021-11-26 ENCOUNTER — APPOINTMENT (OUTPATIENT)
Dept: LAB | Facility: HOSPITAL | Age: 66
End: 2021-11-26
Payer: COMMERCIAL

## 2021-11-26 DIAGNOSIS — E22.1 HYPERPROLACTINEMIA (HCC): ICD-10-CM

## 2021-11-26 DIAGNOSIS — E78.2 MIXED DYSLIPIDEMIA: ICD-10-CM

## 2021-11-26 DIAGNOSIS — E11.69 TYPE 2 DIABETES MELLITUS WITH OTHER SPECIFIED COMPLICATION, WITHOUT LONG-TERM CURRENT USE OF INSULIN (HCC): Primary | ICD-10-CM

## 2021-11-26 DIAGNOSIS — M10.9 GOUT OF BOTH FEET: ICD-10-CM

## 2021-11-26 DIAGNOSIS — E29.1 HYPOGONADISM IN MALE: ICD-10-CM

## 2021-11-26 DIAGNOSIS — E11.69 TYPE 2 DIABETES MELLITUS WITH OTHER SPECIFIED COMPLICATION, WITHOUT LONG-TERM CURRENT USE OF INSULIN (HCC): ICD-10-CM

## 2021-11-26 DIAGNOSIS — E55.9 VITAMIN D DEFICIENCY: ICD-10-CM

## 2021-11-26 LAB
25(OH)D3 SERPL-MCNC: 40.7 NG/ML (ref 30–100)
ALBUMIN SERPL BCP-MCNC: 3.5 G/DL (ref 3.5–5)
ALP SERPL-CCNC: 67 U/L (ref 46–116)
ALT SERPL W P-5'-P-CCNC: 32 U/L (ref 12–78)
ANION GAP SERPL CALCULATED.3IONS-SCNC: 6 MMOL/L (ref 4–13)
AST SERPL W P-5'-P-CCNC: 18 U/L (ref 5–45)
BASOPHILS # BLD AUTO: 0.06 THOUSANDS/ΜL (ref 0–0.1)
BASOPHILS NFR BLD AUTO: 1 % (ref 0–1)
BILIRUB SERPL-MCNC: 0.94 MG/DL (ref 0.2–1)
BUN SERPL-MCNC: 21 MG/DL (ref 5–25)
CALCIUM SERPL-MCNC: 8.6 MG/DL (ref 8.3–10.1)
CHLORIDE SERPL-SCNC: 107 MMOL/L (ref 100–108)
CHOLEST SERPL-MCNC: 127 MG/DL
CO2 SERPL-SCNC: 26 MMOL/L (ref 21–32)
CREAT SERPL-MCNC: 0.97 MG/DL (ref 0.6–1.3)
EOSINOPHIL # BLD AUTO: 0.23 THOUSAND/ΜL (ref 0–0.61)
EOSINOPHIL NFR BLD AUTO: 4 % (ref 0–6)
ERYTHROCYTE [DISTWIDTH] IN BLOOD BY AUTOMATED COUNT: 14.9 % (ref 11.6–15.1)
EST. AVERAGE GLUCOSE BLD GHB EST-MCNC: 128 MG/DL
GFR SERPL CREATININE-BSD FRML MDRD: 80 ML/MIN/1.73SQ M
GLUCOSE P FAST SERPL-MCNC: 87 MG/DL (ref 65–99)
HBA1C MFR BLD: 6.1 %
HCT VFR BLD AUTO: 43.7 % (ref 36.5–49.3)
HDLC SERPL-MCNC: 58 MG/DL
HGB BLD-MCNC: 14.4 G/DL (ref 12–17)
IMM GRANULOCYTES # BLD AUTO: 0.02 THOUSAND/UL (ref 0–0.2)
IMM GRANULOCYTES NFR BLD AUTO: 0 % (ref 0–2)
LDLC SERPL CALC-MCNC: 53 MG/DL (ref 0–100)
LYMPHOCYTES # BLD AUTO: 1.58 THOUSANDS/ΜL (ref 0.6–4.47)
LYMPHOCYTES NFR BLD AUTO: 25 % (ref 14–44)
MCH RBC QN AUTO: 28.1 PG (ref 26.8–34.3)
MCHC RBC AUTO-ENTMCNC: 33 G/DL (ref 31.4–37.4)
MCV RBC AUTO: 85 FL (ref 82–98)
MONOCYTES # BLD AUTO: 0.58 THOUSAND/ΜL (ref 0.17–1.22)
MONOCYTES NFR BLD AUTO: 9 % (ref 4–12)
NEUTROPHILS # BLD AUTO: 3.95 THOUSANDS/ΜL (ref 1.85–7.62)
NEUTS SEG NFR BLD AUTO: 61 % (ref 43–75)
NONHDLC SERPL-MCNC: 69 MG/DL
NRBC BLD AUTO-RTO: 0 /100 WBCS
PLATELET # BLD AUTO: 192 THOUSANDS/UL (ref 149–390)
PMV BLD AUTO: 11.8 FL (ref 8.9–12.7)
POTASSIUM SERPL-SCNC: 3.9 MMOL/L (ref 3.5–5.3)
PROLACTIN SERPL-MCNC: 29.9 NG/ML (ref 2.5–17.4)
PROT SERPL-MCNC: 6.6 G/DL (ref 6.4–8.2)
RBC # BLD AUTO: 5.13 MILLION/UL (ref 3.88–5.62)
SODIUM SERPL-SCNC: 139 MMOL/L (ref 136–145)
TRIGL SERPL-MCNC: 81 MG/DL
URATE SERPL-MCNC: 4.9 MG/DL (ref 4.2–8)
WBC # BLD AUTO: 6.42 THOUSAND/UL (ref 4.31–10.16)

## 2021-11-26 PROCEDURE — 80061 LIPID PANEL: CPT

## 2021-11-26 PROCEDURE — 82306 VITAMIN D 25 HYDROXY: CPT

## 2021-11-26 PROCEDURE — 85025 COMPLETE CBC W/AUTO DIFF WBC: CPT

## 2021-11-26 PROCEDURE — 3044F HG A1C LEVEL LT 7.0%: CPT | Performed by: FAMILY MEDICINE

## 2021-11-26 PROCEDURE — 84550 ASSAY OF BLOOD/URIC ACID: CPT

## 2021-11-26 PROCEDURE — 84146 ASSAY OF PROLACTIN: CPT

## 2021-11-26 PROCEDURE — 84402 ASSAY OF FREE TESTOSTERONE: CPT

## 2021-11-26 PROCEDURE — 80053 COMPREHEN METABOLIC PANEL: CPT

## 2021-11-26 PROCEDURE — 84403 ASSAY OF TOTAL TESTOSTERONE: CPT

## 2021-11-26 PROCEDURE — 36415 COLL VENOUS BLD VENIPUNCTURE: CPT

## 2021-11-26 PROCEDURE — 83036 HEMOGLOBIN GLYCOSYLATED A1C: CPT

## 2021-11-27 LAB
TESTOST FREE SERPL-MCNC: 5.3 PG/ML (ref 6.6–18.1)
TESTOST SERPL-MCNC: 213 NG/DL (ref 264–916)

## 2021-12-16 ENCOUNTER — OFFICE VISIT (OUTPATIENT)
Dept: OBGYN CLINIC | Facility: CLINIC | Age: 66
End: 2021-12-16
Payer: COMMERCIAL

## 2021-12-16 VITALS
SYSTOLIC BLOOD PRESSURE: 118 MMHG | WEIGHT: 262 LBS | HEIGHT: 68 IN | HEART RATE: 70 BPM | DIASTOLIC BLOOD PRESSURE: 72 MMHG | TEMPERATURE: 97.8 F | BODY MASS INDEX: 39.71 KG/M2

## 2021-12-16 DIAGNOSIS — M17.0 BILATERAL PRIMARY OSTEOARTHRITIS OF KNEE: Primary | ICD-10-CM

## 2021-12-16 PROCEDURE — 3008F BODY MASS INDEX DOCD: CPT | Performed by: ORTHOPAEDIC SURGERY

## 2021-12-16 PROCEDURE — 99213 OFFICE O/P EST LOW 20 MIN: CPT | Performed by: ORTHOPAEDIC SURGERY

## 2021-12-16 PROCEDURE — 1036F TOBACCO NON-USER: CPT | Performed by: ORTHOPAEDIC SURGERY

## 2021-12-24 DIAGNOSIS — M17.12 PRIMARY OSTEOARTHRITIS OF LEFT KNEE: ICD-10-CM

## 2021-12-24 DIAGNOSIS — M17.0 ARTHRITIS OF BOTH KNEES: ICD-10-CM

## 2021-12-24 DIAGNOSIS — M17.11 PRIMARY OSTEOARTHRITIS OF RIGHT KNEE: ICD-10-CM

## 2021-12-24 DIAGNOSIS — G89.29 CHRONIC PAIN OF LEFT KNEE: ICD-10-CM

## 2021-12-24 DIAGNOSIS — M25.562 CHRONIC PAIN OF LEFT KNEE: ICD-10-CM

## 2021-12-27 RX ORDER — NAPROXEN 500 MG/1
TABLET ORAL
Qty: 60 TABLET | Refills: 0 | Status: SHIPPED | OUTPATIENT
Start: 2021-12-27 | End: 2022-01-21

## 2022-01-01 NOTE — ED ATTENDING ATTESTATION
Sue Adamson MD, saw and evaluated the patient  I have discussed the patient with the resident/non-physician practitioner and agree with the resident's/non-physician practitioner's findings, Plan of Care, and MDM as documented in the resident's/non-physician practitioner's note, except where noted  All available labs and Radiology studies were reviewed  At this point I agree with the current assessment done in the Emergency Department  I have conducted an independent evaluation of this patient a history and physical is as follows:      Critical Care Time  CritCare Time    Procedures     56 yo male with three days of sob and chest discomfort, started gradually  Pt with mild cough, nonproductive  Pt with hx of asthma, [place] speaking only from [employer]  pmh dm, htn, cad  Pt with hx of enlarged heart  Hx of similar symptoms in past  No n/v/diaphoresis, no leg swelling  No headache  Vss, afebrile, lungs decreased air movement, rrr, abdomen soft nontender, mild pedal edema  Udn, cardiac workup, bnp   Possible copd, may require steroids 
Statement Selected

## 2022-01-21 DIAGNOSIS — M17.12 PRIMARY OSTEOARTHRITIS OF LEFT KNEE: ICD-10-CM

## 2022-01-21 DIAGNOSIS — M25.562 CHRONIC PAIN OF LEFT KNEE: ICD-10-CM

## 2022-01-21 DIAGNOSIS — M17.0 ARTHRITIS OF BOTH KNEES: ICD-10-CM

## 2022-01-21 DIAGNOSIS — M17.11 PRIMARY OSTEOARTHRITIS OF RIGHT KNEE: ICD-10-CM

## 2022-01-21 DIAGNOSIS — G89.29 CHRONIC PAIN OF LEFT KNEE: ICD-10-CM

## 2022-01-21 RX ORDER — NAPROXEN 500 MG/1
TABLET ORAL
Qty: 60 TABLET | Refills: 0 | Status: SHIPPED | OUTPATIENT
Start: 2022-01-21 | End: 2022-02-15

## 2022-02-07 ENCOUNTER — APPOINTMENT (OUTPATIENT)
Dept: LAB | Facility: HOSPITAL | Age: 67
End: 2022-02-07
Payer: MEDICARE

## 2022-02-07 DIAGNOSIS — N18.2 CKD (CHRONIC KIDNEY DISEASE) STAGE 2, GFR 60-89 ML/MIN: ICD-10-CM

## 2022-02-07 DIAGNOSIS — Z12.5 SCREENING PSA (PROSTATE SPECIFIC ANTIGEN): ICD-10-CM

## 2022-02-07 DIAGNOSIS — E78.2 MIXED DYSLIPIDEMIA: ICD-10-CM

## 2022-02-07 DIAGNOSIS — E22.1 HYPERPROLACTINEMIA (HCC): ICD-10-CM

## 2022-02-07 DIAGNOSIS — E11.69 TYPE 2 DIABETES MELLITUS WITH OTHER SPECIFIED COMPLICATION, WITHOUT LONG-TERM CURRENT USE OF INSULIN (HCC): ICD-10-CM

## 2022-02-07 LAB
ALBUMIN SERPL BCP-MCNC: 3.4 G/DL (ref 3.5–5)
ALP SERPL-CCNC: 69 U/L (ref 46–116)
ALT SERPL W P-5'-P-CCNC: 29 U/L (ref 12–78)
ANION GAP SERPL CALCULATED.3IONS-SCNC: 3 MMOL/L (ref 4–13)
AST SERPL W P-5'-P-CCNC: 15 U/L (ref 5–45)
BILIRUB SERPL-MCNC: 0.83 MG/DL (ref 0.2–1)
BUN SERPL-MCNC: 16 MG/DL (ref 5–25)
CALCIUM ALBUM COR SERPL-MCNC: 9.5 MG/DL (ref 8.3–10.1)
CALCIUM SERPL-MCNC: 9 MG/DL (ref 8.3–10.1)
CHLORIDE SERPL-SCNC: 108 MMOL/L (ref 100–108)
CO2 SERPL-SCNC: 28 MMOL/L (ref 21–32)
CREAT SERPL-MCNC: 1.05 MG/DL (ref 0.6–1.3)
EST. AVERAGE GLUCOSE BLD GHB EST-MCNC: 128 MG/DL
GFR SERPL CREATININE-BSD FRML MDRD: 72 ML/MIN/1.73SQ M
GLUCOSE P FAST SERPL-MCNC: 101 MG/DL (ref 65–99)
HBA1C MFR BLD: 6.1 %
POTASSIUM SERPL-SCNC: 4.3 MMOL/L (ref 3.5–5.3)
PROLACTIN SERPL-MCNC: 27.7 NG/ML (ref 2.5–17.4)
PROT SERPL-MCNC: 7 G/DL (ref 6.4–8.2)
SODIUM SERPL-SCNC: 139 MMOL/L (ref 136–145)
TESTOST SERPL-MCNC: 180 NG/DL (ref 95–948)
TSH SERPL DL<=0.05 MIU/L-ACNC: 2.29 UIU/ML (ref 0.36–3.74)

## 2022-02-07 PROCEDURE — 84443 ASSAY THYROID STIM HORMONE: CPT

## 2022-02-07 PROCEDURE — 80053 COMPREHEN METABOLIC PANEL: CPT

## 2022-02-07 PROCEDURE — 84146 ASSAY OF PROLACTIN: CPT

## 2022-02-07 PROCEDURE — G0103 PSA SCREENING: HCPCS

## 2022-02-07 PROCEDURE — 36415 COLL VENOUS BLD VENIPUNCTURE: CPT

## 2022-02-07 PROCEDURE — 84403 ASSAY OF TOTAL TESTOSTERONE: CPT

## 2022-02-07 PROCEDURE — 84153 ASSAY OF PSA TOTAL: CPT

## 2022-02-07 PROCEDURE — 83036 HEMOGLOBIN GLYCOSYLATED A1C: CPT

## 2022-02-08 LAB — PSA SERPL DL<=0.01 NG/ML-MCNC: 0.83 NG/ML (ref 0–4)

## 2022-02-15 DIAGNOSIS — M17.0 ARTHRITIS OF BOTH KNEES: ICD-10-CM

## 2022-02-15 DIAGNOSIS — M17.12 PRIMARY OSTEOARTHRITIS OF LEFT KNEE: ICD-10-CM

## 2022-02-15 DIAGNOSIS — G89.29 CHRONIC PAIN OF LEFT KNEE: ICD-10-CM

## 2022-02-15 DIAGNOSIS — M25.562 CHRONIC PAIN OF LEFT KNEE: ICD-10-CM

## 2022-02-15 DIAGNOSIS — M17.11 PRIMARY OSTEOARTHRITIS OF RIGHT KNEE: ICD-10-CM

## 2022-02-15 RX ORDER — NAPROXEN 500 MG/1
TABLET ORAL
Qty: 60 TABLET | Refills: 0 | Status: SHIPPED | OUTPATIENT
Start: 2022-02-15 | End: 2022-03-14

## 2022-03-12 DIAGNOSIS — M17.11 PRIMARY OSTEOARTHRITIS OF RIGHT KNEE: ICD-10-CM

## 2022-03-12 DIAGNOSIS — M17.12 PRIMARY OSTEOARTHRITIS OF LEFT KNEE: ICD-10-CM

## 2022-03-12 DIAGNOSIS — G89.29 CHRONIC PAIN OF LEFT KNEE: ICD-10-CM

## 2022-03-12 DIAGNOSIS — M17.0 ARTHRITIS OF BOTH KNEES: ICD-10-CM

## 2022-03-12 DIAGNOSIS — M25.562 CHRONIC PAIN OF LEFT KNEE: ICD-10-CM

## 2022-03-14 RX ORDER — NAPROXEN 500 MG/1
TABLET ORAL
Qty: 60 TABLET | Refills: 0 | Status: SHIPPED | OUTPATIENT
Start: 2022-03-14 | End: 2022-04-08

## 2022-03-18 ENCOUNTER — HOSPITAL ENCOUNTER (OUTPATIENT)
Dept: RADIOLOGY | Facility: HOSPITAL | Age: 67
Discharge: HOME | End: 2022-03-18
Attending: FAMILY MEDICINE
Payer: MEDICARE

## 2022-03-18 ENCOUNTER — OFFICE VISIT (OUTPATIENT)
Dept: FAMILY MEDICINE CLINIC | Facility: CLINIC | Age: 67
End: 2022-03-18
Payer: MEDICARE

## 2022-03-18 VITALS
DIASTOLIC BLOOD PRESSURE: 62 MMHG | HEIGHT: 68 IN | TEMPERATURE: 98.8 F | BODY MASS INDEX: 40.47 KG/M2 | RESPIRATION RATE: 16 BRPM | WEIGHT: 267 LBS | HEART RATE: 84 BPM | SYSTOLIC BLOOD PRESSURE: 120 MMHG
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
## 2022-03-18 DIAGNOSIS — M25.552 LEFT HIP PAIN: Primary | ICD-10-CM

## 2022-03-18 DIAGNOSIS — Z02.9 ADMINISTRATIVE ENCOUNTER: ICD-10-CM

## 2022-03-18 DIAGNOSIS — M25.552 LEFT HIP PAIN: ICD-10-CM

## 2022-03-18 PROCEDURE — 73502 X-RAY EXAM HIP UNI 2-3 VIEWS: CPT

## 2022-03-18 PROCEDURE — 99214 OFFICE O/P EST MOD 30 MIN: CPT | Performed by: FAMILY MEDICINE

## 2022-03-18 PROCEDURE — 72100 X-RAY EXAM L-S SPINE 2/3 VWS: CPT

## 2022-03-18 NOTE — ASSESSMENT & PLAN NOTE
Patient has acute left hip pain  history total hip replacement  Will obtain x-rays of both left hip and lumbar spine and rule out fracture of prosthesis  patient may need to have surgery given worsening nature of pain  Advised patient to get test done and will review with patient when available  also refer to Orthopedics that patient may make appointment in [place] she does have fracture  if no fracture but having pain, we will refer to Physical therapy for further evaluation treatment  new medications for now      follow-up by phone or in office as needed

## 2022-03-18 NOTE — PROGRESS NOTES
Assessment/Plan:    Left hip pain  Patient has acute left hip pain  history total hip replacement  Will obtain x-rays of both left hip and lumbar spine and rule out fracture of prosthesis  patient may need to have surgery given worsening nature of pain  Advised patient to get test done and will review with patient when available  also refer to Orthopedics that patient may make appointment in [place] she does have fracture  if no fracture but having pain, we will refer to Physical therapy for further evaluation treatment  new medications for now  follow-up by phone or in office as needed    Fall  Patient had fall roughly 3 weeks prior  Slipped on the snow/ice  advised patient to be very careful when he goes outside in inclement weather  will obtain x-rays to rule out fractures  Diagnoses and all orders for this visit:    Left hip pain  -     XR hip/pelv 2-3 vws left if performed; Future  -     Cancel: Ambulatory Referral to Orthopedic Surgery; Future  -     XR spine lumbar 2 or 3 views injury; Future  -     Ambulatory Referral to Orthopedic Surgery; Future    Fall, initial encounter  -     XR hip/pelv 2-3 vws left if performed; Future  -     Cancel: Ambulatory Referral to Orthopedic Surgery; Future  -     XR spine lumbar 2 or 3 views injury; Future  -     Ambulatory Referral to Orthopedic Surgery;  Future    Administrative encounter  Comments:  Disability parking permit forms filled out            Subjective:   Chief Complaint   Patient presents with    Follow-up     parking disability forms     Health Maintenance   Topic Date Due    COVID-19 Vaccine (2 - Moderna 3-dose series) 03/09/2021    DM Eye Exam  01/17/2022    HEMOGLOBIN A1C  08/07/2022    Diabetic Foot Exam  10/26/2022    Fall Risk  11/23/2022    Medicare Annual Wellness Visit (AWV)  11/23/2022    BMI: Followup Plan  11/23/2022    URINE MICROALBUMIN  02/07/2023    BMI: Adult  03/18/2023    DTaP,Tdap,and Td Vaccines (2 - Td or Tdap) 07/09/2028    Colorectal Cancer Screening  01/10/2029    Hepatitis C Screening  Completed    Pneumococcal Vaccine: 65+ Years  Completed    Influenza Vaccine  Completed    HIB Vaccine  Aged Out    Hepatitis B Vaccine  Aged Out    IPV Vaccine  Aged Out    Hepatitis A Vaccine  Aged Out    Meningococcal ACWY Vaccine  Aged Out    HPV Vaccine  Aged Out        Patient ID: Monika Khan is a 74 y o  male  HPI    The patient presents with complaints of left leg pain  Patient fell 3 weeks prior landed on left side  No loss of consciousness  Then started having left hip pain roughly 1 week prior  Pain with walking  Has antalgic gait  Uses a cane  Has history of left total hip replacement  No bruises noted by patient as of yet  Patient is not on a blood thinner  The following portions of the patient's history were reviewed and updated as appropriate: allergies, current medications, past family history, past medical history, past social history, past surgical history, and problem list     Review of Systems   Constitutional: Negative for chills and fever  HENT: Negative for congestion  Eyes: Negative for visual disturbance  Respiratory: Negative for cough and shortness of breath  Cardiovascular: Negative for chest pain and palpitations  Gastrointestinal: Negative for diarrhea, nausea and vomiting  Genitourinary: Negative for dysuria  Musculoskeletal: Positive for arthralgias (left hip pain)  Negative for myalgias  Skin: Negative for rash  Neurological: Negative for dizziness and headaches  Objective:  /62   Pulse 84   Temp 98 8 °F (37 1 °C) (Tympanic)   Resp 16   Ht 5' 8" (1 727 m)   Wt 121 kg (267 lb)   BMI 40 60 kg/m²      Physical Exam  Vitals and nursing note reviewed  Constitutional:       General: He is not in acute distress  HENT:      Head: Normocephalic and atraumatic     Eyes:      Conjunctiva/sclera: Conjunctivae normal  Cardiovascular:      Rate and Rhythm: Normal rate and regular rhythm  Pulses: Normal pulses  Heart sounds: No murmur heard  Pulmonary:      Effort: Pulmonary effort is normal  No respiratory distress  Breath sounds: No wheezing, rhonchi or rales  Abdominal:      General: Bowel sounds are normal  There is no distension  Palpations: Abdomen is soft  Tenderness: There is no abdominal tenderness  There is no right CVA tenderness, left CVA tenderness or guarding  Musculoskeletal:         General: No swelling  Comments: Left hip tenderness palpation both posteriorly and anteriorly  Patient has antalgic gait when he walks  Rest of special tests complicated secondary to body habitus and also pain  Neurological:      Mental Status: He is alert  This note has been constructed using a voice recognition system  There may be translation, syntax, or grammatical errors  If you have an questions, please contact the dictating provider

## 2022-03-18 NOTE — ASSESSMENT & PLAN NOTE
Patient had fall roughly 3 weeks prior  Slipped on the snow/ice  advised patient to be very careful when he goes outside in inclement weather  will obtain x-rays to rule out fractures

## 2022-03-21 ENCOUNTER — OFFICE VISIT (OUTPATIENT)
Dept: OBGYN CLINIC | Facility: HOSPITAL | Age: 67
End: 2022-03-21
Payer: MEDICARE

## 2022-03-21 VITALS
DIASTOLIC BLOOD PRESSURE: 79 MMHG | BODY MASS INDEX: 40.47 KG/M2 | HEART RATE: 93 BPM | HEIGHT: 68 IN | WEIGHT: 267 LBS | SYSTOLIC BLOOD PRESSURE: 145 MMHG

## 2022-03-21 DIAGNOSIS — M25.552 PAIN IN LEFT HIP: Primary | ICD-10-CM

## 2022-03-21 DIAGNOSIS — M25.552 LEFT HIP PAIN: ICD-10-CM
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
## 2022-03-21 PROCEDURE — 99213 OFFICE O/P EST LOW 20 MIN: CPT | Performed by: PHYSICIAN ASSISTANT

## 2022-03-21 NOTE — PROGRESS NOTES
Assessment:    Patient had a fall several weeks ago onto his left hip and lower back area  He has a history of left total hip arthroplasty done in [facility]   Overall his pain is improved  He is taking naproxen and every week it seems to get better  He presents today to make sure his implant looks stable  X-rays today demonstrate no acute findings compared to prior  Plan:      Weight-bearing as tolerated LLE  Continue naproxen   Rest, and activities to tolerance  May need physical therapy eventually however with rest and NSAID medications, this will likely continue to improve with time  Patient is interested in following up with Dr Melissa Basurto for repeat injections to his knees eventually, as well as his left hip  Problem List Items Addressed This Visit        Other    Pain in left hip - Primary                   Subjective:     Patient ID:  Myriam Escobedo is a 74 y o  male    HPI    68-year-old male presenting for evaluation of his left hip  According to the patient, about 3-4 weeks ago he fell onto the left side of his lower back and left hip and after that he did not experience any hip pain is able to ambulate normally  About a week after this he started to experience localized left lateral hip pain as well as lower back pain  He has to use a cane due to his antalgic limp  Over the last few weeks, he has been taking naproxen and his pain has gradually improved week by week  It still hurts when he ambulates  No associated numbness and tingling  He states the pain does go down almost his entire left lower extremity  He did present to his primary care who ordered x-rays of his lower back and left hip and was referred here for further evaluation            The following portions of the patient's history were reviewed and updated as appropriate: allergies, current medications, past family history, past medical history, past social history, past surgical history and problem list     Review of Systems     Objective:    Imaging:  Left hip x-rays demonstrate no acute fracture or dislocation  Implant looks stable compared to previous x-rays done in 2019  Vitals:    03/21/22 1457   BP: 145/79   Pulse: 93           Physical Exam     Orthopedic Examination:  Ambulation demonstrates mild antalgic left-sided limp      Inspection:  Old left hip incision is well healed without erythema swelling or fluctuance  In the supine position, leg lengths are equal,  there is no external rotation or shortening      Palpation:  Lateral hip region and lower back are nontender to palpation    Range-of-motion:    Strength:  5/5 hip flexion knee extension ankle plantar dorsiflexion    Sensation:  Intact L2-S1    Special Tests:  Able to straight leg raise  Negative logroll   No pain with internal or external rotation of the hip

## 2022-03-31 ENCOUNTER — HOSPITAL ENCOUNTER (OUTPATIENT)
Dept: RADIOLOGY | Facility: HOSPITAL | Age: 67
Discharge: HOME | End: 2022-03-31
Payer: MEDICARE

## 2022-03-31 DIAGNOSIS — E22.1 HYPERPROLACTINEMIA (HCC): ICD-10-CM

## 2022-03-31 PROCEDURE — 70553 MRI BRAIN STEM W/O & W/DYE: CPT

## 2022-03-31 PROCEDURE — G1004 CDSM NDSC: HCPCS

## 2022-03-31 PROCEDURE — A9585 GADOBUTROL INJECTION: HCPCS | Performed by: RADIOLOGY

## 2022-03-31 RX ADMIN — GADOBUTROL 12 ML: 604.72 INJECTION INTRAVENOUS at 18:48

## 2022-04-08 DIAGNOSIS — M17.0 ARTHRITIS OF BOTH KNEES: ICD-10-CM

## 2022-04-08 DIAGNOSIS — M17.11 PRIMARY OSTEOARTHRITIS OF RIGHT KNEE: ICD-10-CM

## 2022-04-08 DIAGNOSIS — M17.12 PRIMARY OSTEOARTHRITIS OF LEFT KNEE: ICD-10-CM

## 2022-04-08 DIAGNOSIS — G89.29 CHRONIC PAIN OF LEFT KNEE: ICD-10-CM

## 2022-04-08 DIAGNOSIS — M25.562 CHRONIC PAIN OF LEFT KNEE: ICD-10-CM

## 2022-04-08 RX ORDER — NAPROXEN 500 MG/1
TABLET ORAL
Qty: 60 TABLET | Refills: 0 | Status: SHIPPED | OUTPATIENT
Start: 2022-04-08

## 2022-08-27 ENCOUNTER — HOSPITAL ENCOUNTER (EMERGENCY)
Facility: HOSPITAL | Age: 67
Discharge: HOME | End: 2022-08-28
Attending: EMERGENCY MEDICINE
Payer: MEDICARE

## 2022-08-27 ENCOUNTER — APPOINTMENT (OUTPATIENT)
Dept: RADIOLOGY | Facility: HOSPITAL | Age: 67
End: 2022-08-27
Payer: MEDICARE

## 2022-08-27 VITALS
HEART RATE: 101 BPM | SYSTOLIC BLOOD PRESSURE: 142 MMHG | TEMPERATURE: 98.8 F | DIASTOLIC BLOOD PRESSURE: 79 MMHG | RESPIRATION RATE: 24 BRPM | OXYGEN SATURATION: 97 %

## 2022-08-27 DIAGNOSIS — J45.901 ACUTE ASTHMA EXACERBATION: Primary | ICD-10-CM

## 2022-08-27 DIAGNOSIS — U07.1 COVID-19: ICD-10-CM

## 2022-08-27 DIAGNOSIS — J18.9 PNEUMONIA: ICD-10-CM

## 2022-08-27 LAB
2HR DELTA HS TROPONIN: 0 NG/L
ANION GAP SERPL CALCULATED.3IONS-SCNC: 7 MMOL/L (ref 4–13)
BASOPHILS # BLD AUTO: 0.05 THOUSANDS/ΜL (ref 0–0.1)
BASOPHILS NFR BLD AUTO: 0 % (ref 0–1)
BUN SERPL-MCNC: 12 MG/DL (ref 5–25)
CALCIUM SERPL-MCNC: 8.7 MG/DL (ref 8.3–10.1)
CARDIAC TROPONIN I PNL SERPL HS: 5 NG/L
CARDIAC TROPONIN I PNL SERPL HS: 5 NG/L
CHLORIDE SERPL-SCNC: 101 MMOL/L (ref 96–108)
CO2 SERPL-SCNC: 27 MMOL/L (ref 21–32)
CREAT SERPL-MCNC: 1.19 MG/DL (ref 0.6–1.3)
EOSINOPHIL # BLD AUTO: 0.02 THOUSAND/ΜL (ref 0–0.61)
EOSINOPHIL NFR BLD AUTO: 0 % (ref 0–6)
ERYTHROCYTE [DISTWIDTH] IN BLOOD BY AUTOMATED COUNT: 14.1 % (ref 11.6–15.1)
GFR SERPL CREATININE-BSD FRML MDRD: 62 ML/MIN/1.73SQ M
GLUCOSE SERPL-MCNC: 132 MG/DL (ref 65–140)
HCT VFR BLD AUTO: 47.3 % (ref 36.5–49.3)
HGB BLD-MCNC: 15.1 G/DL (ref 12–17)
IMM GRANULOCYTES # BLD AUTO: 0.07 THOUSAND/UL (ref 0–0.2)
IMM GRANULOCYTES NFR BLD AUTO: 1 % (ref 0–2)
LYMPHOCYTES # BLD AUTO: 0.57 THOUSANDS/ΜL (ref 0.6–4.47)
LYMPHOCYTES NFR BLD AUTO: 5 % (ref 14–44)
MCH RBC QN AUTO: 27.9 PG (ref 26.8–34.3)
MCHC RBC AUTO-ENTMCNC: 31.9 G/DL (ref 31.4–37.4)
MCV RBC AUTO: 87 FL (ref 82–98)
MONOCYTES # BLD AUTO: 0.84 THOUSAND/ΜL (ref 0.17–1.22)
MONOCYTES NFR BLD AUTO: 7 % (ref 4–12)
NEUTROPHILS # BLD AUTO: 11.01 THOUSANDS/ΜL (ref 1.85–7.62)
NEUTS SEG NFR BLD AUTO: 87 % (ref 43–75)
NRBC BLD AUTO-RTO: 0 /100 WBCS
PLATELET # BLD AUTO: 188 THOUSANDS/UL (ref 149–390)
PMV BLD AUTO: 10.9 FL (ref 8.9–12.7)
POTASSIUM SERPL-SCNC: 3.6 MMOL/L (ref 3.5–5.3)
RBC # BLD AUTO: 5.41 MILLION/UL (ref 3.88–5.62)
SARS-COV-2 RNA RESP QL NAA+PROBE: POSITIVE
SODIUM SERPL-SCNC: 135 MMOL/L (ref 135–147)
WBC # BLD AUTO: 12.56 THOUSAND/UL (ref 4.31–10.16)

## 2022-08-27 PROCEDURE — U0005 INFEC AGEN DETEC AMPLI PROBE: HCPCS | Performed by: EMERGENCY MEDICINE

## 2022-08-27 PROCEDURE — 36415 COLL VENOUS BLD VENIPUNCTURE: CPT | Performed by: EMERGENCY MEDICINE

## 2022-08-27 PROCEDURE — 99284 EMERGENCY DEPT VISIT MOD MDM: CPT

## 2022-08-27 PROCEDURE — 84484 ASSAY OF TROPONIN QUANT: CPT | Performed by: EMERGENCY MEDICINE

## 2022-08-27 PROCEDURE — 85025 COMPLETE CBC W/AUTO DIFF WBC: CPT | Performed by: EMERGENCY MEDICINE

## 2022-08-27 PROCEDURE — 94640 AIRWAY INHALATION TREATMENT: CPT

## 2022-08-27 PROCEDURE — 93005 ELECTROCARDIOGRAM TRACING: CPT

## 2022-08-27 PROCEDURE — 99284 EMERGENCY DEPT VISIT MOD MDM: CPT | Performed by: EMERGENCY MEDICINE

## 2022-08-27 PROCEDURE — 80048 BASIC METABOLIC PNL TOTAL CA: CPT | Performed by: EMERGENCY MEDICINE

## 2022-08-27 PROCEDURE — 71045 X-RAY EXAM CHEST 1 VIEW: CPT

## 2022-08-27 PROCEDURE — U0003 INFECTIOUS AGENT DETECTION BY NUCLEIC ACID (DNA OR RNA); SEVERE ACUTE RESPIRATORY SYNDROME CORONAVIRUS 2 (SARS-COV-2) (CORONAVIRUS DISEASE [COVID-19]), AMPLIFIED PROBE TECHNIQUE, MAKING USE OF HIGH THROUGHPUT TECHNOLOGIES AS DESCRIBED BY CMS-2020-01-R: HCPCS | Performed by: EMERGENCY MEDICINE

## 2022-08-27 RX ORDER — ALBUTEROL SULFATE 2.5 MG/3ML
5 SOLUTION RESPIRATORY (INHALATION) ONCE
Status: COMPLETED | OUTPATIENT
Start: 2022-08-27 | End: 2022-08-27

## 2022-08-27 RX ORDER — SODIUM CHLORIDE FOR INHALATION 0.9 %
3 VIAL, NEBULIZER (ML) INHALATION ONCE
Status: COMPLETED | OUTPATIENT
Start: 2022-08-27 | End: 2022-08-27

## 2022-08-27 RX ORDER — PREDNISONE 20 MG/1
40 TABLET ORAL ONCE
Status: COMPLETED | OUTPATIENT
Start: 2022-08-27 | End: 2022-08-27

## 2022-08-27 RX ORDER — DOXYCYCLINE HYCLATE 100 MG/1
100 CAPSULE ORAL ONCE
Status: COMPLETED | OUTPATIENT
Start: 2022-08-27 | End: 2022-08-27

## 2022-08-27 RX ADMIN — PREDNISONE 40 MG: 20 TABLET ORAL at 16:55

## 2022-08-27 RX ADMIN — ALBUTEROL SULFATE 10 MG: 2.5 SOLUTION RESPIRATORY (INHALATION) at 16:57

## 2022-08-27 RX ADMIN — ISODIUM CHLORIDE 3 ML: 0.03 SOLUTION RESPIRATORY (INHALATION) at 16:57

## 2022-08-27 RX ADMIN — ALBUTEROL SULFATE 5 MG: 2.5 SOLUTION RESPIRATORY (INHALATION) at 19:28

## 2022-08-27 RX ADMIN — DOXYCYCLINE 100 MG: 100 CAPSULE ORAL at 22:45

## 2022-08-27 RX ADMIN — IPRATROPIUM BROMIDE 1 MG: 0.5 SOLUTION RESPIRATORY (INHALATION) at 16:57

## 2022-08-27 NOTE — Clinical Note
Ignacio Craig was seen and treated in our emergency department on 8/27/2022  No restrictions    Other - See Comments    N/A    Diagnosis: COVID-19, pneumonia, asthma exacerbation    Ariel Ruffin  is off the rest of the shift today, may return to work on return date  He may return on this date: 09/06/2022    Please quarantine for 5 days and mask for an additional 5 days  If you have any questions or concerns, please don't hesitate to call        Alvaro Martinez MD    ______________________________           _______________          _______________  [facility] Representative                              Date                                Time

## 2022-08-27 NOTE — ED ATTENDING ATTESTATION
8/27/2022  IKelly MD, saw and evaluated the patient  I have discussed the patient with the resident/non-physician practitioner and agree with the resident's/non-physician practitioner's findings, Plan of Care, and MDM as documented in the resident's/non-physician practitioner's note, except where noted  All available labs and Radiology studies were reviewed  I was present for key portions of any procedure(s) performed by the resident/non-physician practitioner and I was immediately available to provide assistance  At this point I agree with the current assessment done in the Emergency Department  I have conducted an independent evaluation of this patient a history and physical is as follows:  Pt has history of asthma Here has noticed increased wheeze sob and cough since yesterday Today forgot inhaler at home and was increased sob and cough PE: alert mild distress heart reg lungs wheezing noted abd soft nontender ext and MDM: heart neb steroids covid test     reeval improved still with some sob will give additional albuterol check cxr ekg ambulatory pulse ox  ED Course         Critical Care Time  CriticalCare Time  Performed by: Kelly Jones MD  Authorized by:  Kelly Jones MD     Critical care provider statement:     Critical care time (minutes):  33    Critical care time was exclusive of:  Separately billable procedures and treating other patients and teaching time    Critical care was necessary to treat or prevent imminent or life-threatening deterioration of the following conditions:  Respiratory failure    Critical care was time spent personally by me on the following activities:  Obtaining history from patient or surrogate, development of treatment plan with patient or surrogate, examination of patient, evaluation of patient's response to treatment, re-evaluation of patient's condition, ordering and review of laboratory studies and ordering and review of radiographic studies    I assumed direction of critical care for this patient from another provider in my specialty: no
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
## 2022-08-28 RX ORDER — PREDNISONE 20 MG/1
40 TABLET ORAL DAILY
Qty: 10 TABLET | Refills: 0 | Status: SHIPPED | OUTPATIENT
Start: 2022-08-28 | End: 2022-09-02

## 2022-08-28 RX ORDER — DOXYCYCLINE HYCLATE 100 MG/1
100 CAPSULE ORAL 2 TIMES DAILY
Qty: 14 CAPSULE | Refills: 0 | Status: SHIPPED | OUTPATIENT
Start: 2022-08-28 | End: 2022-09-04

## 2022-08-29 LAB
ATRIAL RATE: 100 BPM
P AXIS: 49 DEGREES
PR INTERVAL: 252 MS
QRS AXIS: 39 DEGREES
QRSD INTERVAL: 90 MS
QT INTERVAL: 326 MS
QTC INTERVAL: 420 MS
T WAVE AXIS: 55 DEGREES
VENTRICULAR RATE: 100 BPM

## 2022-08-29 PROCEDURE — 93010 ELECTROCARDIOGRAM REPORT: CPT | Performed by: INTERNAL MEDICINE

## 2022-09-09 ENCOUNTER — HOSPITAL ENCOUNTER (EMERGENCY)
Facility: HOSPITAL | Age: 67
Discharge: HOME | End: 2022-09-09
Attending: EMERGENCY MEDICINE
Payer: MEDICARE

## 2022-09-09 ENCOUNTER — APPOINTMENT (OUTPATIENT)
Dept: RADIOLOGY | Facility: HOSPITAL | Age: 67
End: 2022-09-09
Payer: MEDICARE

## 2022-09-09 VITALS
DIASTOLIC BLOOD PRESSURE: 72 MMHG | OXYGEN SATURATION: 96 % | RESPIRATION RATE: 18 BRPM | TEMPERATURE: 97.7 F | HEART RATE: 78 BPM | SYSTOLIC BLOOD PRESSURE: 148 MMHG

## 2022-09-09 DIAGNOSIS — M54.50 ACUTE LOW BACK PAIN: Primary | ICD-10-CM

## 2022-09-09 PROCEDURE — 96372 THER/PROPH/DIAG INJ SC/IM: CPT

## 2022-09-09 PROCEDURE — 99284 EMERGENCY DEPT VISIT MOD MDM: CPT | Performed by: EMERGENCY MEDICINE

## 2022-09-09 PROCEDURE — 99283 EMERGENCY DEPT VISIT LOW MDM: CPT

## 2022-09-09 PROCEDURE — 72100 X-RAY EXAM L-S SPINE 2/3 VWS: CPT

## 2022-09-09 RX ORDER — LIDOCAINE 50 MG/G
1 PATCH TOPICAL ONCE
Status: DISCONTINUED | OUTPATIENT
Start: 2022-09-09 | End: 2022-09-09 | Stop reason: HOSPADM

## 2022-09-09 RX ORDER — KETOROLAC TROMETHAMINE 30 MG/ML
15 INJECTION, SOLUTION INTRAMUSCULAR; INTRAVENOUS ONCE
Status: COMPLETED | OUTPATIENT
Start: 2022-09-09 | End: 2022-09-09

## 2022-09-09 RX ORDER — ACETAMINOPHEN 325 MG/1
975 TABLET ORAL ONCE
Status: COMPLETED | OUTPATIENT
Start: 2022-09-09 | End: 2022-09-09

## 2022-09-09 RX ADMIN — KETOROLAC TROMETHAMINE 15 MG: 30 INJECTION, SOLUTION INTRAMUSCULAR at 09:50

## 2022-09-09 RX ADMIN — ACETAMINOPHEN 975 MG: 325 TABLET ORAL at 09:50

## 2022-09-09 RX ADMIN — LIDOCAINE 5% 1 PATCH: 700 PATCH TOPICAL at 09:50

## 2022-09-09 NOTE — ED ATTENDING ATTESTATION
9/9/2022  IHi DO, saw and evaluated the patient  I have discussed the patient with the resident/non-physician practitioner and agree with the resident's/non-physician practitioner's findings, Plan of Care, and MDM as documented in the resident's/non-physician practitioner's note, except where noted  All available labs and Radiology studies were reviewed  I was present for key portions of any procedure(s) performed by the resident/non-physician practitioner and I was immediately available to provide assistance  At this point I agree with the current assessment done in the Emergency Department  I have conducted an independent evaluation of this patient a history and physical is as follows:    69 yom with lbp  Acute on chronic  Hx of similar years ago, waxes and wanes  Worsens sometimes  No fevers  No significant trauma, thinks he just twisted oddly recently  Denies urinary retention or incontinence, saddle anesthesia, leg weakness, paresthesias  Normal exam, neg straight leg raise  States PT helped him in [place] years ago     Plan pain control, comp spine    ED Course         Critical Care Time  Procedures
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
## 2022-09-09 NOTE — DISCHARGE INSTRUCTIONS
Follow-up with PCP for further care  Contact info provided below if needed  [facility] will contact you for follow-up as well  If they do not contact you, contact info is also provided below  Use over the counter medications as stated on the bottle as needed for pain control   - Tylenol  - Ibuprofen  - Lidocaine  Return to the ED with new or worsening symptoms

## 2022-09-09 NOTE — Clinical Note
Edna Bennett was seen and treated in our emergency department on 9/9/2022  Diagnosis: Back pain    Jeanmarie Saba  may return to work on return date  He may return on this date: 09/14/2022         If you have any questions or concerns, please don't hesitate to call        Feliz Bradshaw MD    ______________________________           _______________          _______________  Hospital Representative                              Date                                Time

## 2022-09-12 ENCOUNTER — TELEPHONE (OUTPATIENT)
Dept: PHYSICAL THERAPY | Facility: OTHER | Age: 67
End: 2022-09-12

## 2022-09-12 NOTE — TELEPHONE ENCOUNTER
Call placed to the patient per [facility] referral   Call goes directly to a busy signal and will not connect  Attempted twice  This is the 1st attempt to reach the patient  Will defer per protocol

## 2022-09-14 NOTE — TELEPHONE ENCOUNTER
Nurse reached out to discuss recent referral entered for  [facility]     Number listed in pt demographics dialed twice  Results in immediate BUSY signal as prior attempts on 9/12/22  Referral closed as staff unable to reach or LM for patient  No alternate contact number listed

## 2022-10-12 ENCOUNTER — APPOINTMENT (OUTPATIENT)
Dept: LAB | Facility: CLINIC | Age: 67
End: 2022-10-12
Payer: MEDICARE

## 2022-10-12 DIAGNOSIS — E22.1 HYPERPROLACTINEMIA (HCC): ICD-10-CM

## 2022-10-12 DIAGNOSIS — E55.9 AVITAMINOSIS D: ICD-10-CM

## 2022-10-12 DIAGNOSIS — E11.69 TYPE 2 DIABETES MELLITUS WITH OTHER SPECIFIED COMPLICATION, WITHOUT LONG-TERM CURRENT USE OF INSULIN (HCC): ICD-10-CM

## 2022-10-12 DIAGNOSIS — M10.9 GOUT OF FOOT, UNSPECIFIED CAUSE, UNSPECIFIED CHRONICITY, UNSPECIFIED LATERALITY: ICD-10-CM

## 2022-10-12 DIAGNOSIS — E78.2 MIXED DYSLIPIDEMIA: ICD-10-CM

## 2022-10-12 PROBLEM — Z12.5 SCREENING FOR MALIGNANT NEOPLASM OF PROSTATE: Status: RESOLVED | Noted: 2018-10-17 | Resolved: 2022-10-12

## 2022-10-12 LAB
25(OH)D3 SERPL-MCNC: 57.3 NG/ML (ref 30–100)
ALBUMIN SERPL BCP-MCNC: 3.1 G/DL (ref 3.5–5)
ALP SERPL-CCNC: 52 U/L (ref 46–116)
ALT SERPL W P-5'-P-CCNC: 28 U/L (ref 12–78)
ANION GAP SERPL CALCULATED.3IONS-SCNC: 3 MMOL/L (ref 4–13)
AST SERPL W P-5'-P-CCNC: 15 U/L (ref 5–45)
BASOPHILS # BLD AUTO: 0.07 THOUSANDS/ΜL (ref 0–0.1)
BASOPHILS NFR BLD AUTO: 1 % (ref 0–1)
BILIRUB SERPL-MCNC: 0.41 MG/DL (ref 0.2–1)
BUN SERPL-MCNC: 11 MG/DL (ref 5–25)
CALCIUM ALBUM COR SERPL-MCNC: 9.3 MG/DL (ref 8.3–10.1)
CALCIUM SERPL-MCNC: 8.6 MG/DL (ref 8.3–10.1)
CHLORIDE SERPL-SCNC: 106 MMOL/L (ref 96–108)
CHOLEST SERPL-MCNC: 228 MG/DL
CO2 SERPL-SCNC: 29 MMOL/L (ref 21–32)
CREAT SERPL-MCNC: 1.19 MG/DL (ref 0.6–1.3)
EOSINOPHIL # BLD AUTO: 0.17 THOUSAND/ΜL (ref 0–0.61)
EOSINOPHIL NFR BLD AUTO: 3 % (ref 0–6)
ERYTHROCYTE [DISTWIDTH] IN BLOOD BY AUTOMATED COUNT: 14.9 % (ref 11.6–15.1)
EST. AVERAGE GLUCOSE BLD GHB EST-MCNC: 128 MG/DL
GFR SERPL CREATININE-BSD FRML MDRD: 61 ML/MIN/1.73SQ M
GLUCOSE P FAST SERPL-MCNC: 105 MG/DL (ref 65–99)
HBA1C MFR BLD: 6.1 %
HCT VFR BLD AUTO: 48.5 % (ref 36.5–49.3)
HDLC SERPL-MCNC: 38 MG/DL
HGB BLD-MCNC: 15.5 G/DL (ref 12–17)
IMM GRANULOCYTES # BLD AUTO: 0.01 THOUSAND/UL (ref 0–0.2)
IMM GRANULOCYTES NFR BLD AUTO: 0 % (ref 0–2)
LDLC SERPL CALC-MCNC: 162 MG/DL (ref 0–100)
LYMPHOCYTES # BLD AUTO: 1.57 THOUSANDS/ΜL (ref 0.6–4.47)
LYMPHOCYTES NFR BLD AUTO: 23 % (ref 14–44)
MCH RBC QN AUTO: 27.6 PG (ref 26.8–34.3)
MCHC RBC AUTO-ENTMCNC: 32 G/DL (ref 31.4–37.4)
MCV RBC AUTO: 86 FL (ref 82–98)
MONOCYTES # BLD AUTO: 0.63 THOUSAND/ΜL (ref 0.17–1.22)
MONOCYTES NFR BLD AUTO: 9 % (ref 4–12)
NEUTROPHILS # BLD AUTO: 4.39 THOUSANDS/ΜL (ref 1.85–7.62)
NEUTS SEG NFR BLD AUTO: 64 % (ref 43–75)
NONHDLC SERPL-MCNC: 190 MG/DL
NRBC BLD AUTO-RTO: 0 /100 WBCS
PLATELET # BLD AUTO: 218 THOUSANDS/UL (ref 149–390)
PMV BLD AUTO: 11.2 FL (ref 8.9–12.7)
POTASSIUM SERPL-SCNC: 4.3 MMOL/L (ref 3.5–5.3)
PROLACTIN SERPL-MCNC: 41.3 NG/ML (ref 2.5–17.4)
PROT SERPL-MCNC: 6.6 G/DL (ref 6.4–8.4)
RBC # BLD AUTO: 5.62 MILLION/UL (ref 3.88–5.62)
SODIUM SERPL-SCNC: 138 MMOL/L (ref 135–147)
TESTOST SERPL-MCNC: 599 NG/DL (ref 95–948)
TRIGL SERPL-MCNC: 139 MG/DL
TSH SERPL DL<=0.05 MIU/L-ACNC: 1.55 UIU/ML (ref 0.45–4.5)
URATE SERPL-MCNC: 7.4 MG/DL (ref 3.5–8.5)
WBC # BLD AUTO: 6.84 THOUSAND/UL (ref 4.31–10.16)

## 2022-10-12 PROCEDURE — 84550 ASSAY OF BLOOD/URIC ACID: CPT

## 2022-10-12 PROCEDURE — 84146 ASSAY OF PROLACTIN: CPT

## 2022-10-12 PROCEDURE — 84403 ASSAY OF TOTAL TESTOSTERONE: CPT

## 2022-10-12 PROCEDURE — 82306 VITAMIN D 25 HYDROXY: CPT

## 2022-10-12 PROCEDURE — 36415 COLL VENOUS BLD VENIPUNCTURE: CPT

## 2022-10-12 PROCEDURE — 85025 COMPLETE CBC W/AUTO DIFF WBC: CPT

## 2022-10-12 PROCEDURE — 84443 ASSAY THYROID STIM HORMONE: CPT

## 2022-10-12 PROCEDURE — 80061 LIPID PANEL: CPT

## 2022-10-12 PROCEDURE — 80053 COMPREHEN METABOLIC PANEL: CPT

## 2022-10-12 PROCEDURE — 83036 HEMOGLOBIN GLYCOSYLATED A1C: CPT

## 2022-11-11 ENCOUNTER — VBI (OUTPATIENT)
Dept: ADMINISTRATIVE | Facility: OTHER | Age: 67
End: 2022-11-11

## 2022-12-14 LAB
LEFT EYE DIABETIC RETINOPATHY: POSITIVE
RIGHT EYE DIABETIC RETINOPATHY: POSITIVE

## 2023-01-26 ENCOUNTER — APPOINTMENT (OUTPATIENT)
Dept: LAB | Facility: CLINIC | Age: 68
End: 2023-01-26

## 2023-01-26 DIAGNOSIS — M10.9 GOUT OF BOTH FEET: ICD-10-CM

## 2023-01-26 DIAGNOSIS — E29.1 MALE HYPOGONADISM: ICD-10-CM

## 2023-01-26 DIAGNOSIS — Z78.9 MICROALBUMINURIA ABSENT: ICD-10-CM

## 2023-01-26 DIAGNOSIS — E78.2 MIXED DYSLIPIDEMIA: ICD-10-CM

## 2023-01-26 DIAGNOSIS — E11.69 TYPE 2 DIABETES MELLITUS WITH OTHER SPECIFIED COMPLICATION, WITHOUT LONG-TERM CURRENT USE OF INSULIN (HCC): ICD-10-CM

## 2023-01-26 DIAGNOSIS — N18.2 CHRONIC KIDNEY DISEASE (CKD) STAGE G2/A1, MILDLY DECREASED GLOMERULAR FILTRATION RATE (GFR) BETWEEN 60-89 ML/MIN/1.73 SQUARE METER AND ALBUMINURIA CREATININE RATIO LESS THAN 30 MG/G: ICD-10-CM

## 2023-01-26 DIAGNOSIS — E55.9 MILD VITAMIN D DEFICIENCY: ICD-10-CM

## 2023-01-26 LAB
25(OH)D3 SERPL-MCNC: 28.7 NG/ML (ref 30–100)
ALBUMIN SERPL BCP-MCNC: 3.5 G/DL (ref 3.5–5)
ALP SERPL-CCNC: 58 U/L (ref 46–116)
ALT SERPL W P-5'-P-CCNC: 42 U/L (ref 12–78)
ANION GAP SERPL CALCULATED.3IONS-SCNC: 1 MMOL/L (ref 4–13)
AST SERPL W P-5'-P-CCNC: 19 U/L (ref 5–45)
BASOPHILS # BLD AUTO: 0.05 THOUSANDS/ÂΜL (ref 0–0.1)
BASOPHILS NFR BLD AUTO: 1 % (ref 0–1)
BILIRUB SERPL-MCNC: 0.47 MG/DL (ref 0.2–1)
BUN SERPL-MCNC: 23 MG/DL (ref 5–25)
CALCIUM SERPL-MCNC: 8.9 MG/DL (ref 8.3–10.1)
CHLORIDE SERPL-SCNC: 105 MMOL/L (ref 96–108)
CHOLEST SERPL-MCNC: 207 MG/DL
CO2 SERPL-SCNC: 30 MMOL/L (ref 21–32)
CREAT SERPL-MCNC: 1.12 MG/DL (ref 0.6–1.3)
EOSINOPHIL # BLD AUTO: 0.18 THOUSAND/ÂΜL (ref 0–0.61)
EOSINOPHIL NFR BLD AUTO: 2 % (ref 0–6)
ERYTHROCYTE [DISTWIDTH] IN BLOOD BY AUTOMATED COUNT: 15.9 % (ref 11.6–15.1)
EST. AVERAGE GLUCOSE BLD GHB EST-MCNC: 117 MG/DL
GFR SERPL CREATININE-BSD FRML MDRD: 66 ML/MIN/1.73SQ M
GLUCOSE P FAST SERPL-MCNC: 102 MG/DL (ref 65–99)
HBA1C MFR BLD: 5.7 %
HCT VFR BLD AUTO: 52.8 % (ref 36.5–49.3)
HDLC SERPL-MCNC: 48 MG/DL
HGB BLD-MCNC: 16.4 G/DL (ref 12–17)
IMM GRANULOCYTES # BLD AUTO: 0.01 THOUSAND/UL (ref 0–0.2)
IMM GRANULOCYTES NFR BLD AUTO: 0 % (ref 0–2)
LDLC SERPL CALC-MCNC: 131 MG/DL (ref 0–100)
LYMPHOCYTES # BLD AUTO: 1.91 THOUSANDS/ÂΜL (ref 0.6–4.47)
LYMPHOCYTES NFR BLD AUTO: 25 % (ref 14–44)
MCH RBC QN AUTO: 27.1 PG (ref 26.8–34.3)
MCHC RBC AUTO-ENTMCNC: 31.1 G/DL (ref 31.4–37.4)
MCV RBC AUTO: 87 FL (ref 82–98)
MONOCYTES # BLD AUTO: 0.76 THOUSAND/ÂΜL (ref 0.17–1.22)
MONOCYTES NFR BLD AUTO: 10 % (ref 4–12)
NEUTROPHILS # BLD AUTO: 4.66 THOUSANDS/ÂΜL (ref 1.85–7.62)
NEUTS SEG NFR BLD AUTO: 62 % (ref 43–75)
NONHDLC SERPL-MCNC: 159 MG/DL
NRBC BLD AUTO-RTO: 0 /100 WBCS
PLATELET # BLD AUTO: 245 THOUSANDS/UL (ref 149–390)
PMV BLD AUTO: 10.9 FL (ref 8.9–12.7)
POTASSIUM SERPL-SCNC: 4.5 MMOL/L (ref 3.5–5.3)
PROT SERPL-MCNC: 6.7 G/DL (ref 6.4–8.4)
RBC # BLD AUTO: 6.06 MILLION/UL (ref 3.88–5.62)
SODIUM SERPL-SCNC: 136 MMOL/L (ref 135–147)
TESTOST SERPL-MCNC: 927 NG/DL (ref 95–948)
TRIGL SERPL-MCNC: 139 MG/DL
WBC # BLD AUTO: 7.57 THOUSAND/UL (ref 4.31–10.16)

## 2023-01-27 LAB
PSA FREE MFR SERPL: 18.5 %
PSA FREE SERPL-MCNC: 0.37 NG/ML
PSA SERPL-MCNC: 2 NG/ML (ref 0–4)

## 2023-02-28 ENCOUNTER — OFFICE VISIT (OUTPATIENT)
Dept: FAMILY MEDICINE CLINIC | Facility: CLINIC | Age: 68
End: 2023-02-28

## 2023-02-28 VITALS
HEART RATE: 76 BPM | WEIGHT: 258.2 LBS | TEMPERATURE: 97.8 F | RESPIRATION RATE: 16 BRPM | HEIGHT: 69 IN | SYSTOLIC BLOOD PRESSURE: 122 MMHG | DIASTOLIC BLOOD PRESSURE: 74 MMHG | BODY MASS INDEX: 38.24 KG/M2

## 2023-02-28 DIAGNOSIS — E11.69 TYPE 2 DIABETES MELLITUS WITH OTHER SPECIFIED COMPLICATION, WITHOUT LONG-TERM CURRENT USE OF INSULIN (HCC): ICD-10-CM

## 2023-02-28 DIAGNOSIS — I10 ESSENTIAL HYPERTENSION: ICD-10-CM

## 2023-02-28 DIAGNOSIS — E22.1 HYPERPROLACTINEMIA (HCC): ICD-10-CM

## 2023-02-28 DIAGNOSIS — F33.41 RECURRENT MAJOR DEPRESSIVE DISORDER, IN PARTIAL REMISSION (HCC): ICD-10-CM

## 2023-02-28 DIAGNOSIS — G89.29 CHRONIC PAIN OF LEFT KNEE: ICD-10-CM

## 2023-02-28 DIAGNOSIS — E66.9 OBESITY (BMI 30-39.9): ICD-10-CM

## 2023-02-28 DIAGNOSIS — J44.9 CHRONIC OBSTRUCTIVE PULMONARY DISEASE, UNSPECIFIED COPD TYPE (HCC): Primary | ICD-10-CM

## 2023-02-28 DIAGNOSIS — E11.69 TYPE 2 DIABETES MELLITUS WITH OTHER SPECIFIED COMPLICATION, UNSPECIFIED WHETHER LONG TERM INSULIN USE (HCC): ICD-10-CM

## 2023-02-28 DIAGNOSIS — N18.2 CKD (CHRONIC KIDNEY DISEASE) STAGE 2, GFR 60-89 ML/MIN: ICD-10-CM

## 2023-02-28 DIAGNOSIS — E78.2 MIXED HYPERLIPIDEMIA: ICD-10-CM

## 2023-02-28 DIAGNOSIS — M25.562 CHRONIC PAIN OF LEFT KNEE: ICD-10-CM

## 2023-02-28 PROBLEM — E55.9 VITAMIN D DEFICIENCY: Status: ACTIVE | Noted: 2019-12-06

## 2023-02-28 NOTE — PATIENT INSTRUCTIONS
Team Member Role and Specialty Contact Info Address Start End Comments   Makenzie Hoffmann [place] Physician Assistant (Physician Assistant) Phone: [phone] Fax: [phone]  [address] [facility] [id number] [place] [address] 2/28/2023 - -     Make appointment with podiatry      Medicare Preventive Visit Patient Instructions  Thank you for completing your Welcome to Medicare Visit or Medicare Annual Wellness Visit today  Your next wellness visit will be due in one year (2/29/2024)  The screening/preventive services that you may require over the next 5-10 years are detailed below  Some tests may not apply to you based off risk factors and/or age  Screening tests ordered at today's visit but not completed yet may show as past due  Also, please note that scanned in results may not display below  Preventive Screenings:  Service Recommendations Previous Testing/Comments   Colorectal Cancer Screening  Colonoscopy    Fecal Occult Blood Test (FOBT)/Fecal Immunochemical Test (FIT)  Fecal DNA/Cologuard Test  Flexible Sigmoidoscopy Age: 39-70 years old   Colonoscopy: every 10 years (May be performed more frequently if at higher risk)  OR  FOBT/FIT: every 1 year  OR  Cologuard: every 3 years  OR  Sigmoidoscopy: every 5 years  Screening may be recommended earlier than age 39 if at higher risk for colorectal cancer  Also, an individualized decision between you and your healthcare provider will decide whether screening between the ages of 74-80 would be appropriate   Colonoscopy: 01/10/2019  FOBT/FIT: Not on file  Cologuard: Not on file  Sigmoidoscopy: Not on file          Prostate Cancer Screening Individualized decision between patient and health care provider in men between ages of 53-78   Medicare will cover every 12 months beginning on the day after your 50th birthday PSA: 2 0 ng/mL           Hepatitis C Screening Once for adults born between [facility]  More frequently in patients at high risk for Hepatitis C Hep C Antibody: 01/09/2019        Diabetes Screening 1-2 times per year if you're at risk for diabetes or have pre-diabetes Fasting glucose: 102 mg/dL (1/26/2023)  A1C: 5 7 % (1/26/2023)      Cholesterol Screening Once every 5 years if you don't have a lipid disorder  May order more often based on risk factors  Lipid panel: 01/26/2023         Other Preventive Screenings Covered by Medicare:  Abdominal Aortic Aneurysm (AAA) Screening: covered once if your at risk  You're considered to be at risk if you have a family history of AAA or a male between the age of 73-68 who smoking at least 100 cigarettes in your lifetime  Lung Cancer Screening: covers low dose CT scan once per year if you meet all of the following conditions: (1) Age 50-69; (2) No signs or symptoms of lung cancer; (3) Current smoker or have quit smoking within the last 15 years; (4) You have a tobacco smoking history of at least 20 pack years (packs per day x number of years you smoked); (5) You get a written order from a healthcare provider  Glaucoma Screening: covered annually if you're considered high risk: (1) You have diabetes OR (2) Family history of glaucoma OR (3)  aged 48 and older OR (3)  American aged 72 and older  Osteoporosis Screening: covered every 2 years if you meet one of the following conditions: (1) Have a vertebral abnormality; (2) On glucocorticoid therapy for more than 3 months; (3) Have primary hyperparathyroidism; (4) On osteoporosis medications and need to assess response to drug therapy  HIV Screening: covered annually if you're between the age of 12-76  Also covered annually if you are younger than 13 and older than 72 with risk factors for HIV infection  For pregnant patients, it is covered up to 3 times per pregnancy      Immunizations:  Immunization Recommendations   Influenza Vaccine Annual influenza vaccination during flu season is recommended for all persons aged >= 6 months who do not have contraindications   Pneumococcal Vaccine   * Pneumococcal conjugate vaccine = PCV13 (Prevnar 13), PCV15 (Vaxneuvance), PCV20 (Prevnar 20)  * Pneumococcal polysaccharide vaccine = PPSV23 (Pneumovax) Adults 2364 years old: 1-3 doses may be recommended based on certain risk factors  Adults 72 years old: 1-2 doses may be recommended based off what pneumonia vaccine you previously received   Hepatitis B Vaccine 3 dose series if at intermediate or high risk (ex: diabetes, end stage renal disease, liver disease)   Tetanus (Td) Vaccine - COST NOT COVERED BY MEDICARE PART B Following completion of primary series, a booster dose should be given every 10 years to maintain immunity against tetanus  Td may also be given as tetanus wound prophylaxis  Tdap Vaccine - COST NOT COVERED BY MEDICARE PART B Recommended at least once for all adults  For pregnant patients, recommended with each pregnancy  Shingles Vaccine (Shingrix) - COST NOT COVERED BY MEDICARE PART B  2 shot series recommended in those aged 48 and above     Health Maintenance Due:      Topic Date Due    Colorectal Cancer Screening  01/10/2029    Hepatitis C Screening  Completed     Immunizations Due:      Topic Date Due    COVID-19 Vaccine (2 - Moderna series) 03/09/2021    Influenza Vaccine (1) 09/01/2022     Advance Directives   What are advance directives? Advance directives are legal documents that state your wishes and plans for medical care  These plans are made ahead of time in case you lose your ability to make decisions for yourself  Advance directives can apply to any medical decision, such as the treatments you want, and if you want to donate organs  What are the types of advance directives? There are many types of advance directives, and each state has rules about how to use them  You may choose a combination of any of the following:  Living will: This is a written record of the treatment you want   You can also choose which treatments you do not want, which to limit, and which to stop at a certain time  This includes surgery, medicine, IV fluid, and tube feedings  [facility] power of  for [facility]): This is a written record that states who you want to make healthcare choices for you when you are unable to make them for yourself  This person, called a proxy, is usually a family member or a friend  You may choose more than 1 proxy  Do not resuscitate (DNR) order:  A DNR order is used in case your heart stops beating or you stop breathing  It is a request not to have certain forms of treatment, such as CPR  A DNR order may be included in other types of advance directives  Medical directive: This covers the care that you want if you are in a coma, near death, or unable to make decisions for yourself  You can list the treatments you want for each condition  Treatment may include pain medicine, surgery, blood transfusions, dialysis, IV or tube feedings, and a ventilator (breathing machine)  Values history: This document has questions about your views, beliefs, and how you feel and think about life  This information can help others choose the care that you would choose  Why are advance directives important? An advance directive helps you control your care  Although spoken wishes may be used, it is better to have your wishes written down  Spoken wishes can be misunderstood, or not followed  Treatments may be given even if you do not want them  An advance directive may make it easier for your family to make difficult choices about your care  Weight Management   Why it is important to manage your weight:  Being overweight increases your risk of health conditions such as heart disease, high blood pressure, type 2 diabetes, and certain types of cancer  It can also increase your risk for osteoarthritis, sleep apnea, and other respiratory problems  Aim for a slow, steady weight loss   Even a small amount of weight loss can lower your risk of health problems  How to lose weight safely:  A safe and healthy way to lose weight is to eat fewer calories and get regular exercise  You can lose up about 1 pound a week by decreasing the number of calories you eat by 500 calories each day  Healthy meal plan for weight management:  A healthy meal plan includes a variety of foods, contains fewer calories, and helps you stay healthy  A healthy meal plan includes the following:  Eat whole-grain foods more often  A healthy meal plan should contain fiber  Fiber is the part of grains, fruits, and vegetables that is not broken down by your body  Whole-grain foods are healthy and provide extra fiber in your diet  Some examples of whole-grain foods are whole-wheat breads and pastas, oatmeal, brown rice, and bulgur  Eat a variety of vegetables every day  Include dark, leafy greens such as spinach, kale, meredith greens, and mustard greens  Eat yellow and orange vegetables such as carrots, sweet potatoes, and winter squash  Eat a variety of fruits every day  Choose fresh or canned fruit (canned in its own juice or light syrup) instead of juice  Fruit juice has very little or no fiber  Eat low-fat dairy foods  Drink fat-free (skim) milk or 1% milk  Eat fat-free yogurt and low-fat cottage cheese  Try low-fat cheeses such as mozzarella and other reduced-fat cheeses  Choose meat and other protein foods that are low in fat  Choose beans or other legumes such as split peas or lentils  Choose fish, skinless poultry (chicken or turkey), or lean cuts of red meat (beef or pork)  Before you cook meat or poultry, cut off any visible fat  Use less fat and oil  Try baking foods instead of frying them  Add less fat, such as margarine, sour cream, regular salad dressing and mayonnaise to foods  Eat fewer high-fat foods  Some examples of high-fat foods include french fries, doughnuts, ice cream, and cakes  Eat fewer sweets  Limit foods and drinks that are high in sugar   This includes candy, cookies, regular soda, and sweetened drinks  Exercise:  Exercise at least 30 minutes per day on most days of the week  Some examples of exercise include walking, biking, dancing, and swimming  You can also fit in more physical activity by taking the stairs instead of the elevator or parking farther away from stores  Ask your healthcare provider about the best exercise plan for you  © Copyright [employer] 2018 Information is for End User's use only and may not be sold, redistributed or otherwise used for commercial purposes   All illustrations and images included in CareNotes® are the copyrighted property of A D A M , Inc  or [address] [place] [address]

## 2023-02-28 NOTE — ASSESSMENT & PLAN NOTE
Lab Results   Component Value Date    EGFR 66 01/26/2023    EGFR 61 10/12/2022    EGFR 62 08/27/2022    CREATININE 1 12 01/26/2023    CREATININE 1 19 10/12/2022    CREATININE 1 19 08/27/2022   Stable kidney function

## 2023-02-28 NOTE — ASSESSMENT & PLAN NOTE
Lab Results   Component Value Date    HGBA1C 5 7 (H) 01/26/2023   Continues to follow closely with endocrinology  Compliant with medications  He continues on metformin and ozempic  Up to date with eye exams  Diabetic foot exam in the office today  Encouraged the patient to make an appointment with podiatry for toenail maintenance

## 2023-03-16 ENCOUNTER — OFFICE VISIT (OUTPATIENT)
Dept: FAMILY MEDICINE CLINIC | Facility: CLINIC | Age: 68
End: 2023-03-16

## 2023-03-16 VITALS
DIASTOLIC BLOOD PRESSURE: 72 MMHG | HEIGHT: 69 IN | HEART RATE: 89 BPM | BODY MASS INDEX: 38.03 KG/M2 | TEMPERATURE: 98.6 F | SYSTOLIC BLOOD PRESSURE: 162 MMHG | OXYGEN SATURATION: 91 % | WEIGHT: 256.8 LBS | RESPIRATION RATE: 16 BRPM

## 2023-03-16 DIAGNOSIS — J45.901 EXACERBATION OF ASTHMA, UNSPECIFIED ASTHMA SEVERITY, UNSPECIFIED WHETHER PERSISTENT: Primary | ICD-10-CM

## 2023-03-16 DIAGNOSIS — J45.40 MODERATE PERSISTENT ASTHMA WITHOUT COMPLICATION: ICD-10-CM

## 2023-03-16 DIAGNOSIS — J44.9 CHRONIC OBSTRUCTIVE PULMONARY DISEASE, UNSPECIFIED COPD TYPE (HCC): ICD-10-CM

## 2023-03-16 DIAGNOSIS — J40 BRONCHITIS: ICD-10-CM

## 2023-03-16 RX ORDER — BENZONATATE 100 MG/1
100 CAPSULE ORAL 3 TIMES DAILY PRN
Qty: 20 CAPSULE | Refills: 0 | Status: SHIPPED | OUTPATIENT
Start: 2023-03-16

## 2023-03-16 RX ORDER — FLUTICASONE PROPIONATE AND SALMETEROL 250; 50 UG/1; UG/1
1 POWDER RESPIRATORY (INHALATION) 2 TIMES DAILY
Qty: 60 BLISTER | Refills: 1 | Status: SHIPPED | OUTPATIENT
Start: 2023-03-16

## 2023-03-16 RX ORDER — BUSPIRONE HYDROCHLORIDE 7.5 MG/1
TABLET ORAL
COMMUNITY
Start: 2022-12-26

## 2023-03-16 RX ORDER — ALBUTEROL SULFATE 90 UG/1
2 AEROSOL, METERED RESPIRATORY (INHALATION) EVERY 6 HOURS PRN
Qty: 8.5 G | Refills: 3 | Status: SHIPPED | OUTPATIENT
Start: 2023-03-16

## 2023-03-16 RX ORDER — LISINOPRIL 10 MG/1
10 TABLET ORAL DAILY
COMMUNITY
Start: 2023-02-06 | End: 2024-02-06

## 2023-03-16 RX ORDER — PREDNISONE 10 MG/1
TABLET ORAL
Qty: 30 TABLET | Refills: 0 | Status: SHIPPED | OUTPATIENT
Start: 2023-03-16

## 2023-03-16 RX ORDER — EZETIMIBE 10 MG/1
TABLET ORAL
COMMUNITY
Start: 2023-02-04

## 2023-03-16 RX ORDER — DEXTROMETHORPHAN HYDROBROMIDE AND PROMETHAZINE HYDROCHLORIDE 15; 6.25 MG/5ML; MG/5ML
5 SOLUTION ORAL
Qty: 118 ML | Refills: 0 | Status: SHIPPED | OUTPATIENT
Start: 2023-03-16

## 2023-03-16 NOTE — ASSESSMENT & PLAN NOTE
Increasing cough and shortness of breath along with some wheezing over the past 2 weeks  He has been COVID-negative  Prednisone sent to the patient's pharmacy  I will send his inhalers back to the pharmacy  Phenergan DM to the pharmacy  He has an upcoming appointment with pulmonology  He has been encouraged to keep this appointment

## 2023-03-16 NOTE — ASSESSMENT & PLAN NOTE
Patient has an upcoming pulmonology appointment  He has has an increase in his cough for the past 2 to 3 weeks  He previously had been prescribed Advair and albuterol  He states that he stopped using these medications 2 to 3 months ago when he ran out  I will send this to the pharmacy today  I did recommend to the patient that he use these as instructed and do not stop these medications

## 2023-03-16 NOTE — ASSESSMENT & PLAN NOTE
Advair and albuterol previously prescribed  The patient ran out of these medications 2 to 3 months ago and stop them entirely  He has had an increasingly worse daily cough  It has been worsening over the past 2 weeks  I will send his back to the pharmacy  He has an upcoming pulmonology appointment

## 2023-03-16 NOTE — PROGRESS NOTES
[address] [facility]    NAME: Rolf Sanchez  AGE: 69 y o  SEX: male  : 1955     DATE: 3/16/2023     Assessment and Plan:     Problem List Items Addressed This Visit        Respiratory    Asthma     Advair and albuterol previously prescribed  The patient ran out of these medications 2 to 3 months ago and stop them entirely  He has had an increasingly worse daily cough  It has been worsening over the past 2 weeks  I will send his back to the pharmacy  He has an upcoming pulmonology appointment  Relevant Medications    Fluticasone-Salmeterol (Advair Diskus) 250-50 mcg/dose inhaler    albuterol (PROVENTIL HFA,VENTOLIN HFA) 90 mcg/act inhaler    Chronic obstructive pulmonary disease, unspecified COPD type [facility])     Patient has an upcoming pulmonology appointment  He has has an increase in his cough for the past 2 to 3 weeks  He previously had been prescribed Advair and albuterol  He states that he stopped using these medications 2 to 3 months ago when he ran out  I will send this to the pharmacy today  I did recommend to the patient that he use these as instructed and do not stop these medications  Relevant Medications    Fluticasone-Salmeterol (Advair Diskus) 250-50 mcg/dose inhaler    albuterol (PROVENTIL HFA,VENTOLIN HFA) 90 mcg/act inhaler    predniSONE 10 mg tablet    Promethazine-DM (PHENERGAN-DM) 6 25-15 mg/5 mL oral syrup    benzonatate (TESSALON PERLES) 100 mg capsule    Bronchitis    Relevant Medications    Fluticasone-Salmeterol (Advair Diskus) 250-50 mcg/dose inhaler    albuterol (PROVENTIL HFA,VENTOLIN HFA) 90 mcg/act inhaler    predniSONE 10 mg tablet    Promethazine-DM (PHENERGAN-DM) 6 25-15 mg/5 mL oral syrup    benzonatate (TESSALON PERLES) 100 mg capsule    Exacerbation of asthma - Primary     Increasing cough and shortness of breath along with some wheezing over the past 2 weeks  He has been COVID-negative    Prednisone sent to the patient's pharmacy  I will send his inhalers back to the pharmacy  Phenergan DM to the pharmacy  He has an upcoming appointment with pulmonology  He has been encouraged to keep this appointment  Relevant Medications    Fluticasone-Salmeterol (Advair Diskus) 250-50 mcg/dose inhaler    albuterol (PROVENTIL HFA,VENTOLIN HFA) 90 mcg/act inhaler           No follow-ups on file  Chief Complaint:     Chief Complaint   Patient presents with   • Cough     Ongoing for 2 weeks         History of Present Illness:   Patient presents to the office today with an increasing cough over the past several months which has worsened over the past 2 to 3 weeks  He does have a history of COPD and asthma  He stopped his inhaler use approximately 2 to 3 months ago when he ran out  I will send these into the pharmacy today  I did review the instructions with him  He did test negative for COVID 2 weeks ago when his symptoms started to worsen  He continues with some shortness of breath, wheezing and cough  Prednisone sent to the patient's pharmacy  Phenergan DM for the nighttime cough  He will contact the office should his symptoms not improve  Review of Systems:     Review of Systems   Constitutional: Negative  Negative for fatigue  HENT: Negative  Negative for congestion, postnasal drip, rhinorrhea and trouble swallowing  Eyes: Negative  Negative for visual disturbance  Respiratory: Positive for cough, chest tightness and shortness of breath  Negative for choking  Cardiovascular: Negative  Negative for chest pain  Gastrointestinal: Negative  Endocrine: Negative  Genitourinary: Negative  Musculoskeletal: Negative  Negative for arthralgias, back pain, myalgias and neck pain  Skin: Negative  Neurological: Negative for dizziness and headaches  Psychiatric/Behavioral: Negative           Problem List:     Patient Active Problem List   Diagnosis   • Essential hypertension   • Type 2 diabetes mellitus with other specified complication, without long-term current use of insulin ([facility])   • Asthma   • CKD (chronic kidney disease) stage 2, GFR 60-89 ml/min   • Obesity (BMI 30-39  9)   • Hyperlipidemia   • Depression   • Osteoarthritis of both knees   • Sebaceous cyst   • Chronic gout of right knee   • Cystitis   • Primary insomnia   • Chronic pain of left knee   • Other constipation   • Left hip pain   • Pain in left hip   • Vitamin D deficiency   • Chronic obstructive pulmonary disease, unspecified COPD type ([facility])   • Hyperprolactinemia ([facility])   • Bronchitis   • Exacerbation of asthma        Objective:     /72 (BP Location: Left arm, Patient Position: Sitting)   Pulse 89   Temp 98 6 °F (37 °C) (Tympanic)   Resp 16   Ht 5' 8 5" (1 74 m)   Wt 116 kg (256 lb 12 8 oz)   SpO2 91%   BMI 38 48 kg/m²     Current Outpatient Medications   Medication Sig Dispense Refill   • albuterol (PROVENTIL HFA,VENTOLIN HFA) 90 mcg/act inhaler Inhale 2 puffs every 6 (six) hours as needed for wheezing or shortness of breath 8 5 g 3   • Alcohol Swabs (ALCOHOL PADS) 70 % PADS Use as directed  0   • allopurinol (ZYLOPRIM) 300 mg tablet JENNIFER 1 TABLETA POR VIA ORAL DIARIAMENTE  2   • Ascorbic Acid (vitamin C) 1000 MG tablet Take 1 tablet (1,000 mg total) by mouth 2 (two) times a day 20 tablet 0   • atorvastatin (LIPITOR) 80 mg tablet TAKE ONE TABLET (80 MG TOTAL) BY MOUTH DAILY WITH DINNER       • benzonatate (TESSALON PERLES) 100 mg capsule Take 1 capsule (100 mg total) by mouth 3 (three) times a day as needed for cough 20 capsule 0   • Blood Glucose Monitoring Suppl (FREESTYLE LITE) ANGE Use as directed  0   • busPIRone (BUSPAR) 7 5 mg tablet JENNIFER 1 TABLETA POR VIA ORAL DOS VECES AL CORAL FOR ANXIETY     • cholecalciferol (VITAMIN D3) 1,000 units tablet Take 2 tablets (2,000 Units total) by mouth daily 180 tablet 3   • Continuous Blood Gluc Sensor (FreeStyle Dunia 2 Sensor) MISC INJECT 1 EACH UNDER THE SKIN EVERY 14 (FOURTEEN) DAYS  • DULoxetine (CYMBALTA) 30 mg delayed release capsule TAKE ONE CAPSULE (30 MG TOTAL) BY MOUTH DAILY 90 capsule 3   • ezetimibe (ZETIA) 10 mg tablet TAKE ONE TABLET (10 MG TOTAL) BY MOUTH DAILY  • Fluticasone-Salmeterol (Advair Diskus) 250-50 mcg/dose inhaler Inhale 1 puff 2 (two) times a day Rinse mouth after use 60 blister 1   • furosemide (LASIX) 20 mg tablet TAKE 1 TABLET (20 MG TOTAL) BY MOUTH DAILY 90 tablet 0   • gabapentin (NEURONTIN) 300 mg capsule TAKE ONE CAPSULE (300 MG TOTAL) BY MOUTH 2 (TWO) TIMES A DAY  • glucose blood (FREESTYLE LITE) test strip USE AS DIRECTED A / HASTA CHECK BLOOD GLUCOSE 300 each 3   • lisinopril (ZESTRIL) 10 mg tablet Take 10 mg by mouth daily     • metFORMIN (GLUCOPHAGE-XR) 750 mg 24 hr tablet JENNIFER 1 TABLETA POR VIA ORAL DIARIAMENTE  2   • Multiple Vitamin (multivitamin) tablet Take 1 tablet by mouth daily 10 tablet 0   • naproxen (EC NAPROSYN) 500 MG EC tablet Take 1 tablet (500 mg total) by mouth 2 (two) times a day as needed for mild pain 60 tablet 1   • naproxen (NAPROSYN) 500 mg tablet TAKE ONE TABLET (500 MG TOTAL) BY MOUTH 2 (TWO) TIMES A DAY AS NEEDED FOR MILD PAIN 60 tablet 0   • polyethylene glycol (GLYCOLAX) 17 GM/SCOOP powder Take 17 g by mouth daily as needed (constipation) May substitute as needed 507 g 1   • predniSONE 10 mg tablet 40 mg for three days, 30 mg for three days, 20 mg for 3 days, 10 mg for 3 days   30 tablet 0   • Promethazine-DM (PHENERGAN-DM) 6 25-15 mg/5 mL oral syrup Take 5 mL by mouth daily at bedtime as needed for cough 118 mL 0   • semaglutide, 1 mg/dose, (Ozempic) 2 mg/1 5 mL injection pen Inject 1 mg under the skin     • sertraline (ZOLOFT) 50 mg tablet Take 50 mg by mouth daily     • Testosterone Cypionate 200 MG/ML KIT Inject 200 mg into a muscle every 21 days     • traZODone (DESYREL) 50 mg tablet Take 1 tablet (50 mg total) by mouth daily at bedtime as needed for sleep 30 tablet 1     No current facility-administered medications for this visit  Physical Exam  Vitals reviewed  Constitutional:       Appearance: Normal appearance  He is obese  HENT:      Head: Normocephalic and atraumatic  Right Ear: Tympanic membrane, ear canal and external ear normal       Left Ear: Tympanic membrane, ear canal and external ear normal       Nose: Nose normal       Mouth/Throat:      Mouth: Mucous membranes are moist    Eyes:      Extraocular Movements: Extraocular movements intact  Pupils: Pupils are equal, round, and reactive to light  Cardiovascular:      Rate and Rhythm: Normal rate and regular rhythm  Pulses: Normal pulses  Heart sounds: Normal heart sounds  Pulmonary:      Effort: Pulmonary effort is normal       Breath sounds: Wheezing present  Musculoskeletal:         General: Normal range of motion  Skin:     General: Skin is warm  Neurological:      General: No focal deficit present  Mental Status: He is alert and oriented to person, place, and time  Psychiatric:         Mood and Affect: Mood normal          Behavior: Behavior normal          Thought Content:  Thought content normal          Judgment: Judgment normal          Ney Quezada, [id number] [address] o[address]o

## 2023-05-03 ENCOUNTER — OFFICE VISIT (OUTPATIENT)
Dept: FAMILY MEDICINE CLINIC | Facility: CLINIC | Age: 68
End: 2023-05-03

## 2023-05-03 ENCOUNTER — APPOINTMENT (OUTPATIENT)
Dept: LAB | Facility: CLINIC | Age: 68
End: 2023-05-03

## 2023-05-03 VITALS
WEIGHT: 260 LBS | SYSTOLIC BLOOD PRESSURE: 120 MMHG | OXYGEN SATURATION: 91 % | HEIGHT: 69 IN | BODY MASS INDEX: 38.51 KG/M2 | HEART RATE: 89 BPM | RESPIRATION RATE: 22 BRPM | DIASTOLIC BLOOD PRESSURE: 62 MMHG | TEMPERATURE: 97.5 F

## 2023-05-03 DIAGNOSIS — N18.2 CKD (CHRONIC KIDNEY DISEASE) STAGE 2, GFR 60-89 ML/MIN: ICD-10-CM

## 2023-05-03 DIAGNOSIS — G89.29 CHRONIC PAIN OF LEFT KNEE: ICD-10-CM

## 2023-05-03 DIAGNOSIS — Z78.9 MICROALBUMINURIA ABSENT: ICD-10-CM

## 2023-05-03 DIAGNOSIS — E29.1 HYPOGONADISM MALE: ICD-10-CM

## 2023-05-03 DIAGNOSIS — M25.552 LEFT HIP PAIN: ICD-10-CM

## 2023-05-03 DIAGNOSIS — M54.50 LUMBAR BACK PAIN: Primary | ICD-10-CM

## 2023-05-03 DIAGNOSIS — M54.16 LUMBAR RADICULOPATHY: ICD-10-CM

## 2023-05-03 DIAGNOSIS — E55.9 VITAMIN D DEFICIENCY: ICD-10-CM

## 2023-05-03 DIAGNOSIS — M10.9 GOUT OF BOTH FEET: ICD-10-CM

## 2023-05-03 DIAGNOSIS — M25.562 CHRONIC PAIN OF LEFT KNEE: ICD-10-CM

## 2023-05-03 DIAGNOSIS — E11.69 TYPE 2 DIABETES MELLITUS WITH OTHER SPECIFIED COMPLICATION, WITHOUT LONG-TERM CURRENT USE OF INSULIN (HCC): ICD-10-CM

## 2023-05-03 DIAGNOSIS — E78.2 MIXED HYPERLIPIDEMIA: ICD-10-CM

## 2023-05-03 LAB
25(OH)D3 SERPL-MCNC: 42.8 NG/ML (ref 30–100)
ALBUMIN SERPL BCP-MCNC: 3.7 G/DL (ref 3.5–5)
ALP SERPL-CCNC: 60 U/L (ref 46–116)
ALT SERPL W P-5'-P-CCNC: 46 U/L (ref 12–78)
ANION GAP SERPL CALCULATED.3IONS-SCNC: 5 MMOL/L (ref 4–13)
AST SERPL W P-5'-P-CCNC: 33 U/L (ref 5–45)
BASOPHILS # BLD AUTO: 0.06 THOUSANDS/ÂΜL (ref 0–0.1)
BASOPHILS NFR BLD AUTO: 1 % (ref 0–1)
BILIRUB SERPL-MCNC: 0.58 MG/DL (ref 0.2–1)
BUN SERPL-MCNC: 15 MG/DL (ref 5–25)
CALCIUM SERPL-MCNC: 8.8 MG/DL (ref 8.3–10.1)
CHLORIDE SERPL-SCNC: 108 MMOL/L (ref 96–108)
CHOLEST SERPL-MCNC: 170 MG/DL
CO2 SERPL-SCNC: 25 MMOL/L (ref 21–32)
CREAT SERPL-MCNC: 1.15 MG/DL (ref 0.6–1.3)
EOSINOPHIL # BLD AUTO: 0.22 THOUSAND/ÂΜL (ref 0–0.61)
EOSINOPHIL NFR BLD AUTO: 3 % (ref 0–6)
ERYTHROCYTE [DISTWIDTH] IN BLOOD BY AUTOMATED COUNT: 15.8 % (ref 11.6–15.1)
GFR SERPL CREATININE-BSD FRML MDRD: 64 ML/MIN/1.73SQ M
GLUCOSE P FAST SERPL-MCNC: 100 MG/DL (ref 65–99)
HCT VFR BLD AUTO: 52.6 % (ref 36.5–49.3)
HDLC SERPL-MCNC: 58 MG/DL
HGB BLD-MCNC: 16.8 G/DL (ref 12–17)
IMM GRANULOCYTES # BLD AUTO: 0.01 THOUSAND/UL (ref 0–0.2)
IMM GRANULOCYTES NFR BLD AUTO: 0 % (ref 0–2)
LDLC SERPL CALC-MCNC: 91 MG/DL (ref 0–100)
LYMPHOCYTES # BLD AUTO: 1.64 THOUSANDS/ÂΜL (ref 0.6–4.47)
LYMPHOCYTES NFR BLD AUTO: 22 % (ref 14–44)
MCH RBC QN AUTO: 28 PG (ref 26.8–34.3)
MCHC RBC AUTO-ENTMCNC: 31.9 G/DL (ref 31.4–37.4)
MCV RBC AUTO: 88 FL (ref 82–98)
MONOCYTES # BLD AUTO: 0.75 THOUSAND/ÂΜL (ref 0.17–1.22)
MONOCYTES NFR BLD AUTO: 10 % (ref 4–12)
NEUTROPHILS # BLD AUTO: 4.92 THOUSANDS/ÂΜL (ref 1.85–7.62)
NEUTS SEG NFR BLD AUTO: 64 % (ref 43–75)
NONHDLC SERPL-MCNC: 112 MG/DL
NRBC BLD AUTO-RTO: 0 /100 WBCS
PLATELET # BLD AUTO: 229 THOUSANDS/UL (ref 149–390)
PMV BLD AUTO: 10.6 FL (ref 8.9–12.7)
POTASSIUM SERPL-SCNC: 4.3 MMOL/L (ref 3.5–5.3)
PROT SERPL-MCNC: 7.3 G/DL (ref 6.4–8.4)
RBC # BLD AUTO: 6 MILLION/UL (ref 3.88–5.62)
SODIUM SERPL-SCNC: 138 MMOL/L (ref 135–147)
TESTOST SERPL-MCNC: 504 NG/DL (ref 95–948)
TRIGL SERPL-MCNC: 103 MG/DL
WBC # BLD AUTO: 7.6 THOUSAND/UL (ref 4.31–10.16)

## 2023-05-03 RX ORDER — PREDNISONE 10 MG/1
TABLET ORAL
Qty: 30 TABLET | Refills: 0 | Status: SHIPPED | OUTPATIENT
Start: 2023-05-03

## 2023-05-03 NOTE — ASSESSMENT & PLAN NOTE
The patient continues with chronic left knee pain  He states [facility] and was receiving injections in his knee  He would like to continue those  He states that he was also told at that time that he would need a knee replacement  X-ray of the knee ordered  I will refer the patient to orthopedics

## 2023-05-03 NOTE — ASSESSMENT & PLAN NOTE
History of degenerative disc disease in the lumbar spine  Updated lumbar x-ray ordered  The patient may need physical therapy  Over-the-counter treatments discussed with the patient  Double M-Plasty Complex Repair Preamble Text (Leave Blank If You Do Not Want): Extensive wide undermining was performed.

## 2023-05-03 NOTE — ASSESSMENT & PLAN NOTE
Patient with a history of total left hip replacement in [facility]   He continues with increasing left hip pain  He was prescribed naproxen in the past but states he is not taking it because some of his friends told him it was not good for him  I did recommend the patient take that as needed for pain  Hip x-ray ordered  Refer to orthopedics

## 2023-05-03 NOTE — PROGRESS NOTES
[facility]    NAME: Anna Caputo  AGE: 69 y o  SEX: male  : 1955     DATE: 5/3/2023     Assessment and Plan:     Problem List Items Addressed This Visit        Nervous and Auditory    Lumbar radiculopathy     Patient with increasing lumbar back pain radiating down his left hip and down his leg  No mechanism of injury  History of degenerative joint disease in his knee  History of degenerative arthritis in his spine  History of left hip replacement  Tapering dose of steroids sent to the pharmacy  X-rays ordered  Referral to orthopedics  May need physical therapy  He does ambulate with a cane         Relevant Orders    Ambulatory Referral to Orthopedic Surgery       Other    Chronic pain of left knee       The patient continues with chronic left knee pain  He states he was evaluated by a doctor in [facility] and was receiving injections in his knee  He would like to continue those  He states that he was also told at that time that he would need a knee replacement  X-ray of the knee ordered  I will refer the patient to orthopedics  Relevant Orders    Ambulatory Referral to Orthopedic Surgery    XR knee 3 vw left non injury    Left hip pain     Patient with a history of total left hip replacement in [facility]   He continues with increasing left hip pain  He was prescribed naproxen in the past but states he is not taking it because some of his friends told him it was not good for him  I did recommend the patient take that as needed for pain  Hip x-ray ordered  Refer to orthopedics  Relevant Medications    predniSONE 10 mg tablet    Other Relevant Orders    XR hip/pelv 2-3 vws left if performed    Ambulatory Referral to Orthopedic Surgery    Lumbar back pain - Primary     History of degenerative disc disease in the lumbar spine  Updated lumbar x-ray ordered  The patient may need physical therapy    Over-the-counter treatments discussed with the patient  Relevant Medications    predniSONE 10 mg tablet    Other Relevant Orders    XR spine lumbar minimum 4 views non injury    Ambulatory Referral to Orthopedic Surgery           No follow-ups on file  Chief Complaint:     No chief complaint on file  History of Present Illness: Ivy Kerr presents to the office today with multiple concerns  He is having an increase in his lumbar back pain as well as left hip pain and left knee pain  This is discussed in the above assessment and plan  X-rays ordered  Tapering dose of steroids sent to pharmacy  Side effects discussed  Naproxen prescribed in the past   Patient encouraged to take this if needed  Physical therapy may be warranted  Refer to orthopedics  Review of Systems:     Review of Systems   Constitutional: Negative  Negative for fatigue  HENT: Negative  Negative for congestion, postnasal drip, rhinorrhea and trouble swallowing  Eyes: Negative  Negative for visual disturbance  Respiratory: Negative  Negative for choking and shortness of breath  Cardiovascular: Negative  Negative for chest pain  Gastrointestinal: Negative  Endocrine: Negative  Genitourinary: Negative  Musculoskeletal: Positive for arthralgias, back pain and gait problem  Negative for myalgias and neck pain  Skin: Negative  Neurological: Negative for dizziness and headaches  Psychiatric/Behavioral: Negative  Problem List:     Patient Active Problem List   Diagnosis    Essential hypertension    Type 2 diabetes mellitus with other specified complication, without long-term current use of insulin (HCC)    Asthma    CKD (chronic kidney disease) stage 2, GFR 60-89 ml/min    Obesity (BMI 30-39  9)    Hyperlipidemia    Depression    Osteoarthritis of both knees    Sebaceous cyst    Chronic gout of right knee    Cystitis    Primary insomnia    Chronic pain of left knee    Other constipation    "Left hip pain    Pain in left hip    Vitamin D deficiency    Chronic obstructive pulmonary disease, unspecified COPD type (HCC)    Hyperprolactinemia (HCC)    Bronchitis    Exacerbation of asthma    Lumbar back pain    Lumbar radiculopathy        Objective:     /62   Pulse 89   Temp 97 5 °F (36 4 °C) (Tympanic)   Resp 22   Ht 5' 8 5\" (1 74 m)   Wt 118 kg (260 lb)   SpO2 91%   BMI 38 96 kg/m²     Current Outpatient Medications   Medication Sig Dispense Refill    albuterol (PROVENTIL HFA,VENTOLIN HFA) 90 mcg/act inhaler Inhale 2 puffs every 6 (six) hours as needed for wheezing or shortness of breath 8 5 g 3    Alcohol Swabs (ALCOHOL PADS) 70 % PADS Use as directed  0    allopurinol (ZYLOPRIM) 300 mg tablet JENNIFER 1 TABLETA POR VIA ORAL DIARIAMENTE  2    Ascorbic Acid (vitamin C) 1000 MG tablet Take 1 tablet (1,000 mg total) by mouth 2 (two) times a day 20 tablet 0    atorvastatin (LIPITOR) 80 mg tablet TAKE ONE TABLET (80 MG TOTAL) BY MOUTH DAILY WITH DINNER   benzonatate (TESSALON PERLES) 100 mg capsule Take 1 capsule (100 mg total) by mouth 3 (three) times a day as needed for cough 20 capsule 0    Blood Glucose Monitoring Suppl (FREESTYLE LITE) ANGE Use as directed  0    busPIRone (BUSPAR) 7 5 mg tablet JENNIFER 1 TABLETA POR VIA ORAL DOS VECES AL CORAL FOR ANXIETY      cholecalciferol (VITAMIN D3) 1,000 units tablet Take 2 tablets (2,000 Units total) by mouth daily 180 tablet 3    Continuous Blood Gluc Sensor (FreeStyle Dunia 2 Sensor) MISC INJECT 1 EACH UNDER THE SKIN EVERY 14 (FOURTEEN) DAYS   DULoxetine (CYMBALTA) 30 mg delayed release capsule TAKE ONE CAPSULE (30 MG TOTAL) BY MOUTH DAILY 90 capsule 3    ezetimibe (ZETIA) 10 mg tablet TAKE ONE TABLET (10 MG TOTAL) BY MOUTH DAILY        Fluticasone-Salmeterol (Advair Diskus) 250-50 mcg/dose inhaler Inhale 1 puff 2 (two) times a day Rinse mouth after use 60 blister 1    furosemide (LASIX) 20 mg tablet TAKE 1 TABLET (20 MG TOTAL) " BY MOUTH DAILY 90 tablet 0    gabapentin (NEURONTIN) 300 mg capsule TAKE ONE CAPSULE (300 MG TOTAL) BY MOUTH 2 (TWO) TIMES A DAY   glucose blood (FREESTYLE LITE) test strip USE AS DIRECTED A / HASOSMANI CHECK BLOOD GLUCOSE 300 each 3    lisinopril (ZESTRIL) 10 mg tablet Take 10 mg by mouth daily      metFORMIN (GLUCOPHAGE-XR) 750 mg 24 hr tablet JENNIFER 1 TABLETA POR VIA ORAL DIARIAMENTE  2    Multiple Vitamin (multivitamin) tablet Take 1 tablet by mouth daily 10 tablet 0    naproxen (EC NAPROSYN) 500 MG EC tablet Take 1 tablet (500 mg total) by mouth 2 (two) times a day as needed for mild pain 60 tablet 1    polyethylene glycol (GLYCOLAX) 17 GM/SCOOP powder Take 17 g by mouth daily as needed (constipation) May substitute as needed 507 g 1    predniSONE 10 mg tablet 40 mg for three days, 30 mg for three days, 20 mg for 3 days, 10 mg for 3 days  30 tablet 0    semaglutide, 1 mg/dose, (Ozempic) 2 mg/1 5 mL injection pen Inject 1 mg under the skin      sertraline (ZOLOFT) 50 mg tablet Take 50 mg by mouth daily      Testosterone Cypionate 200 MG/ML KIT Inject 200 mg into a muscle every 21 days      traZODone (DESYREL) 50 mg tablet Take 1 tablet (50 mg total) by mouth daily at bedtime as needed for sleep 30 tablet 1     No current facility-administered medications for this visit  Physical Exam  Vitals reviewed  Constitutional:       Appearance: Normal appearance  HENT:      Head: Normocephalic and atraumatic  Nose: Nose normal       Mouth/Throat:      Mouth: Mucous membranes are moist    Eyes:      Extraocular Movements: Extraocular movements intact  Pupils: Pupils are equal, round, and reactive to light  Cardiovascular:      Rate and Rhythm: Normal rate and regular rhythm  Pulses: Normal pulses  Heart sounds: Normal heart sounds  Pulmonary:      Effort: Pulmonary effort is normal       Breath sounds: Normal breath sounds     Musculoskeletal:      Lumbar back: Tenderness and bony tenderness present  Decreased range of motion  Left hip: Bony tenderness present  Left knee: Bony tenderness present  Decreased range of motion  Skin:     General: Skin is warm  Neurological:      General: No focal deficit present  Mental Status: He is alert and oriented to person, place, and time  Psychiatric:         Mood and Affect: Mood normal          Behavior: Behavior normal          Thought Content:  Thought content normal          Judgment: Judgment normal          [place] , [id number] Hites[address] omero

## 2023-05-03 NOTE — ASSESSMENT & PLAN NOTE
Patient with increasing lumbar back pain radiating down his left hip and down his leg  No mechanism of injury  History of degenerative joint disease in his knee  History of degenerative arthritis in his spine  History of left hip replacement  Tapering dose of steroids sent to the pharmacy  X-rays ordered  Referral to orthopedics  May need physical therapy    He does ambulate with a cane

## 2023-05-03 NOTE — LETTER
May 3, 2023     Patient: Roberto Henderson  YOB: 1955  Date of Visit: 5/3/2023      To Whom it May Concern: Roberto Henderson is under my professional care  Aubreylinnette Buck was seen in my office on 5/3/2023  Aubreylinnette Jane may return to work on 5/10/2023  He is to be excused from work this week       If you have any questions or concerns, please don't hesitate to call           Sincerely,          MARLIN Carmen        CC: No Recipients

## 2023-05-04 LAB
PSA FREE MFR SERPL: 19.3 %
PSA FREE SERPL-MCNC: 0.27 NG/ML
PSA SERPL-MCNC: 1.4 NG/ML (ref 0–4)

## 2023-05-05 LAB
EST. AVERAGE GLUCOSE BLD GHB EST-MCNC: 117 MG/DL
HBA1C MFR BLD: 5.7 %

## 2023-06-28 ENCOUNTER — APPOINTMENT (EMERGENCY)
Dept: RADIOLOGY | Facility: HOSPITAL | Age: 68
End: 2023-06-28
Payer: COMMERCIAL

## 2023-06-28 ENCOUNTER — HOSPITAL ENCOUNTER (EMERGENCY)
Facility: HOSPITAL | Age: 68
Discharge: HOME | End: 2023-06-28
Attending: EMERGENCY MEDICINE | Admitting: EMERGENCY MEDICINE
Payer: COMMERCIAL

## 2023-06-28 VITALS
TEMPERATURE: 97.8 F | OXYGEN SATURATION: 97 % | DIASTOLIC BLOOD PRESSURE: 64 MMHG | RESPIRATION RATE: 20 BRPM | SYSTOLIC BLOOD PRESSURE: 121 MMHG | HEART RATE: 85 BPM

## 2023-06-28 DIAGNOSIS — J18.9 PNEUMONIA: Primary | ICD-10-CM

## 2023-06-28 DIAGNOSIS — J45.901 ASTHMA EXACERBATION: ICD-10-CM

## 2023-06-28 LAB
ALBUMIN SERPL BCP-MCNC: 3.8 G/DL (ref 3.5–5)
ALP SERPL-CCNC: 60 U/L (ref 46–116)
ALT SERPL W P-5'-P-CCNC: 44 U/L (ref 12–78)
ANION GAP SERPL CALCULATED.3IONS-SCNC: 3 MMOL/L
AST SERPL W P-5'-P-CCNC: 27 U/L (ref 5–45)
ATRIAL RATE: 93 BPM
BASOPHILS # BLD AUTO: 0.06 THOUSANDS/ÂΜL (ref 0–0.1)
BASOPHILS NFR BLD AUTO: 1 % (ref 0–1)
BILIRUB SERPL-MCNC: 0.31 MG/DL (ref 0.2–1)
BUN SERPL-MCNC: 18 MG/DL (ref 5–25)
CALCIUM SERPL-MCNC: 9.5 MG/DL (ref 8.3–10.1)
CARDIAC TROPONIN I PNL SERPL HS: 5 NG/L
CHLORIDE SERPL-SCNC: 111 MMOL/L (ref 96–108)
CO2 SERPL-SCNC: 28 MMOL/L (ref 21–32)
CREAT SERPL-MCNC: 1.17 MG/DL (ref 0.6–1.3)
EOSINOPHIL # BLD AUTO: 0.3 THOUSAND/ÂΜL (ref 0–0.61)
EOSINOPHIL NFR BLD AUTO: 4 % (ref 0–6)
ERYTHROCYTE [DISTWIDTH] IN BLOOD BY AUTOMATED COUNT: 15.3 % (ref 11.6–15.1)
GFR SERPL CREATININE-BSD FRML MDRD: 63 ML/MIN/1.73SQ M
GLUCOSE SERPL-MCNC: 98 MG/DL (ref 65–140)
HCT VFR BLD AUTO: 46.1 % (ref 36.5–49.3)
HGB BLD-MCNC: 15.4 G/DL (ref 12–17)
IMM GRANULOCYTES # BLD AUTO: 0.04 THOUSAND/UL (ref 0–0.2)
IMM GRANULOCYTES NFR BLD AUTO: 1 % (ref 0–2)
LYMPHOCYTES # BLD AUTO: 1.74 THOUSANDS/ÂΜL (ref 0.6–4.47)
LYMPHOCYTES NFR BLD AUTO: 20 % (ref 14–44)
MCH RBC QN AUTO: 28.8 PG (ref 26.8–34.3)
MCHC RBC AUTO-ENTMCNC: 33.4 G/DL (ref 31.4–37.4)
MCV RBC AUTO: 86 FL (ref 82–98)
MONOCYTES # BLD AUTO: 0.76 THOUSAND/ÂΜL (ref 0.17–1.22)
MONOCYTES NFR BLD AUTO: 9 % (ref 4–12)
NEUTROPHILS # BLD AUTO: 5.66 THOUSANDS/ÂΜL (ref 1.85–7.62)
NEUTS SEG NFR BLD AUTO: 65 % (ref 43–75)
NRBC BLD AUTO-RTO: 0 /100 WBCS
P AXIS: 48 DEGREES
PLATELET # BLD AUTO: 203 THOUSANDS/UL (ref 149–390)
PMV BLD AUTO: 11.1 FL (ref 8.9–12.7)
POTASSIUM SERPL-SCNC: 4.4 MMOL/L (ref 3.5–5.3)
PR INTERVAL: 250 MS
PROT SERPL-MCNC: 6.9 G/DL (ref 6.4–8.4)
QRS AXIS: 8 DEGREES
QRSD INTERVAL: 76 MS
QT INTERVAL: 344 MS
QTC INTERVAL: 427 MS
RBC # BLD AUTO: 5.34 MILLION/UL (ref 3.88–5.62)
SODIUM SERPL-SCNC: 142 MMOL/L (ref 135–147)
T WAVE AXIS: 46 DEGREES
VENTRICULAR RATE: 93 BPM
WBC # BLD AUTO: 8.56 THOUSAND/UL (ref 4.31–10.16)

## 2023-06-28 PROCEDURE — 71046 X-RAY EXAM CHEST 2 VIEWS: CPT

## 2023-06-28 PROCEDURE — 36415 COLL VENOUS BLD VENIPUNCTURE: CPT

## 2023-06-28 PROCEDURE — 93010 ELECTROCARDIOGRAM REPORT: CPT | Performed by: INTERNAL MEDICINE

## 2023-06-28 PROCEDURE — 84484 ASSAY OF TROPONIN QUANT: CPT | Performed by: EMERGENCY MEDICINE

## 2023-06-28 PROCEDURE — 85025 COMPLETE CBC W/AUTO DIFF WBC: CPT | Performed by: EMERGENCY MEDICINE

## 2023-06-28 PROCEDURE — 80053 COMPREHEN METABOLIC PANEL: CPT | Performed by: EMERGENCY MEDICINE

## 2023-06-28 PROCEDURE — 93005 ELECTROCARDIOGRAM TRACING: CPT

## 2023-06-28 RX ORDER — AMOXICILLIN 500 MG/1
1000 CAPSULE ORAL 3 TIMES DAILY
Qty: 30 CAPSULE | Refills: 0 | Status: SHIPPED | OUTPATIENT
Start: 2023-06-28 | End: 2023-07-03

## 2023-06-28 RX ORDER — AMOXICILLIN 500 MG/1
1000 CAPSULE ORAL 3 TIMES DAILY
Qty: 30 CAPSULE | Refills: 0 | Status: SHIPPED | OUTPATIENT
Start: 2023-06-28 | End: 2023-06-28 | Stop reason: SDUPTHER

## 2023-06-28 RX ORDER — AMOXICILLIN 250 MG/1
1000 CAPSULE ORAL ONCE
Status: COMPLETED | OUTPATIENT
Start: 2023-06-28 | End: 2023-06-28

## 2023-06-28 RX ORDER — ALBUTEROL SULFATE 2.5 MG/3ML
5 SOLUTION RESPIRATORY (INHALATION) ONCE
Status: COMPLETED | OUTPATIENT
Start: 2023-06-28 | End: 2023-06-28

## 2023-06-28 RX ADMIN — ALBUTEROL SULFATE 5 MG: 2.5 SOLUTION RESPIRATORY (INHALATION) at 16:09

## 2023-06-28 RX ADMIN — IPRATROPIUM BROMIDE 0.5 MG: 0.5 SOLUTION RESPIRATORY (INHALATION) at 16:09

## 2023-06-28 RX ADMIN — ALBUTEROL SULFATE 5 MG: 2.5 SOLUTION RESPIRATORY (INHALATION) at 17:49

## 2023-06-28 RX ADMIN — AMOXICILLIN 1000 MG: 250 CAPSULE ORAL at 18:24

## 2023-06-28 RX ADMIN — DEXAMETHASONE SODIUM PHOSPHATE 10 MG: 10 INJECTION, SOLUTION INTRAMUSCULAR; INTRAVENOUS at 16:08

## 2023-06-28 RX ADMIN — IPRATROPIUM BROMIDE 0.5 MG: 0.5 SOLUTION RESPIRATORY (INHALATION) at 17:49

## 2023-06-28 NOTE — ED ATTENDING ATTESTATION
6/28/2023  I, Mony Galeano DO, saw and evaluated the patient  I have discussed the patient with the resident/non-physician practitioner and agree with the resident's/non-physician practitioner's findings, Plan of Care, and MDM as documented in the resident's/non-physician practitioner's note, except where noted  All available labs and Radiology studies were reviewed  I was present for key portions of any procedure(s) performed by the resident/non-physician practitioner and I was immediately available to provide assistance  At this point I agree with the current assessment done in the Emergency Department  I have conducted an independent evaluation of this patient a history and physical is as follows:    Chief Complaint   Patient presents with   • Asthma     Pt reports increased SOB, chest tightness, and generalized body aches over last three days, hx asthma, no relief with rescue inhalers       67 yom with dyspnea  Hx asthma  Onset of dyspnea 3 days ago  Chest tightness  Dry cough    88% RA, tachypnea, improved to 97% 4LNC      pmhx DM, HTN, quit smoking 2006    A/P: dyspnea, cough, asthma  Cxr, albuterol    ED Course         Critical Care Time  Procedures
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
## 2023-06-28 NOTE — DISCHARGE INSTRUCTIONS
Cordelia Gonzalez was seen and evaluated today in the emergency department over your concern of shortness of breath, asthma  The workup that we performed showed concern for pneumonia  Please return to the emergency department if you experience chest pain, shortness of breath, difficulty breathing or any other signs and symptoms that may be concerning to you  Please follow-up with your primary care doctor within 1 day  All questions were answered prior to discharge  Thank you for choosing [facility] for your care  Take the antibiotics for 5 days  Follow-up with your primary care provider

## 2023-06-28 NOTE — Clinical Note
Abaddebbi Nguyen was seen and treated in our emergency department on 6/28/2023  As tolerated    Diagnosis: Pneumonia    Chidi Schneider  may return to work on return date  He may return on this date: 06/29/2023         If you have any questions or concerns, please don't hesitate to call        Kitty Shannon DO    ______________________________           _______________          _______________  Hospital Representative                              Date                                Time

## 2023-06-29 ENCOUNTER — VBI (OUTPATIENT)
Dept: ADMINISTRATIVE | Facility: OTHER | Age: 68
End: 2023-06-29

## 2023-06-29 NOTE — TELEPHONE ENCOUNTER
Maribell Swain    ED Visit Information     Ed visit date: 6/28/23  Diagnosis Description: Pneumonia  In Network? Yes [address]  Discharge status: Home  Discharged with meds ? Yes  Number of ED visits to date: 1  ED Severity:3     Outreach Information    Outreach successful: No 3  Date letter mailed:NA  Date Finalized: 7/3/23    Care Coordination    Follow up appointment with pcp: no na  Transportation issues ?  NA    Value Consolidated [place]    Outreach type: 7 Day Outreach  06/29/2023 02:22 PM EDT by Irene Phillips 06/29/2023 02:22 PM EDT by Mayela Button (Self) [phone] (Mobile)  [phone] (Mobile) Remove  - Left Message    06/30/2023 12:28 PM EDT by Irenesoheila Phillips 06/30/2023 12:28 PM EDT by Mayela Button (Self) [phone] (Mobile)  [phone] (Mobile) Remove  - Left Message    07/03/2023 01:42 PM EDT by Irene Phillips 07/03/2023 01:42 PM EDT by Mayela Button (Self) [phone] (Mobile)  [phone] (Mobile) Remove  - Left Message

## 2023-08-08 ENCOUNTER — APPOINTMENT (OUTPATIENT)
Dept: LAB | Facility: CLINIC | Age: 68
End: 2023-08-08
Payer: COMMERCIAL

## 2023-08-08 DIAGNOSIS — E22.1 HYPERPROLACTINEMIA (HCC): ICD-10-CM

## 2023-08-08 DIAGNOSIS — E29.1 HYPOGONADISM MALE: ICD-10-CM

## 2023-08-08 DIAGNOSIS — E66.01 CLASS 2 SEVERE OBESITY DUE TO EXCESS CALORIES WITH SERIOUS COMORBIDITY AND BODY MASS INDEX (BMI) OF 38.0 TO 38.9 IN ADULT (HCC): ICD-10-CM

## 2023-08-08 DIAGNOSIS — E11.69 TYPE 2 DIABETES MELLITUS WITH OTHER SPECIFIED COMPLICATION, WITHOUT LONG-TERM CURRENT USE OF INSULIN (HCC): ICD-10-CM

## 2023-08-08 LAB
ALBUMIN SERPL BCP-MCNC: 3.6 G/DL (ref 3.5–5)
ALP SERPL-CCNC: 63 U/L (ref 46–116)
ALT SERPL W P-5'-P-CCNC: 40 U/L (ref 12–78)
ANION GAP SERPL CALCULATED.3IONS-SCNC: 5 MMOL/L
AST SERPL W P-5'-P-CCNC: 18 U/L (ref 5–45)
BASOPHILS # BLD AUTO: 0.06 THOUSANDS/ÂΜL (ref 0–0.1)
BASOPHILS NFR BLD AUTO: 1 % (ref 0–1)
BILIRUB SERPL-MCNC: 0.38 MG/DL (ref 0.2–1)
BUN SERPL-MCNC: 17 MG/DL (ref 5–25)
CALCIUM SERPL-MCNC: 8.9 MG/DL (ref 8.3–10.1)
CHLORIDE SERPL-SCNC: 111 MMOL/L (ref 96–108)
CO2 SERPL-SCNC: 27 MMOL/L (ref 21–32)
CREAT SERPL-MCNC: 0.87 MG/DL (ref 0.6–1.3)
EOSINOPHIL # BLD AUTO: 0.25 THOUSAND/ÂΜL (ref 0–0.61)
EOSINOPHIL NFR BLD AUTO: 4 % (ref 0–6)
ERYTHROCYTE [DISTWIDTH] IN BLOOD BY AUTOMATED COUNT: 16.4 % (ref 11.6–15.1)
EST. AVERAGE GLUCOSE BLD GHB EST-MCNC: 146 MG/DL
GFR SERPL CREATININE-BSD FRML MDRD: 88 ML/MIN/1.73SQ M
GLUCOSE P FAST SERPL-MCNC: 105 MG/DL (ref 65–99)
HBA1C MFR BLD: 6.7 %
HCT VFR BLD AUTO: 45.6 % (ref 36.5–49.3)
HGB BLD-MCNC: 15.2 G/DL (ref 12–17)
IMM GRANULOCYTES # BLD AUTO: 0.01 THOUSAND/UL (ref 0–0.2)
IMM GRANULOCYTES NFR BLD AUTO: 0 % (ref 0–2)
LYMPHOCYTES # BLD AUTO: 1.84 THOUSANDS/ÂΜL (ref 0.6–4.47)
LYMPHOCYTES NFR BLD AUTO: 32 % (ref 14–44)
MCH RBC QN AUTO: 29.6 PG (ref 26.8–34.3)
MCHC RBC AUTO-ENTMCNC: 33.3 G/DL (ref 31.4–37.4)
MCV RBC AUTO: 89 FL (ref 82–98)
MONOCYTES # BLD AUTO: 0.58 THOUSAND/ÂΜL (ref 0.17–1.22)
MONOCYTES NFR BLD AUTO: 10 % (ref 4–12)
NEUTROPHILS # BLD AUTO: 3.05 THOUSANDS/ÂΜL (ref 1.85–7.62)
NEUTS SEG NFR BLD AUTO: 53 % (ref 43–75)
NRBC BLD AUTO-RTO: 0 /100 WBCS
PLATELET # BLD AUTO: 185 THOUSANDS/UL (ref 149–390)
PMV BLD AUTO: 11.4 FL (ref 8.9–12.7)
POTASSIUM SERPL-SCNC: 4.1 MMOL/L (ref 3.5–5.3)
PROLACTIN SERPL-MCNC: 22.11 NG/ML
PROT SERPL-MCNC: 7 G/DL (ref 6.4–8.4)
RBC # BLD AUTO: 5.14 MILLION/UL (ref 3.88–5.62)
SODIUM SERPL-SCNC: 143 MMOL/L (ref 135–147)
WBC # BLD AUTO: 5.79 THOUSAND/UL (ref 4.31–10.16)

## 2023-08-08 PROCEDURE — 84402 ASSAY OF FREE TESTOSTERONE: CPT

## 2023-08-08 PROCEDURE — 85025 COMPLETE CBC W/AUTO DIFF WBC: CPT

## 2023-08-08 PROCEDURE — 84403 ASSAY OF TOTAL TESTOSTERONE: CPT

## 2023-08-08 PROCEDURE — 83036 HEMOGLOBIN GLYCOSYLATED A1C: CPT

## 2023-08-08 PROCEDURE — 36415 COLL VENOUS BLD VENIPUNCTURE: CPT

## 2023-08-08 PROCEDURE — 84146 ASSAY OF PROLACTIN: CPT

## 2023-08-08 PROCEDURE — 80053 COMPREHEN METABOLIC PANEL: CPT

## 2023-08-09 LAB
TESTOST FREE SERPL-MCNC: 4.8 PG/ML (ref 6.6–18.1)
TESTOST SERPL-MCNC: 140 NG/DL (ref 264–916)

## 2023-11-10 ENCOUNTER — TRANSCRIBE ORDERS (OUTPATIENT)
Dept: LAB | Facility: CLINIC | Age: 68
End: 2023-11-10

## 2023-11-10 DIAGNOSIS — M10.9 GOUT OF BOTH FEET: ICD-10-CM

## 2023-11-10 DIAGNOSIS — E55.9 MILD VITAMIN D DEFICIENCY: ICD-10-CM

## 2023-11-10 DIAGNOSIS — E29.1 HYPOGONADISM MALE: ICD-10-CM

## 2023-11-10 DIAGNOSIS — Z79.4 TYPE 2 DIABETES MELLITUS WITH OTHER SPECIFIED COMPLICATION, WITH LONG-TERM CURRENT USE OF INSULIN (HCC): Primary | ICD-10-CM

## 2023-11-10 DIAGNOSIS — E78.2 MIXED DYSLIPIDEMIA: ICD-10-CM

## 2023-11-10 DIAGNOSIS — E11.69 TYPE 2 DIABETES MELLITUS WITH OTHER SPECIFIED COMPLICATION, WITH LONG-TERM CURRENT USE OF INSULIN (HCC): Primary | ICD-10-CM

## 2023-11-10 DIAGNOSIS — E22.1 HYPERPROLACTINEMIA (HCC): ICD-10-CM

## 2023-11-21 ENCOUNTER — TELEPHONE (OUTPATIENT)
Dept: FAMILY MEDICINE CLINIC | Facility: CLINIC | Age: 68
End: 2023-11-21

## 2023-11-21 NOTE — TELEPHONE ENCOUNTER
Maggie from [email or web address] called to leave message that patient is prescribed Trellegy from a Dr. Nubia Lemus ([facility]) and is also prescribed Advair from you. She wanted to make you aware of this and if it is okay that he is on both, or if one should be discontinued. If one should be discontinued, she is requesting someone reach out to [facility] to cancel. She provided her callback number of [phone] for any questions. Please advise.

## 2023-11-22 ENCOUNTER — HOSPITAL ENCOUNTER (EMERGENCY)
Facility: HOSPITAL | Age: 68
Discharge: HOME | End: 2023-11-22
Attending: EMERGENCY MEDICINE
Payer: COMMERCIAL

## 2023-11-22 VITALS
HEART RATE: 90 BPM | RESPIRATION RATE: 18 BRPM | TEMPERATURE: 97.2 F | SYSTOLIC BLOOD PRESSURE: 121 MMHG | OXYGEN SATURATION: 94 % | DIASTOLIC BLOOD PRESSURE: 74 MMHG

## 2023-11-22 DIAGNOSIS — T78.3XXA ANGIOEDEMA, INITIAL ENCOUNTER: Primary | ICD-10-CM

## 2023-11-22 DIAGNOSIS — K21.9 GERD (GASTROESOPHAGEAL REFLUX DISEASE): ICD-10-CM

## 2023-11-22 DIAGNOSIS — I10 ESSENTIAL HYPERTENSION: ICD-10-CM

## 2023-11-22 PROCEDURE — 99283 EMERGENCY DEPT VISIT LOW MDM: CPT

## 2023-11-22 PROCEDURE — 99284 EMERGENCY DEPT VISIT MOD MDM: CPT | Performed by: EMERGENCY MEDICINE

## 2023-11-22 PROCEDURE — 93005 ELECTROCARDIOGRAM TRACING: CPT

## 2023-11-22 RX ORDER — FAMOTIDINE 20 MG/1
20 TABLET, FILM COATED ORAL 2 TIMES DAILY
Qty: 30 TABLET | Refills: 0 | Status: SHIPPED | OUTPATIENT
Start: 2023-11-22

## 2023-11-22 RX ORDER — PREDNISONE 20 MG/1
40 TABLET ORAL ONCE
Status: COMPLETED | OUTPATIENT
Start: 2023-11-22 | End: 2023-11-22

## 2023-11-22 RX ORDER — SUCRALFATE 1 G/1
1 TABLET ORAL ONCE
Status: COMPLETED | OUTPATIENT
Start: 2023-11-22 | End: 2023-11-22

## 2023-11-22 RX ORDER — PREDNISONE 10 MG/1
40 TABLET ORAL DAILY
Qty: 30 TABLET | Refills: 0 | Status: SHIPPED | OUTPATIENT
Start: 2023-11-22

## 2023-11-22 RX ORDER — DIPHENHYDRAMINE HCL 25 MG
50 TABLET ORAL ONCE
Status: COMPLETED | OUTPATIENT
Start: 2023-11-22 | End: 2023-11-22

## 2023-11-22 RX ORDER — METOPROLOL SUCCINATE 25 MG/1
25 TABLET, EXTENDED RELEASE ORAL DAILY
Qty: 20 TABLET | Refills: 0 | Status: SHIPPED | OUTPATIENT
Start: 2023-11-22

## 2023-11-22 RX ADMIN — PREDNISONE 40 MG: 20 TABLET ORAL at 13:44

## 2023-11-22 RX ADMIN — SUCRALFATE 1 G: 1 TABLET ORAL at 13:44

## 2023-11-22 RX ADMIN — DIPHENHYDRAMINE HCL 50 MG: 25 TABLET ORAL at 13:45

## 2023-11-22 NOTE — DISCHARGE INSTRUCTIONS
Stop taking the Lisinopril. Begin taking the metoprolol daily for your blood pressure and pepcid for your acid reflux symptoms. Take the steroid taper as prescribed. It is very important to call your doctor and schedule a follow-up appointment so your blood pressure medications can be adjusted. Please return to the emergency department if you experience worsening facial swelling, difficulty swallowing, difficulty breathing, chest pain, severe nausea/vomiting, diffuse hives, or any other concerning symptoms.

## 2023-11-22 NOTE — ED ATTENDING ATTESTATION
11/22/2023  Sammy Matthews DO, saw and evaluated the patient. I have discussed the patient with the resident/non-physician practitioner and agree with the resident's/non-physician practitioner's findings, Plan of Care, and MDM as documented in the resident's/non-physician practitioner's note, except where noted. All available labs and Radiology studies were reviewed. I was present for key portions of any procedure(s) performed by the resident/non-physician practitioner and I was immediately available to provide assistance. At this point I agree with the current assessment done in the Emergency Department. I have conducted an independent evaluation of this patient a history and physical is as follows:    70-year-old male presents for complaint of right upper and lower lip swelling with some dull sensation while eating lunch today. This morning, he noted some chest discomfort and dyspnea but adds that he has a history of COPD and did not think anything of it initially. The swelling appears to be greater on the upper lip but is not causing any weakness or facial deformity. He has no tongue swelling or intraoral changes. He does have poor dentition but no apparent dental infection, including gum swelling or erythema. He did not take any medications for the symptoms. He does take lisinopril 10 mg daily and states he has done so "all [his] life". No history of angioedema or acute allergic reaction. No other symptoms of anaphylaxis. No recent travel or sick contacts. ROS: Denies f/c, HA, abdominal pain, n/v/d. 12 system ROS o/w negative.     PE: NAD, appears comfortable, alert; PERRL, EOMI; MMM, no posterior oropharyngeal exudate, edema or erythema, moderate right-sided upper lip edema with some induration extending onto the cheek, mild right-sided lower lip edema; HRR, no murmur; lungs CTA w/o w/r/r, POx 94% on RA (nl); abdomen s/nt/nd, nl BS in all 4 quadrant; (-) LE edema or calf TTP, FROM extremities x4; skin p/w/d; CNs GI/NF, oriented. MDM/DDx: Lip edema - mild angioedema, mild allergic reaction, less likely but at risk for dental abscess, no clinical evidence of acute anaphylaxis. I independently reviewed and interpreted ordered labs from this encounter. A/P: Will treat symptoms, observe in the ED, reevaluate for further work up and disposition.     ED Course         Critical Care Time  Procedures
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
## 2023-11-23 LAB
ATRIAL RATE: 90 BPM
P AXIS: 79 DEGREES
PR INTERVAL: 250 MS
QRS AXIS: 11 DEGREES
QRSD INTERVAL: 78 MS
QT INTERVAL: 346 MS
QTC INTERVAL: 423 MS
T WAVE AXIS: 51 DEGREES
VENTRICULAR RATE: 90 BPM

## 2023-11-23 PROCEDURE — 93010 ELECTROCARDIOGRAM REPORT: CPT | Performed by: INTERNAL MEDICINE

## 2023-11-24 ENCOUNTER — VBI (OUTPATIENT)
Dept: ADMINISTRATIVE | Facility: OTHER | Age: 68
End: 2023-11-24

## 2023-11-24 ENCOUNTER — PATIENT OUTREACH (OUTPATIENT)
Dept: FAMILY MEDICINE CLINIC | Facility: CLINIC | Age: 68
End: 2023-11-24

## 2023-11-24 DIAGNOSIS — Z71.89 COORDINATION OF COMPLEX CARE: Primary | ICD-10-CM

## 2023-11-24 NOTE — PROGRESS NOTES
Received referral via in basket message. Chart reviewed, does not meet complex criteria for outpatient case management at this time.

## 2023-11-24 NOTE — TELEPHONE ENCOUNTER
11/24/23 10:40 AM    Patient contacted post ED visit, first outreach attempt made. Message was left for patient to return a call to the VBI Department at [facility]: Phone [phone]. Thank you.   Omid AGUILAR [facility]

## 2023-11-29 NOTE — TELEPHONE ENCOUNTER
11/29/23 10:24 AM    Patient contacted post ED visit, VBI department spoke with patient/caregiver and outreach was successful. Thank you.   Saritha AGUILAR [facility] AT [facility]

## 2023-12-02 ENCOUNTER — APPOINTMENT (OUTPATIENT)
Dept: LAB | Facility: CLINIC | Age: 68
End: 2023-12-02
Payer: COMMERCIAL

## 2023-12-02 DIAGNOSIS — Z79.4 TYPE 2 DIABETES MELLITUS WITH OTHER SPECIFIED COMPLICATION, WITH LONG-TERM CURRENT USE OF INSULIN (HCC): ICD-10-CM

## 2023-12-02 DIAGNOSIS — E29.1 HYPOGONADISM MALE: ICD-10-CM

## 2023-12-02 DIAGNOSIS — E11.69 TYPE 2 DIABETES MELLITUS WITH OTHER SPECIFIED COMPLICATION, WITH LONG-TERM CURRENT USE OF INSULIN (HCC): ICD-10-CM

## 2023-12-02 DIAGNOSIS — E55.9 MILD VITAMIN D DEFICIENCY: ICD-10-CM

## 2023-12-02 DIAGNOSIS — M10.9 GOUT OF BOTH FEET: ICD-10-CM

## 2023-12-02 DIAGNOSIS — E78.2 MIXED DYSLIPIDEMIA: ICD-10-CM

## 2023-12-02 DIAGNOSIS — E22.1 HYPERPROLACTINEMIA (HCC): ICD-10-CM

## 2023-12-02 LAB
25(OH)D3 SERPL-MCNC: 94.1 NG/ML (ref 30–100)
ALBUMIN SERPL BCP-MCNC: 4.1 G/DL (ref 3.5–5)
ALP SERPL-CCNC: 55 U/L (ref 34–104)
ALT SERPL W P-5'-P-CCNC: 23 U/L (ref 7–52)
ANION GAP SERPL CALCULATED.3IONS-SCNC: 10 MMOL/L
AST SERPL W P-5'-P-CCNC: 18 U/L (ref 13–39)
BILIRUB SERPL-MCNC: 0.51 MG/DL (ref 0.2–1)
BUN SERPL-MCNC: 16 MG/DL (ref 5–25)
CALCIUM SERPL-MCNC: 9 MG/DL (ref 8.4–10.2)
CHLORIDE SERPL-SCNC: 105 MMOL/L (ref 96–108)
CHOLEST SERPL-MCNC: 212 MG/DL
CO2 SERPL-SCNC: 25 MMOL/L (ref 21–32)
CREAT SERPL-MCNC: 0.86 MG/DL (ref 0.6–1.3)
EST. AVERAGE GLUCOSE BLD GHB EST-MCNC: 131 MG/DL
GFR SERPL CREATININE-BSD FRML MDRD: 87 ML/MIN/1.73SQ M
GLUCOSE P FAST SERPL-MCNC: 99 MG/DL (ref 65–99)
HBA1C MFR BLD: 6.2 %
HDLC SERPL-MCNC: 48 MG/DL
LDLC SERPL CALC-MCNC: 136 MG/DL (ref 0–100)
NONHDLC SERPL-MCNC: 164 MG/DL
POTASSIUM SERPL-SCNC: 4 MMOL/L (ref 3.5–5.3)
PROLACTIN SERPL-MCNC: 25.08 NG/ML
PROT SERPL-MCNC: 6.4 G/DL (ref 6.4–8.4)
SODIUM SERPL-SCNC: 140 MMOL/L (ref 135–147)
TESTOST SERPL-MSCNC: 136 NG/DL
TRIGL SERPL-MCNC: 138 MG/DL
TSH SERPL DL<=0.05 MIU/L-ACNC: 2.05 UIU/ML (ref 0.45–4.5)
URATE SERPL-MCNC: 4.1 MG/DL (ref 3.5–8.5)

## 2023-12-02 PROCEDURE — 80053 COMPREHEN METABOLIC PANEL: CPT

## 2023-12-02 PROCEDURE — 82306 VITAMIN D 25 HYDROXY: CPT

## 2023-12-02 PROCEDURE — 84403 ASSAY OF TOTAL TESTOSTERONE: CPT

## 2023-12-02 PROCEDURE — 84550 ASSAY OF BLOOD/URIC ACID: CPT

## 2023-12-02 PROCEDURE — 84443 ASSAY THYROID STIM HORMONE: CPT

## 2023-12-02 PROCEDURE — 36415 COLL VENOUS BLD VENIPUNCTURE: CPT

## 2023-12-02 PROCEDURE — 84146 ASSAY OF PROLACTIN: CPT

## 2023-12-02 PROCEDURE — 80061 LIPID PANEL: CPT

## 2023-12-02 PROCEDURE — 83036 HEMOGLOBIN GLYCOSYLATED A1C: CPT

## 2024-02-01 ENCOUNTER — CONSULT (OUTPATIENT)
Dept: PULMONOLOGY | Facility: CLINIC | Age: 69
End: 2024-02-01
Payer: COMMERCIAL

## 2024-02-01 VITALS
HEIGHT: 68 IN | DIASTOLIC BLOOD PRESSURE: 70 MMHG | TEMPERATURE: 98.1 F | WEIGHT: 274.8 LBS | OXYGEN SATURATION: 96 % | SYSTOLIC BLOOD PRESSURE: 124 MMHG | HEART RATE: 80 BPM | BODY MASS INDEX: 41.65 KG/M2

## 2024-02-01 DIAGNOSIS — J45.40 MODERATE PERSISTENT ASTHMA, UNSPECIFIED WHETHER COMPLICATED: ICD-10-CM

## 2024-02-01 DIAGNOSIS — J44.9 CHRONIC OBSTRUCTIVE PULMONARY DISEASE, UNSPECIFIED COPD TYPE (HCC): Primary | ICD-10-CM

## 2024-02-01 DIAGNOSIS — G47.33 OSA (OBSTRUCTIVE SLEEP APNEA): ICD-10-CM

## 2024-02-01 PROCEDURE — 99204 OFFICE O/P NEW MOD 45 MIN: CPT | Performed by: INTERNAL MEDICINE

## 2024-02-01 RX ORDER — DOCUSATE SODIUM 100 MG/1
100 CAPSULE, LIQUID FILLED ORAL 2 TIMES DAILY
COMMUNITY

## 2024-02-01 RX ORDER — FLUTICASONE FUROATE, UMECLIDINIUM BROMIDE AND VILANTEROL TRIFENATATE 100; 62.5; 25 UG/1; UG/1; UG/1
POWDER RESPIRATORY (INHALATION)
COMMUNITY
Start: 2023-12-16
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
## 2024-02-15 ENCOUNTER — HOSPITAL ENCOUNTER (OUTPATIENT)
Dept: PULMONOLOGY | Facility: HOSPITAL | Age: 69
End: 2024-02-15
Attending: INTERNAL MEDICINE
Payer: COMMERCIAL

## 2024-02-15 DIAGNOSIS — J44.9 CHRONIC OBSTRUCTIVE PULMONARY DISEASE, UNSPECIFIED COPD TYPE (HCC): ICD-10-CM

## 2024-02-15 DIAGNOSIS — J45.40 MODERATE PERSISTENT ASTHMA, UNSPECIFIED WHETHER COMPLICATED: ICD-10-CM

## 2024-02-15 PROCEDURE — 94729 DIFFUSING CAPACITY: CPT | Performed by: STUDENT IN AN ORGANIZED HEALTH CARE EDUCATION/TRAINING PROGRAM

## 2024-02-15 PROCEDURE — 94060 EVALUATION OF WHEEZING: CPT | Performed by: STUDENT IN AN ORGANIZED HEALTH CARE EDUCATION/TRAINING PROGRAM

## 2024-02-15 PROCEDURE — 94726 PLETHYSMOGRAPHY LUNG VOLUMES: CPT

## 2024-02-15 PROCEDURE — 94726 PLETHYSMOGRAPHY LUNG VOLUMES: CPT | Performed by: STUDENT IN AN ORGANIZED HEALTH CARE EDUCATION/TRAINING PROGRAM

## 2024-02-15 PROCEDURE — 94060 EVALUATION OF WHEEZING: CPT

## 2024-02-15 PROCEDURE — 94760 N-INVAS EAR/PLS OXIMETRY 1: CPT

## 2024-02-15 PROCEDURE — 94729 DIFFUSING CAPACITY: CPT

## 2024-02-15 RX ORDER — ALBUTEROL SULFATE 2.5 MG/3ML
2.5 SOLUTION RESPIRATORY (INHALATION) ONCE
Status: COMPLETED | OUTPATIENT
Start: 2024-02-15 | End: 2024-02-15

## 2024-02-15 RX ADMIN — ALBUTEROL SULFATE 2.5 MG: 2.5 SOLUTION RESPIRATORY (INHALATION) at 09:03

## 2024-03-22 ENCOUNTER — TELEPHONE (OUTPATIENT)
Dept: PSYCHIATRY | Facility: CLINIC | Age: 69
End: 2024-03-22

## 2024-03-22 ENCOUNTER — APPOINTMENT (OUTPATIENT)
Dept: LAB | Facility: CLINIC | Age: 69
DRG: 208 | End: 2024-03-22
Payer: COMMERCIAL

## 2024-03-22 DIAGNOSIS — E22.1 IDIOPATHIC HYPERPROLACTINEMIA (HCC): ICD-10-CM

## 2024-03-22 LAB
ALBUMIN SERPL BCP-MCNC: 4.1 G/DL (ref 3.5–5)
ALP SERPL-CCNC: 56 U/L (ref 34–104)
ALT SERPL W P-5'-P-CCNC: 27 U/L (ref 7–52)
ANION GAP SERPL CALCULATED.3IONS-SCNC: 7 MMOL/L (ref 4–13)
AST SERPL W P-5'-P-CCNC: 18 U/L (ref 13–39)
BASOPHILS # BLD AUTO: 0.06 THOUSANDS/ÂΜL (ref 0–0.1)
BASOPHILS NFR BLD AUTO: 1 % (ref 0–1)
BILIRUB SERPL-MCNC: 0.53 MG/DL (ref 0.2–1)
BUN SERPL-MCNC: 21 MG/DL (ref 5–25)
CALCIUM SERPL-MCNC: 8.9 MG/DL (ref 8.4–10.2)
CHLORIDE SERPL-SCNC: 102 MMOL/L (ref 96–108)
CO2 SERPL-SCNC: 28 MMOL/L (ref 21–32)
CREAT SERPL-MCNC: 1.01 MG/DL (ref 0.6–1.3)
EOSINOPHIL # BLD AUTO: 0.25 THOUSAND/ÂΜL (ref 0–0.61)
EOSINOPHIL NFR BLD AUTO: 4 % (ref 0–6)
ERYTHROCYTE [DISTWIDTH] IN BLOOD BY AUTOMATED COUNT: 13.9 % (ref 11.6–15.1)
EST. AVERAGE GLUCOSE BLD GHB EST-MCNC: 134 MG/DL
GFR SERPL CREATININE-BSD FRML MDRD: 75 ML/MIN/1.73SQ M
GLUCOSE P FAST SERPL-MCNC: 96 MG/DL (ref 65–99)
HBA1C MFR BLD: 6.3 %
HCT VFR BLD AUTO: 46.2 % (ref 36.5–49.3)
HGB BLD-MCNC: 14.8 G/DL (ref 12–17)
IMM GRANULOCYTES # BLD AUTO: 0.02 THOUSAND/UL (ref 0–0.2)
IMM GRANULOCYTES NFR BLD AUTO: 0 % (ref 0–2)
LYMPHOCYTES # BLD AUTO: 1.68 THOUSANDS/ÂΜL (ref 0.6–4.47)
LYMPHOCYTES NFR BLD AUTO: 26 % (ref 14–44)
MCH RBC QN AUTO: 28.8 PG (ref 26.8–34.3)
MCHC RBC AUTO-ENTMCNC: 32 G/DL (ref 31.4–37.4)
MCV RBC AUTO: 90 FL (ref 82–98)
MONOCYTES # BLD AUTO: 0.65 THOUSAND/ÂΜL (ref 0.17–1.22)
MONOCYTES NFR BLD AUTO: 10 % (ref 4–12)
NEUTROPHILS # BLD AUTO: 3.9 THOUSANDS/ÂΜL (ref 1.85–7.62)
NEUTS SEG NFR BLD AUTO: 59 % (ref 43–75)
NRBC BLD AUTO-RTO: 0 /100 WBCS
PLATELET # BLD AUTO: 204 THOUSANDS/UL (ref 149–390)
PMV BLD AUTO: 11.2 FL (ref 8.9–12.7)
POTASSIUM SERPL-SCNC: 4.2 MMOL/L (ref 3.5–5.3)
PROLACTIN SERPL-MCNC: 19.84 NG/ML
PROT SERPL-MCNC: 6.4 G/DL (ref 6.4–8.4)
RBC # BLD AUTO: 5.14 MILLION/UL (ref 3.88–5.62)
SODIUM SERPL-SCNC: 137 MMOL/L (ref 135–147)
TESTOST SERPL-MSCNC: 155 NG/DL
WBC # BLD AUTO: 6.56 THOUSAND/UL (ref 4.31–10.16)

## 2024-03-22 PROCEDURE — 85025 COMPLETE CBC W/AUTO DIFF WBC: CPT

## 2024-03-22 PROCEDURE — 83036 HEMOGLOBIN GLYCOSYLATED A1C: CPT

## 2024-03-22 PROCEDURE — 3044F HG A1C LEVEL LT 7.0%: CPT | Performed by: INTERNAL MEDICINE

## 2024-03-22 PROCEDURE — 36415 COLL VENOUS BLD VENIPUNCTURE: CPT

## 2024-03-22 PROCEDURE — 84146 ASSAY OF PROLACTIN: CPT

## 2024-03-22 PROCEDURE — 80053 COMPREHEN METABOLIC PANEL: CPT

## 2024-03-22 PROCEDURE — 84403 ASSAY OF TOTAL TESTOSTERONE: CPT

## 2024-03-22 NOTE — TELEPHONE ENCOUNTER
Contacted patient in regards to Routine Referral in attempts to verify patient's needs of services and add patient to proper wait list. spoke with patient whom stated is interested in med management at [facility] with no preference in provider. Closing referral.

## 2024-03-25 ENCOUNTER — APPOINTMENT (INPATIENT)
Dept: RADIOLOGY | Facility: HOSPITAL | Age: 69
DRG: 208 | End: 2024-03-25
Payer: COMMERCIAL

## 2024-03-25 ENCOUNTER — ANESTHESIA EVENT (EMERGENCY)
Dept: ANESTHESIOLOGY | Facility: HOSPITAL | Age: 69
DRG: 208 | End: 2024-03-25
Payer: COMMERCIAL

## 2024-03-25 ENCOUNTER — HOSPITAL ENCOUNTER (INPATIENT)
Facility: HOSPITAL | Age: 69
LOS: 6 days | Discharge: HOME HEALTH | DRG: 208 | End: 2024-03-31
Attending: EMERGENCY MEDICINE | Admitting: STUDENT IN AN ORGANIZED HEALTH CARE EDUCATION/TRAINING PROGRAM
Payer: COMMERCIAL

## 2024-03-25 ENCOUNTER — ANESTHESIA (EMERGENCY)
Dept: ANESTHESIOLOGY | Facility: HOSPITAL | Age: 69
DRG: 208 | End: 2024-03-25
Payer: COMMERCIAL

## 2024-03-25 DIAGNOSIS — T78.3XXD ANGIOEDEMA, SUBSEQUENT ENCOUNTER: ICD-10-CM

## 2024-03-25 DIAGNOSIS — E11.9 DIABETES (HCC): ICD-10-CM

## 2024-03-25 DIAGNOSIS — T78.3XXA ANGIOEDEMA, INITIAL ENCOUNTER: Primary | ICD-10-CM

## 2024-03-25 DIAGNOSIS — R26.2 AMBULATORY DYSFUNCTION: ICD-10-CM

## 2024-03-25 DIAGNOSIS — M54.16 LUMBAR RADICULOPATHY: ICD-10-CM

## 2024-03-25 PROBLEM — T78.2XXA ANAPHYLAXIS: Status: ACTIVE | Noted: 2024-03-25

## 2024-03-25 LAB
ANION GAP SERPL CALCULATED.3IONS-SCNC: 10 MMOL/L (ref 4–13)
BASE EXCESS BLDA CALC-SCNC: 0 MMOL/L
BASOPHILS # BLD AUTO: 0.06 THOUSANDS/ÂΜL (ref 0–0.1)
BASOPHILS NFR BLD AUTO: 1 % (ref 0–1)
BUN SERPL-MCNC: 24 MG/DL (ref 5–25)
C4 SERPL-MCNC: 32 MG/DL (ref 19–52)
CALCIUM SERPL-MCNC: 9 MG/DL (ref 8.4–10.2)
CHLORIDE SERPL-SCNC: 100 MMOL/L (ref 96–108)
CO2 SERPL-SCNC: 28 MMOL/L (ref 21–32)
CREAT SERPL-MCNC: 1.33 MG/DL (ref 0.6–1.3)
EOSINOPHIL # BLD AUTO: 0.18 THOUSAND/ÂΜL (ref 0–0.61)
EOSINOPHIL NFR BLD AUTO: 2 % (ref 0–6)
ERYTHROCYTE [DISTWIDTH] IN BLOOD BY AUTOMATED COUNT: 14.4 % (ref 11.6–15.1)
GFR SERPL CREATININE-BSD FRML MDRD: 53 ML/MIN/1.73SQ M
GLUCOSE SERPL-MCNC: 141 MG/DL (ref 65–140)
GLUCOSE SERPL-MCNC: 157 MG/DL (ref 65–140)
HCO3 BLDA-SCNC: 25.8 MMOL/L (ref 22–28)
HCT VFR BLD AUTO: 48.4 % (ref 36.5–49.3)
HGB BLD-MCNC: 15.9 G/DL (ref 12–17)
HOROWITZ INDEX BLDA+IHG-RTO: 100 MM[HG]
IMM GRANULOCYTES # BLD AUTO: 0.04 THOUSAND/UL (ref 0–0.2)
IMM GRANULOCYTES NFR BLD AUTO: 0 % (ref 0–2)
LYMPHOCYTES # BLD AUTO: 2.3 THOUSANDS/ÂΜL (ref 0.6–4.47)
LYMPHOCYTES NFR BLD AUTO: 25 % (ref 14–44)
MCH RBC QN AUTO: 29.7 PG (ref 26.8–34.3)
MCHC RBC AUTO-ENTMCNC: 32.9 G/DL (ref 31.4–37.4)
MCV RBC AUTO: 90 FL (ref 82–98)
MONOCYTES # BLD AUTO: 0.72 THOUSAND/ÂΜL (ref 0.17–1.22)
MONOCYTES NFR BLD AUTO: 8 % (ref 4–12)
NEUTROPHILS # BLD AUTO: 5.97 THOUSANDS/ÂΜL (ref 1.85–7.62)
NEUTS SEG NFR BLD AUTO: 64 % (ref 43–75)
NRBC BLD AUTO-RTO: 0 /100 WBCS
O2 CT BLDA-SCNC: 22.9 ML/DL (ref 16–23)
OXYHGB MFR BLDA: 98.6 % (ref 94–97)
PCO2 BLDA: 46.1 MM HG (ref 36–44)
PEEP RESPIRATORY: 5 CM[H2O]
PH BLDA: 7.37 [PH] (ref 7.35–7.45)
PLATELET # BLD AUTO: 235 THOUSANDS/UL (ref 149–390)
PMV BLD AUTO: 12.5 FL (ref 8.9–12.7)
PO2 BLDA: 321.3 MM HG (ref 75–129)
POTASSIUM SERPL-SCNC: 3.9 MMOL/L (ref 3.5–5.3)
RBC # BLD AUTO: 5.36 MILLION/UL (ref 3.88–5.62)
SODIUM SERPL-SCNC: 138 MMOL/L (ref 135–147)
SPECIMEN SOURCE: ABNORMAL
VENT AC: 12
VENT- AC: AC
VT SETTING VENT: 500 ML
WBC # BLD AUTO: 9.27 THOUSAND/UL (ref 4.31–10.16)

## 2024-03-25 PROCEDURE — 86160 COMPLEMENT ANTIGEN: CPT

## 2024-03-25 PROCEDURE — 86160 COMPLEMENT ANTIGEN: CPT | Performed by: STUDENT IN AN ORGANIZED HEALTH CARE EDUCATION/TRAINING PROGRAM

## 2024-03-25 PROCEDURE — 87040 BLOOD CULTURE FOR BACTERIA: CPT | Performed by: STUDENT IN AN ORGANIZED HEALTH CARE EDUCATION/TRAINING PROGRAM

## 2024-03-25 PROCEDURE — 86332 IMMUNE COMPLEX ASSAY: CPT

## 2024-03-25 PROCEDURE — 96372 THER/PROPH/DIAG INJ SC/IM: CPT

## 2024-03-25 PROCEDURE — 5A1945Z RESPIRATORY VENTILATION, 24-96 CONSECUTIVE HOURS: ICD-10-PCS | Performed by: STUDENT IN AN ORGANIZED HEALTH CARE EDUCATION/TRAINING PROGRAM

## 2024-03-25 PROCEDURE — 99285 EMERGENCY DEPT VISIT HI MDM: CPT

## 2024-03-25 PROCEDURE — 82948 REAGENT STRIP/BLOOD GLUCOSE: CPT

## 2024-03-25 PROCEDURE — 94760 N-INVAS EAR/PLS OXIMETRY 1: CPT

## 2024-03-25 PROCEDURE — 85025 COMPLETE CBC W/AUTO DIFF WBC: CPT

## 2024-03-25 PROCEDURE — 94002 VENT MGMT INPAT INIT DAY: CPT

## 2024-03-25 PROCEDURE — 31500 INSERT EMERGENCY AIRWAY: CPT

## 2024-03-25 PROCEDURE — 96365 THER/PROPH/DIAG IV INF INIT: CPT

## 2024-03-25 PROCEDURE — 93005 ELECTROCARDIOGRAM TRACING: CPT

## 2024-03-25 PROCEDURE — 99291 CRITICAL CARE FIRST HOUR: CPT | Performed by: EMERGENCY MEDICINE

## 2024-03-25 PROCEDURE — 99291 CRITICAL CARE FIRST HOUR: CPT | Performed by: INTERNAL MEDICINE

## 2024-03-25 PROCEDURE — 96375 TX/PRO/DX INJ NEW DRUG ADDON: CPT

## 2024-03-25 PROCEDURE — 71045 X-RAY EXAM CHEST 1 VIEW: CPT

## 2024-03-25 PROCEDURE — 36415 COLL VENOUS BLD VENIPUNCTURE: CPT

## 2024-03-25 PROCEDURE — 82805 BLOOD GASES W/O2 SATURATION: CPT | Performed by: STUDENT IN AN ORGANIZED HEALTH CARE EDUCATION/TRAINING PROGRAM

## 2024-03-25 PROCEDURE — 0BH17EZ INSERTION OF ENDOTRACHEAL AIRWAY INTO TRACHEA, VIA NATURAL OR ARTIFICIAL OPENING: ICD-10-PCS | Performed by: STUDENT IN AN ORGANIZED HEALTH CARE EDUCATION/TRAINING PROGRAM

## 2024-03-25 PROCEDURE — 80048 BASIC METABOLIC PNL TOTAL CA: CPT

## 2024-03-25 RX ORDER — FAMOTIDINE 10 MG/ML
20 INJECTION, SOLUTION INTRAVENOUS 2 TIMES DAILY
Status: DISCONTINUED | OUTPATIENT
Start: 2024-03-25 | End: 2024-03-25

## 2024-03-25 RX ORDER — ALLOPURINOL 300 MG/1
300 TABLET ORAL DAILY
Status: DISCONTINUED | OUTPATIENT
Start: 2024-03-26 | End: 2024-03-31 | Stop reason: HOSPADM

## 2024-03-25 RX ORDER — TRANEXAMIC ACID 10 MG/ML
1000 INJECTION, SOLUTION INTRAVENOUS ONCE
Status: COMPLETED | OUTPATIENT
Start: 2024-03-25 | End: 2024-03-25

## 2024-03-25 RX ORDER — FENTANYL CITRATE 50 UG/ML
50 INJECTION, SOLUTION INTRAMUSCULAR; INTRAVENOUS
Status: DISCONTINUED | OUTPATIENT
Start: 2024-03-25 | End: 2024-03-25

## 2024-03-25 RX ORDER — EZETIMIBE 10 MG/1
10 TABLET ORAL
Status: DISCONTINUED | OUTPATIENT
Start: 2024-03-25 | End: 2024-03-31 | Stop reason: HOSPADM

## 2024-03-25 RX ORDER — GABAPENTIN 300 MG/1
300 CAPSULE ORAL 2 TIMES DAILY
Status: DISCONTINUED | OUTPATIENT
Start: 2024-03-25 | End: 2024-03-31 | Stop reason: HOSPADM

## 2024-03-25 RX ORDER — SENNOSIDES 8.8 MG/5ML
8.8 LIQUID ORAL DAILY PRN
Status: DISCONTINUED | OUTPATIENT
Start: 2024-03-25 | End: 2024-03-31 | Stop reason: HOSPADM

## 2024-03-25 RX ORDER — DIPHENHYDRAMINE HYDROCHLORIDE 50 MG/ML
25 INJECTION INTRAMUSCULAR; INTRAVENOUS ONCE
Status: COMPLETED | OUTPATIENT
Start: 2024-03-25 | End: 2024-03-25

## 2024-03-25 RX ORDER — POLYETHYLENE GLYCOL 3350 17 G/17G
17 POWDER, FOR SOLUTION ORAL DAILY PRN
Status: DISCONTINUED | OUTPATIENT
Start: 2024-03-25 | End: 2024-03-31 | Stop reason: HOSPADM

## 2024-03-25 RX ORDER — INSULIN LISPRO 100 [IU]/ML
2-12 INJECTION, SOLUTION INTRAVENOUS; SUBCUTANEOUS EVERY 6 HOURS SCHEDULED
Status: DISCONTINUED | OUTPATIENT
Start: 2024-03-25 | End: 2024-03-29

## 2024-03-25 RX ORDER — LIDOCAINE HYDROCHLORIDE 40 MG/ML
5 SOLUTION TOPICAL ONCE
Status: COMPLETED | OUTPATIENT
Start: 2024-03-25 | End: 2024-03-25

## 2024-03-25 RX ORDER — FAMOTIDINE 10 MG/ML
20 INJECTION, SOLUTION INTRAVENOUS 2 TIMES DAILY
Status: DISCONTINUED | OUTPATIENT
Start: 2024-03-26 | End: 2024-03-30

## 2024-03-25 RX ORDER — METHYLPREDNISOLONE SODIUM SUCCINATE 40 MG/ML
40 INJECTION, POWDER, LYOPHILIZED, FOR SOLUTION INTRAMUSCULAR; INTRAVENOUS EVERY 8 HOURS SCHEDULED
Status: DISCONTINUED | OUTPATIENT
Start: 2024-03-26 | End: 2024-03-27

## 2024-03-25 RX ORDER — METHYLPREDNISOLONE SODIUM SUCCINATE 125 MG/2ML
125 INJECTION, POWDER, LYOPHILIZED, FOR SOLUTION INTRAMUSCULAR; INTRAVENOUS ONCE
Status: COMPLETED | OUTPATIENT
Start: 2024-03-25 | End: 2024-03-25

## 2024-03-25 RX ORDER — ETOMIDATE 2 MG/ML
20 INJECTION INTRAVENOUS ONCE
Status: COMPLETED | OUTPATIENT
Start: 2024-03-25 | End: 2024-03-25

## 2024-03-25 RX ORDER — ATORVASTATIN CALCIUM 80 MG/1
80 TABLET, FILM COATED ORAL
Status: DISCONTINUED | OUTPATIENT
Start: 2024-03-25 | End: 2024-03-31 | Stop reason: HOSPADM

## 2024-03-25 RX ORDER — PROPOFOL 10 MG/ML
5-50 INJECTION, EMULSION INTRAVENOUS
Status: DISCONTINUED | OUTPATIENT
Start: 2024-03-25 | End: 2024-03-25

## 2024-03-25 RX ORDER — ENOXAPARIN SODIUM 100 MG/ML
40 INJECTION SUBCUTANEOUS 2 TIMES DAILY
Status: DISCONTINUED | OUTPATIENT
Start: 2024-03-25 | End: 2024-03-26

## 2024-03-25 RX ORDER — FENTANYL CITRATE 50 UG/ML
100 INJECTION, SOLUTION INTRAMUSCULAR; INTRAVENOUS ONCE
Status: COMPLETED | OUTPATIENT
Start: 2024-03-25 | End: 2024-03-25

## 2024-03-25 RX ORDER — CHLORHEXIDINE GLUCONATE ORAL RINSE 1.2 MG/ML
15 SOLUTION DENTAL EVERY 12 HOURS SCHEDULED
Status: DISCONTINUED | OUTPATIENT
Start: 2024-03-25 | End: 2024-03-29

## 2024-03-25 RX ORDER — PROPOFOL 10 MG/ML
5-50 INJECTION, EMULSION INTRAVENOUS
Status: DISCONTINUED | OUTPATIENT
Start: 2024-03-25 | End: 2024-03-25 | Stop reason: SDUPTHER

## 2024-03-25 RX ORDER — DOCUSATE SODIUM 100 MG/1
100 CAPSULE, LIQUID FILLED ORAL 2 TIMES DAILY
Status: DISCONTINUED | OUTPATIENT
Start: 2024-03-25 | End: 2024-03-26

## 2024-03-25 RX ORDER — LIDOCAINE HYDROCHLORIDE 20 MG/ML
JELLY TOPICAL
Status: COMPLETED
Start: 2024-03-25 | End: 2024-03-25

## 2024-03-25 RX ORDER — LEVALBUTEROL INHALATION SOLUTION 1.25 MG/3ML
1.25 SOLUTION RESPIRATORY (INHALATION)
Status: DISCONTINUED | OUTPATIENT
Start: 2024-03-25 | End: 2024-03-31 | Stop reason: HOSPADM

## 2024-03-25 RX ORDER — FENTANYL CITRATE 50 UG/ML
50 INJECTION, SOLUTION INTRAMUSCULAR; INTRAVENOUS
Status: DISCONTINUED | OUTPATIENT
Start: 2024-03-25 | End: 2024-03-29

## 2024-03-25 RX ORDER — EPINEPHRINE 1 MG/ML
INJECTION, SOLUTION, CONCENTRATE INTRAVENOUS
Status: COMPLETED
Start: 2024-03-25 | End: 2024-03-25

## 2024-03-25 RX ORDER — CHLORHEXIDINE GLUCONATE ORAL RINSE 1.2 MG/ML
15 SOLUTION DENTAL EVERY 12 HOURS SCHEDULED
Status: DISCONTINUED | OUTPATIENT
Start: 2024-03-25 | End: 2024-03-25

## 2024-03-25 RX ORDER — LIDOCAINE HYDROCHLORIDE 10 MG/ML
10 INJECTION, SOLUTION EPIDURAL; INFILTRATION; INTRACAUDAL; PERINEURAL ONCE
Status: DISCONTINUED | OUTPATIENT
Start: 2024-03-25 | End: 2024-03-29

## 2024-03-25 RX ORDER — LORATADINE ORAL 5 MG/5ML
10 SOLUTION ORAL DAILY
Status: DISCONTINUED | OUTPATIENT
Start: 2024-03-26 | End: 2024-03-30

## 2024-03-25 RX ORDER — FAMOTIDINE 10 MG/ML
20 INJECTION, SOLUTION INTRAVENOUS DAILY
Status: DISCONTINUED | OUTPATIENT
Start: 2024-03-26 | End: 2024-03-25

## 2024-03-25 RX ORDER — PROPOFOL 10 MG/ML
5-50 INJECTION, EMULSION INTRAVENOUS
Status: DISCONTINUED | OUTPATIENT
Start: 2024-03-25 | End: 2024-03-26

## 2024-03-25 RX ORDER — FAMOTIDINE 10 MG/ML
20 INJECTION, SOLUTION INTRAVENOUS ONCE
Status: COMPLETED | OUTPATIENT
Start: 2024-03-25 | End: 2024-03-25

## 2024-03-25 RX ORDER — EPINEPHRINE 1 MG/ML
0.3 INJECTION, SOLUTION, CONCENTRATE INTRAVENOUS ONCE
Status: COMPLETED | OUTPATIENT
Start: 2024-03-25 | End: 2024-03-25

## 2024-03-25 RX ORDER — ASCORBIC ACID 500 MG
1000 TABLET ORAL 2 TIMES DAILY
Status: DISCONTINUED | OUTPATIENT
Start: 2024-03-25 | End: 2024-03-31 | Stop reason: HOSPADM

## 2024-03-25 RX ORDER — BUSPIRONE HYDROCHLORIDE 5 MG/1
7.5 TABLET ORAL DAILY
Status: DISCONTINUED | OUTPATIENT
Start: 2024-03-25 | End: 2024-03-31 | Stop reason: HOSPADM

## 2024-03-25 RX ORDER — ONDANSETRON 2 MG/ML
4 INJECTION INTRAMUSCULAR; INTRAVENOUS ONCE
Status: COMPLETED | OUTPATIENT
Start: 2024-03-25 | End: 2024-03-25

## 2024-03-25 RX ADMIN — ONDANSETRON 4 MG: 2 INJECTION INTRAMUSCULAR; INTRAVENOUS at 18:42

## 2024-03-25 RX ADMIN — ETOMIDATE 20 MG: 2 INJECTION INTRAVENOUS at 18:21

## 2024-03-25 RX ADMIN — TRANEXAMIC ACID 1000 MG: 10 INJECTION, SOLUTION INTRAVENOUS at 17:40

## 2024-03-25 RX ADMIN — LIDOCAINE HYDROCHLORIDE 5 APPLICATION: 20 JELLY TOPICAL at 18:07

## 2024-03-25 RX ADMIN — GABAPENTIN 300 MG: 300 CAPSULE ORAL at 21:56

## 2024-03-25 RX ADMIN — PROPOFOL 100 MG: 10 INJECTION, EMULSION INTRAVENOUS at 18:22

## 2024-03-25 RX ADMIN — ATORVASTATIN CALCIUM 80 MG: 80 TABLET, FILM COATED ORAL at 21:55

## 2024-03-25 RX ADMIN — PROPOFOL 75 MCG/KG/MIN: 10 INJECTION, EMULSION INTRAVENOUS at 18:31

## 2024-03-25 RX ADMIN — METHYLPREDNISOLONE SODIUM SUCCINATE 125 MG: 125 INJECTION, POWDER, FOR SOLUTION INTRAMUSCULAR; INTRAVENOUS at 17:38

## 2024-03-25 RX ADMIN — LEVALBUTEROL HYDROCHLORIDE 1.25 MG: 1.25 SOLUTION RESPIRATORY (INHALATION) at 20:39

## 2024-03-25 RX ADMIN — OXYCODONE HYDROCHLORIDE AND ACETAMINOPHEN 1000 MG: 500 TABLET ORAL at 21:55

## 2024-03-25 RX ADMIN — EZETIMIBE 10 MG: 10 TABLET ORAL at 21:55

## 2024-03-25 RX ADMIN — CHLORHEXIDINE GLUCONATE 15 ML: 1.2 SOLUTION ORAL at 21:55

## 2024-03-25 RX ADMIN — INSULIN LISPRO 2 UNITS: 100 INJECTION, SOLUTION INTRAVENOUS; SUBCUTANEOUS at 22:03

## 2024-03-25 RX ADMIN — DIPHENHYDRAMINE HYDROCHLORIDE 25 MG: 50 INJECTION, SOLUTION INTRAMUSCULAR; INTRAVENOUS at 17:38

## 2024-03-25 RX ADMIN — PROPOFOL 60 MCG/KG/MIN: 10 INJECTION, EMULSION INTRAVENOUS at 22:09

## 2024-03-25 RX ADMIN — EPINEPHRINE 0.3 MG: 1 INJECTION, SOLUTION, CONCENTRATE INTRAVENOUS at 17:34

## 2024-03-25 RX ADMIN — BUSPIRONE HYDROCHLORIDE 7.5 MG: 5 TABLET ORAL at 21:56

## 2024-03-25 RX ADMIN — FAMOTIDINE 20 MG: 10 INJECTION INTRAVENOUS at 17:45

## 2024-03-25 RX ADMIN — IPRATROPIUM BROMIDE 0.5 MG: 0.5 SOLUTION RESPIRATORY (INHALATION) at 20:39

## 2024-03-25 RX ADMIN — FENTANYL CITRATE 100 MCG: 50 INJECTION INTRAMUSCULAR; INTRAVENOUS at 18:41

## 2024-03-25 RX ADMIN — LIDOCAINE HYDROCHLORIDE 5 ML: 40 SOLUTION TOPICAL at 18:11

## 2024-03-25 RX ADMIN — ENOXAPARIN SODIUM 40 MG: 40 INJECTION SUBCUTANEOUS at 21:56

## 2024-03-25 RX ADMIN — PROPOFOL 70 MCG/KG/MIN: 10 INJECTION, EMULSION INTRAVENOUS at 20:17

## 2024-03-25 NOTE — ADDENDUM NOTE
Addendum  created 03/25/24 1907 by Richie Monterroso MD    Clinical Note Signed, Review and Sign - Ready for Procedure

## 2024-03-25 NOTE — RESPIRATORY THERAPY NOTE
03/25/24 8439   Respiratory Assessment   Assessment Type Assess only   Respiratory Pattern Assisted   Chest Assessment Chest expansion symmetrical   Resp Comments Called to ED 25 for pt getting intubated. MD nasally intubated with 7.0 ETT on pt right nare, secured at 28 cm at nare with white tape. Placed pt on vent with settings as documented. Pt did vomit during intubation. Positive color change on capnography and equal bilateral breath sounds. All alarms on and functioning, BVM present at head of bed. Will continue to monitor pt.   Vent Information   Vent type Cobian G5   Cobian Vent Mode (S)CMV   $ Vent Charge-INITIAL Yes   $ Pulse Oximetry Spot Check Charge Completed   (S)CMV Settings   Resp Rate (BPM) 12 BPM   VT (mL) 450 mL   FIO2 (%) 100 %   PEEP (cmH2O) 6 cmH2O   I:E Ratio 1:4.0   Insp Time (%) 1 %   Flow Trigger (LPM) 5   Humidification Heater   Heater Temperature (Set) 98.6 °F (37 °C)   (S)CMV Actuals   Resp Rate (BPM) 25 BPM   VT (mL) 476   MV 12.8   MAP (cmH2O) 4.9 cmH2O   Peak Pressure (cmH2O) 12 cmH2O   I:E Ratio (Obs) 1:1.4   Heater Temperature (Obs) 98.6 °F (37 °C)   (S)CMV Alarms   High Peak Pressure (cmH2O) 40   Low Pressure (cmH2O) 5 cm H2O   High Resp Rate (BPM) 40 BPM   Low Resp Rate (BPM) 8 BPM   High MV (L/min) 20 L/min   Low MV (L/min) 2 L/min   High VT (mL) 1000 mL   Low VT (mL) 250 mL   Apnea Time (s) 20 S   Maintenance   Alarm (pink) cable attached No   Resuscitation bag with peep valve at bedside Yes   Water bag changed No   Circuit changed No   Respiratory Charges    $ Intubation/Intubation Assist Yes

## 2024-03-25 NOTE — ED PROCEDURE NOTE
PROCEDURE  CriticalCare Time    Date/Time: 3/25/2024 5:31 PM    Performed by: Grace Oates MD  Authorized by: Grace Oates MD    Critical care provider statement:     Critical care time (minutes):  90    Critical care start time:  3/25/2024 5:26 PM    Critical care end time:  3/25/2024 6:56 PM    Critical care time was exclusive of:  Separately billable procedures and treating other patients and teaching time    Critical care was necessary to treat or prevent imminent or life-threatening deterioration of the following conditions:  Respiratory failure    Critical care was time spent personally by me on the following activities:  Obtaining history from patient or surrogate, development of treatment plan with patient or surrogate, discussions with consultants, evaluation of patient's response to treatment, examination of patient, interpretation of cardiac output measurements, ordering and performing treatments and interventions, ordering and review of radiographic studies, re-evaluation of patient's condition, review of old charts and ventilator management    I assumed direction of critical care for this patient from another provider in my specialty: no         Grace Oates MD  03/26/24 1733       Grace Oates MD  04/04/24 144
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
## 2024-03-25 NOTE — ED ATTENDING ATTESTATION
I saw and evaluated the patient. I have discussed the patient with the resident physician and agree with the resident's findings, assessment and plan as documented in the resident physician's note, unless otherwise documented below. All available laboratory and imaging studies were reviewed by myself.  I was present for key portions of any procedure(s) performed by the resident and I was immediately available to provide assistance.     I agree with the current assessment done in the Emergency Department. I have conducted an independent evaluation of this patient    Final Diagnosis:  1. Angioedema, initial encounter    2. Angioedema, subsequent encounter             Chief Complaint   Patient presents with    Allergic Reaction - Major     Pt brought in by EMS having a reaction to codd or amoxicillin, unsure at this time. Pt has facial and tongue swelling     This is a 68 y.o. male presenting for evaluation of angioedema. History limited due to acuity of condition and difficult to understand patient's speech secondary to angioedema. Patient did just start amoxicillin today and also ate cod earlier prior to onset of angioedema. Per chart review, patient did have angioedema in Nov 2023 while on ACE inhibitor and was advised to discontinue this medication. Patient nods when asked if he feels short of breath.     PMH:   has a past medical history of Anxiety, Asthma, Cardiac disease, COPD (chronic obstructive pulmonary disease) (HCC), Diabetes mellitus (HCC), Gout, Hyperlipidemia, Hypertension, and Shortness of breath.    PSH:   has a past surgical history that includes Total hip arthroplasty (Left); Tracheostomy; Knee arthroscopy (Bilateral); Carpal tunnel release; and pr colonoscopy flx dx w/collj spec when pfrmd (N/A, 1/10/2019).    Social:  Social History     Substance and Sexual Activity   Alcohol Use Yes    Comment: occasionally     Social History     Tobacco Use   Smoking Status Former    Current packs/day: 0.00     Average packs/day: 2.0 packs/day for 38.0 years (76.0 ttl pk-yrs)    Types: Cigarettes    Start date:     Quit date:     Years since quittin.2    Passive exposure: Never   Smokeless Tobacco Never     Social History     Substance and Sexual Activity   Drug Use No     PE:  Vitals:    24 1400 24 1500 24 1600 24 1700   BP: 141/66 147/67 148/64 141/63   BP Location:       Pulse: 105 105 104 102   Resp:  19 20   Temp:   98.1 °F (36.7 °C)    TempSrc:   Axillary    SpO2: 96% 96% 94% 96%   Weight:           Unable to obtain ROS secondary to angioedema.       Physical exam:  GENERAL APPEARANCE: Appears comfortable, no acute distress, calm and cooperative   NEURO: GCS 15, no focal deficits, cranial nerves grossly intact, clear fluent speech, no facial asymmetry   HEENT: Angioedema of tongue, lips. Bilateral periorbital swelling. Normocephalic, atraumatic, moist mucous membranes   Neck: Supple, full ROM  CV: RRR, no murmurs, rubs, or gallops  LUNGS: CTAB, no wheezing, rales, or rhonchi  GI: Abdomen soft, non-tender, no rebound or guarding   MSK: Extremities non-tender, no pitting edema  SKIN: Warm and dry      Assessment and plan: This is a 68 y.o. male presenting for evaluation of angioedema. Concern for impending loss of airway. Cricothyrotomy supplies placed at bedside in case patient became in need of surgical airway. Anaesthesia and ENT called to bedside. Anaesthesia was able to nasally intubate patient awake via fiberoptic intubation, however, intubation complicated by patient vomiting. Patient given etomidate bolus and started on propofol drip with PRN Fentanyl and admitted to MICU for further management.     Code Status: Level 1 - Full Code  Advance Directive and Living Will:      Power of :    POLST:      Medications   lidocaine (PF) (XYLOCAINE-MPF) 1 % injection 10 mL (10 mL Infiltration Not Given 3/25/24 2114)   methylPREDNISolone sodium succinate (Solu-MEDROL)  injection 40 mg (40 mg Intravenous Given 3/26/24 1349)   ascorbic acid (VITAMIN C) tablet 1,000 mg (1,000 mg Oral Given 3/26/24 0836)   atorvastatin (LIPITOR) tablet 80 mg (80 mg Oral Given 3/26/24 1543)   busPIRone (BUSPAR) tablet 7.5 mg (7.5 mg Oral Given 3/26/24 0836)   cholecalciferol (VITAMIN D3) tablet 2,000 Units (2,000 Units Oral Given 3/26/24 0837)   ezetimibe (ZETIA) tablet 10 mg (10 mg Oral Given 3/25/24 2155)   gabapentin (NEURONTIN) capsule 300 mg (300 mg Oral Given 3/26/24 1719)   multivitamin stress formula tablet 1 tablet (1 tablet Oral Given 3/26/24 0838)   levalbuterol (XOPENEX) inhalation solution 1.25 mg (1.25 mg Nebulization Given 3/26/24 1306)   ipratropium (ATROVENT) 0.02 % inhalation solution 0.5 mg (0.5 mg Nebulization Given 3/26/24 1306)   insulin lispro (HumALOG/ADMELOG) 100 units/mL subcutaneous injection 2-12 Units (2 Units Subcutaneous Given 3/26/24 1307)   allopurinol (ZYLOPRIM) tablet 300 mg (300 mg Oral Given 3/26/24 0838)   Famotidine (PF) (PEPCID) injection 20 mg (20 mg Intravenous Given 3/26/24 1719)   chlorhexidine (PERIDEX) 0.12 % oral rinse 15 mL (15 mL Mouth/Throat Given 3/26/24 0837)   polyethylene glycol (MIRALAX) packet 17 g (has no administration in time range)   senna oral syrup 8.8 mg (has no administration in time range)   fentaNYL injection 50 mcg (has no administration in time range)   Loratadine (CLARITIN) oral soln 10 mg (10 mg Oral Given 3/26/24 0838)   insulin glargine (LANTUS) subcutaneous injection 13 Units 0.13 mL (has no administration in time range)   enoxaparin (LOVENOX) subcutaneous injection 40 mg (has no administration in time range)   ceFAZolin (ANCEF) IVPB (premix in dextrose) 2,000 mg 50 mL (0 mg Intravenous Stopped 3/26/24 2073)   senna (SENOKOT) tablet 8.6 mg (has no administration in time range)   propofol (DIPRIVAN) 1000 mg in 100 mL infusion (premix) (50 mcg/kg/min × 125 kg Intravenous New Bag 3/26/24 4619)   diphenhydrAMINE (BENADRYL) injection  25 mg (25 mg Intravenous Given 3/25/24 1738)   Famotidine (PF) (PEPCID) injection 20 mg (20 mg Intravenous Given 3/25/24 1745)   methylPREDNISolone sodium succinate (Solu-MEDROL) injection 125 mg (125 mg Intravenous Given 3/25/24 1738)   EPINEPHrine PF (ADRENALIN) 1 mg/mL injection 0.3 mg (0.3 mg Intramuscular Given 3/25/24 1734)   tranexamic acid (CYKLOKAPRON) 1000-0.7 MG/100ML-% injection 1,000 mg (0 mg Intravenous Stopped 3/25/24 1756)   diphenhydrAMINE (FOR EMS ONLY) (BENADRYL) injection 50 mg (0 mg Does not apply Given to EMS 3/25/24 1752)   lidocaine (XYLOCAINE) 4 % topical solution 5 mL (5 mL Topical Given by Other 3/25/24 1811)   lidocaine (URO-JET) 2 % urethral/mucosal gel **ADS Override Pull** (5 Applications  Given by Other 3/25/24 1807)   fentaNYL injection 100 mcg (100 mcg Intravenous Given 3/25/24 1841)   ondansetron (ZOFRAN) injection 4 mg (4 mg Intravenous Given 3/25/24 1842)   etomidate (AMIDATE) 2 mg/mL injection 20 mg (20 mg Intravenous Given 3/25/24 1821)     XR chest portable   Final Result      Endotracheal tube tip is 2.5 cm above the ellie            Workstation performed: [id number]         XR chest portable ICU   Final Result      Endotracheal tube tip 4.5 cm above the ellie            Workstation performed: [id number]         XR chest 1 view portable   Final Result   Small left basilar pleural effusion. Tubes and lines as above.      Workstation performed: [id number]           Orders Placed This Encounter   Procedures    Critical Care    Blood culture    Blood culture    Sputum culture and Gram stain    XR chest 1 view portable    XR chest portable ICU    XR chest portable    CBC and differential    Basic metabolic panel    CBC and differential    Comprehensive metabolic panel    Magnesium    Phosphorus    Calcium, ionized    Procalcitonin    Platelet count    Blood gas, arterial    C1 esterase inhibitor    C4 complement    C1Q Binding Assay    RBC Morphology Reflex Test     Triglycerides    Phosphorus    Magnesium    Basic metabolic panel    CBC and differential    Diet Enteral/Parenteral; Tube Feeding No Oral Diet; Non-Formulary; Continuous; 0; Prosource Protein Liquid - Two Packets; QID    Continuous cardiac monitoring    Continuous pulse oximetry    Nursing communication Continue IV as ordered.    Notify admitting physician    Notify admitting physician on arrival    Cardio-Pulmonary Monitoring (Critical Care & Step Down Only)    Vital Signs    Out of bed to chair    Nasal Antiseptic Swab    Turn patient    Daily weights    I/O    CAM (ICU) Assessment    Nursing dysphagia Screen    Apply SCD or Foot pumps    Insulin Subcutaneous Notify Physician    Insulin Subcutaneous Instruction    Hypoglycemia Protocol    Fingerstick Glucose (POCT)    ET tube suction    Early Mobilization    Elevate HOB    Orogastric tube insertion    Oral care    Suction deep laryngeal    Maintain suction equipment    Change oral suction equipment    Assess sedation level - goal not achieved    Assess sedation level - goal achieved    Notify provider    Daily Awakening Trial    Document reasons for exclusion    Care Document reasons for failure    Initiate voiding trial    Level 1-Full Code: all life saving measures are indicated    Consult to Case Management    Inpatient Consult to Nutrition Services    Liberation from Mechanical Ventilation    Respiratory Protocol    Mechanical ventilation    Endotracheal Tube Position Adjustment    ECG 12 lead    INPATIENT ADMISSION    Restraints non-violent    Fall precautions     Labs Reviewed   BASIC METABOLIC PANEL - Abnormal       Result Value Ref Range Status    Sodium 138  135 - 147 mmol/L Final    Potassium 3.9  3.5 - 5.3 mmol/L Final    Chloride 100  96 - 108 mmol/L Final    CO2 28  21 - 32 mmol/L Final    ANION GAP 10  4 - 13 mmol/L Final    BUN 24  5 - 25 mg/dL Final    Creatinine 1.33 (*) 0.60 - 1.30 mg/dL Final    Comment: Standardized to IDMS reference method     Glucose 141 (*) 65 - 140 mg/dL Final    Comment: If the patient is fasting, the ADA then defines impaired fasting glucose as > 100 mg/dL and diabetes as > or equal to 123 mg/dL.    Calcium 9.0  8.4 - 10.2 mg/dL Final    eGFR 53  ml/min/1.73sq m Final    Narrative:     National Kid[employer] guidelines for Chronic Kidney Disease (CKD):     Stage 1 with normal or high GFR (GFR > 90 mL/min/1.73 square meters)    Stage 2 Mild CKD (GFR = 60-89 mL/min/1.73 square meters)    Stage 3A Moderate CKD (GFR = 45-59 mL/min/1.73 square meters)    Stage 3B Moderate CKD (GFR = 30-44 mL/min/1.73 square meters)    Stage 4 Severe CKD (GFR = 15-29 mL/min/1.73 square meters)    Stage 5 End Stage CKD (GFR <15 mL/min/1.73 square meters)  Note: GFR calculation is accurate only with a steady state creatinine   SPUTUM CULTURE AND GRAM STAIN   CBC AND DIFFERENTIAL    WBC 9.27  4.31 - 10.16 Thousand/uL Final    RBC 5.36  3.88 - 5.62 Million/uL Final    Hemoglobin 15.9  12.0 - 17.0 g/dL Final    Hematocrit 48.4  36.5 - 49.3 % Final    MCV 90  82 - 98 fL Final    MCH 29.7  26.8 - 34.3 pg Final    MCHC 32.9  31.4 - 37.4 g/dL Final    RDW 14.4  11.6 - 15.1 % Final    MPV 12.5  8.9 - 12.7 fL Final    Platelets 235  149 - 390 Thousands/uL Final    nRBC 0  /100 WBCs Final    Neutrophils Relative 64  43 - 75 % Final    Immature Grans % 0  0 - 2 % Final    Lymphocytes Relative 25  14 - 44 % Final    Monocytes Relative 8  4 - 12 % Final    Eosinophils Relative 2  0 - 6 % Final    Basophils Relative 1  0 - 1 % Final    Neutrophils Absolute 5.97  1.85 - 7.62 Thousands/µL Final    Absolute Immature Grans 0.04  0.00 - 0.20 Thousand/uL Final    Absolute Lymphocytes 2.30  0.60 - 4.47 Thousands/µL Final    Absolute Monocytes 0.72  0.17 - 1.22 Thousand/µL Final    Eosinophils Absolute 0.18  0.00 - 0.61 Thousand/µL Final    Basophils Absolute 0.06  0.00 - 0.10 Thousands/µL Final   PLATELET COUNT   C1 ESTERASE INHIBITOR   C1Q BINDING ASSAY         Time  reflects when diagnosis was documented in both MDM as applicable and the Disposition within this note       Time User Action Codes Description Comment    3/25/2024  6:27 PM Shree Segovia Add [T78.3XXA] Angioedema, initial encounter     3/25/2024  7:12 PM Deondre Gomez Add [T78.3XXD] Angioedema, subsequent encounter           ED Disposition       ED Disposition   Admit    Condition   Stable    Date/Time   Mon Mar 25, 2024  6:27 PM    Comment   Case was discussed with Dr. Carvalho and the patient's admission status was agreed to be Admission Status: observation status to the service of Dr. Carvalho.               Follow-up Information    None       Current Discharge Medication List        CONTINUE these medications which have NOT CHANGED    Details   albuterol (PROVENTIL HFA,VENTOLIN HFA) 90 mcg/act inhaler Inhale 2 puffs every 6 (six) hours as needed for wheezing or shortness of breath  Qty: 8.5 g, Refills: 3    Comments: Substitution to a formulary equivalent within the same pharmaceutical class is authorized.  Associated Diagnoses: Exacerbation of asthma, unspecified asthma severity, unspecified whether persistent      Alcohol Swabs (ALCOHOL PADS) 70 % PADS Use as directed  Refills: 0      allopurinol (ZYLOPRIM) 300 mg tablet JENNIFER 1 TABLETA POR VIA ORAL DIARIAMENTE  Refills: 2      Ascorbic Acid (vitamin C) 1000 MG tablet Take 1 tablet (1,000 mg total) by mouth 2 (two) times a day  Qty: 20 tablet, Refills: 0    Associated Diagnoses: COVID-19      atorvastatin (LIPITOR) 80 mg tablet TAKE ONE TABLET (80 MG TOTAL) BY MOUTH DAILY WITH DINNER.      benzonatate (TESSALON PERLES) 100 mg capsule Take 1 capsule (100 mg total) by mouth 3 (three) times a day as needed for cough  Qty: 20 capsule, Refills: 0    Associated Diagnoses: Bronchitis      Blood Glucose Monitoring Suppl (FREESTYLE LITE) ANGE Use as directed  Refills: 0      busPIRone (BUSPAR) 7.5 mg tablet JENNIFER 1 TABLETA POR VIA ORAL DOS VECES AL CORAL FOR ANXIETY       cholecalciferol (VITAMIN D3) 1,000 units tablet Take 2 tablets (2,000 Units total) by mouth daily  Qty: 180 tablet, Refills: 3    Associated Diagnoses: Vitamin D deficiency      docusate sodium (COLACE) 100 mg capsule Take 100 mg by mouth 2 (two) times a day      ezetimibe (ZETIA) 10 mg tablet TAKE ONE TABLET (10 MG TOTAL) BY MOUTH DAILY.      famotidine (PEPCID) 20 mg tablet Take 1 tablet (20 mg total) by mouth 2 (two) times a day  Qty: 30 tablet, Refills: 0    Associated Diagnoses: GERD (gastroesophageal reflux disease)      furosemide (LASIX) 20 mg tablet TAKE 1 TABLET (20 MG TOTAL) BY MOUTH DAILY  Qty: 90 tablet, Refills: 0    Associated Diagnoses: Lower extremity edema      gabapentin (NEURONTIN) 300 mg capsule TAKE ONE CAPSULE (300 MG TOTAL) BY MOUTH 2 (TWO) TIMES A DAY.      glucose blood (FREESTYLE LITE) test strip USE AS DIRECTED A / HASTA CHECK BLOOD GLUCOSE  Qty: 300 each, Refills: 3    Associated Diagnoses: Type 2 diabetes mellitus without complication, without long-term current use of insulin (HCC)      Multiple Vitamin (multivitamin) tablet Take 1 tablet by mouth daily  Qty: 10 tablet, Refills: 0    Associated Diagnoses: COVID-19      traZODone (DESYREL) 50 mg tablet Take 1 tablet (50 mg total) by mouth daily at bedtime as needed for sleep  Qty: 30 tablet, Refills: 1    Associated Diagnoses: Primary insomnia      Trelegy Ellipta 100-62.5-25 MCG/ACT inhaler TOME ONE PUFF ONCE A DAY           No discharge procedures on file.  Prior to Admission Medications   Prescriptions Last Dose Informant Patient Reported? Taking?   Alcohol Swabs (ALCOHOL PADS) 70 % PADS   Yes No   Sig: Use as directed   Ascorbic Acid (vitamin C) 1000 MG tablet   No No   Sig: Take 1 tablet (1,000 mg total) by mouth 2 (two) times a day   Patient not taking: Reported on 2/1/2024   Blood Glucose Monitoring Suppl (FREESTYLE LITE) ANGE   Yes No   Sig: Use as directed   Multiple Vitamin (multivitamin) tablet   No No   Sig: Take 1 tablet  "by mouth daily   Trelegy Ellipta 100-62.5-25 MCG/ACT inhaler   Yes No   Sig: TOME ONE PUFF ONCE A DAY   albuterol (PROVENTIL HFA,VENTOLIN HFA) 90 mcg/act inhaler   No No   Sig: Inhale 2 puffs every 6 (six) hours as needed for wheezing or shortness of breath   allopurinol (ZYLOPRIM) 300 mg tablet   Yes No   Sig: JENNIFER 1 TABLETA POR VIA ORAL DIARIAMENTE   atorvastatin (LIPITOR) 80 mg tablet   Yes No   Sig: TAKE ONE TABLET (80 MG TOTAL) BY MOUTH DAILY WITH DINNER.   benzonatate (TESSALON PERLES) 100 mg capsule   No No   Sig: Take 1 capsule (100 mg total) by mouth 3 (three) times a day as needed for cough   Patient not taking: Reported on 2/1/2024   busPIRone (BUSPAR) 7.5 mg tablet   Yes No   Sig: JENNIFER 1 TABLETA POR VIA ORAL DOS VECES AL CORAL FOR ANXIETY   cholecalciferol (VITAMIN D3) 1,000 units tablet   No No   Sig: Take 2 tablets (2,000 Units total) by mouth daily   docusate sodium (COLACE) 100 mg capsule   Yes No   Sig: Take 100 mg by mouth 2 (two) times a day   ezetimibe (ZETIA) 10 mg tablet   Yes No   Sig: TAKE ONE TABLET (10 MG TOTAL) BY MOUTH DAILY.   famotidine (PEPCID) 20 mg tablet   No No   Sig: Take 1 tablet (20 mg total) by mouth 2 (two) times a day   Patient not taking: Reported on 2/1/2024   furosemide (LASIX) 20 mg tablet   No No   Sig: TAKE 1 TABLET (20 MG TOTAL) BY MOUTH DAILY   Patient not taking: Reported on 2/1/2024   gabapentin (NEURONTIN) 300 mg capsule   Yes No   Sig: TAKE ONE CAPSULE (300 MG TOTAL) BY MOUTH 2 (TWO) TIMES A DAY.   glucose blood (FREESTYLE LITE) test strip   No No   Sig: USE AS DIRECTED A / HASTA CHECK BLOOD GLUCOSE   traZODone (DESYREL) 50 mg tablet   No No   Sig: Take 1 tablet (50 mg total) by mouth daily at bedtime as needed for sleep      Facility-Administered Medications: None         Portions of the record may have been created with voice recognition software. Occasional wrong word or \"sound a like\" substitutions may have occurred due to the inherent limitations of voice " recognition software. Read the chart carefully and recognize, using context, where substitutions have occurred.    Electronically signed by:  Grace Oates

## 2024-03-25 NOTE — ANESTHESIA PREPROCEDURE EVALUATION
Procedure:  EMERGENT INTUBATION    Relevant Problems   CARDIO   (+) Essential hypertension   (+) Hyperlipidemia      ENDO   (+) Type 2 diabetes mellitus with other specified complication, without long-term current use of insulin (HCC)      /RENAL   (+) CKD (chronic kidney disease) stage 2, GFR 60-89 ml/min      MUSCULOSKELETAL   (+) Chronic gout of right knee   (+) Lumbar back pain   (+) Osteoarthritis of both knees      NEURO/PSYCH   (+) Depression      PULMONARY   (+) Asthma   (+) Chronic obstructive pulmonary disease, unspecified COPD type (HCC)   (+) Exacerbation of asthma        Physical Exam    Airway    Mallampati score: IV  TM Distance: <3 FB  Neck ROM: full     Dental   No notable dental hx     Cardiovascular  Cardiovascular exam normal    Pulmonary  Pulmonary exam normal     Other Findings    Angioedema    Anesthesia Plan  ASA Score- 3 Emergent    Anesthesia Type-          Additional Monitors:     Airway Plan:     Comment: Verbal consent for intubation from patient. No sedation given.       Plan Factors-Exercise tolerance (METS): >4 METS.    Chart reviewed.        Patient is not a current smoker.              Induction-     Postoperative Plan-     Informed Consent- Anesthetic plan and risks discussed with patient.                   regular

## 2024-03-26 ENCOUNTER — APPOINTMENT (INPATIENT)
Dept: RADIOLOGY | Facility: HOSPITAL | Age: 69
DRG: 208 | End: 2024-03-26
Payer: COMMERCIAL

## 2024-03-26 LAB
ALBUMIN SERPL BCP-MCNC: 4.1 G/DL (ref 3.5–5)
ALP SERPL-CCNC: 53 U/L (ref 34–104)
ALT SERPL W P-5'-P-CCNC: 24 U/L (ref 7–52)
ANION GAP SERPL CALCULATED.3IONS-SCNC: 13 MMOL/L (ref 4–13)
AST SERPL W P-5'-P-CCNC: 21 U/L (ref 13–39)
ATRIAL RATE: 99 BPM
BASOPHILS # BLD MANUAL: 0 THOUSAND/UL (ref 0–0.1)
BASOPHILS NFR MAR MANUAL: 0 % (ref 0–1)
BILIRUB SERPL-MCNC: 0.57 MG/DL (ref 0.2–1)
BUN SERPL-MCNC: 21 MG/DL (ref 5–25)
CA-I BLD-SCNC: 1.09 MMOL/L (ref 1.12–1.32)
CALCIUM SERPL-MCNC: 9.2 MG/DL (ref 8.4–10.2)
CHLORIDE SERPL-SCNC: 102 MMOL/L (ref 96–108)
CO2 SERPL-SCNC: 24 MMOL/L (ref 21–32)
CREAT SERPL-MCNC: 0.97 MG/DL (ref 0.6–1.3)
DIFFERENTIAL COMMENT: ABNORMAL
EOSINOPHIL # BLD MANUAL: 0 THOUSAND/UL (ref 0–0.4)
EOSINOPHIL NFR BLD MANUAL: 0 % (ref 0–6)
ERYTHROCYTE [DISTWIDTH] IN BLOOD BY AUTOMATED COUNT: 14.5 % (ref 11.6–15.1)
GFR SERPL CREATININE-BSD FRML MDRD: 78 ML/MIN/1.73SQ M
GLUCOSE SERPL-MCNC: 159 MG/DL (ref 65–140)
GLUCOSE SERPL-MCNC: 162 MG/DL (ref 65–140)
GLUCOSE SERPL-MCNC: 164 MG/DL (ref 65–140)
GLUCOSE SERPL-MCNC: 168 MG/DL (ref 65–140)
GLUCOSE SERPL-MCNC: 172 MG/DL (ref 65–140)
GLUCOSE SERPL-MCNC: 174 MG/DL (ref 65–140)
GLUCOSE SERPL-MCNC: 181 MG/DL (ref 65–140)
HCT VFR BLD AUTO: 48.1 % (ref 36.5–49.3)
HGB BLD-MCNC: 15.5 G/DL (ref 12–17)
LYMPHOCYTES # BLD AUTO: 0.48 THOUSAND/UL (ref 0.6–4.47)
LYMPHOCYTES # BLD AUTO: 1 % (ref 14–44)
MAGNESIUM SERPL-MCNC: 2.2 MG/DL (ref 1.9–2.7)
MCH RBC QN AUTO: 29.1 PG (ref 26.8–34.3)
MCHC RBC AUTO-ENTMCNC: 32.2 G/DL (ref 31.4–37.4)
MCV RBC AUTO: 90 FL (ref 82–98)
MONOCYTES # BLD AUTO: 0.97 THOUSAND/UL (ref 0–1.22)
MONOCYTES NFR BLD: 4 % (ref 4–12)
NEUTROPHILS # BLD MANUAL: 22.75 THOUSAND/UL (ref 1.85–7.62)
NEUTS BAND NFR BLD MANUAL: 16 % (ref 0–8)
NEUTS SEG NFR BLD AUTO: 78 % (ref 43–75)
P AXIS: 48 DEGREES
PHOSPHATE SERPL-MCNC: 4.1 MG/DL (ref 2.3–4.1)
PLATELET # BLD AUTO: 323 THOUSANDS/UL (ref 149–390)
PLATELET BLD QL SMEAR: ADEQUATE
PMV BLD AUTO: 11.8 FL (ref 8.9–12.7)
POTASSIUM SERPL-SCNC: 4.7 MMOL/L (ref 3.5–5.3)
PR INTERVAL: 244 MS
PROCALCITONIN SERPL-MCNC: 0.26 NG/ML
PROT SERPL-MCNC: 6.3 G/DL (ref 6.4–8.4)
QRS AXIS: -7 DEGREES
QRSD INTERVAL: 72 MS
QT INTERVAL: 346 MS
QTC INTERVAL: 444 MS
RBC # BLD AUTO: 5.33 MILLION/UL (ref 3.88–5.62)
RBC MORPH BLD: NORMAL
SODIUM SERPL-SCNC: 139 MMOL/L (ref 135–147)
T WAVE AXIS: 43 DEGREES
TRIGL SERPL-MCNC: 139 MG/DL
VARIANT LYMPHS # BLD AUTO: 1 %
VENTRICULAR RATE: 99 BPM
WBC # BLD AUTO: 24.2 THOUSAND/UL (ref 4.31–10.16)

## 2024-03-26 PROCEDURE — 87077 CULTURE AEROBIC IDENTIFY: CPT | Performed by: STUDENT IN AN ORGANIZED HEALTH CARE EDUCATION/TRAINING PROGRAM

## 2024-03-26 PROCEDURE — 85007 BL SMEAR W/DIFF WBC COUNT: CPT | Performed by: STUDENT IN AN ORGANIZED HEALTH CARE EDUCATION/TRAINING PROGRAM

## 2024-03-26 PROCEDURE — 94640 AIRWAY INHALATION TREATMENT: CPT

## 2024-03-26 PROCEDURE — 82330 ASSAY OF CALCIUM: CPT | Performed by: STUDENT IN AN ORGANIZED HEALTH CARE EDUCATION/TRAINING PROGRAM

## 2024-03-26 PROCEDURE — 99233 SBSQ HOSP IP/OBS HIGH 50: CPT | Performed by: STUDENT IN AN ORGANIZED HEALTH CARE EDUCATION/TRAINING PROGRAM

## 2024-03-26 PROCEDURE — 85027 COMPLETE CBC AUTOMATED: CPT | Performed by: STUDENT IN AN ORGANIZED HEALTH CARE EDUCATION/TRAINING PROGRAM

## 2024-03-26 PROCEDURE — 94760 N-INVAS EAR/PLS OXIMETRY 1: CPT

## 2024-03-26 PROCEDURE — 84100 ASSAY OF PHOSPHORUS: CPT | Performed by: STUDENT IN AN ORGANIZED HEALTH CARE EDUCATION/TRAINING PROGRAM

## 2024-03-26 PROCEDURE — 93010 ELECTROCARDIOGRAM REPORT: CPT | Performed by: INTERNAL MEDICINE

## 2024-03-26 PROCEDURE — 71045 X-RAY EXAM CHEST 1 VIEW: CPT

## 2024-03-26 PROCEDURE — 87070 CULTURE OTHR SPECIMN AEROBIC: CPT | Performed by: STUDENT IN AN ORGANIZED HEALTH CARE EDUCATION/TRAINING PROGRAM

## 2024-03-26 PROCEDURE — 94664 DEMO&/EVAL PT USE INHALER: CPT

## 2024-03-26 PROCEDURE — 84478 ASSAY OF TRIGLYCERIDES: CPT

## 2024-03-26 PROCEDURE — 87186 SC STD MICRODIL/AGAR DIL: CPT | Performed by: STUDENT IN AN ORGANIZED HEALTH CARE EDUCATION/TRAINING PROGRAM

## 2024-03-26 PROCEDURE — 87205 SMEAR GRAM STAIN: CPT | Performed by: STUDENT IN AN ORGANIZED HEALTH CARE EDUCATION/TRAINING PROGRAM

## 2024-03-26 PROCEDURE — 36600 WITHDRAWAL OF ARTERIAL BLOOD: CPT

## 2024-03-26 PROCEDURE — 80053 COMPREHEN METABOLIC PANEL: CPT | Performed by: STUDENT IN AN ORGANIZED HEALTH CARE EDUCATION/TRAINING PROGRAM

## 2024-03-26 PROCEDURE — 83735 ASSAY OF MAGNESIUM: CPT | Performed by: STUDENT IN AN ORGANIZED HEALTH CARE EDUCATION/TRAINING PROGRAM

## 2024-03-26 PROCEDURE — 82948 REAGENT STRIP/BLOOD GLUCOSE: CPT

## 2024-03-26 PROCEDURE — 84145 PROCALCITONIN (PCT): CPT | Performed by: STUDENT IN AN ORGANIZED HEALTH CARE EDUCATION/TRAINING PROGRAM

## 2024-03-26 RX ORDER — INSULIN GLARGINE 100 [IU]/ML
13 INJECTION, SOLUTION SUBCUTANEOUS
Status: DISCONTINUED | OUTPATIENT
Start: 2024-03-26 | End: 2024-03-31 | Stop reason: HOSPADM

## 2024-03-26 RX ORDER — PROPOFOL 10 MG/ML
5-50 INJECTION, EMULSION INTRAVENOUS
Status: DISCONTINUED | OUTPATIENT
Start: 2024-03-26 | End: 2024-03-29

## 2024-03-26 RX ORDER — ENOXAPARIN SODIUM 100 MG/ML
60 INJECTION SUBCUTANEOUS 2 TIMES DAILY
Status: DISCONTINUED | OUTPATIENT
Start: 2024-03-26 | End: 2024-03-26

## 2024-03-26 RX ORDER — SENNOSIDES 8.6 MG
1 TABLET ORAL
Status: DISCONTINUED | OUTPATIENT
Start: 2024-03-26 | End: 2024-03-31 | Stop reason: HOSPADM

## 2024-03-26 RX ORDER — CEFAZOLIN SODIUM 2 G/50ML
2000 SOLUTION INTRAVENOUS EVERY 8 HOURS
Qty: 600 ML | Refills: 0 | Status: COMPLETED | OUTPATIENT
Start: 2024-03-26 | End: 2024-03-30

## 2024-03-26 RX ORDER — ENOXAPARIN SODIUM 100 MG/ML
40 INJECTION SUBCUTANEOUS EVERY 12 HOURS SCHEDULED
Status: DISCONTINUED | OUTPATIENT
Start: 2024-03-26 | End: 2024-03-31 | Stop reason: HOSPADM

## 2024-03-26 RX ADMIN — PROPOFOL 50 MCG/KG/MIN: 10 INJECTION, EMULSION INTRAVENOUS at 17:19

## 2024-03-26 RX ADMIN — CEFAZOLIN SODIUM 2000 MG: 2 SOLUTION INTRAVENOUS at 19:29

## 2024-03-26 RX ADMIN — INSULIN LISPRO 2 UNITS: 100 INJECTION, SOLUTION INTRAVENOUS; SUBCUTANEOUS at 06:31

## 2024-03-26 RX ADMIN — PROPOFOL 50 MCG/KG/MIN: 10 INJECTION, EMULSION INTRAVENOUS at 20:22

## 2024-03-26 RX ADMIN — PROPOFOL 60 MCG/KG/MIN: 10 INJECTION, EMULSION INTRAVENOUS at 10:16

## 2024-03-26 RX ADMIN — PROPOFOL 60 MCG/KG/MIN: 10 INJECTION, EMULSION INTRAVENOUS at 11:54

## 2024-03-26 RX ADMIN — CHLORHEXIDINE GLUCONATE 15 ML: 1.2 SOLUTION ORAL at 21:29

## 2024-03-26 RX ADMIN — LORATADINE 10 MG: 5 SOLUTION ORAL at 08:38

## 2024-03-26 RX ADMIN — METHYLPREDNISOLONE SODIUM SUCCINATE 40 MG: 40 INJECTION, POWDER, FOR SOLUTION INTRAMUSCULAR; INTRAVENOUS at 05:00

## 2024-03-26 RX ADMIN — PROPOFOL 60 MCG/KG/MIN: 10 INJECTION, EMULSION INTRAVENOUS at 05:00

## 2024-03-26 RX ADMIN — PROPOFOL 60 MCG/KG/MIN: 10 INJECTION, EMULSION INTRAVENOUS at 00:34

## 2024-03-26 RX ADMIN — PROPOFOL 60 MCG/KG/MIN: 10 INJECTION, EMULSION INTRAVENOUS at 08:35

## 2024-03-26 RX ADMIN — OXYCODONE HYDROCHLORIDE AND ACETAMINOPHEN 1000 MG: 500 TABLET ORAL at 08:36

## 2024-03-26 RX ADMIN — INSULIN LISPRO 2 UNITS: 100 INJECTION, SOLUTION INTRAVENOUS; SUBCUTANEOUS at 17:43

## 2024-03-26 RX ADMIN — B-COMPLEX W/ C & FOLIC ACID TAB 1 TABLET: TAB at 08:38

## 2024-03-26 RX ADMIN — Medication 2000 UNITS: at 08:37

## 2024-03-26 RX ADMIN — ENOXAPARIN SODIUM 60 MG: 60 INJECTION SUBCUTANEOUS at 08:56

## 2024-03-26 RX ADMIN — PROPOFOL 50 MCG/KG/MIN: 10 INJECTION, EMULSION INTRAVENOUS at 14:29

## 2024-03-26 RX ADMIN — FAMOTIDINE 20 MG: 10 INJECTION INTRAVENOUS at 09:46

## 2024-03-26 RX ADMIN — LEVALBUTEROL HYDROCHLORIDE 1.25 MG: 1.25 SOLUTION RESPIRATORY (INHALATION) at 07:02

## 2024-03-26 RX ADMIN — EZETIMIBE 10 MG: 10 TABLET ORAL at 21:10

## 2024-03-26 RX ADMIN — CHLORHEXIDINE GLUCONATE 15 ML: 1.2 SOLUTION ORAL at 08:37

## 2024-03-26 RX ADMIN — LEVALBUTEROL HYDROCHLORIDE 1.25 MG: 1.25 SOLUTION RESPIRATORY (INHALATION) at 19:15

## 2024-03-26 RX ADMIN — CEFAZOLIN SODIUM 2000 MG: 2 SOLUTION INTRAVENOUS at 13:33

## 2024-03-26 RX ADMIN — PROPOFOL 50 MCG/KG/MIN: 10 INJECTION, EMULSION INTRAVENOUS at 23:20

## 2024-03-26 RX ADMIN — ATORVASTATIN CALCIUM 80 MG: 80 TABLET, FILM COATED ORAL at 15:43

## 2024-03-26 RX ADMIN — INSULIN LISPRO 2 UNITS: 100 INJECTION, SOLUTION INTRAVENOUS; SUBCUTANEOUS at 13:07

## 2024-03-26 RX ADMIN — INSULIN LISPRO 2 UNITS: 100 INJECTION, SOLUTION INTRAVENOUS; SUBCUTANEOUS at 00:03

## 2024-03-26 RX ADMIN — IPRATROPIUM BROMIDE 0.5 MG: 0.5 SOLUTION RESPIRATORY (INHALATION) at 07:02

## 2024-03-26 RX ADMIN — LEVALBUTEROL HYDROCHLORIDE 1.25 MG: 1.25 SOLUTION RESPIRATORY (INHALATION) at 13:06

## 2024-03-26 RX ADMIN — SENNOSIDES 8.6 MG: 8.6 TABLET, FILM COATED ORAL at 21:29

## 2024-03-26 RX ADMIN — METHYLPREDNISOLONE SODIUM SUCCINATE 40 MG: 40 INJECTION, POWDER, FOR SOLUTION INTRAMUSCULAR; INTRAVENOUS at 21:29

## 2024-03-26 RX ADMIN — GABAPENTIN 300 MG: 300 CAPSULE ORAL at 08:37

## 2024-03-26 RX ADMIN — IPRATROPIUM BROMIDE 0.5 MG: 0.5 SOLUTION RESPIRATORY (INHALATION) at 13:06

## 2024-03-26 RX ADMIN — PROPOFOL 60 MCG/KG/MIN: 10 INJECTION, EMULSION INTRAVENOUS at 02:47

## 2024-03-26 RX ADMIN — FAMOTIDINE 20 MG: 10 INJECTION INTRAVENOUS at 17:19

## 2024-03-26 RX ADMIN — IPRATROPIUM BROMIDE 0.5 MG: 0.5 SOLUTION RESPIRATORY (INHALATION) at 19:15

## 2024-03-26 RX ADMIN — GABAPENTIN 300 MG: 300 CAPSULE ORAL at 17:19

## 2024-03-26 RX ADMIN — PROPOFOL 50 MCG/KG/MIN: 10 INJECTION, EMULSION INTRAVENOUS at 12:21

## 2024-03-26 RX ADMIN — BUSPIRONE HYDROCHLORIDE 7.5 MG: 5 TABLET ORAL at 08:36

## 2024-03-26 RX ADMIN — ALLOPURINOL 300 MG: 300 TABLET ORAL at 08:38

## 2024-03-26 RX ADMIN — INSULIN GLARGINE 13 UNITS: 100 INJECTION, SOLUTION SUBCUTANEOUS at 21:29

## 2024-03-26 RX ADMIN — METHYLPREDNISOLONE SODIUM SUCCINATE 40 MG: 40 INJECTION, POWDER, FOR SOLUTION INTRAMUSCULAR; INTRAVENOUS at 13:49

## 2024-03-26 RX ADMIN — OXYCODONE HYDROCHLORIDE AND ACETAMINOPHEN 1000 MG: 500 TABLET ORAL at 21:29

## 2024-03-26 RX ADMIN — ENOXAPARIN SODIUM 40 MG: 40 INJECTION SUBCUTANEOUS at 21:29

## 2024-03-26 NOTE — CONSULTS
Nutrition Recommendations:    Current propofol rate provides 1200 kcals/day.     Would recommend providing Prosource Protein Liquid only at this time to maximize protein intake.   Recommend 2 Prosource packets 4 times daily for a total of 8 packets per day, 480 kcals (1680 total kcals with current propofol), 120g protein, approximately 480mL water for administration.   Pended order with recommendations as above.    RD will check propofol rate tomorrow 3/27 and provide updated recs if needed.

## 2024-03-26 NOTE — SEPSIS NOTE
Sepsis Note   Henry Mittal 68 y.o. male MRN: [id number]  Unit/Bed#: MICU 03 Encounter: [id number]       Initial Sepsis Screening       Row Name 03/26/24 1237                Is the patient's history suggestive of a new or worsening infection? No  -NV                  User Key  (r) = Recorded By, (t) = Taken By, (c) = Cosigned By      Initials Name Provider Type    NV Katie Hemphill MD Resident                        Body mass index is 40.59 kg/m².  Wt Readings from Last 1 Encounters:   03/26/24 121 kg (266 lb 15.6 oz)        Ideal body weight: 68.4 kg (150 lb 12.7 oz)  Adjusted ideal body weight: 89.5 kg (197 lb 4.3 oz)      Received message regarding concern for sepsis given WBC 24.20 and pulse 94. Patient was intubated yesterday due to angioedema and is receiving frequent steroids. Exam not and clinical picture is not suggestive of sepsis. Suspect reactive leukocytosis vs demargination from steroids.

## 2024-03-26 NOTE — PLAN OF CARE
Problem: RESPIRATORY - ADULT  Goal: Achieves optimal ventilation and oxygenation  Description: INTERVENTIONS:  - Assess for changes in respiratory status  - Assess for changes in mentation and behavior  - Position to facilitate oxygenation and minimize respiratory effort  - Oxygen administered by appropriate delivery if ordered  - Initiate smoking cessation education as indicated  - Encourage broncho-pulmonary hygiene including cough, deep breathe, Incentive Spirometry  - Assess the need for suctioning and aspirate as needed  - Assess and instruct to report SOB or any respiratory difficulty  - Respiratory Therapy support as indicated  Outcome: Progressing     Problem: Prexisting or High Potential for Compromised Skin Integrity  Goal: Skin integrity is maintained or improved  Description: INTERVENTIONS:  - Identify patients at risk for skin breakdown  - Assess and monitor skin integrity  - Assess and monitor nutrition and hydration status  - Monitor labs   - Assess for incontinence   - Turn and reposition patient  - Assist with mobility/ambulation  - Relieve pressure over bony prominences  - Avoid friction and shearing  - Provide appropriate hygiene as needed including keeping skin clean and dry  - Evaluate need for skin moisturizer/barrier cream  - Collaborate with interdisciplinary team   - Patient/family teaching  - Consider wound care consult   3/26/2024 0222 by Jaye Lockhart RN  Outcome: Progressing
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
## 2024-03-26 NOTE — RESPIRATORY THERAPY NOTE
03/26/24 1057   Respiratory Assessment   Resp Comments Endotube advanced to hub   Vent Information   Vent ID [id number]   Vent type Cobian G5   Cobian Vent Mode (S)CMV   $ Pulse Oximetry Spot Check Charge Completed   (S)CMV Settings   Resp Rate (BPM) 14 BPM   VT (mL) 450 mL   FIO2 (%) 40 %   PEEP (cmH2O) 6 cmH2O   I:E Ratio 1:2   Insp Time (%) 1 %   Flow Trigger (LPM) 5   Humidification Heater   Heater Temperature (Set) 98.6 °F (37 °C)   (S)CMV Actuals   Resp Rate (BPM) 19 BPM   VT (mL) 492   MV 8.6   MAP (cmH2O) 10 cmH2O   Peak Pressure (cmH2O) 18 cmH2O   I:E Ratio (Obs) 1:2   Insp Resistance 11   (S)CMV Alarms   High Peak Pressure (cmH2O) 40   Low Pressure (cmH2O) 5 cm H2O   High Resp Rate (BPM) 40 BPM   Low Resp Rate (BPM) 8 BPM   High MV (L/min) 20 L/min   Low MV (L/min) 4 L/min   Apnea Time (s) 20 S   Maintenance   Water bag changed No   Circuit changed No   ETT  Hi-Lo 7 mm   Placement Date/Time: 03/25/24 1822   Preoxygenated: Yes  Technique: Flexible fiberscope  Type: Hi-Lo  Tube Size: 7 mm  Laryngoscope: (c) Other (Comment)  Location: Right Nare  Grade View: Full view of the glottis  Insertion: Atraumatic  Insertion atte...   Secured at (cm) advanced to hub   Measured from Nare
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
## 2024-03-26 NOTE — MALNUTRITION/BMI
This medical record reflects one or more clinical indicators suggestive of morbid obesity.      BMI Findings:  Adult BMI Classifications: Morbid Obesity 40-44.9        Body mass index is 40.59 kg/m².     See Nutrition note dated 3/26/24 for additional details.  Completed nutrition assessment is viewable in the nutrition documentation.
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
## 2024-03-26 NOTE — PROGRESS NOTES
Critical Care Attending Note; Bassam Hassan   Note Date: 24  Note Time: 10:52 AM    Patient: Henry Mittal  Age, : 68 y.o., 1955 MRN: [id number] Code Status: Level 1 - Full Code [facility]   H[facility] LOS:1 days  ]   Patient seen and examined, medical record reviewed, discussed with house staff and nursing staff.     HPI   CC: Angioedema   68M with a PMH of Asthma, COPD, HTN, DM2, Depression/Anxiety, Gout, L total hip repair and recent dental procedure who presents with angio edema.      Main ICU Plans:       #Neuro  Sedation - RASS 0-(-1)  - Propofol/ Fentanyl PRN     #Pulm  Respiratory Failure - Intubated Nasal 7 - Airway Protection - Concerns for Angioedema - PCN vs ACE-I vs Hereditary -   - LTVV VC - 6-8cc/kg IDBW  - Wean FiO2 - Maintain Oxygen Sat >92%  - VAP Bundle  - HOB > 30o    - PEEP Titrate  - Advance ET tube - 2 CM  - Follow C1 Esterase   - Solumedrol 40mg q8hr, Claritin, Pepcid    COPD/Asthma - FEV1   - Xopenex/Atrovent - wean as tolerated    #ID   Dental Procedure  - Ancef - 7 days    #Endo  DM  - ISS  - Lantus 13 qhs    #MSK  Gout - Allopurinol    #DVT/GI ppx  Lovenox     #Lines/Tubes/Drains:   Invasive Devices       Peripheral Intravenous Line  Duration             Peripheral IV 24 Dorsal (posterior);Left Hand 1 day    Peripheral IV 24 Left Antecubital <1 day    Peripheral IV 24 Right;Ventral (anterior) Hand <1 day              Drain  Duration             NG/OG/Enteral Tube 18 Fr Right mouth <1 day    Urethral Catheter <1 day              Airway  Duration             ETT  Hi-Lo 7 mm <1 day                    #Nutrition:   Diet Enteral/Parenteral; Tube Feeding No Oral Diet; Glucerna 1.2; Continuous; 50        #Code Status:   Level 1 - Full Code    #Dispo:   ICU        Bassam Hassan MD  Pulmonary, Critical Care    Critical care time, excluding procedures, teaching, family meetings, and excludes any prior time recorded by the  "AP/resident, 35 minutes. Upon my evaluation, this patient has a high probability of imminent or life-threatening deterioration due to above problems which required my direct attention, intervention, and personal management.   Impression/Active Problems:    Angioedema   Respiratory Failure   Anxiety    Gout   Dental Procedure   DM  COPD Asthma    Physical Exam:     Vital Signs:   Weight: 121 kg (266 lb 15.6 oz)  IBW: Ideal body weight: 68.4 kg (150 lb 12.7 oz)  Adjusted ideal body weight: 89.5 kg (197 lb 4.3 oz)  Temp:  [98.1 °F (36.7 °C)-98.4 °F (36.9 °C)] 98.3 °F (36.8 °C)  HR:  [] 100  Resp:  [13-21] 20  BP: (108-172)/(57-80) 142/69  General: NAD  Neuro: Sedated  HEENT: Facial edema, tongue swelling  Heart: RRR  Lungs: CTAB  Abdomen:   Extremities:                 Ventilator Settings:               Invalid input(s): \"PCO2\", \"O2\"  Radiologic Images Reviewed:    CXR   ETT 5cm above ellie  Input / Output:     Intake/Output Summary (Last 24 hours) at 3/26/2024 1052  Last data filed at 3/26/2024 1000  Gross per 24 hour   Intake 700.43 ml   Output 1380 ml   Net -679.57 ml            Infusions:  propofol, 5-50 mcg/kg/min, Last Rate: 60 mcg/kg/min (03/26/24 1016)      Scheduled Medications:  Current Facility-Administered Medications   Medication Dose Route Frequency Provider Last Rate    allopurinol  300 mg Oral Daily Deondre Gomez DO      vitamin C  1,000 mg Oral BID Deondre Gomez, DO      atorvastatin  80 mg Oral Daily With Dinner Deondre Gomez DO      busPIRone  7.5 mg Oral Daily Deondre Gomez, DO      chlorhexidine  15 mL Mouth/Throat Q12H [facility] Deondre Gomez DO      cholecalciferol  2,000 Units Oral Daily Deondre Gomez, DO      docusate sodium  100 mg Oral BID Deondre Gomez, DO      enoxaparin  40 mg Subcutaneous Q12H [facility] Katie Hemphill MD      ezetimibe  10 mg Oral HS Deondre Gomez DO      Famotidine (PF)  20 mg Intravenous BID Deondre Gomez,   " "    fentaNYL  50 mcg Intravenous Q1H PRN Deondre Gomez, DO      gabapentin  300 mg Oral BID Deondre Gomez, DO      insulin glargine  13 Units Subcutaneous HS Katie Hemphill MD      insulin lispro  2-12 Units Subcutaneous Q6H [facility] Deondre Gomez, DO      ipratropium  0.5 mg Nebulization TID Deondre Gomez, DO      levalbuterol  1.25 mg Nebulization TID Deondre Gomez, DO      lidocaine (PF)  10 mL Infiltration Once Shree Segovia MD      Loratadine  10 mg Oral Daily Deondre Gomez, DO      methylPREDNISolone sodium succinate  40 mg Intravenous Q8H [facility] Deondre Gomez, DO      multivitamin stress formula  1 tablet Oral Daily Deondre Gomez, DO      polyethylene glycol  17 g Oral Daily PRN Deondre Gomez, DO      propofol  5-50 mcg/kg/min Intravenous Titrated Deondre Gomez DO 60 mcg/kg/min (03/26/24 1016)    senna  8.8 mg Per NG Tube Daily PRN Deondre Gomez DO         PRN Medications:    fentaNYL    polyethylene glycol    senna    Labs Reviewed:  Results from last 7 days   Lab Units 03/26/24  0511 03/25/24  1909 03/22/24  0658   WBC Thousand/uL 24.20* 9.27 6.56   HEMOGLOBIN g/dL 15.5 15.9 14.8   HEMATOCRIT % 48.1 48.4 46.2   PLATELETS Thousands/uL 323 235 204      Results from last 7 days   Lab Units 03/26/24  0629 03/25/24  1909 03/22/24  0658   SODIUM mmol/L 139 138 137   CO2 mmol/L 24 28 28   BUN mg/dL 21 24 21   CALCIUM mg/dL 9.2 9.0 8.9   MAGNESIUM mg/dL 2.2  --   --    PHOSPHORUS mg/dL 4.1  --   --          Invalid input(s): \"ASTSGOT\", \"ALTSGPT\"LABRCNTIP@ ,alkphos:3,tbilirubin:3,dbilirubin:3)@            Invalid input(s): \"TROPT\", \"PBNP\"             I have personally seen and examined the patient on (03/26/24 between [id number]). I discussed the patient with the AP/resident including, but not limited to, verifying findings; reviewing labs and x-rays; discussing with consultants; developing the plan of care with the bedside nurse; and discussing " treatment plan with patient or surrogate.  I have reviewed the note and assessment performed by the AP/resident and agree with the AP/resident’s documented findings and plan of care with the above additions/exceptions. Please see my comments for details and adjustments.

## 2024-03-26 NOTE — PLAN OF CARE
Problem: Prexisting or High Potential for Compromised Skin Integrity  Goal: Skin integrity is maintained or improved  Description: INTERVENTIONS:  - Identify patients at risk for skin breakdown  - Assess and monitor skin integrity  - Assess and monitor nutrition and hydration status  - Monitor labs   - Assess for incontinence   - Turn and reposition patient  - Assist with mobility/ambulation  - Relieve pressure over bony prominences  - Avoid friction and shearing  - Provide appropriate hygiene as needed including keeping skin clean and dry  - Evaluate need for skin moisturizer/barrier cream  - Collaborate with interdisciplinary team   - Patient/family teaching  - Consider wound care consult   Outcome: Progressing     Problem: RESPIRATORY - ADULT  Goal: Achieves optimal ventilation and oxygenation  Description: INTERVENTIONS:  - Assess for changes in respiratory status  - Assess for changes in mentation and behavior  - Position to facilitate oxygenation and minimize respiratory effort  - Oxygen administered by appropriate delivery if ordered  - Initiate smoking cessation education as indicated  - Encourage broncho-pulmonary hygiene including cough, deep breathe, Incentive Spirometry  - Assess the need for suctioning and aspirate as needed  - Assess and instruct to report SOB or any respiratory difficulty  - Respiratory Therapy support as indicated  Outcome: Progressing        Problem: SAFETY,RESTRAINT: NV/NON-SELF DESTRUCTIVE BEHAVIOR  Goal: Remains free of harm/injury (restraint for non violent/non self-detsructive behavior)  Description: INTERVENTIONS:  - Instruct patient/family regarding restraint use   - Assess and monitor physiologic and psychological status   - Provide interventions and comfort measures to meet assessed patient needs   - Identify and implement measures to help patient regain control  - Assess readiness for release of restraint   Outcome: Progressing  Goal: Returns to optimal restraint-free  functioning  Description: INTERVENTIONS:  - Assess the patient's behavior and symptoms that indicate continued need for restraint  - Identify and implement measures to help patient regain control  - Assess readiness for release of restraint   Outcome: Progressing

## 2024-03-27 LAB
ANION GAP SERPL CALCULATED.3IONS-SCNC: 12 MMOL/L (ref 4–13)
BASOPHILS # BLD MANUAL: 0 THOUSAND/UL (ref 0–0.1)
BASOPHILS NFR MAR MANUAL: 0 % (ref 0–1)
BUN SERPL-MCNC: 27 MG/DL (ref 5–25)
CALCIUM SERPL-MCNC: 8.9 MG/DL (ref 8.4–10.2)
CHLORIDE SERPL-SCNC: 101 MMOL/L (ref 96–108)
CO2 SERPL-SCNC: 23 MMOL/L (ref 21–32)
CREAT SERPL-MCNC: 1.03 MG/DL (ref 0.6–1.3)
EOSINOPHIL # BLD MANUAL: 0 THOUSAND/UL (ref 0–0.4)
EOSINOPHIL NFR BLD MANUAL: 0 % (ref 0–6)
ERYTHROCYTE [DISTWIDTH] IN BLOOD BY AUTOMATED COUNT: 14.3 % (ref 11.6–15.1)
GFR SERPL CREATININE-BSD FRML MDRD: 73 ML/MIN/1.73SQ M
GLUCOSE SERPL-MCNC: 139 MG/DL (ref 65–140)
GLUCOSE SERPL-MCNC: 150 MG/DL (ref 65–140)
GLUCOSE SERPL-MCNC: 159 MG/DL (ref 65–140)
GLUCOSE SERPL-MCNC: 160 MG/DL (ref 65–140)
GLUCOSE SERPL-MCNC: 165 MG/DL (ref 65–140)
HCT VFR BLD AUTO: 45.8 % (ref 36.5–49.3)
HGB BLD-MCNC: 14.8 G/DL (ref 12–17)
LYMPHOCYTES # BLD AUTO: 1.21 THOUSAND/UL (ref 0.6–4.47)
LYMPHOCYTES # BLD AUTO: 4 % (ref 14–44)
MAGNESIUM SERPL-MCNC: 2.5 MG/DL (ref 1.9–2.7)
MCH RBC QN AUTO: 29.2 PG (ref 26.8–34.3)
MCHC RBC AUTO-ENTMCNC: 32.3 G/DL (ref 31.4–37.4)
MCV RBC AUTO: 90 FL (ref 82–98)
MONOCYTES # BLD AUTO: 0.6 THOUSAND/UL (ref 0–1.22)
MONOCYTES NFR BLD: 4 % (ref 4–12)
NEUTROPHILS # BLD MANUAL: 13.3 THOUSAND/UL (ref 1.85–7.62)
NEUTS BAND NFR BLD MANUAL: 11 % (ref 0–8)
NEUTS SEG NFR BLD AUTO: 77 % (ref 43–75)
PHOSPHATE SERPL-MCNC: 4.3 MG/DL (ref 2.3–4.1)
PLATELET # BLD AUTO: 199 THOUSANDS/UL (ref 149–390)
PLATELET BLD QL SMEAR: ADEQUATE
PMV BLD AUTO: 11.7 FL (ref 8.9–12.7)
POTASSIUM SERPL-SCNC: 4.1 MMOL/L (ref 3.5–5.3)
RBC # BLD AUTO: 5.07 MILLION/UL (ref 3.88–5.62)
RBC MORPH BLD: NORMAL
SODIUM SERPL-SCNC: 136 MMOL/L (ref 135–147)
VARIANT LYMPHS # BLD AUTO: 4 %
WBC # BLD AUTO: 15.11 THOUSAND/UL (ref 4.31–10.16)

## 2024-03-27 PROCEDURE — 85027 COMPLETE CBC AUTOMATED: CPT

## 2024-03-27 PROCEDURE — 80048 BASIC METABOLIC PNL TOTAL CA: CPT

## 2024-03-27 PROCEDURE — 94760 N-INVAS EAR/PLS OXIMETRY 1: CPT

## 2024-03-27 PROCEDURE — 94664 DEMO&/EVAL PT USE INHALER: CPT

## 2024-03-27 PROCEDURE — 83735 ASSAY OF MAGNESIUM: CPT

## 2024-03-27 PROCEDURE — 99233 SBSQ HOSP IP/OBS HIGH 50: CPT | Performed by: STUDENT IN AN ORGANIZED HEALTH CARE EDUCATION/TRAINING PROGRAM

## 2024-03-27 PROCEDURE — 94003 VENT MGMT INPAT SUBQ DAY: CPT

## 2024-03-27 PROCEDURE — 82948 REAGENT STRIP/BLOOD GLUCOSE: CPT

## 2024-03-27 PROCEDURE — 94640 AIRWAY INHALATION TREATMENT: CPT

## 2024-03-27 PROCEDURE — 84100 ASSAY OF PHOSPHORUS: CPT

## 2024-03-27 PROCEDURE — 85007 BL SMEAR W/DIFF WBC COUNT: CPT

## 2024-03-27 RX ORDER — METHYLPREDNISOLONE SODIUM SUCCINATE 40 MG/ML
40 INJECTION, POWDER, LYOPHILIZED, FOR SOLUTION INTRAMUSCULAR; INTRAVENOUS EVERY 12 HOURS SCHEDULED
Status: DISCONTINUED | OUTPATIENT
Start: 2024-03-27 | End: 2024-03-28

## 2024-03-27 RX ADMIN — METHYLPREDNISOLONE SODIUM SUCCINATE 40 MG: 40 INJECTION, POWDER, FOR SOLUTION INTRAMUSCULAR; INTRAVENOUS at 05:25

## 2024-03-27 RX ADMIN — FENTANYL CITRATE 50 MCG: 50 INJECTION INTRAMUSCULAR; INTRAVENOUS at 17:54

## 2024-03-27 RX ADMIN — CHLORHEXIDINE GLUCONATE 15 ML: 1.2 SOLUTION ORAL at 21:19

## 2024-03-27 RX ADMIN — LEVALBUTEROL HYDROCHLORIDE 1.25 MG: 1.25 SOLUTION RESPIRATORY (INHALATION) at 19:10

## 2024-03-27 RX ADMIN — ALLOPURINOL 300 MG: 300 TABLET ORAL at 08:35

## 2024-03-27 RX ADMIN — EZETIMIBE 10 MG: 10 TABLET ORAL at 21:20

## 2024-03-27 RX ADMIN — PROPOFOL 45 MCG/KG/MIN: 10 INJECTION, EMULSION INTRAVENOUS at 11:12

## 2024-03-27 RX ADMIN — ENOXAPARIN SODIUM 40 MG: 40 INJECTION SUBCUTANEOUS at 21:19

## 2024-03-27 RX ADMIN — GABAPENTIN 300 MG: 300 CAPSULE ORAL at 17:11

## 2024-03-27 RX ADMIN — PROPOFOL 50 MCG/KG/MIN: 10 INJECTION, EMULSION INTRAVENOUS at 04:01

## 2024-03-27 RX ADMIN — CEFAZOLIN SODIUM 2000 MG: 2 SOLUTION INTRAVENOUS at 04:01

## 2024-03-27 RX ADMIN — PROPOFOL 40 MCG/KG/MIN: 10 INJECTION, EMULSION INTRAVENOUS at 08:35

## 2024-03-27 RX ADMIN — FAMOTIDINE 20 MG: 10 INJECTION INTRAVENOUS at 08:34

## 2024-03-27 RX ADMIN — INSULIN LISPRO 2 UNITS: 100 INJECTION, SOLUTION INTRAVENOUS; SUBCUTANEOUS at 12:22

## 2024-03-27 RX ADMIN — BUSPIRONE HYDROCHLORIDE 7.5 MG: 5 TABLET ORAL at 08:33

## 2024-03-27 RX ADMIN — B-COMPLEX W/ C & FOLIC ACID TAB 1 TABLET: TAB at 08:35

## 2024-03-27 RX ADMIN — OXYCODONE HYDROCHLORIDE AND ACETAMINOPHEN 1000 MG: 500 TABLET ORAL at 21:19

## 2024-03-27 RX ADMIN — PROPOFOL 50 MCG/KG/MIN: 10 INJECTION, EMULSION INTRAVENOUS at 21:50

## 2024-03-27 RX ADMIN — Medication 2000 UNITS: at 08:33

## 2024-03-27 RX ADMIN — METHYLPREDNISOLONE SODIUM SUCCINATE 40 MG: 40 INJECTION, POWDER, FOR SOLUTION INTRAMUSCULAR; INTRAVENOUS at 21:18

## 2024-03-27 RX ADMIN — INSULIN GLARGINE 13 UNITS: 100 INJECTION, SOLUTION SUBCUTANEOUS at 21:19

## 2024-03-27 RX ADMIN — SENNOSIDES 8.6 MG: 8.6 TABLET, FILM COATED ORAL at 21:19

## 2024-03-27 RX ADMIN — PROPOFOL 45 MCG/KG/MIN: 10 INJECTION, EMULSION INTRAVENOUS at 05:19

## 2024-03-27 RX ADMIN — LEVALBUTEROL HYDROCHLORIDE 1.25 MG: 1.25 SOLUTION RESPIRATORY (INHALATION) at 13:19

## 2024-03-27 RX ADMIN — LORATADINE 10 MG: 5 SOLUTION ORAL at 08:35

## 2024-03-27 RX ADMIN — LEVALBUTEROL HYDROCHLORIDE 1.25 MG: 1.25 SOLUTION RESPIRATORY (INHALATION) at 07:02

## 2024-03-27 RX ADMIN — INSULIN LISPRO 2 UNITS: 100 INJECTION, SOLUTION INTRAVENOUS; SUBCUTANEOUS at 05:39

## 2024-03-27 RX ADMIN — PROPOFOL 50 MCG/KG/MIN: 10 INJECTION, EMULSION INTRAVENOUS at 17:02

## 2024-03-27 RX ADMIN — ATORVASTATIN CALCIUM 80 MG: 80 TABLET, FILM COATED ORAL at 17:11

## 2024-03-27 RX ADMIN — INSULIN LISPRO 2 UNITS: 100 INJECTION, SOLUTION INTRAVENOUS; SUBCUTANEOUS at 23:50

## 2024-03-27 RX ADMIN — FENTANYL CITRATE 50 MCG: 50 INJECTION INTRAMUSCULAR; INTRAVENOUS at 19:42

## 2024-03-27 RX ADMIN — FAMOTIDINE 20 MG: 10 INJECTION INTRAVENOUS at 17:11

## 2024-03-27 RX ADMIN — GABAPENTIN 300 MG: 300 CAPSULE ORAL at 08:33

## 2024-03-27 RX ADMIN — INSULIN LISPRO 2 UNITS: 100 INJECTION, SOLUTION INTRAVENOUS; SUBCUTANEOUS at 00:16

## 2024-03-27 RX ADMIN — IPRATROPIUM BROMIDE 0.5 MG: 0.5 SOLUTION RESPIRATORY (INHALATION) at 13:20

## 2024-03-27 RX ADMIN — ENOXAPARIN SODIUM 40 MG: 40 INJECTION SUBCUTANEOUS at 08:34

## 2024-03-27 RX ADMIN — CEFAZOLIN SODIUM 2000 MG: 2 SOLUTION INTRAVENOUS at 11:06

## 2024-03-27 RX ADMIN — PROPOFOL 50 MCG/KG/MIN: 10 INJECTION, EMULSION INTRAVENOUS at 13:53

## 2024-03-27 RX ADMIN — IPRATROPIUM BROMIDE 0.5 MG: 0.5 SOLUTION RESPIRATORY (INHALATION) at 07:02

## 2024-03-27 RX ADMIN — IPRATROPIUM BROMIDE 0.5 MG: 0.5 SOLUTION RESPIRATORY (INHALATION) at 19:10

## 2024-03-27 RX ADMIN — PROPOFOL 50 MCG/KG/MIN: 10 INJECTION, EMULSION INTRAVENOUS at 19:15

## 2024-03-27 RX ADMIN — CEFAZOLIN SODIUM 2000 MG: 2 SOLUTION INTRAVENOUS at 19:29

## 2024-03-27 RX ADMIN — CHLORHEXIDINE GLUCONATE 15 ML: 1.2 SOLUTION ORAL at 08:34

## 2024-03-27 RX ADMIN — OXYCODONE HYDROCHLORIDE AND ACETAMINOPHEN 1000 MG: 500 TABLET ORAL at 08:33

## 2024-03-27 NOTE — CASE MANAGEMENT
Case Management Assessment & Discharge Planning Note    Patient name Henry Mittal  [facility] [id number]  : 1955 Date 3/27/2024       Current Admission Date: 3/25/2024  Current Admission Diagnosis:Angioedema   Patient Active Problem List    Diagnosis Date Noted    Angioedema 2024    Lumbar back pain 2023    Lumbar radiculopathy 2023    Bronchitis 2023    Exacerbation of asthma 2023    Chronic obstructive pulmonary disease, unspecified COPD type (HCC) 2023    Hyperprolactinemia (HCC) 2023    Pain in left hip 2022    Left hip pain 2022    Cystitis 10/26/2021    Primary insomnia 10/26/2021    Chronic pain of left knee 10/26/2021    Other constipation 10/26/2021    Vitamin D deficiency 2019    Chronic gout of right knee 2019    Sebaceous cyst 2019    Osteoarthritis of both knees 2018    Hyperlipidemia 2018    Depression 2018    Essential hypertension 2018    Type 2 diabetes mellitus with other specified complication, without long-term current use of insulin (HCC) 2018    Asthma 2018    CKD (chronic kidney disease) stage 2, GFR 60-89 ml/min 2018    Obesity (BMI 30-39.9) 2018      LOS (days): 2  Geometric Mean LOS (GMLOS) (days):   Days to GMLOS:     OBJECTIVE:    Risk of Unplanned Readmission Score: 17.28         Current admission status: Inpatient       Preferred Pharmacy:   UNKNOWN - FOLLOW UP PRIOR TO DISCHARGE TO E-PRESCRIBE  No address on file      Primary Care Provider: MARLIN Malhotra    [facility]  Secondary Insurance:     ASSESSMENT:  Active Health Care Proxies    There are no active Health Care Proxies on file.                 Readmission Root Cause  30 Day Readmission: No    Patient Information  Admitted from:: Home  Mental Status: Intubated  During Assessment patient was accompanied by: Not accompanied during  assessment  Assessment information provided by:: Daughter  Support Systems: Son, Daughter, Friend  Home entry access options. Select all that apply.: Elevator  Type of Current Residence: Apartment  Floor Level: 7  Upon entering residence, is there a bedroom on the main floor (no further steps)?: Yes  Upon entering residence, is there a bathroom on the main floor (no further steps)?: Yes  Living Arrangements: Lives Alone    Activities of Daily Living Prior to Admission  Functional Status: Independent  Completes ADLs independently?: Yes  Ambulates independently?: Yes  Does patient use assisted devices?: Yes  Assisted Devices (DME) used: Straight Cane  Does patient currently own DME?: Yes  What DME does the patient currently own?: Straight Cane  Does patient have a history of Outpatient Therapy (PT/OT)?: No  Does the patient have a history of Short-Term Rehab?: Yes ([place]  Does patient have a history of HHC?: No  Does patient currently have HHC?: No         Patient Information Continued  Income Source: SSI/SSD  Does patient have prescription coverage?: Yes  Does patient have a history of substance abuse?: No  Does patient have a history of Mental Health Diagnosis?: Yes (depression)  Is patient receiving treatment for mental health?: Yes  Has patient received inpatient treatment related to mental health in the last 2 years?: No         Means of Transportation  Means of Transport to Appts:: Drives Self      Social Determinants of Health (SDOH)      Flowsheet Row Most Recent Value   Housing Stability    In the last 12 months, was there a time when you were not able to pay the mortgage or rent on time? N   In the last 12 months, was there a time when you did not have a steady place to sleep or slept in a shelter (including now)? N   Transportation Needs    In the past 12 months, has lack of transportation kept you from medical appointments or from getting medications? no   In the past 12 months, has lack of  transportation kept you from meetings, work, or from getting things needed for daily living? No   Food Insecurity    Within the past 12 months, you worried that your food would run out before you got the money to buy more. Never true   Within the past 12 months, the food you bought just didn't last and you didn't have money to get more. Never true   Utilities    In the past 12 months has the electric, gas, oil, or water company threatened to shut off services in your home? No            DISCHARGE DETAILS:    Discharge planning discussed with:: enrique Christie  Freedom of Choice: Yes     CM contacted family/caregiver?: Yes  Were Treatment Team discharge recommendations reviewed with patient/caregiver?: Yes  Did patient/caregiver verbalize understanding of patient care needs?: Yes  Were patient/caregiver advised of the risks associated with not following Treatment Team discharge recommendations?: Yes    Contacts  Patient Contacts: Enrique Christie  Relationship to Patient:: Family  Contact Method: Phone  Phone Number: [phone]  Reason/Outcome: Emergency Contact

## 2024-03-27 NOTE — RESPIRATORY THERAPY NOTE
03/27/24 0315   Respiratory Assessment   Resp Comments No adverse events overnight.   Vent Information   Vent type Cobian G5   Cobian Vent Mode (S)CMV   $ Vent Daily Charge-Subsequent Yes   $ Pulse Oximetry Spot Check Charge Completed   (S)CMV Settings   Resp Rate (BPM) 14 BPM   VT (mL) 450 mL   FIO2 (%) 40 %   PEEP (cmH2O) 6 cmH2O   Insp Time (%)   (1 sec)   Flow Trigger (LPM) 5   Humidification Heater   Heater Temperature (Set) 87.8 °F (31 °C)   (S)CMV Actuals   Resp Rate (BPM) 22 BPM   VT (mL) 421   MV 9.6   MAP (cmH2O) 9.1 cmH2O   Peak Pressure (cmH2O) 16 cmH2O   I:E Ratio (Obs) 1:1.6   (S)CMV Alarms   High Peak Pressure (cmH2O) 40   Low Pressure (cmH2O) 5 cm H2O   High Resp Rate (BPM) 40 BPM   Low Resp Rate (BPM) 8 BPM   High MV (L/min) 20 L/min   Low MV (L/min) 4 L/min   High VT (mL) 1000 mL   Low VT (mL) 250 mL   Apnea Time (s) 20 S   Maintenance   Water bag changed No   Circuit changed No

## 2024-03-27 NOTE — QUICK NOTE
Airway inspection:  Using bronchoscope (Slim 3.8mm), inspected oral airway which continues to demonstrate edematous tongue. Could not pass into the pharynx and could not the visualize epiglottis or vocal cords as well due to obstruction from patient's tongue. Attempted to visualize the pharynx and epiglottis via left nare but was aborted due to epistaxis.

## 2024-03-27 NOTE — PROGRESS NOTES
Nutrition Review    Nutrition review scheduled for today to check propofol rate and adjust TF if needed.   Propofol rate is decreased since assessment yesterday, still providing almost 900 kcals/day.     Added Vital High Protein TF formula at 15mL/hr for now, continue with 2 packets of Prosource QID.   This will provide 840 kcals (1732 total calories with current propofol), 151g protein, 781mL water.     Additional free water flushes per critical care; suggest at least 30mL q4 hrs to help maintain tube patency.

## 2024-03-27 NOTE — RESPIRATORY THERAPY NOTE
RT Ventilator Management Note  Henry Mittal 68 y.o. male MRN: [id number]  [facility]: [id number]      Daily Screen         3/26/2024  0744 3/27/2024  0759          Patient safety screen outcome:: Failed Failed      Not Ready for Weaning due to:: Underline problem not resolved Underline problem not resolved                Physical Exam:   Assessment Type: (P) Assess only      Resp Comments: (P) Pt resting on current settings, will attempt to wean later.
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
## 2024-03-27 NOTE — PLAN OF CARE
Problem: MOBILITY - ADULT  Goal: Maintain or return to baseline ADL function  Description: INTERVENTIONS:  -  Assess patient's ability to carry out ADLs; assess patient's baseline for ADL function and identify physical deficits which impact ability to perform ADLs (bathing, care of mouth/teeth, toileting, grooming, dressing, etc.)  - Assess/evaluate cause of self-care deficits   - Assess range of motion  - Assess patient's mobility; develop plan if impaired  - Assess patient's need for assistive devices and provide as appropriate  - Encourage maximum independence but intervene and supervise when necessary  - Involve family in performance of ADLs  - Assess for home care needs following discharge   - Consider OT consult to assist with ADL evaluation and planning for discharge  - Provide patient education as appropriate  Outcome: Progressing  Goal: Maintains/Returns to pre admission functional level  Description: INTERVENTIONS:  - Perform AM-PAC 6 Click Basic Mobility/ Daily Activity assessment daily.  - Set and communicate daily mobility goal to care team and patient/family/caregiver.   - Collaborate with rehabilitation services on mobility goals if consulted  - Perform Range of Motion 2 times a day.  - Reposition patient every 2 hours.    - Out of bed for toileting  - Record patient progress and toleration of activity level   Outcome: Progressing
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
## 2024-03-28 LAB
ANION GAP SERPL CALCULATED.3IONS-SCNC: 11 MMOL/L (ref 4–13)
BACTERIA SPT RESP CULT: ABNORMAL
BACTERIA SPT RESP CULT: ABNORMAL
BASOPHILS # BLD AUTO: 0.01 THOUSANDS/ÂΜL (ref 0–0.1)
BASOPHILS NFR BLD AUTO: 0 % (ref 0–1)
BUN SERPL-MCNC: 30 MG/DL (ref 5–25)
C1INH SERPL-MCNC: 18 MG/DL (ref 21–39)
CALCIUM SERPL-MCNC: 8.7 MG/DL (ref 8.4–10.2)
CHLORIDE SERPL-SCNC: 102 MMOL/L (ref 96–108)
CO2 SERPL-SCNC: 25 MMOL/L (ref 21–32)
CREAT SERPL-MCNC: 0.96 MG/DL (ref 0.6–1.3)
EOSINOPHIL # BLD AUTO: 0 THOUSAND/ÂΜL (ref 0–0.61)
EOSINOPHIL NFR BLD AUTO: 0 % (ref 0–6)
ERYTHROCYTE [DISTWIDTH] IN BLOOD BY AUTOMATED COUNT: 14.4 % (ref 11.6–15.1)
GFR SERPL CREATININE-BSD FRML MDRD: 79 ML/MIN/1.73SQ M
GLUCOSE SERPL-MCNC: 117 MG/DL (ref 65–140)
GLUCOSE SERPL-MCNC: 130 MG/DL (ref 65–140)
GLUCOSE SERPL-MCNC: 146 MG/DL (ref 65–140)
GLUCOSE SERPL-MCNC: 147 MG/DL (ref 65–140)
GLUCOSE SERPL-MCNC: 148 MG/DL (ref 65–140)
GRAM STN SPEC: ABNORMAL
HCT VFR BLD AUTO: 43.6 % (ref 36.5–49.3)
HGB BLD-MCNC: 13.6 G/DL (ref 12–17)
IC SERPL C1Q BIND-ACNC: <1.2 UG EQ/ML
IMM GRANULOCYTES # BLD AUTO: 0.09 THOUSAND/UL (ref 0–0.2)
IMM GRANULOCYTES NFR BLD AUTO: 1 % (ref 0–2)
LYMPHOCYTES # BLD AUTO: 0.6 THOUSANDS/ÂΜL (ref 0.6–4.47)
LYMPHOCYTES NFR BLD AUTO: 4 % (ref 14–44)
MAGNESIUM SERPL-MCNC: 2.6 MG/DL (ref 1.9–2.7)
MCH RBC QN AUTO: 28.6 PG (ref 26.8–34.3)
MCHC RBC AUTO-ENTMCNC: 31.2 G/DL (ref 31.4–37.4)
MCV RBC AUTO: 92 FL (ref 82–98)
MONOCYTES # BLD AUTO: 0.97 THOUSAND/ÂΜL (ref 0.17–1.22)
MONOCYTES NFR BLD AUTO: 7 % (ref 4–12)
NEUTROPHILS # BLD AUTO: 11.84 THOUSANDS/ÂΜL (ref 1.85–7.62)
NEUTS SEG NFR BLD AUTO: 88 % (ref 43–75)
NRBC BLD AUTO-RTO: 0 /100 WBCS
PHOSPHATE SERPL-MCNC: 4.2 MG/DL (ref 2.3–4.1)
PLATELET # BLD AUTO: 190 THOUSANDS/UL (ref 149–390)
PMV BLD AUTO: 11.2 FL (ref 8.9–12.7)
POTASSIUM SERPL-SCNC: 4.2 MMOL/L (ref 3.5–5.3)
RBC # BLD AUTO: 4.75 MILLION/UL (ref 3.88–5.62)
SODIUM SERPL-SCNC: 138 MMOL/L (ref 135–147)
WBC # BLD AUTO: 13.51 THOUSAND/UL (ref 4.31–10.16)

## 2024-03-28 PROCEDURE — 94664 DEMO&/EVAL PT USE INHALER: CPT

## 2024-03-28 PROCEDURE — 99232 SBSQ HOSP IP/OBS MODERATE 35: CPT | Performed by: STUDENT IN AN ORGANIZED HEALTH CARE EDUCATION/TRAINING PROGRAM

## 2024-03-28 PROCEDURE — 85025 COMPLETE CBC W/AUTO DIFF WBC: CPT

## 2024-03-28 PROCEDURE — 83735 ASSAY OF MAGNESIUM: CPT

## 2024-03-28 PROCEDURE — 94002 VENT MGMT INPAT INIT DAY: CPT

## 2024-03-28 PROCEDURE — 94760 N-INVAS EAR/PLS OXIMETRY 1: CPT

## 2024-03-28 PROCEDURE — 84100 ASSAY OF PHOSPHORUS: CPT

## 2024-03-28 PROCEDURE — 80048 BASIC METABOLIC PNL TOTAL CA: CPT

## 2024-03-28 PROCEDURE — 94003 VENT MGMT INPAT SUBQ DAY: CPT

## 2024-03-28 PROCEDURE — 82948 REAGENT STRIP/BLOOD GLUCOSE: CPT

## 2024-03-28 PROCEDURE — 94640 AIRWAY INHALATION TREATMENT: CPT

## 2024-03-28 RX ORDER — OXYMETAZOLINE HYDROCHLORIDE 0.05 G/100ML
2 SPRAY NASAL EVERY 12 HOURS SCHEDULED
Qty: 1.2 ML | Refills: 0 | Status: DISCONTINUED | OUTPATIENT
Start: 2024-03-28 | End: 2024-03-31

## 2024-03-28 RX ORDER — METHYLPREDNISOLONE SODIUM SUCCINATE 40 MG/ML
40 INJECTION, POWDER, LYOPHILIZED, FOR SOLUTION INTRAMUSCULAR; INTRAVENOUS DAILY
Status: DISCONTINUED | OUTPATIENT
Start: 2024-03-29 | End: 2024-03-29

## 2024-03-28 RX ORDER — DEXMEDETOMIDINE HYDROCHLORIDE 4 UG/ML
.1-.7 INJECTION, SOLUTION INTRAVENOUS
Status: DISCONTINUED | OUTPATIENT
Start: 2024-03-28 | End: 2024-03-29

## 2024-03-28 RX ORDER — GLYCOPYRROLATE 0.2 MG/ML
0.1 INJECTION INTRAMUSCULAR; INTRAVENOUS EVERY 4 HOURS
Status: DISCONTINUED | OUTPATIENT
Start: 2024-03-28 | End: 2024-03-29

## 2024-03-28 RX ADMIN — IPRATROPIUM BROMIDE 0.5 MG: 0.5 SOLUTION RESPIRATORY (INHALATION) at 19:04

## 2024-03-28 RX ADMIN — OXYMETAZOLINE HYDROCHLORIDE 2 SPRAY: 0.05 SPRAY NASAL at 14:07

## 2024-03-28 RX ADMIN — LEVALBUTEROL HYDROCHLORIDE 1.25 MG: 1.25 SOLUTION RESPIRATORY (INHALATION) at 19:04

## 2024-03-28 RX ADMIN — LEVALBUTEROL HYDROCHLORIDE 1.25 MG: 1.25 SOLUTION RESPIRATORY (INHALATION) at 07:59

## 2024-03-28 RX ADMIN — Medication 2000 UNITS: at 08:09

## 2024-03-28 RX ADMIN — CEFAZOLIN SODIUM 2000 MG: 2 SOLUTION INTRAVENOUS at 02:48

## 2024-03-28 RX ADMIN — LORATADINE 10 MG: 5 SOLUTION ORAL at 08:10

## 2024-03-28 RX ADMIN — GABAPENTIN 300 MG: 300 CAPSULE ORAL at 08:09

## 2024-03-28 RX ADMIN — PROPOFOL 50 MCG/KG/MIN: 10 INJECTION, EMULSION INTRAVENOUS at 03:37

## 2024-03-28 RX ADMIN — ENOXAPARIN SODIUM 40 MG: 40 INJECTION SUBCUTANEOUS at 21:18

## 2024-03-28 RX ADMIN — DEXMEDETOMIDINE HYDROCHLORIDE 0.2 MCG/KG/HR: 4 INJECTION, SOLUTION INTRAVENOUS at 11:48

## 2024-03-28 RX ADMIN — GLYCOPYRROLATE 0.1 MG: 0.2 INJECTION, SOLUTION INTRAMUSCULAR; INTRAVENOUS at 19:11

## 2024-03-28 RX ADMIN — Medication 1 SPRAY: at 14:59

## 2024-03-28 RX ADMIN — INSULIN GLARGINE 13 UNITS: 100 INJECTION, SOLUTION SUBCUTANEOUS at 21:18

## 2024-03-28 RX ADMIN — B-COMPLEX W/ C & FOLIC ACID TAB 1 TABLET: TAB at 08:10

## 2024-03-28 RX ADMIN — GLYCOPYRROLATE 0.1 MG: 0.2 INJECTION, SOLUTION INTRAMUSCULAR; INTRAVENOUS at 23:56

## 2024-03-28 RX ADMIN — PROPOFOL 50 MCG/KG/MIN: 10 INJECTION, EMULSION INTRAVENOUS at 11:03

## 2024-03-28 RX ADMIN — ENOXAPARIN SODIUM 40 MG: 40 INJECTION SUBCUTANEOUS at 08:09

## 2024-03-28 RX ADMIN — PROPOFOL 50 MCG/KG/MIN: 10 INJECTION, EMULSION INTRAVENOUS at 06:10

## 2024-03-28 RX ADMIN — FENTANYL CITRATE 50 MCG: 50 INJECTION INTRAMUSCULAR; INTRAVENOUS at 08:28

## 2024-03-28 RX ADMIN — GLYCOPYRROLATE 0.1 MG: 0.2 INJECTION, SOLUTION INTRAMUSCULAR; INTRAVENOUS at 11:53

## 2024-03-28 RX ADMIN — ALLOPURINOL 300 MG: 300 TABLET ORAL at 08:10

## 2024-03-28 RX ADMIN — OXYMETAZOLINE HYDROCHLORIDE 2 SPRAY: 0.05 SPRAY NASAL at 21:18

## 2024-03-28 RX ADMIN — CHLORHEXIDINE GLUCONATE 15 ML: 1.2 SOLUTION ORAL at 08:09

## 2024-03-28 RX ADMIN — PROPOFOL 50 MCG/KG/MIN: 10 INJECTION, EMULSION INTRAVENOUS at 00:43

## 2024-03-28 RX ADMIN — IPRATROPIUM BROMIDE 0.5 MG: 0.5 SOLUTION RESPIRATORY (INHALATION) at 07:59

## 2024-03-28 RX ADMIN — PROPOFOL 50 MCG/KG/MIN: 10 INJECTION, EMULSION INTRAVENOUS at 08:27

## 2024-03-28 RX ADMIN — IPRATROPIUM BROMIDE 0.5 MG: 0.5 SOLUTION RESPIRATORY (INHALATION) at 12:44

## 2024-03-28 RX ADMIN — METHYLPREDNISOLONE SODIUM SUCCINATE 40 MG: 40 INJECTION, POWDER, FOR SOLUTION INTRAMUSCULAR; INTRAVENOUS at 08:08

## 2024-03-28 RX ADMIN — LEVALBUTEROL HYDROCHLORIDE 1.25 MG: 1.25 SOLUTION RESPIRATORY (INHALATION) at 12:44

## 2024-03-28 RX ADMIN — OXYCODONE HYDROCHLORIDE AND ACETAMINOPHEN 1000 MG: 500 TABLET ORAL at 08:09

## 2024-03-28 RX ADMIN — BUSPIRONE HYDROCHLORIDE 7.5 MG: 5 TABLET ORAL at 08:09

## 2024-03-28 RX ADMIN — FAMOTIDINE 20 MG: 10 INJECTION INTRAVENOUS at 08:08

## 2024-03-28 RX ADMIN — FAMOTIDINE 20 MG: 10 INJECTION INTRAVENOUS at 17:04

## 2024-03-28 RX ADMIN — FENTANYL CITRATE 50 MCG: 50 INJECTION INTRAMUSCULAR; INTRAVENOUS at 04:53

## 2024-03-28 RX ADMIN — Medication 1 SPRAY: at 19:17

## 2024-03-28 RX ADMIN — GLYCOPYRROLATE 0.1 MG: 0.2 INJECTION, SOLUTION INTRAMUSCULAR; INTRAVENOUS at 17:01

## 2024-03-28 RX ADMIN — CEFAZOLIN SODIUM 2000 MG: 2 SOLUTION INTRAVENOUS at 19:11

## 2024-03-28 RX ADMIN — CEFAZOLIN SODIUM 2000 MG: 2 SOLUTION INTRAVENOUS at 11:03

## 2024-03-28 RX ADMIN — CHLORHEXIDINE GLUCONATE 15 ML: 1.2 SOLUTION ORAL at 21:18

## 2024-03-28 NOTE — PLAN OF CARE
Problem: Prexisting or High Potential for Compromised Skin Integrity  Goal: Skin integrity is maintained or improved  Description: INTERVENTIONS:  - Identify patients at risk for skin breakdown  - Assess and monitor skin integrity  - Assess and monitor nutrition and hydration status  - Monitor labs   - Assess for incontinence   - Turn and reposition patient  - Assist with mobility/ambulation  - Relieve pressure over bony prominences  - Avoid friction and shearing  - Provide appropriate hygiene as needed including keeping skin clean and dry  - Evaluate need for skin moisturizer/barrier cream  - Collaborate with interdisciplinary team   - Patient/family teaching  - Consider wound care consult   Outcome: Progressing     Problem: RESPIRATORY - ADULT  Goal: Achieves optimal ventilation and oxygenation  Description: INTERVENTIONS:  - Assess for changes in respiratory status  - Assess for changes in mentation and behavior  - Position to facilitate oxygenation and minimize respiratory effort  - Oxygen administered by appropriate delivery if ordered  - Initiate smoking cessation education as indicated  - Encourage broncho-pulmonary hygiene including cough, deep breathe, Incentive Spirometry  - Assess the need for suctioning and aspirate as needed  - Assess and instruct to report SOB or any respiratory difficulty  - Respiratory Therapy support as indicated  Outcome: Progressing      Problem: SAFETY,RESTRAINT: NV/NON-SELF DESTRUCTIVE BEHAVIOR  Goal: Remains free of harm/injury (restraint for non violent/non self-detsructive behavior)  Description: INTERVENTIONS:  - Instruct patient/family regarding restraint use   - Assess and monitor physiologic and psychological status   - Provide interventions and comfort measures to meet assessed patient needs   - Identify and implement measures to help patient regain control  - Assess readiness for release of restraint   Outcome: Progressing  Goal: Returns to optimal restraint-free  functioning  Description: INTERVENTIONS:  - Assess the patient's behavior and symptoms that indicate continued need for restraint  - Identify and implement measures to help patient regain control  - Assess readiness for release of restraint   Outcome: Progressing     Problem: Nutrition/Hydration-ADULT  Goal: Nutrient/Hydration intake appropriate for improving, restoring or maintaining nutritional needs  Description: Monitor and assess patient's nutrition/hydration status for malnutrition. Collaborate with interdisciplinary team and initiate plan and interventions as ordered.  Monitor patient's weight and dietary intake as ordered or per policy. Utilize nutrition screening tool and intervene as necessary. Determine patient's food preferences and provide high-protein, high-caloric foods as appropriate.     INTERVENTIONS:  - Monitor oral intake, urinary output, labs, and treatment plans  - Assess nutrition and hydration status and recommend course of action  - Evaluate amount of meals eaten  - Assist patient with eating if necessary   - Allow adequate time for meals  - Recommend/ encourage appropriate diets, oral nutritional supplements, and vitamin/mineral supplements  - Order, calculate, and assess calorie counts as needed  - Recommend, monitor, and adjust tube feedings and TPN/PPN based on assessed needs  - Assess need for intravenous fluids  - Provide specific nutrition/hydration education as appropriate  - Include patient/family/caregiver in decisions related to nutrition  Outcome: Progressing     Problem: MOBILITY - ADULT  Goal: Maintain or return to baseline ADL function  Description: INTERVENTIONS:  -  Assess patient's ability to carry out ADLs; assess patient's baseline for ADL function and identify physical deficits which impact ability to perform ADLs (bathing, care of mouth/teeth, toileting, grooming, dressing, etc.)  - Assess/evaluate cause of self-care deficits   - Assess range of motion  - Assess  patient's mobility; develop plan if impaired  - Assess patient's need for assistive devices and provide as appropriate  - Encourage maximum independence but intervene and supervise when necessary  - Involve family in performance of ADLs  - Assess for home care needs following discharge   - Consider OT consult to assist with ADL evaluation and planning for discharge  - Provide patient education as appropriate  Outcome: Progressing  Goal: Maintains/Returns to pre admission functional level  Description: INTERVENTIONS:  - Perform AM-PAC 6 Click Basic Mobility/ Daily Activity assessment daily.  - Set and communicate daily mobility goal to care team and patient/family/caregiver.     Outcome: Progressing
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
## 2024-03-28 NOTE — PLAN OF CARE
Problem: Prexisting or High Potential for Compromised Skin Integrity  Goal: Skin integrity is maintained or improved  Description: INTERVENTIONS:  - Identify patients at risk for skin breakdown  - Assess and monitor skin integrity  - Assess and monitor nutrition and hydration status  - Monitor labs   - Assess for incontinence   - Turn and reposition patient  - Assist with mobility/ambulation  - Relieve pressure over bony prominences  - Avoid friction and shearing  - Provide appropriate hygiene as needed including keeping skin clean and dry  - Evaluate need for skin moisturizer/barrier cream  - Collaborate with interdisciplinary team   - Patient/family teaching  - Consider wound care consult   Outcome: Progressing     Problem: RESPIRATORY - ADULT  Goal: Achieves optimal ventilation and oxygenation  Description: INTERVENTIONS:  - Assess for changes in respiratory status  - Assess for changes in mentation and behavior  - Position to facilitate oxygenation and minimize respiratory effort  - Oxygen administered by appropriate delivery if ordered  - Initiate smoking cessation education as indicated  - Encourage broncho-pulmonary hygiene including cough, deep breathe, Incentive Spirometry  - Assess the need for suctioning and aspirate as needed  - Assess and instruct to report SOB or any respiratory difficulty  - Respiratory Therapy support as indicated  Outcome: Progressing     Problem: SKIN/TISSUE INTEGRITY - ADULT  Goal: Skin Integrity remains intact(Skin Breakdown Prevention)  Description: Assess:  -Assess extremities for adequate circulation and sensation     Bed Management:  -Have minimal linens on bed & keep smooth, unwrinkled  -Change linens as needed when moist or perspiring    Activity:  -Encourage activity and walks on unit  -Encourage or provide ROM exercises     Skin Care:  -Avoid use of baby powder, tape, friction and shearing, hot water or constrictive clothing  -Do not massage red bony areas    Outcome:  Progressing  Goal: Incision(s), wounds(s) or drain site(s) healing without S/S of infection  Description: INTERVENTIONS  - Assess and document dressing, incision, wound bed, drain sites and surrounding tissue  - Provide patient and family education    Outcome: Progressing  Goal: Pressure injury heals and does not worsen  Description: Interventions:  - Implement low air loss mattress or specialty surface (Criteria met)  - Apply silicone foam dressing  - Consider nutrition services referral as needed  Outcome: Progressing     Problem: Nutrition/Hydration-ADULT  Goal: Nutrient/Hydration intake appropriate for improving, restoring or maintaining nutritional needs  Description: Monitor and assess patient's nutrition/hydration status for malnutrition. Collaborate with interdisciplinary team and initiate plan and interventions as ordered.  Monitor patient's weight and dietary intake as ordered or per policy. Utilize nutrition screening tool and intervene as necessary. Determine patient's food preferences and provide high-protein, high-caloric foods as appropriate.     INTERVENTIONS:  - Monitor oral intake, urinary output, labs, and treatment plans  - Assess nutrition and hydration status and recommend course of action  - Evaluate amount of meals eaten  - Assist patient with eating if necessary   - Allow adequate time for meals  - Recommend/ encourage appropriate diets, oral nutritional supplements, and vitamin/mineral supplements  - Order, calculate, and assess calorie counts as needed  - Recommend, monitor, and adjust tube feedings and TPN/PPN based on assessed needs  - Assess need for intravenous fluids  - Provide specific nutrition/hydration education as appropriate  - Include patient/family/caregiver in decisions related to nutrition  Outcome: Progressing
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
## 2024-03-28 NOTE — RESPIRATORY THERAPY NOTE
03/28/24 0310   Respiratory Assessment   Resp Comments No adverse events overnight.   Vent Information   Vent type Cobian G5   Cobian Vent Mode (S)CMV   $ Vent Daily Charge-Subsequent Yes   $ Pulse Oximetry Spot Check Charge Completed   (S)CMV Settings   Resp Rate (BPM) 14 BPM   VT (mL) 450 mL   FIO2 (%) 40 %   PEEP (cmH2O) 6 cmH2O   Insp Time (%)   (1 sec)   Flow Trigger (LPM) 5   Humidification Heater   Heater Temperature (Set) 87.8 °F (31 °C)   (S)CMV Actuals   Resp Rate (BPM) 20 BPM   VT (mL) 461   MV 9.1   MAP (cmH2O) 8.8 cmH2O   Peak Pressure (cmH2O) 17 cmH2O   I:E Ratio (Obs) 1:2.1   (S)CMV Alarms   High Peak Pressure (cmH2O) 40   Low Pressure (cmH2O) 5 cm H2O   High Resp Rate (BPM) 40 BPM   Low Resp Rate (BPM) 8 BPM   High MV (L/min) 20 L/min   Low MV (L/min) 4 L/min   Apnea Time (s) 20 S   Maintenance   Water bag changed No   Circuit changed No

## 2024-03-29 LAB
ANION GAP SERPL CALCULATED.3IONS-SCNC: 8 MMOL/L (ref 4–13)
BASOPHILS # BLD AUTO: 0.02 THOUSANDS/ÂΜL (ref 0–0.1)
BASOPHILS NFR BLD AUTO: 0 % (ref 0–1)
BUN SERPL-MCNC: 33 MG/DL (ref 5–25)
CALCIUM SERPL-MCNC: 8.3 MG/DL (ref 8.4–10.2)
CHLORIDE SERPL-SCNC: 106 MMOL/L (ref 96–108)
CO2 SERPL-SCNC: 28 MMOL/L (ref 21–32)
CREAT SERPL-MCNC: 0.8 MG/DL (ref 0.6–1.3)
EOSINOPHIL # BLD AUTO: 0.03 THOUSAND/ÂΜL (ref 0–0.61)
EOSINOPHIL NFR BLD AUTO: 0 % (ref 0–6)
ERYTHROCYTE [DISTWIDTH] IN BLOOD BY AUTOMATED COUNT: 14.4 % (ref 11.6–15.1)
GFR SERPL CREATININE-BSD FRML MDRD: 90 ML/MIN/1.73SQ M
GLUCOSE SERPL-MCNC: 103 MG/DL (ref 65–140)
GLUCOSE SERPL-MCNC: 135 MG/DL (ref 65–140)
GLUCOSE SERPL-MCNC: 136 MG/DL (ref 65–140)
GLUCOSE SERPL-MCNC: 141 MG/DL (ref 65–140)
GLUCOSE SERPL-MCNC: 93 MG/DL (ref 65–140)
HCT VFR BLD AUTO: 41.3 % (ref 36.5–49.3)
HGB BLD-MCNC: 13 G/DL (ref 12–17)
IMM GRANULOCYTES # BLD AUTO: 0.03 THOUSAND/UL (ref 0–0.2)
IMM GRANULOCYTES NFR BLD AUTO: 0 % (ref 0–2)
LYMPHOCYTES # BLD AUTO: 2 THOUSANDS/ÂΜL (ref 0.6–4.47)
LYMPHOCYTES NFR BLD AUTO: 18 % (ref 14–44)
MAGNESIUM SERPL-MCNC: 2.6 MG/DL (ref 1.9–2.7)
MCH RBC QN AUTO: 28.4 PG (ref 26.8–34.3)
MCHC RBC AUTO-ENTMCNC: 31.5 G/DL (ref 31.4–37.4)
MCV RBC AUTO: 90 FL (ref 82–98)
MONOCYTES # BLD AUTO: 1.15 THOUSAND/ÂΜL (ref 0.17–1.22)
MONOCYTES NFR BLD AUTO: 10 % (ref 4–12)
NEUTROPHILS # BLD AUTO: 8.12 THOUSANDS/ÂΜL (ref 1.85–7.62)
NEUTS SEG NFR BLD AUTO: 72 % (ref 43–75)
NRBC BLD AUTO-RTO: 0 /100 WBCS
PHOSPHATE SERPL-MCNC: 3.6 MG/DL (ref 2.3–4.1)
PLATELET # BLD AUTO: 181 THOUSANDS/UL (ref 149–390)
PMV BLD AUTO: 10.7 FL (ref 8.9–12.7)
POTASSIUM SERPL-SCNC: 4.1 MMOL/L (ref 3.5–5.3)
RBC # BLD AUTO: 4.58 MILLION/UL (ref 3.88–5.62)
SODIUM SERPL-SCNC: 142 MMOL/L (ref 135–147)
WBC # BLD AUTO: 11.35 THOUSAND/UL (ref 4.31–10.16)

## 2024-03-29 PROCEDURE — 94760 N-INVAS EAR/PLS OXIMETRY 1: CPT

## 2024-03-29 PROCEDURE — 82948 REAGENT STRIP/BLOOD GLUCOSE: CPT

## 2024-03-29 PROCEDURE — 99232 SBSQ HOSP IP/OBS MODERATE 35: CPT | Performed by: STUDENT IN AN ORGANIZED HEALTH CARE EDUCATION/TRAINING PROGRAM

## 2024-03-29 PROCEDURE — 80048 BASIC METABOLIC PNL TOTAL CA: CPT

## 2024-03-29 PROCEDURE — 97167 OT EVAL HIGH COMPLEX 60 MIN: CPT

## 2024-03-29 PROCEDURE — 94003 VENT MGMT INPAT SUBQ DAY: CPT

## 2024-03-29 PROCEDURE — 94640 AIRWAY INHALATION TREATMENT: CPT

## 2024-03-29 PROCEDURE — 84100 ASSAY OF PHOSPHORUS: CPT

## 2024-03-29 PROCEDURE — 83735 ASSAY OF MAGNESIUM: CPT

## 2024-03-29 PROCEDURE — 85025 COMPLETE CBC W/AUTO DIFF WBC: CPT

## 2024-03-29 PROCEDURE — 97163 PT EVAL HIGH COMPLEX 45 MIN: CPT

## 2024-03-29 PROCEDURE — 92610 EVALUATE SWALLOWING FUNCTION: CPT

## 2024-03-29 PROCEDURE — 94664 DEMO&/EVAL PT USE INHALER: CPT

## 2024-03-29 RX ORDER — INSULIN LISPRO 100 [IU]/ML
1-5 INJECTION, SOLUTION INTRAVENOUS; SUBCUTANEOUS
Status: DISCONTINUED | OUTPATIENT
Start: 2024-03-29 | End: 2024-03-31 | Stop reason: HOSPADM

## 2024-03-29 RX ORDER — LANOLIN ALCOHOL/MO/W.PET/CERES
3 CREAM (GRAM) TOPICAL
Status: DISCONTINUED | OUTPATIENT
Start: 2024-03-29 | End: 2024-03-31 | Stop reason: HOSPADM

## 2024-03-29 RX ORDER — INSULIN LISPRO 100 [IU]/ML
1-6 INJECTION, SOLUTION INTRAVENOUS; SUBCUTANEOUS
Status: DISCONTINUED | OUTPATIENT
Start: 2024-03-29 | End: 2024-03-31 | Stop reason: HOSPADM

## 2024-03-29 RX ADMIN — METHYLPREDNISOLONE SODIUM SUCCINATE 40 MG: 40 INJECTION, POWDER, FOR SOLUTION INTRAMUSCULAR; INTRAVENOUS at 09:00

## 2024-03-29 RX ADMIN — GABAPENTIN 300 MG: 300 CAPSULE ORAL at 10:17

## 2024-03-29 RX ADMIN — ENOXAPARIN SODIUM 40 MG: 40 INJECTION SUBCUTANEOUS at 09:00

## 2024-03-29 RX ADMIN — OXYCODONE HYDROCHLORIDE AND ACETAMINOPHEN 1000 MG: 500 TABLET ORAL at 10:11

## 2024-03-29 RX ADMIN — LEVALBUTEROL HYDROCHLORIDE 1.25 MG: 1.25 SOLUTION RESPIRATORY (INHALATION) at 13:33

## 2024-03-29 RX ADMIN — EZETIMIBE 10 MG: 10 TABLET ORAL at 21:45

## 2024-03-29 RX ADMIN — ATORVASTATIN CALCIUM 80 MG: 80 TABLET, FILM COATED ORAL at 17:17

## 2024-03-29 RX ADMIN — CEFAZOLIN SODIUM 2000 MG: 2 SOLUTION INTRAVENOUS at 04:14

## 2024-03-29 RX ADMIN — CEFAZOLIN SODIUM 2000 MG: 2 SOLUTION INTRAVENOUS at 12:20

## 2024-03-29 RX ADMIN — LEVALBUTEROL HYDROCHLORIDE 1.25 MG: 1.25 SOLUTION RESPIRATORY (INHALATION) at 19:47

## 2024-03-29 RX ADMIN — OXYCODONE HYDROCHLORIDE AND ACETAMINOPHEN 1000 MG: 500 TABLET ORAL at 21:45

## 2024-03-29 RX ADMIN — CEFAZOLIN SODIUM 2000 MG: 2 SOLUTION INTRAVENOUS at 20:50

## 2024-03-29 RX ADMIN — LORATADINE 10 MG: 5 SOLUTION ORAL at 10:14

## 2024-03-29 RX ADMIN — B-COMPLEX W/ C & FOLIC ACID TAB 1 TABLET: TAB at 10:13

## 2024-03-29 RX ADMIN — GABAPENTIN 300 MG: 300 CAPSULE ORAL at 17:17

## 2024-03-29 RX ADMIN — ALLOPURINOL 300 MG: 300 TABLET ORAL at 10:13

## 2024-03-29 RX ADMIN — BUSPIRONE HYDROCHLORIDE 7.5 MG: 5 TABLET ORAL at 10:17

## 2024-03-29 RX ADMIN — Medication 8.8 MG: at 10:52

## 2024-03-29 RX ADMIN — CHLORHEXIDINE GLUCONATE 15 ML: 1.2 SOLUTION ORAL at 09:00

## 2024-03-29 RX ADMIN — LEVALBUTEROL HYDROCHLORIDE 1.25 MG: 1.25 SOLUTION RESPIRATORY (INHALATION) at 08:14

## 2024-03-29 RX ADMIN — MELATONIN 3 MG: at 22:41

## 2024-03-29 RX ADMIN — Medication 2000 UNITS: at 10:11

## 2024-03-29 RX ADMIN — GLYCOPYRROLATE 0.1 MG: 0.2 INJECTION, SOLUTION INTRAMUSCULAR; INTRAVENOUS at 04:14

## 2024-03-29 RX ADMIN — INSULIN GLARGINE 13 UNITS: 100 INJECTION, SOLUTION SUBCUTANEOUS at 21:45

## 2024-03-29 RX ADMIN — FAMOTIDINE 20 MG: 10 INJECTION INTRAVENOUS at 10:51

## 2024-03-29 RX ADMIN — SENNOSIDES 8.6 MG: 8.6 TABLET, FILM COATED ORAL at 21:45

## 2024-03-29 RX ADMIN — IPRATROPIUM BROMIDE 0.5 MG: 0.5 SOLUTION RESPIRATORY (INHALATION) at 13:33

## 2024-03-29 RX ADMIN — OXYMETAZOLINE HYDROCHLORIDE 2 SPRAY: 0.05 SPRAY NASAL at 22:42

## 2024-03-29 RX ADMIN — ENOXAPARIN SODIUM 40 MG: 40 INJECTION SUBCUTANEOUS at 21:45

## 2024-03-29 RX ADMIN — IPRATROPIUM BROMIDE 0.5 MG: 0.5 SOLUTION RESPIRATORY (INHALATION) at 08:14

## 2024-03-29 RX ADMIN — IPRATROPIUM BROMIDE 0.5 MG: 0.5 SOLUTION RESPIRATORY (INHALATION) at 19:47

## 2024-03-29 NOTE — PLAN OF CARE
Problem: OCCUPATIONAL THERAPY ADULT  Goal: Performs self-care activities at highest level of function for planned discharge setting.  See evaluation for individualized goals.  Description: Treatment Interventions: ADL retraining, Functional transfer training, UE strengthening/ROM, Endurance training, Patient/family training, Equipment evaluation/education, Compensatory technique education, Continued evaluation, Energy conservation, Activityengagement          See flowsheet documentation for full assessment, interventions and recommendations.   Note: Limitation: Decreased ADL status, Decreased endurance, Decreased self-care trans, Decreased high-level ADLs, Decreased Safe judgement during ADL  Prognosis: Fair  Assessment: Pt is a 67 y/o male seen for OT eval s/p adm to [facility] w/  facial and tongue swelling. Pt is dx'd w/ angioedema. Pt  has a past medical history of Anxiety, Asthma, Cardiac disease, COPD (chronic obstructive pulmonary disease) ([facility]), Diabetes mellitus ([facility]), Gout, Hyperlipidemia, Hypertension, and Shortness of breath. Pt with active OT orders and activity as tolerated orders. Pt lives alone in 8th floor apt w/ elevator access. Pt was I w/  ADLS and IADLS, drove, & required use of DME PTA including RW, cane, w/c. Pt is currently demonstrating the following occupational deficits: Min A UB ADLS, Max A LB ADLS, Min A x2 transfers and Mod A x1 functional mobility w/ HHA. These deficits that are impacting pt's baseline areas of occupation are a result of the following impairments: pain, endurance, activity tolerance, functional mobility, forward functional reach, functional standing tolerance, decreased I w/ ADLS/IADLS, and decreased safety awareness.The following Occupational Performance Areas to address include: bathing/shower, toilet hygiene, dressing, medication management, socialization, health maintenance, functional mobility, community mobility, clothing management, and household maintenance.  Recommend inpatient rehab  upon D/C. Pt to continue to benefit from acute immediate OT services to address the following goals 2-3x/week to  w/in 10-14 days:     Rehab Resource Intensity Level, OT: II (Moderate Resource Intensity)     Felipa Roman MS, OTR/L

## 2024-03-29 NOTE — ASSESSMENT & PLAN NOTE
Diagnosis: Hx of Asthma, smoking history, COPD   PFT 2024: FEV1/FVC 78%, FEV1 66%, DLCO 80%, no change to bronchodilator, no airflow obstruction   Trelegy at home  TID Xopenex  (levalbuterol) and Atrovent (Ipratropium)

## 2024-03-29 NOTE — PLAN OF CARE
Problem: PHYSICAL THERAPY ADULT  Goal: Performs mobility at highest level of function for planned discharge setting.  See evaluation for individualized goals.  Description: Treatment/Interventions: Functional transfer training, LE strengthening/ROM, Therapeutic exercise, Endurance training, Bed mobility, Gait training, Equipment eval/education, Spoke to nursing          See flowsheet documentation for full assessment, interventions and recommendations.  Note: Prognosis: Good  Problem List: Decreased endurance, Impaired balance, Decreased mobility, Obesity, Pain  Assessment: Pt is a 68 y.o. male presenting to [facility] on  3/25/2024 1726 w/ acute angioedema. Pt  has a past medical history of Anxiety, Asthma, Cardiac disease, COPD (chronic obstructive pulmonary disease) ([facility]), Diabetes mellitus ([facility]), Gout, Hyperlipidemia, Hypertension, and Shortness of breath. Pt's PLOF is independent w/ mobility, iADLs, and requires some assistance w/ ADLs (neighbors help w/ putting on socks and shoes). Pt's home set up consists of an 8th floor apartment and lives alone. Upon evaluation today, pt presents with the following impairments:impaired balance, decreased endurance, gait deviations, pain, decreased activity tolerance, decreased functional mobility tolerance, fall risk, and fatigue . Pt's activity limitations include: bed mobility, transfers, and ambulation. Pt was able to perform bed mobility and transfers w/ min assist x 1 (CGA for mobility and HHA for transfers). Pt was able to ambulate 3ft from the bed to the chair w/ mod assist x 1 (increased time required).  Pt would benefit from skilled PT 2-3x/week for 5 weeks to address these impairments and activity limitations. Pt will continue to be seen upon admission. Pt's prognosis is Good secondary to:  age, PMH, and PLOF . The patient's AM-[facility] Basic Mobility Inpatient Standardized Score is less than 42.9, suggesting this patient may benefit from discharge to  post-acute rehabilitation services. Please also refer to the recommendation of the Physical Therapist for safe discharge planning.        Rehab Resource Intensity Level, PT: II (Moderate Resource Intensity)    See flowsheet documentation for full assessment.

## 2024-03-29 NOTE — PLAN OF CARE
Problem: Prexisting or High Potential for Compromised Skin Integrity  Goal: Skin integrity is maintained or improved  Description: INTERVENTIONS:  - Identify patients at risk for skin breakdown  - Assess and monitor skin integrity  - Assess and monitor nutrition and hydration status  - Monitor labs   - Assess for incontinence   - Turn and reposition patient  - Assist with mobility/ambulation  - Relieve pressure over bony prominences  - Avoid friction and shearing  - Provide appropriate hygiene as needed including keeping skin clean and dry  - Evaluate need for skin moisturizer/barrier cream  - Collaborate with interdisciplinary team   - Patient/family teaching  - Consider wound care consult   Outcome: Progressing     Problem: RESPIRATORY - ADULT  Goal: Achieves optimal ventilation and oxygenation  Description: INTERVENTIONS:  - Assess for changes in respiratory status  - Assess for changes in mentation and behavior  - Position to facilitate oxygenation and minimize respiratory effort  - Oxygen administered by appropriate delivery if ordered  - Initiate smoking cessation education as indicated  - Encourage broncho-pulmonary hygiene including cough, deep breathe, Incentive Spirometry  - Assess the need for suctioning and aspirate as needed  - Assess and instruct to report SOB or any respiratory difficulty  - Respiratory Therapy support as indicated  Outcome: Progressing     Problem: SKIN/TISSUE INTEGRITY - ADULT  Goal: Skin Integrity remains intact(Skin Breakdown Prevention)  Description: Assess:  -Inspect skin when repositioning, toileting, and assisting with ADLS  -Assess extremities for adequate circulation and sensation     Bed Management:  -Have minimal linens on bed & keep smooth, unwrinkled  -Change linens as needed when moist or perspiring    Toileting:  -Offer bedside commode    Activity:  -Encourage activity and walks on unit  -Encourage or provide ROM exercises     Skin Care:  -Avoid use of baby powder,  tape, friction and shearing, hot water or constrictive clothing  -Do not massage red bony areas    Outcome: Progressing  Goal: Incision(s), wounds(s) or drain site(s) healing without S/S of infection  Description: INTERVENTIONS  - Assess and document dressing, incision, wound bed, drain sites and surrounding tissue  - Provide patient and family education  Outcome: Progressing  Goal: Pressure injury heals and does not worsen  Description: Interventions:  - Implement low air loss mattress or specialty surface (Criteria met)  - Apply silicone foam dressing  - Apply fecal or urinary incontinence containment device   - Consider nutrition services referral as needed  Outcome: Progressing     Problem: SAFETY,RESTRAINT: NV/NON-SELF DESTRUCTIVE BEHAVIOR  Goal: Remains free of harm/injury (restraint for non violent/non self-detsructive behavior)  Description: INTERVENTIONS:  - Instruct patient/family regarding restraint use   - Assess and monitor physiologic and psychological status   - Provide interventions and comfort measures to meet assessed patient needs   - Identify and implement measures to help patient regain control  - Assess readiness for release of restraint   Outcome: Progressing  Goal: Returns to optimal restraint-free functioning  Description: INTERVENTIONS:  - Assess the patient's behavior and symptoms that indicate continued need for restraint  - Identify and implement measures to help patient regain control  - Assess readiness for release of restraint   Outcome: Progressing     Problem: Nutrition/Hydration-ADULT  Goal: Nutrient/Hydration intake appropriate for improving, restoring or maintaining nutritional needs  Description: Monitor and assess patient's nutrition/hydration status for malnutrition. Collaborate with interdisciplinary team and initiate plan and interventions as ordered.  Monitor patient's weight and dietary intake as ordered or per policy. Utilize nutrition screening tool and intervene as  necessary. Determine patient's food preferences and provide high-protein, high-caloric foods as appropriate.     INTERVENTIONS:  - Monitor oral intake, urinary output, labs, and treatment plans  - Assess nutrition and hydration status and recommend course of action  - Evaluate amount of meals eaten  - Assist patient with eating if necessary   - Allow adequate time for meals  - Recommend/ encourage appropriate diets, oral nutritional supplements, and vitamin/mineral supplements  - Order, calculate, and assess calorie counts as needed  - Recommend, monitor, and adjust tube feedings and TPN/PPN based on assessed needs  - Assess need for intravenous fluids  - Provide specific nutrition/hydration education as appropriate  - Include patient/family/caregiver in decisions related to nutrition  Outcome: Progressing     Problem: MOBILITY - ADULT  Goal: Maintain or return to baseline ADL function  Description: INTERVENTIONS:  -  Assess patient's ability to carry out ADLs; assess patient's baseline for ADL function and identify physical deficits which impact ability to perform ADLs (bathing, care of mouth/teeth, toileting, grooming, dressing, etc.)  - Assess/evaluate cause of self-care deficits   - Assess range of motion  - Assess patient's mobility; develop plan if impaired  - Assess patient's need for assistive devices and provide as appropriate  - Encourage maximum independence but intervene and supervise when necessary  - Involve family in performance of ADLs  - Assess for home care needs following discharge   - Consider OT consult to assist with ADL evaluation and planning for discharge  - Provide patient education as appropriate  Outcome: Progressing  Goal: Maintains/Returns to pre admission functional level  Description: INTERVENTIONS:  - Perform AM-PAC 6 Click Basic Mobility/ Daily Activity assessment daily.  - Set and communicate daily mobility goal to care team and patient/family/caregiver.   - Collaborate with  rehabilitation services on mobility goals if consulted  - Out of bed for toileting  - Record patient progress and toleration of activity level   Outcome: Progressing

## 2024-03-29 NOTE — ASSESSMENT & PLAN NOTE
2 hours after use of amoxicillin; previously told to stop ACE inhibitor w/ hx of lip swelling in past. Also reports that patient was eating cod when swelling started, however no history obtained from patient himself.   Etiology: Angioedema (ACE vs hereditary) vs Allergic anaphylaxis (less likely in setting of timing)  S/p solumedrol 125 mg, benadryl, IM epi in ED  Solumedrol at 40mg q24; Transition to prednisone tomorrow  Claritin 10mg daily  D/C BiPAP today; CPAP at night for FLAQUITA  Check for hereditary angioedema  C1 esterase mildly low at 18 (normal range 21-39) - concerning for hereditary angioedema  C4 WNL  C1q binding assay negative
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
## 2024-03-29 NOTE — OCCUPATIONAL THERAPY NOTE
Occupational Therapy Evaluation     Patient Name: Henry Mittal  Today's Date: 3/29/2024  Problem List  Principal Problem:    Angioedema  Active Problems:    Essential hypertension    Type 2 diabetes mellitus with other specified complication, without long-term current use of insulin (HCC)    Asthma    Chronic gout of right knee    Past Medical History  Past Medical History:   Diagnosis Date    Anxiety     Asthma     Cardiac disease     COPD (chronic obstructive pulmonary disease) (HCC)     Diabetes mellitus (HCC)     Gout     Hyperlipidemia     Hypertension     Shortness of breath     with exertion     Past Surgical History  Past Surgical History:   Procedure Laterality Date    CARPAL TUNNEL RELEASE      KNEE ARTHROSCOPY Bilateral     LA COLONOSCOPY FLX DX W/COLLJ SPEC WHEN PFRMD N/A 1/10/2019    Procedure: COLONOSCOPY;  Surgeon: Nithin Carvajal MD;  Location: [facility];  Service: Gastroenterology    TOTAL HIP ARTHROPLASTY Left     TRACHEOSTOMY      from [facility]         03/29/24 1125   OT Last Visit   OT Visit Date 03/29/24   Note Type   Note type Evaluation   Pain Assessment   Pain Assessment Tool 0-10   Pain Score No Pain   Restrictions/Precautions   Weight Bearing Precautions Per Order No   Other Precautions Cognitive;Chair Alarm;Bed Alarm;Multiple lines;Telemetry;Fall Risk;Pain   Home Living   Type of Home Apartment   Home Layout One level;Elevator;Able to live on main level with bedroom/bathroom;Performs ADLs on one level   Bathroom Shower/Tub Walk-in shower   Bathroom Toilet Standard   Bathroom Equipment Other (Comment)  (denies any)   Home Equipment Cane;Walker;Wheelchair-manual   Additional Comments Pt resides in 8th floor apt w/ elevator access   Prior Function   Level of [place] Independent with ADLs;Independent with functional mobility;Independent with IADLS  (assist w/ socks from lady friend)   Lives With Alone   Receives Help From Neighbor;Other (Comment)  (women who live next door)   IADLs  Independent with driving;Independent with meal prep;Independent with medication management   Falls in the last 6 months 0   Vocational Retired   Comments (+)    Lifestyle   Autonomy I w/ ADLS (except socks); I w/ IADLS, Mod I w/ transfers and functional mobility PTA   Reciprocal Relationships pt lives alone (dght present in room, reports she hasn't been face to face w/ her dad for 24 yrs)   Service to Others Retired; worked for [employer] in [employer] [place] [employer] [place]   Intrinsic Gratification TV   ADL   Eating Assistance 7  Independent   Grooming Assistance 5  Supervision/Setup   UB Bathing Assistance 4  Minimal Assistance   LB Bathing Assistance 3  Moderate Assistance   UB Dressing Assistance 4  Minimal Assistance   LB Dressing Assistance 2  Maximal Assistance   LB Dressing Deficit Don/doff R sock;Don/doff L sock   Toileting Assistance  3  Moderate Assistance   Functional Assistance 3  Moderate Assistance   Functional Deficit Steadying;Verbal cueing;Supervision/safety;Increased time to complete   Bed Mobility   Supine to Sit 4  Minimal assistance   Additional items Assist x 1;HOB elevated;Increased time required;Verbal cues;LE management   Sit to Supine Unable to assess   Additional Comments pt sat EOB w/ Fair sitting balance/trunk control   Transfers   Sit to Stand 4  Minimal assistance   Additional items Assist x 2;Increased time required;Verbal cues   Stand to Sit 4  Minimal assistance   Additional items Assist x 2;Increased time required;Verbal cues   Additional Comments w/ B/L HHA   Functional Mobility   Functional Mobility 3  Moderate assistance   Additional Comments pt took few small steps from EOB to recliner w/ Mod Ax1; HHA used; SBA 2nd for safety   Additional items Hand hold assistance   Balance   Static Sitting Fair   Dynamic Sitting Fair -   Static Standing Poor +   Dynamic Standing Poor +   Ambulatory Poor   Activity Tolerance   Activity Tolerance Patient limited by  fatigue;Patient limited by pain   Medical Staff Made Aware PT, SPT   Nurse Made Aware yes   RUE Assessment   RUE Assessment WFL   LUE Assessment   LUE Assessment WFL   Hand Function   Gross Motor Coordination Functional   Fine Motor Coordination Functional   Cognition   Overall Cognitive Status WFL   Arousal/Participation Responsive;Cooperative   Attention Attends with cues to redirect   Orientation Level Oriented X4   Memory Decreased recall of precautions   Following Commands Follows one step commands without difficulty   Comments pt is pleasant and coopertive; needs occasional cues for safety   Assessment   Limitation Decreased ADL status;Decreased endurance;Decreased self-care trans;Decreased high-level ADLs;Decreased Safe judgement during ADL   Prognosis Fair   Assessment Pt is a 67 y/o male seen for OT eval s/p adm to [facility] w/  facial and tongue swelling. Pt is dx'd w/ angioedema. Pt  has a past medical history of Anxiety, Asthma, Cardiac disease, COPD (chronic obstructive pulmonary disease) ([facility]), Diabetes mellitus (HCC), Gout, Hyperlipidemia, Hypertension, and Shortness of breath. Pt with active OT orders and activity as tolerated orders. Pt lives alone in 8th floor apt w/ elevator access. Pt was I w/  ADLS and IADLS, drove, & required use of DME PTA including RW, cane, w/c. Pt is currently demonstrating the following occupational deficits: Min A UB ADLS, Max A LB ADLS, Min A x2 transfers and Mod A x1 functional mobility w/ HHA. These deficits that are impacting pt's baseline areas of occupation are a result of the following impairments: pain, endurance, activity tolerance, functional mobility, forward functional reach, functional standing tolerance, decreased I w/ ADLS/IADLS, and decreased safety awareness.The following Occupational Performance Areas to address include: bathing/shower, toilet hygiene, dressing, medication management, socialization, health maintenance, functional mobility, community mobility,  clothing management, and household maintenance. Recommend inpatient rehab  upon D/C. Pt to continue to benefit from acute immediate OT services to address the following goals 2-3x/week to  w/in 10-14 days:   Goals   Patient Goals to be independent   LTG Time Frame 10-14   Long Term Goal #1 see below listed goals   Plan   Treatment Interventions ADL retraining;Functional transfer training;UE strengthening/ROM;Endurance training;Patient/family training;Equipment evaluation/education;Compensatory technique education;Continued evaluation;Energy conservation;Activityengagement   Goal Expiration Date 24   OT Frequency 2-3x/wk   Discharge Recommendation   Rehab Resource Intensity Level, OT II (Moderate Resource Intensity)   Additional Comments  The patient's raw score on the AM-PAC Daily Activity Inpatient Short Form is 17. A raw score of less than 19 suggests the patient may benefit from discharge to post-acute rehabilitation services. Please refer to the recommendation of the Occupational Therapist for safe discharge planning.   Additional Comments 2 Pt seen as a co-session due to the patient's co-morbidities, clinically unstable presentation, and present impairments which are a regression from the patient's baseline.   AM-PAC Daily Activity Inpatient   Lower Body Dressing 2   Bathing 3   Toileting 2   Upper Body Dressing 3   Grooming 3   Eating 4   Daily Activity Raw Score 17   Daily Activity Standardized Score (Calc for Raw Score >=11) 37.26   AM-PAC Applied Cognition Inpatient   Following a Speech/Presentation 3   Understanding Ordinary Conversation 4   Taking Medications 4   Remembering Where Things Are Placed or Put Away 3   Remembering List of 4-5 Errands 3   Taking Care of Complicated Tasks 3   Applied Cognition Raw Score 20   Applied Cognition Standardized Score 41.76   End of Consult   Education Provided Yes;Family or social support of family present for education by provider   Patient Position at  End of Consult Bedside chair;Bed/Chair alarm activated;All needs within reach   Nurse Communication Nurse aware of consult        GOALS    1) Pt will improve activity tolerance to G for 30 min txment sessions for increase engagement in functional tasks    2) Pt will complete UB/LB dressing/self care w/ mod I using adaptive device and DME as needed    3) Pt will complete bathing w/ Mod I w/ use of AE and DME as needed    4) Pt will complete toileting w/ mod I w/ G hygiene/thoroughness using DME as needed    5) Pt will improve functional transfers to Mod I on/off all surfaces using DME as needed w/ G balance/safety     6) Pt will improve functional mobility during ADL/IADL/leisure tasks to Mod I using DME as needed w/ G balance/safety     7) Pt will participate in simulated IADL management task to increase independence to Mod I w/ G safety and endurance    8) Pt will be attentive 100% of the time during ongoing cognitive assessment w/ G participation to assist w/ safe d/c planning/recommendations    9) Pt will demonstrate G carryover of pt/caregiver education and training as appropriate w/o cues w/ good tolerance to increase safety during functional tasks    10) Pt will demonstrate 100% carryover of energy conservation techniques t/o functional I/ADL/leisure tasks w/o cues s/p skilled education to increase endurance during functional tasks     Felipa Roman MS, OTR/L

## 2024-03-29 NOTE — RESPIRATORY THERAPY NOTE
Respiratory Care    03/29/24 0836   Respiratory Assessment   Assessment Type Assess only   General Appearance Awake   Respiratory Pattern Spontaneous;Normal   Chest Assessment Chest expansion symmetrical   Bilateral Breath Sounds Clear;Diminished   Resp Comments Pt taken off of bipap, placed on 4L nc.   O2 Device nc   Oxygen Therapy/Pulse Ox   O2 Device Nasal cannula   O2 Therapy Oxygen humidified   Nasal Cannula O2 Flow Rate (L/min) 4 L/min   Calculated FIO2 (%) - Nasal Cannula 36   SpO2 98 %   SpO2 Activity At Rest   $ Pulse Oximetry Spot Check Charge Completed

## 2024-03-29 NOTE — PROGRESS NOTES
Critical Care Attending Note; Bassam Hassan   Note Date: 24  Note Time: 10:05 AM    Patient: Henry Mittal  Age, : 68 y.o., 1955 MRN: [id number] Code Status: Level 1 - Full Code [facility]   Hospital LOS:4 days  ]   Patient seen and examined, medical record reviewed, discussed with house staff and nursing staff.     HPI   CC: Angioedema   68M with a PMH of Asthma, COPD, HTN, DM2, Depression/Anxiety, Gout, L total hip repair and recent dental procedure who presents with angio edema.      Main ICU Plans:     #Pulm  Respiratory Failure - Intubated Nasal 7 - Airway Protection - Concerns for Angioedema - PCN vs ACE-I vs Hereditary - Successfully extubated 3/28 - C1 Esterase inhibitor is low   - Recommend Allergy  - Pred 40mg qday, Claritin, Pepcid    COPD/Asthma -   - Xopenex/Atrovent - wean as tolerated    #ID   Dental Procedure  - Ancef - 7 days    #Endo  DM  - ISS  - Lantus 13 qhs    #MSK  Gout - Allopurinol    #DVT/GI ppx  Lovenox     #Lines/Tubes/Drains:   Invasive Devices       Peripheral Intravenous Line  Duration             Peripheral IV 24 Left Antecubital 3 days    Peripheral IV 24 Right;Ventral (anterior) Hand 3 days    Peripheral IV 24 Distal;Right;Upper;Ventral (anterior) Antecubital 1 day              Drain  Duration             Urethral Catheter 1 day                    #Nutrition:   Diet Enteral/Parenteral; Tube Feeding No Oral Diet; Vital High Protein; Continuous; 15; Prosource Protein Liquid - Two Packets; QID        #Code Status:   Level 1 - Full Code    #Dispo: Floors        Bassam Hassan MD  Pulmonary, Critical Care    Critical care time, excluding procedures, teaching, family meetings, and excludes any prior time recorded by the AP/resident, 35 minutes. Upon my evaluation, this patient has a high probability of imminent or life-threatening deterioration due to above problems which required my direct attention, intervention,  "and personal management.   Impression/Active Problems:    Angioedema   Respiratory Failure   Anxiety    Gout   Dental Procedure   DM  COPD Asthma   Morbid Obesity   HTN    Likely FLAQUITA  Physical Exam:     Vital Signs:   Weight: 121 kg (266 lb 12.1 oz)  IBW: Ideal body weight: 68.4 kg (150 lb 12.7 oz)  Adjusted ideal body weight: 89.4 kg (197 lb 2.9 oz)  Temp:  [97.7 °F (36.5 °C)-98.8 °F (37.1 °C)] 97.7 °F (36.5 °C)  HR:  [66-96] 72  Resp:  [14-23] 15  BP: (114-153)/(59-77) 114/59  FiO2 (%):  [40] 40  General: NAD  Neuro: AxO 3  HEENT: Facial edema, tongue swelling - Improved, speaking  Heart: RRR  Lungs: CTAB  Abdomen: Soft NT  Extremities: No edema                Ventilator Settings:    FiO2 (%):  [40] 40          Invalid input(s): \"PCO2\", \"O2\"  Radiologic Images Reviewed:    CXR   ETT 3cm above ellie  Input / Output:     Intake/Output Summary (Last 24 hours) at 3/29/2024 1005  Last data filed at 3/29/2024 0854  Gross per 24 hour   Intake 334.79 ml   Output 1450 ml   Net -1115.21 ml            Infusions:  dexmedetomidine, 0.1-0.7 mcg/kg/hr, Last Rate: Stopped (03/28/24 1221)  propofol, 5-50 mcg/kg/min, Last Rate: Stopped (03/28/24 1221)      Scheduled Medications:  Current Facility-Administered Medications   Medication Dose Route Frequency Provider Last Rate    allopurinol  300 mg Oral Daily Deondre Gomez DO      vitamin C  1,000 mg Oral BID Deondre Gomez DO      atorvastatin  80 mg Oral Daily With Dinner Deondre Gomez DO      busPIRone  7.5 mg Oral Daily Deondre Gomez DO      cefazolin  2,000 mg Intravenous Q8H Katie Hemphill MD Stopped (03/29/24 0600)    chlorhexidine  15 mL Mouth/Throat Q12H [facility] Deondre Gomez DO      cholecalciferol  2,000 Units Oral Daily Deondre Gomez DO      dexmedetomidine  0.1-0.7 mcg/kg/hr Intravenous Titrated Katie Hemphill MD Stopped (03/28/24 1221)    enoxaparin  40 mg Subcutaneous Q12H JOSE ELIAS Katie Hemphill MD      ezetimibe  10 mg Oral HS " "Deondre Gomez,       Famotidine (PF)  20 mg Intravenous BID Deondre Gomez, DO      fentaNYL  50 mcg Intravenous Q1H PRN Deondre Gomez, DO      gabapentin  300 mg Oral BID Deondre Gomez, DO      glycopyrrolate  0.1 mg Intravenous Q4H Katie Hemphill MD      insulin glargine  13 Units Subcutaneous HS Katie Hemphill MD      insulin lispro  2-12 Units Subcutaneous Q6H [facility] Deondre Gomez, DO      ipratropium  0.5 mg Nebulization TID Deondre Gomez, DO      levalbuterol  1.25 mg Nebulization TID Deondre Gomez DO      lidocaine (PF)  10 mL Infiltration Once Shree Segovia MD      Loratadine  10 mg Oral Daily Deondre Gomez, DO      methylPREDNISolone sodium succinate  40 mg Intravenous Daily Kaci Rivera, DO      multivitamin stress formula  1 tablet Oral Daily Deondre Gomez DO      oxymetazoline  2 spray Each Nare Q12H [facility] Kaci Rivera, DO      phenol  1 spray Mouth/Throat Q2H PRN Katie Hemphill MD      polyethylene glycol  17 g Oral Daily PRN Deondre Gomez DO      propofol  5-50 mcg/kg/min Intravenous Titrated Katie Hemphill MD Stopped (03/28/24 1221)    senna  1 tablet Per NG Tube HS Shanita Cisneros, DO      senna  8.8 mg Per NG Tube Daily PRN Deondre Gomez DO         PRN Medications:    fentaNYL    phenol    polyethylene glycol    senna    Labs Reviewed:  Results from last 7 days   Lab Units 03/29/24  0434 03/28/24 0456 03/27/24  0413   WBC Thousand/uL 11.35* 13.51* 15.11*   HEMOGLOBIN g/dL 13.0 13.6 14.8   HEMATOCRIT % 41.3 43.6 45.8   PLATELETS Thousands/uL 181 190 199      Results from last 7 days   Lab Units 03/29/24 0434 03/28/24 0456 03/27/24  0413   SODIUM mmol/L 142 138 136   CO2 mmol/L 28 25 23   BUN mg/dL 33* 30* 27*   CALCIUM mg/dL 8.3* 8.7 8.9   MAGNESIUM mg/dL 2.6 2.6 2.5   PHOSPHORUS mg/dL 3.6 4.2* 4.3*         Invalid input(s): \"ASTSGOT\", \"ALTSGPT\"LABRCNTIP@ ,alkphos:3,tbilirubin:3,dbilirubin:3)@            Invalid input(s): " "\"TROPT\", \"PBNP\"             I have personally seen and examined the patient on (03/29/24 between [id number]). I discussed the patient with the AP/resident including, but not limited to, verifying findings; reviewing labs and x-rays; discussing with consultants; developing the plan of care with the bedside nurse; and discussing treatment plan with patient or surrogate.  I have reviewed the note and assessment performed by the AP/resident and agree with the AP/resident’s documented findings and plan of care with the above additions/exceptions. Please see my comments for details and adjustments.           "

## 2024-03-29 NOTE — PHYSICAL THERAPY NOTE
"   PT Evaluation       03/29/24 1126   PT Last Visit   PT Visit Date 03/29/24   Note Type   Note type Evaluation   Pain Assessment   Pain Assessment Tool 0-10   Pain Score 4   Pain Location/Orientation Other (Comment)  (throat)   Restrictions/Precautions   Weight Bearing Precautions Per Order No   Braces or Orthoses   (none)   Other Precautions Multiple lines;Telemetry;O2;Pain   Home Living   Type of Home Apartment  (8th floor)   Home Layout One level;Elevator   Bathroom Shower/Tub Walk-in shower   Bathroom Toilet Standard   Bathroom Equipment Grab bars in shower;Grab bars around toilet   Home Equipment Walker;Cane;Wheelchair-manual  (pt reports switching between cane and walker)   Prior Function   Level of [place] Independent with functional mobility;Needs assistance with ADLs;Independent with IADLS   Lives With Alone   Receives Help From Neighbor;Friend(s);Family   IADLs Independent with driving;Independent with meal prep;Independent with medication management   Falls in the last 6 months 0   Vocational On disability   General   Family/Caregiver Present Yes   Cognition   Overall Cognitive Status WFL   Arousal/Participation Cooperative   Orientation Level Oriented X4   Memory Within functional limits   Following Commands Follows one step commands without difficulty   Subjective   Subjective \"My neighbors usually help me if needed.\"   RUE Assessment   RUE Assessment   (see OT eval)   LUE Assessment   LUE Assessment   (see OT eval)   RLE Assessment   RLE Assessment WFL   LLE Assessment   LLE Assessment WFL   Coordination   Movements are Fluid and Coordinated 1   Bed Mobility   Supine to Sit 4  Minimal assistance   Additional items Assist x 1;Verbal cues  (CGA)   Additional Comments pt received in bed and left in recliner w/ chair alarm   Transfers   Sit to Stand 4  Minimal assistance   Additional items Verbal cues;Assist x 2   Stand to Sit 4  Minimal assistance   Additional items Assist x 1;Verbal cues "   Additional Comments w/ HHA   Ambulation/Elevation   Gait pattern Wide STEPHANIE;Shuffling;Short stride;Excessively slow   Gait Assistance 3  Moderate assist   Additional items Assist x 1;Verbal cues   Assistive Device None   Distance 3 ft from bed to chair   Ambulation/Elevation Additional Comments w/ HHA   Balance   Static Sitting Good   Dynamic Sitting Fair +   Static Standing Fair +   Dynamic Standing Fair -   Ambulatory Poor +   Endurance Deficit   Endurance Deficit Yes   Endurance Deficit Description limited due to fatigue   Activity Tolerance   Activity Tolerance Patient limited by fatigue   Medical Staff Made Aware OT   Nurse Made Aware yes   Assessment   Prognosis Good   Problem List Decreased endurance;Impaired balance;Decreased mobility;Obesity;Pain   Assessment Pt is a 68 y.o. male presenting to [facility] on  3/25/2024 1726 w/ acute angioedema. Pt  has a past medical history of Anxiety, Asthma, Cardiac disease, COPD (chronic obstructive pulmonary disease) (HCC), Diabetes mellitus (HCC), Gout, Hyperlipidemia, Hypertension, and Shortness of breath. Pt's PLOF is independent w/ mobility, iADLs, and requires some assistance w/ ADLs (neighbors help w/ putting on socks and shoes). Pt's home set up consists of an 8th floor apartment and lives alone. Upon evaluation today, pt presents with the following impairments:impaired balance, decreased endurance, gait deviations, pain, decreased activity tolerance, decreased functional mobility tolerance, fall risk, and fatigue . Pt's activity limitations include: bed mobility, transfers, and ambulation. Pt was able to perform bed mobility and transfers w/ min assist x 1 (CGA for mobility and HHA for transfers). Pt was able to ambulate 3ft from the bed to the chair w/ mod assist x 1 (increased time required).  Pt would benefit from skilled PT 2-3x/week for 5 weeks to address these impairments and activity limitations. Pt will continue to be seen upon admission. Pt's  prognosis is Good secondary to:  age, PMH, and PLOF . The patient's AM-PAC Basic Mobility Inpatient Standardized Score is less than 42.9, suggesting this patient may benefit from discharge to post-acute rehabilitation services. Please also refer to the recommendation of the Physical Therapist for safe discharge planning.   Goals   Patient Goals to get better   LTG Expiration Date 04/12/24   Long Term Goal #1 In 14 days, pt will: 1) Perform bed mobility independently to participate in frequent repositioning and improve skin integrity; 2) Perform functional transfers mod-I to promote I with toileting and OOB mobility; 3) Ambulate 200 feet mod-I with least restrictive device to participate in household and community level mobility; 4) Improve b/l LE strength by 1/2 grade in order to improve efficiency of tranfers; 5) Improve balance by 1 grade to reduce risk for falls; 6) Improve overall activity tolerance to 60 minutes in order to increase patient's ability to engage in mobility tasks   Plan   Treatment/Interventions Functional transfer training;LE strengthening/ROM;Therapeutic exercise;Endurance training;Bed mobility;Gait training;Equipment eval/education;Spoke to nursing   PT Frequency 2-3x/wk   Discharge Recommendation   Rehab Resource Intensity Level, PT II (Moderate Resource Intensity)   AM-PAC Basic Mobility Inpatient   Turning in Flat Bed Without Bedrails 4   Lying on Back to Sitting on Edge of Flat Bed Without Bedrails 3   Moving Bed to Chair 3   Standing Up From Chair Using Arms 3   Walk in Room 2   Climb 3-5 Stairs With Railing 2   Basic Mobility Inpatient Raw Score 17   Basic Mobility Standardized Score 39.67   Derrek [place] Highest Level Of Mobility   JH-HLM Goal 5: Stand one or more mins   JH-HLM Achieved 6: Walk 10 steps or more     Maeyla Martino, SPT

## 2024-03-30 PROBLEM — K04.7 DENTAL INFECTION: Status: ACTIVE | Noted: 2024-03-30

## 2024-03-30 LAB
BACTERIA BLD CULT: NORMAL
BACTERIA BLD CULT: NORMAL
GLUCOSE SERPL-MCNC: 106 MG/DL (ref 65–140)
GLUCOSE SERPL-MCNC: 148 MG/DL (ref 65–140)
GLUCOSE SERPL-MCNC: 188 MG/DL (ref 65–140)
GLUCOSE SERPL-MCNC: 211 MG/DL (ref 65–140)

## 2024-03-30 PROCEDURE — 94664 DEMO&/EVAL PT USE INHALER: CPT

## 2024-03-30 PROCEDURE — 94640 AIRWAY INHALATION TREATMENT: CPT

## 2024-03-30 PROCEDURE — 94760 N-INVAS EAR/PLS OXIMETRY 1: CPT

## 2024-03-30 PROCEDURE — 99233 SBSQ HOSP IP/OBS HIGH 50: CPT | Performed by: PHYSICIAN ASSISTANT

## 2024-03-30 PROCEDURE — 82948 REAGENT STRIP/BLOOD GLUCOSE: CPT

## 2024-03-30 RX ORDER — LORATADINE 10 MG/1
10 TABLET ORAL DAILY
Status: DISCONTINUED | OUTPATIENT
Start: 2024-03-30 | End: 2024-03-31 | Stop reason: HOSPADM

## 2024-03-30 RX ORDER — FAMOTIDINE 20 MG/1
20 TABLET, FILM COATED ORAL 2 TIMES DAILY
Status: DISCONTINUED | OUTPATIENT
Start: 2024-03-30 | End: 2024-03-31 | Stop reason: HOSPADM

## 2024-03-30 RX ADMIN — MELATONIN 3 MG: at 21:33

## 2024-03-30 RX ADMIN — ENOXAPARIN SODIUM 40 MG: 40 INJECTION SUBCUTANEOUS at 21:32

## 2024-03-30 RX ADMIN — CEFAZOLIN SODIUM 2000 MG: 2 SOLUTION INTRAVENOUS at 04:30

## 2024-03-30 RX ADMIN — GABAPENTIN 300 MG: 300 CAPSULE ORAL at 16:57

## 2024-03-30 RX ADMIN — SENNOSIDES 8.6 MG: 8.6 TABLET, FILM COATED ORAL at 21:33

## 2024-03-30 RX ADMIN — LORATADINE 10 MG: 10 TABLET ORAL at 11:51

## 2024-03-30 RX ADMIN — CEFAZOLIN SODIUM 2000 MG: 2 SOLUTION INTRAVENOUS at 19:40

## 2024-03-30 RX ADMIN — OXYCODONE HYDROCHLORIDE AND ACETAMINOPHEN 1000 MG: 500 TABLET ORAL at 21:33

## 2024-03-30 RX ADMIN — BUSPIRONE HYDROCHLORIDE 7.5 MG: 5 TABLET ORAL at 08:06

## 2024-03-30 RX ADMIN — ALLOPURINOL 300 MG: 300 TABLET ORAL at 08:06

## 2024-03-30 RX ADMIN — OXYCODONE HYDROCHLORIDE AND ACETAMINOPHEN 1000 MG: 500 TABLET ORAL at 08:05

## 2024-03-30 RX ADMIN — IPRATROPIUM BROMIDE 0.5 MG: 0.5 SOLUTION RESPIRATORY (INHALATION) at 20:16

## 2024-03-30 RX ADMIN — FAMOTIDINE 20 MG: 20 TABLET, FILM COATED ORAL at 11:51

## 2024-03-30 RX ADMIN — GABAPENTIN 300 MG: 300 CAPSULE ORAL at 08:05

## 2024-03-30 RX ADMIN — FAMOTIDINE 20 MG: 20 TABLET, FILM COATED ORAL at 16:57

## 2024-03-30 RX ADMIN — EZETIMIBE 10 MG: 10 TABLET ORAL at 21:33

## 2024-03-30 RX ADMIN — ENOXAPARIN SODIUM 40 MG: 40 INJECTION SUBCUTANEOUS at 08:14

## 2024-03-30 RX ADMIN — ATORVASTATIN CALCIUM 80 MG: 80 TABLET, FILM COATED ORAL at 16:57

## 2024-03-30 RX ADMIN — PREDNISONE 50 MG: 20 TABLET ORAL at 08:05

## 2024-03-30 RX ADMIN — LEVALBUTEROL HYDROCHLORIDE 1.25 MG: 1.25 SOLUTION RESPIRATORY (INHALATION) at 08:43

## 2024-03-30 RX ADMIN — IPRATROPIUM BROMIDE 0.5 MG: 0.5 SOLUTION RESPIRATORY (INHALATION) at 14:40

## 2024-03-30 RX ADMIN — Medication 2000 UNITS: at 08:05

## 2024-03-30 RX ADMIN — LEVALBUTEROL HYDROCHLORIDE 1.25 MG: 1.25 SOLUTION RESPIRATORY (INHALATION) at 14:40

## 2024-03-30 RX ADMIN — LEVALBUTEROL HYDROCHLORIDE 1.25 MG: 1.25 SOLUTION RESPIRATORY (INHALATION) at 20:16

## 2024-03-30 RX ADMIN — INSULIN LISPRO 1 UNITS: 100 INJECTION, SOLUTION INTRAVENOUS; SUBCUTANEOUS at 16:58

## 2024-03-30 RX ADMIN — OXYMETAZOLINE HYDROCHLORIDE 2 SPRAY: 0.05 SPRAY NASAL at 21:32

## 2024-03-30 RX ADMIN — IPRATROPIUM BROMIDE 0.5 MG: 0.5 SOLUTION RESPIRATORY (INHALATION) at 08:43

## 2024-03-30 RX ADMIN — INSULIN GLARGINE 13 UNITS: 100 INJECTION, SOLUTION SUBCUTANEOUS at 21:32

## 2024-03-30 RX ADMIN — OXYMETAZOLINE HYDROCHLORIDE 2 SPRAY: 0.05 SPRAY NASAL at 08:11

## 2024-03-30 RX ADMIN — B-COMPLEX W/ C & FOLIC ACID TAB 1 TABLET: TAB at 08:05

## 2024-03-30 RX ADMIN — INSULIN LISPRO 1 UNITS: 100 INJECTION, SOLUTION INTRAVENOUS; SUBCUTANEOUS at 21:32

## 2024-03-30 RX ADMIN — CEFAZOLIN SODIUM 2000 MG: 2 SOLUTION INTRAVENOUS at 11:51

## 2024-03-30 NOTE — ASSESSMENT & PLAN NOTE
On lasix at home, currently on hold due to recently being intubated/NPO  Plan to resume lasix prior to discharge home

## 2024-03-30 NOTE — ASSESSMENT & PLAN NOTE
Uses walker/cane at home at baseline  Continue gabapentin  Continue PT/OT  Consult case management

## 2024-03-30 NOTE — ASSESSMENT & PLAN NOTE
intubated in ICU initially for angioedema  Extubated 3/28 and placed on BIPAP  Now off BIPAP, ox sat 91% on room air  Continue nebs

## 2024-03-30 NOTE — ASSESSMENT & PLAN NOTE
Initially intubated in ICU, extubated 3/28 to BIPAP. BIPAP discontinued 3/29. Transferred to med/surg 3/29  Has 1 more dose of PO prednisone ordered  Continue claritin  He is tolerating PO diet  No lip or tongue swelling noted today  Continue to monitor closely

## 2024-03-30 NOTE — PROGRESS NOTES
[facility]  Progress Note  Name: Henry Mittal I  MRN: [id number]  Unit/Bed#: [facility] 825-01 I Date of Admission: 3/25/2024   Date of Service: 3/30/2024 I Hospital Day: 5    Assessment/Plan   Dental infection  Assessment & Plan  Was started on IV ancef in ICU through 3/31    Lumbar radiculopathy  Assessment & Plan  Uses walker/cane at home at baseline  Continue gabapentin  Continue PT/OT  Consult case management    Primary insomnia  Assessment & Plan  Continue PTA trazodone     Depression  Assessment & Plan  Continue home buspar, trazodone    Hyperlipidemia  Assessment & Plan  Continue statin/zetia    Asthma  Assessment & Plan  intubated in ICU initially for angioedema  Extubated 3/28 and placed on BIPAP  Now off BIPAP, ox sat 91% on room air  Continue nebs      Type 2 diabetes mellitus with other specified complication, without long-term current use of insulin (HCC)  Assessment & Plan  Lab Results   Component Value Date    HGBA1C 6.3 (H) 03/22/2024       Recent Labs     03/29/24  1707 03/29/24  2104 03/30/24  0804 03/30/24  1108   POCGLU 135 141* 106 148*       Blood Sugar Average: Last 72 hrs:  (P) 137    Patient tolerating PO diet  Continue lantus and sliding scale    Essential hypertension  Assessment & Plan  On lasix at home, currently on hold due to recently being intubated/NPO  Plan to resume lasix prior to discharge home    * Angioedema  Assessment & Plan  Initially intubated in ICU, extubated 3/28 to BIPAP. BIPAP discontinued 3/29. Transferred to med/surg 3/29  Has 1 more dose of PO prednisone ordered  Continue claritin  He is tolerating PO diet  No lip or tongue swelling noted today  Continue to monitor closely             VTE Pharmacologic Prophylaxis:   Pharmacologic: Enoxaparin (Lovenox)      Discussions with Specialists or Other Care Team Provider: reviewed notes from ICU stay    Education and Discussions with Family / Patient: patient updated    Time Spent  for Care: 45 minutes.  More than 50% of total time spent on counseling and coordination of care as described above.    Current Length of Stay: 5 day(s)    Current Patient Status: Inpatient   Certification Statement: The patient will continue to require additional inpatient hospital stay due to need for mobilization with PT prior to discharge home, may need rehab    Discharge Plan: pending PT/OT eval, will be ready for discharge in next 24-48 hours home vs rehab    Code Status: Level 1 - Full Code      Subjective:   Patient feels well. He is sitting up in bed eating lunch. He is concerned about being discharged home as he states he was already having trouble getting around and he lives alone. He states he applied for care giver assistance at home but has not been able to arrange this yet    Objective:     Vitals:   Temp (24hrs), Av °F (36.7 °C), Min:97.9 °F (36.6 °C), Max:98.1 °F (36.7 °C)    Temp:  [97.9 °F (36.6 °C)-98.1 °F (36.7 °C)] 98.1 °F (36.7 °C)  HR:  [68-82] 81  Resp:  [16-20] 20  BP: (108-135)/(60-72) 135/72  SpO2:  [89 %-96 %] 96 %  Body mass index is 40.66 kg/m².     Input and Output Summary (last 24 hours):       Intake/Output Summary (Last 24 hours) at 3/30/2024 1515  Last data filed at 3/30/2024 1044  Gross per 24 hour   Intake 120 ml   Output 1525 ml   Net -1405 ml       Physical Exam:     Physical Exam  Vitals reviewed.   Constitutional:       General: He is not in acute distress.     Appearance: He is not ill-appearing or diaphoretic.   HENT:      Head: Normocephalic and atraumatic.      Nose: Nose normal.      Mouth/Throat:      Pharynx: Oropharynx is clear.      Comments: No lip or tongue edema noted  Cardiovascular:      Rate and Rhythm: Normal rate.   Pulmonary:      Effort: Pulmonary effort is normal. No respiratory distress.      Breath sounds: Normal breath sounds.   Abdominal:      General: There is no distension.      Palpations: Abdomen is soft.      Tenderness: There is no abdominal  tenderness.   Musculoskeletal:         General: No deformity or signs of injury.   Skin:     General: Skin is warm and dry.   Neurological:      Mental Status: He is alert and oriented to person, place, and time.           Additional Data:     Labs:    Results from last 7 days   Lab Units 03/29/24  0434 03/28/24  0456 03/27/24  0413   WBC Thousand/uL 11.35*   < > 15.11*   HEMOGLOBIN g/dL 13.0   < > 14.8   HEMATOCRIT % 41.3   < > 45.8   PLATELETS Thousands/uL 181   < > 199   BANDS PCT %  --   --  11*   NEUTROS PCT % 72   < >  --    LYMPHS PCT % 18   < >  --    LYMPHO PCT %  --   --  4*   MONOS PCT % 10   < >  --    MONO PCT %  --   --  4   EOS PCT % 0   < > 0    < > = values in this interval not displayed.     Results from last 7 days   Lab Units 03/29/24  0434 03/27/24  0413 03/26/24  0629   SODIUM mmol/L 142   < > 139   POTASSIUM mmol/L 4.1   < > 4.7   CHLORIDE mmol/L 106   < > 102   CO2 mmol/L 28   < > 24   BUN mg/dL 33*   < > 21   CREATININE mg/dL 0.80   < > 0.97   ANION GAP mmol/L 8   < > 13   CALCIUM mg/dL 8.3*   < > 9.2   ALBUMIN g/dL  --   --  4.1   TOTAL BILIRUBIN mg/dL  --   --  0.57   ALK PHOS U/L  --   --  53   ALT U/L  --   --  24   AST U/L  --   --  21   GLUCOSE RANDOM mg/dL 93   < > 162*    < > = values in this interval not displayed.         Results from last 7 days   Lab Units 03/30/24  1108 03/30/24  0804 03/29/24  2104 03/29/24  1707 03/29/24  1121 03/29/24  0554 03/28/24  2356 03/28/24  1704 03/28/24  1152 03/28/24  0609 03/27/24  2339 03/27/24  1754   POC GLUCOSE mg/dl 148* 106 141* 135 136 103 117 130 146* 148* 160* 139         Results from last 7 days   Lab Units 03/26/24  0511   PROCALCITONIN ng/ml 0.26*           * I Have Reviewed All Lab Data Listed Above.  * Additional Pertinent Lab Tests Reviewed: All Labs For Current Hospital Admission Reviewed        Recent Cultures (last 7 days):     Results from last 7 days   Lab Units 03/26/24  1300 03/25/24 2035   BLOOD CULTURE   --  No Growth After  4 Days.  No Growth After 4 Days.   SPUTUM CULTURE  2+ Growth of Klebsiella pneumoniae*  1+ Growth of  --    GRAM STAIN RESULT  3+ Polys*  2+ Gram positive cocci in pairs*  2+ Gram negative rods*  --        Last 24 Hours Medication List:   Current Facility-Administered Medications   Medication Dose Route Frequency Provider Last Rate    allopurinol  300 mg Oral Daily Katie Hemphill MD      vitamin C  1,000 mg Oral BID Katie Hemphill MD      atorvastatin  80 mg Oral Daily With Dinner Katie Hemphill MD      busPIRone  7.5 mg Oral Daily Katie Hemphill MD      cefazolin  2,000 mg Intravenous Q8H Katie Hemphill MD Stopped (03/30/24 1412)    cholecalciferol  2,000 Units Oral Daily Katie Hemphill MD      enoxaparin  40 mg Subcutaneous Q12H [facility] Katie Hemphill MD      ezetimibe  10 mg Oral HS Katie Hemphill MD      famotidine  20 mg Oral BID Lety Oquendo PA-C      gabapentin  300 mg Oral BID Katie Hemphill MD      insulin glargine  13 Units Subcutaneous HS Katie Hemphill MD      insulin lispro  1-5 Units Subcutaneous HS Mandi Figueroa MD      insulin lispro  1-6 Units Subcutaneous TID AC Mandi Figueroa MD      ipratropium  0.5 mg Nebulization TID Katie Hemphill MD      levalbuterol  1.25 mg Nebulization TID Katie Hemphill MD      loratadine  10 mg Oral Daily Radha Rincon MD      melatonin  3 mg Oral HS MARLIN Todd      multivitamin stress formula  1 tablet Oral Daily Katie Hemphill MD      oxymetazoline  2 spray Each Nare Q12H [facility] Katie Hemphill MD      phenol  1 spray Mouth/Throat Q2H PRN Katie Hemphill MD      polyethylene glycol  17 g Oral Daily PRN Katie Hemphill MD      predniSONE  50 mg Oral Daily Katie Hemphill MD      senna  1 tablet Per NG Tube HS Katie Hemphill MD      senna  8.8 mg Per NG Tube Daily PRN Katie Hemphill MD          Today, Patient Was Seen By: Lety Oquendo PA-C    ** Please Note: Dictation voice to text software may have been used in the creation of this document. **

## 2024-03-30 NOTE — PLAN OF CARE
Problem: Prexisting or High Potential for Compromised Skin Integrity  Goal: Skin integrity is maintained or improved  Description: INTERVENTIONS:  - Identify patients at risk for skin breakdown  - Assess and monitor skin integrity  - Assess and monitor nutrition and hydration status  - Monitor labs   - Assess for incontinence   - Turn and reposition patient  - Assist with mobility/ambulation  - Relieve pressure over bony prominences  - Avoid friction and shearing  - Provide appropriate hygiene as needed including keeping skin clean and dry  - Evaluate need for skin moisturizer/barrier cream  - Collaborate with interdisciplinary team   - Patient/family teaching  - Consider wound care consult   Outcome: Progressing     Problem: RESPIRATORY - ADULT  Goal: Achieves optimal ventilation and oxygenation  Description: INTERVENTIONS:  - Assess for changes in respiratory status  - Assess for changes in mentation and behavior  - Position to facilitate oxygenation and minimize respiratory effort  - Oxygen administered by appropriate delivery if ordered  - Initiate smoking cessation education as indicated  - Encourage broncho-pulmonary hygiene including cough, deep breathe, Incentive Spirometry  - Assess the need for suctioning and aspirate as needed  - Assess and instruct to report SOB or any respiratory difficulty  - Respiratory Therapy support as indicated  Outcome: Progressing     Problem: SKIN/TISSUE INTEGRITY - ADULT  Goal: Skin Integrity remains intact(Skin Breakdown Prevention    Activity:  Outcome: Progressing  Goal: Incision(s), wounds(s) or drain site(s) healing without S/S of infection  Description: INTERVENTIONS  - Assess and document dressing, incision, wound bed, drain sites and surrounding tissue  - Provide patient and family education  Outcome: Progressing  Goal: Pressure injury heals and does not worsen  Description: Interventions:  - Implement low air loss mattress or specialty surface (Criteria met)  -  Consider nutrition services referral as needed  Outcome: Progressing     Problem: Nutrition/Hydration-ADULT  Goal: Nutrient/Hydration intake appropriate for improving, restoring or maintaining nutritional needs  Description: Monitor and assess patient's nutrition/hydration status for malnutrition. Collaborate with interdisciplinary team and initiate plan and interventions as ordered.  Monitor patient's weight and dietary intake as ordered or per policy. Utilize nutrition screening tool and intervene as necessary. Determine patient's food preferences and provide high-protein, high-caloric foods as appropriate.     INTERVENTIONS:  - Monitor oral intake, urinary output, labs, and treatment plans  - Assess nutrition and hydration status and recommend course of action  - Evaluate amount of meals eaten  - Assist patient with eating if necessary   - Allow adequate time for meals  - Recommend/ encourage appropriate diets, oral nutritional supplements, and vitamin/mineral supplements  - Order, calculate, and assess calorie counts as needed  - Recommend, monitor, and adjust tube feedings and TPN/PPN based on assessed needs  - Assess need for intravenous fluids  - Provide specific nutrition/hydration education as appropriate  - Include patient/family/caregiver in decisions related to nutrition  Outcome: Progressing     Problem: MOBILITY - ADULT  Goal: Maintain or return to baseline ADL function  Description: INTERVENTIONS:  -  Assess patient's ability to carry out ADLs; assess patient's baseline for ADL function and identify physical deficits which impact ability to perform ADLs (bathing, care of mouth/teeth, toileting, grooming, dressing, etc.)  - Assess/evaluate cause of self-care deficits   - Assess range of motion  - Assess patient's mobility; develop plan if impaired  - Assess patient's need for assistive devices and provide as appropriate  - Encourage maximum independence but intervene and supervise when necessary  -  Involve family in performance of ADLs  - Assess for home care needs following discharge   - Consider OT consult to assist with ADL evaluation and planning for discharge  - Provide patient education as appropriate  Outcome: Progressing  Goal: Maintains/Returns to pre admission functional level  Description: INTERVENTIONS:  - Perform AM-PAC 6 Click Basic Mobility/ Daily Activity assessment daily.  - Set and communicate daily mobility goal to care team and patient/family/caregiver.   - Collaborate with rehabilitation services on mobility goals if consulted  - Out of bed for toileting  - Record patient progress and toleration of activity level   Outcome: Progressing

## 2024-03-30 NOTE — ASSESSMENT & PLAN NOTE
Lab Results   Component Value Date    HGBA1C 6.3 (H) 03/22/2024       Recent Labs     03/29/24  1707 03/29/24  2104 03/30/24  0804 03/30/24  1108   POCGLU 135 141* 106 148*       Blood Sugar Average: Last 72 hrs:  (P) 137    Patient tolerating PO diet  Continue lantus and sliding scale

## 2024-03-31 ENCOUNTER — HOME HEALTH ADMISSION (OUTPATIENT)
Dept: HOME HEALTH SERVICES | Facility: HOME HEALTHCARE | Age: 69
End: 2024-03-31
Payer: COMMERCIAL

## 2024-03-31 VITALS
OXYGEN SATURATION: 92 % | DIASTOLIC BLOOD PRESSURE: 74 MMHG | BODY MASS INDEX: 40.36 KG/M2 | RESPIRATION RATE: 16 BRPM | HEART RATE: 69 BPM | TEMPERATURE: 97.6 F | HEIGHT: 68 IN | SYSTOLIC BLOOD PRESSURE: 123 MMHG | WEIGHT: 266.32 LBS

## 2024-03-31 LAB
ANION GAP SERPL CALCULATED.3IONS-SCNC: 8 MMOL/L (ref 4–13)
BUN SERPL-MCNC: 22 MG/DL (ref 5–25)
CALCIUM SERPL-MCNC: 8.5 MG/DL (ref 8.4–10.2)
CHLORIDE SERPL-SCNC: 105 MMOL/L (ref 96–108)
CO2 SERPL-SCNC: 26 MMOL/L (ref 21–32)
CREAT SERPL-MCNC: 0.61 MG/DL (ref 0.6–1.3)
ERYTHROCYTE [DISTWIDTH] IN BLOOD BY AUTOMATED COUNT: 13.4 % (ref 11.6–15.1)
GFR SERPL CREATININE-BSD FRML MDRD: 101 ML/MIN/1.73SQ M
GLUCOSE SERPL-MCNC: 121 MG/DL (ref 65–140)
GLUCOSE SERPL-MCNC: 82 MG/DL (ref 65–140)
GLUCOSE SERPL-MCNC: 97 MG/DL (ref 65–140)
HCT VFR BLD AUTO: 43.4 % (ref 36.5–49.3)
HGB BLD-MCNC: 14 G/DL (ref 12–17)
MCH RBC QN AUTO: 28.5 PG (ref 26.8–34.3)
MCHC RBC AUTO-ENTMCNC: 32.3 G/DL (ref 31.4–37.4)
MCV RBC AUTO: 88 FL (ref 82–98)
PLATELET # BLD AUTO: 232 THOUSANDS/UL (ref 149–390)
PMV BLD AUTO: 11.2 FL (ref 8.9–12.7)
POTASSIUM SERPL-SCNC: 3.6 MMOL/L (ref 3.5–5.3)
RBC # BLD AUTO: 4.92 MILLION/UL (ref 3.88–5.62)
SODIUM SERPL-SCNC: 139 MMOL/L (ref 135–147)
WBC # BLD AUTO: 12.67 THOUSAND/UL (ref 4.31–10.16)

## 2024-03-31 PROCEDURE — 94760 N-INVAS EAR/PLS OXIMETRY 1: CPT

## 2024-03-31 PROCEDURE — 80048 BASIC METABOLIC PNL TOTAL CA: CPT | Performed by: PHYSICIAN ASSISTANT

## 2024-03-31 PROCEDURE — 99239 HOSP IP/OBS DSCHRG MGMT >30: CPT | Performed by: PHYSICIAN ASSISTANT

## 2024-03-31 PROCEDURE — 82948 REAGENT STRIP/BLOOD GLUCOSE: CPT

## 2024-03-31 PROCEDURE — 85027 COMPLETE CBC AUTOMATED: CPT | Performed by: PHYSICIAN ASSISTANT

## 2024-03-31 PROCEDURE — 94640 AIRWAY INHALATION TREATMENT: CPT

## 2024-03-31 RX ORDER — SEMAGLUTIDE 2.68 MG/ML
2 INJECTION, SOLUTION SUBCUTANEOUS
COMMUNITY
Start: 2024-01-06 | End: 2024-04-08 | Stop reason: SDUPTHER

## 2024-03-31 RX ORDER — LORATADINE 10 MG/1
10 TABLET ORAL DAILY
Qty: 30 TABLET | Refills: 0 | Status: SHIPPED | OUTPATIENT
Start: 2024-04-01 | End: 2024-04-03 | Stop reason: SDUPTHER

## 2024-03-31 RX ORDER — INSULIN GLARGINE 100 [IU]/ML
13 INJECTION, SOLUTION SUBCUTANEOUS
Qty: 10 ML | Refills: 0 | Status: SHIPPED | OUTPATIENT
Start: 2024-03-31 | End: 2024-03-31

## 2024-03-31 RX ADMIN — PREDNISONE 50 MG: 20 TABLET ORAL at 08:32

## 2024-03-31 RX ADMIN — B-COMPLEX W/ C & FOLIC ACID TAB 1 TABLET: TAB at 08:33

## 2024-03-31 RX ADMIN — LEVALBUTEROL HYDROCHLORIDE 1.25 MG: 1.25 SOLUTION RESPIRATORY (INHALATION) at 07:46

## 2024-03-31 RX ADMIN — ALLOPURINOL 300 MG: 300 TABLET ORAL at 08:33

## 2024-03-31 RX ADMIN — FAMOTIDINE 20 MG: 20 TABLET, FILM COATED ORAL at 08:32

## 2024-03-31 RX ADMIN — OXYCODONE HYDROCHLORIDE AND ACETAMINOPHEN 1000 MG: 500 TABLET ORAL at 08:32

## 2024-03-31 RX ADMIN — LORATADINE 10 MG: 10 TABLET ORAL at 08:32

## 2024-03-31 RX ADMIN — ENOXAPARIN SODIUM 40 MG: 40 INJECTION SUBCUTANEOUS at 08:33

## 2024-03-31 RX ADMIN — BUSPIRONE HYDROCHLORIDE 7.5 MG: 5 TABLET ORAL at 08:32

## 2024-03-31 RX ADMIN — IPRATROPIUM BROMIDE 0.5 MG: 0.5 SOLUTION RESPIRATORY (INHALATION) at 07:46

## 2024-03-31 RX ADMIN — GABAPENTIN 300 MG: 300 CAPSULE ORAL at 08:32

## 2024-03-31 RX ADMIN — Medication 2000 UNITS: at 08:32

## 2024-03-31 NOTE — PLAN OF CARE
Problem: Prexisting or High Potential for Compromised Skin Integrity  Goal: Skin integrity is maintained or improved  Description: INTERVENTIONS:  - Identify patients at risk for skin breakdown  - Assess and monitor skin integrity  - Assess and monitor nutrition and hydration status  - Monitor labs   - Assess for incontinence   - Turn and reposition patient  - Assist with mobility/ambulation  - Relieve pressure over bony prominences  - Avoid friction and shearing  - Provide appropriate hygiene as needed including keeping skin clean and dry  - Evaluate need for skin moisturizer/barrier cream  - Collaborate with interdisciplinary team   - Patient/family teaching  - Consider wound care consult   Outcome: Progressing     Problem: RESPIRATORY - ADULT  Goal: Achieves optimal ventilation and oxygenation  Description: INTERVENTIONS:  - Assess for changes in respiratory status  - Assess for changes in mentation and behavior  - Position to facilitate oxygenation and minimize respiratory effort  - Oxygen administered by appropriate delivery if ordered  - Initiate smoking cessation education as indicated  - Encourage broncho-pulmonary hygiene including cough, deep breathe, Incentive Spirometry  - Assess the need for suctioning and aspirate as needed  - Assess and instruct to report SOB or any respiratory difficulty  - Respiratory Therapy support as indicated  Outcome: Progressing

## 2024-03-31 NOTE — ASSESSMENT & PLAN NOTE
Uses walker/cane at home at baseline  Continue gabapentin  Patient eager to be discharged home. He does live alone but has many neighbors that help him. Initial recommendation from therapy was short term rehab but patient wants to go home today with home care.

## 2024-03-31 NOTE — ASSESSMENT & PLAN NOTE
Initially intubated in ICU, extubated 3/28 to BIPAP. BIPAP discontinued 3/29. Transferred to med/surg 3/29  Completed course of steroids  Continue claritin  He is tolerating PO diet  No lip or tongue swelling noted   Stable for discharge home today with home care

## 2024-03-31 NOTE — ASSESSMENT & PLAN NOTE
On lasix at home, currently on hold due to recently being intubated/NPO  Plan to resume lasix upon to discharge home

## 2024-03-31 NOTE — CASE MANAGEMENT
Case Management Discharge Planning Note    Patient name Henry Mittal  Location [facility] 825/[facility] 825-01 MRN [id number]  : 1955 Date 3/31/2024       Current Admission Date: 3/25/2024  Current Admission Diagnosis:Angioedema   Patient Active Problem List    Diagnosis Date Noted    Dental infection 2024    Angioedema 2024    Lumbar back pain 2023    Lumbar radiculopathy 2023    Bronchitis 2023    Exacerbation of asthma 2023    Chronic obstructive pulmonary disease, unspecified COPD type (HCC) 2023    Hyperprolactinemia (HCC) 2023    Pain in left hip 2022    Left hip pain 2022    Cystitis 10/26/2021    Primary insomnia 10/26/2021    Chronic pain of left knee 10/26/2021    Other constipation 10/26/2021    Vitamin D deficiency 2019    Chronic gout of right knee 2019    Sebaceous cyst 2019    Osteoarthritis of both knees 2018    Hyperlipidemia 2018    Depression 2018    Essential hypertension 2018    Type 2 diabetes mellitus with other specified complication, without long-term current use of insulin (HCC) 2018    Asthma 2018    CKD (chronic kidney disease) stage 2, GFR 60-89 ml/min 2018    Obesity (BMI 30-39.9) 2018      LOS (days): 6  Geometric Mean LOS (GMLOS) (days): 3.8  Days to GMLOS:-1.9     OBJECTIVE:  Risk of Unplanned Readmission Score: 16.26         Current admission status: Inpatient   Preferred Pharmacy:   UNKNOWN - FOLLOW UP PRIOR TO DISCHARGE TO E-PRESCRIBE  No address on file      Primary Care Provider: MARLIN Malhotra    [facility]  Secondary Insurance:     DISCHARGE DETAILS:                                Requested Home Health Care         Is the patient interested in HHC at discharge?: Yes  Home Health Discipline requested:: Occupational Therapy, Physical Therapy  Home Health Agency Name:: [facility]  Home Health Follow-Up Provider::  PCP  Home Health Services Needed:: Strengthening/Theraputic Exercises to Improve Function, Gait/ADL Training  Homebound Criteria Met:: Requires the Assistance of Another Person for Safe Ambulation or to Leave the Home, Uses an Assist Device (i.e. cane, walker, etc)  Supporting Clincal Findings:: Limited Endurance          Pt clear for d/c.   Reviewed IP rehab recommendation, pt politely declines reports he would like to return home. Does live alone but well supported by neighbors/ friends who are also at bedside  Reviewed C- pt agreeable to blanket referral.   [facility] reserved

## 2024-03-31 NOTE — PLAN OF CARE
Problem: Prexisting or High Potential for Compromised Skin Integrity  Goal: Skin integrity is maintained or improved  Description: INTERVENTIONS:  - Identify patients at risk for skin breakdown  - Assess and monitor skin integrity  - Assess and monitor nutrition and hydration status  - Monitor labs   - Assess for incontinence   - Turn and reposition patient  - Assist with mobility/ambulation  - Relieve pressure over bony prominences  - Avoid friction and shearing  - Provide appropriate hygiene as needed including keeping skin clean and dry  - Evaluate need for skin moisturizer/barrier cream  - Collaborate with interdisciplinary team   - Patient/family teaching  - Consider wound care consult   Outcome: Progressing     Problem: RESPIRATORY - ADULT  Goal: Achieves optimal ventilation and oxygenation  Description: INTERVENTIONS:  - Assess for changes in respiratory status  - Assess for changes in mentation and behavior  - Position to facilitate oxygenation and minimize respiratory effort  - Oxygen administered by appropriate delivery if ordered  - Initiate smoking cessation education as indicated  - Encourage broncho-pulmonary hygiene including cough, deep breathe, Incentive Spirometry  - Assess the need for suctioning and aspirate as needed  - Assess and instruct to report SOB or any respiratory difficulty  - Respiratory Therapy support as indicated  Outcome: Progressing     Problem: SKIN/TISSUE INTEGRITY - ADULT  Goal: Skin Integrity remains intact(Skin Breakdown Prevention)  Description: Assess:  -Perform Zhao assessment every shift  -Inspect skin when repositioning, toileting, and assisting with ADLS  -Assess extremities for adequate circulation and sensation     Bed Management:  -Have minimal linens on bed & keep smooth, unwrinkled  -Change linens as needed when moist or perspiring  -Avoid sitting or lying in one position for more than 2 hours while in bed  -Keep HOB at 45 degrees     Toileting:  -Offer bedside  commode  -Assess for incontinence every 2 hours  -Activity:  -Mobilize patient 3 times a day  -Encourage activity and walks on unit  -Encourage or provide ROM exercises   -Turn and reposition patient every 2 Hours  -Use appropriate equipment to lift or move patient in bed  -Instruct/ Assist with weight shifting every 2 hours when out of bed in chair    Skin Care:  -Avoid use of baby powder, tape, friction and shearing, hot water or constrictive clothing  -Relieve pressure over bony prominences using pillows  -Do not massage red bony areas    Next Steps:  -Teach patient strategies to minimize risks such as weight shifting, skin care   -Consider consults to  interdisciplinary teams such as PT/OT  Outcome: Progressing  Goal: Incision(s), wounds(s) or drain site(s) healing without S/S of infection  Description: INTERVENTIONS  - Assess and document dressing, incision, wound bed, drain sites and surrounding tissue  - Provide patient and family education  Outcome: Progressing  Goal: Pressure injury heals and does not worsen  Description: Interventions:  - Implement low air loss mattress or specialty surface (Criteria met)  - Apply silicone foam dressing  Outcome: Progressing     Problem: SAFETY,RESTRAINT: NV/NON-SELF DESTRUCTIVE BEHAVIOR  Goal: Remains free of harm/injury (restraint for non violent/non self-detsructive behavior)  Description: INTERVENTIONS:  - Instruct patient/family regarding restraint use   - Assess and monitor physiologic and psychological status   - Provide interventions and comfort measures to meet assessed patient needs   - Identify and implement measures to help patient regain control  - Assess readiness for release of restraint   Outcome: Progressing  Goal: Returns to optimal restraint-free functioning  Description: INTERVENTIONS:  - Assess the patient's behavior and symptoms that indicate continued need for restraint  - Identify and implement measures to help patient regain control  - Assess  readiness for release of restraint   Outcome: Progressing     Problem: Nutrition/Hydration-ADULT  Goal: Nutrient/Hydration intake appropriate for improving, restoring or maintaining nutritional needs  Description: Monitor and assess patient's nutrition/hydration status for malnutrition. Collaborate with interdisciplinary team and initiate plan and interventions as ordered.  Monitor patient's weight and dietary intake as ordered or per policy. Utilize nutrition screening tool and intervene as necessary. Determine patient's food preferences and provide high-protein, high-caloric foods as appropriate.     INTERVENTIONS:  - Monitor oral intake, urinary output, labs, and treatment plans  - Assess nutrition and hydration status and recommend course of action  - Evaluate amount of meals eaten  - Assist patient with eating if necessary   - Allow adequate time for meals  - Recommend/ encourage appropriate diets, oral nutritional supplements, and vitamin/mineral supplements  - Order, calculate, and assess calorie counts as needed  - Recommend, monitor, and adjust tube feedings and TPN/PPN based on assessed needs  - Assess need for intravenous fluids  - Provide specific nutrition/hydration education as appropriate  - Include patient/family/caregiver in decisions related to nutrition  Outcome: Progressing     Problem: MOBILITY - ADULT  Goal: Maintain or return to baseline ADL function  Description: INTERVENTIONS:  -  Assess patient's ability to carry out ADLs; assess patient's baseline for ADL function and identify physical deficits which impact ability to perform ADLs (bathing, care of mouth/teeth, toileting, grooming, dressing, etc.)  - Assess/evaluate cause of self-care deficits   - Assess range of motion  - Assess patient's mobility; develop plan if impaired  - Assess patient's need for assistive devices and provide as appropriate  - Encourage maximum independence but intervene and supervise when necessary  - Involve family  in performance of ADLs  - Assess for home care needs following discharge   - Consider OT consult to assist with ADL evaluation and planning for discharge  - Provide patient education as appropriate  Outcome: Progressing  Goal: Maintains/Returns to pre admission functional level  Description: INTERVENTIONS:  - Perform AM-PAC 6 Click Basic Mobility/ Daily Activity assessment daily.  - Set and communicate daily mobility goal to care team and patient/family/caregiver.   - Collaborate with rehabilitation services on mobility goals if consulted  - Ambulate patient 3 times a day  - Out of bed to chair 3 times a day   - Out of bed for meals 3 times a day  - Out of bed for toileting  - Record patient progress and toleration of activity level   Outcome: Progressing

## 2024-03-31 NOTE — PROGRESS NOTES
Patient:    MRN:  [id number]    Aidin Request ID:  [id number]    Level of care reserved:  Home Health Agency    Partner Reserved:  [facility], [place] [address] [phone]    Clinical needs requested:    Geography searched:  [phone]    Start of Service:    Request sent:  10:21am EDT on 3/31/2024 by Danya Cervantes    Partner reserved:  10:39am EDT on 3/31/2024 by Danya Cervantes    Choice list shared:  10:39am EDT on 3/31/2024 by Danya Cervantes

## 2024-03-31 NOTE — ASSESSMENT & PLAN NOTE
Lab Results   Component Value Date    HGBA1C 6.3 (H) 03/22/2024       Recent Labs     03/30/24  1108 03/30/24  1620 03/30/24 2051 03/31/24  0738   POCGLU 148* 188* 211* 97         Blood Sugar Average: Last 72 hrs:  (P) 138.5111196027174566    Patient tolerating PO diet  Patient started on lantus 13 units on 3/26 due to hyperglycemia  Follow up with PCP upon discharge

## 2024-03-31 NOTE — ASSESSMENT & PLAN NOTE
intubated in ICU initially for angioedema  Extubated 3/28 and placed on BIPAP  Now off BIPAP, ox sat 91% on room air  Resume home inhalers for discharge

## 2024-03-31 NOTE — ASSESSMENT & PLAN NOTE
Lab Results   Component Value Date    HGBA1C 6.3 (H) 03/22/2024       Recent Labs     03/30/24  1620 03/30/24  2051 03/31/24  0738 03/31/24  1114   POCGLU 188* 211* 97 121         Blood Sugar Average: Last 72 hrs:  (P) 137.7259322654610044    Patient tolerating PO diet  Patient started on lantus 13 units on 3/26 due to hyperglycemia as well as sliding scale  Per patient, he has been on ozempic and metformin at home. Will resume home diabetic regimen for discharge

## 2024-03-31 NOTE — DISCHARGE SUMMARY
[facility]  Discharge- Henry Mittal 1955, 68 y.o. male MRN: [id number]  [facility] Encounter: [id number]  Primary Care Provider: MARLIN Malhotra   Date and time admitted to hospital: 3/25/2024  5:26 PM    Dental infection  Assessment & Plan  Was started on IV ancef in ICU through 3/31- now completed  Recommend outpatient dental follow up    Lumbar radiculopathy  Assessment & Plan  Uses walker/cane at home at baseline  Continue gabapentin  Patient eager to be discharged home. He does live alone but has many neighbors that help him. Initial recommendation from therapy was short term rehab but patient wants to go home today with home care.     Primary insomnia  Assessment & Plan  Continue PTA trazodone     Depression  Assessment & Plan  Continue home buspar, trazodone    Hyperlipidemia  Assessment & Plan  Continue statin/zetia    Asthma  Assessment & Plan  intubated in ICU initially for angioedema  Extubated 3/28 and placed on BIPAP  Now off BIPAP, ox sat 91% on room air  Resume home inhalers for discharge      Type 2 diabetes mellitus with other specified complication, without long-term current use of insulin (HCC)  Assessment & Plan  Lab Results   Component Value Date    HGBA1C 6.3 (H) 03/22/2024       Recent Labs     03/30/24  1620 03/30/24  2051 03/31/24  0738 03/31/24  1114   POCGLU 188* 211* 97 121         Blood Sugar Average: Last 72 hrs:  (P) 137.4997984628212226    Patient tolerating PO diet  Patient started on lantus 13 units on 3/26 due to hyperglycemia as well as sliding scale  Per patient, he has been on ozempic and metformin at home. Will resume home diabetic regimen for discharge      Essential hypertension  Assessment & Plan  On lasix at home, currently on hold due to recently being intubated/NPO  Plan to resume lasix upon to discharge home    * Angioedema  Assessment & Plan  Initially intubated in ICU, extubated 3/28 to BIPAP. BIPAP  discontinued 3/29. Transferred to med/surg 3/29  Completed course of steroids  Continue claritin  He is tolerating PO diet  No lip or tongue swelling noted   Stable for discharge home today with home care        Discharging Physician / Practitioner: Lety Oquendo PA-C  PCP: MARLIN Malhotra  Admission Date:   Admission Orders (From admission, onward)       Ordered        03/25/24 1841  INPATIENT ADMISSION  Once                          Discharge Date: 03/31/24    Medical Problems       Resolved Problems  Date Reviewed: 3/31/2024   None         Consultations During Hospital Stay:  Pulmonary/critical care    Procedures Performed:   Intubated emergently on admit, extubated on 3/28    Significant Findings / Test Results:   Hgb A1c 6.3        Complications:  none    Reason for Admission: angioedema    Hospital Course:     Henry Mittal is a 68 y.o. male patient who originally presented to the hospital on 3/25/2024 due to acute resp failure due to angioedema. Developed progressive facial/tongue edema a few hours after taking amoxicillin. There was also concern about ingestion of an ACE inhibitor and he had recently eaten cod fish.  He was initially admitted to ICU and was emergently intubated. He was able to be extubated to BIPAP on 3/28 and subsequently BIPAP was discontinued 3/29.   During his hospitalization he was started on and completed a course of IV ancef for a dental infection  He was initially ordered IV steroids while intubated, transitioned to PO prednisone. He completed his last dose of PO prednisone prior to discharge. He will continue PO claritin at home.   His blood sugars were noted to be elevated in the hospital, likely partially due to steroid therapy. However it does appear he had a diagnosis of diabetes pre hospitalization. He was therefore started on lantus 13 untis daily. Blood sugars improved on lantus. He will be given a prescription to continue at home  On 3/29 he was seen by  "therapy and recommendation at that time was short stay in inpatient rehab. Patient feels better today and wants to go home with home care services. Case management arranging home care.         Please see above list of diagnoses and related plan for additional information.     Condition at Discharge: stable     Discharge Day Visit / Exam:     Subjective:  \"I'm ready to go home\"  Vitals: Blood Pressure: 123/74 (03/31/24 0740)  Pulse: 69 (03/31/24 0740)  Temperature: 97.6 °F (36.4 °C) (03/31/24 0740)  Temp Source: Oral (03/29/24 1200)  Respirations: 16 (03/31/24 0740)  Height: 5' 8\" (172.7 cm) (03/26/24 2000)  Weight - Scale: 121 kg (266 lb 5.1 oz) (03/31/24 0600)  SpO2: 92 % (03/31/24 0746)  Exam:   Physical Exam  Vitals reviewed.   Constitutional:       General: He is not in acute distress.     Appearance: He is obese. He is not diaphoretic.   HENT:      Head: Normocephalic and atraumatic.      Nose: Nose normal.      Mouth/Throat:      Pharynx: Oropharynx is clear.      Comments: No tongue swelling or facial edema  Cardiovascular:      Rate and Rhythm: Normal rate.   Pulmonary:      Effort: Pulmonary effort is normal. No respiratory distress.      Breath sounds: Normal breath sounds.   Abdominal:      General: There is no distension.      Palpations: Abdomen is soft.      Tenderness: There is no abdominal tenderness.   Musculoskeletal:         General: No deformity or signs of injury.   Skin:     General: Skin is warm and dry.   Neurological:      Mental Status: He is alert and oriented to person, place, and time.         Discussion with Family: neighbors at bedside, updated    Discharge instructions/Information to patient and family:   See after visit summary for information provided to patient and family.      Provisions for Follow-Up Care:  See after visit summary for information related to follow-up care and any pertinent home health orders.      Disposition:     Home with [facility] Services (Reminder: Complete face " to face encounter)        Planned Readmission: no     Discharge Statement:  I spent 45 minutes discharging the patient. This time was spent on the day of discharge. I had direct contact with the patient on the day of discharge. Greater than 50% of the total time was spent examining patient, answering all patient questions, arranging and discussing plan of care with patient as well as directly providing post-discharge instructions.  Additional time then spent on discharge activities.    Discharge Medications:  See after visit summary for reconciled discharge medications provided to patient and family.      ** Please Note: This note has been constructed using a voice recognition system **

## 2024-04-01 ENCOUNTER — HOME CARE VISIT (OUTPATIENT)
Dept: HOME HEALTH SERVICES | Facility: HOME HEALTHCARE | Age: 69
End: 2024-04-01

## 2024-04-01 ENCOUNTER — TRANSITIONAL CARE MANAGEMENT (OUTPATIENT)
Dept: FAMILY MEDICINE CLINIC | Facility: CLINIC | Age: 69
End: 2024-04-01

## 2024-04-01 ENCOUNTER — HOME CARE VISIT (OUTPATIENT)
Dept: HOME HEALTH SERVICES | Facility: HOME HEALTHCARE | Age: 69
End: 2024-04-01
Payer: COMMERCIAL

## 2024-04-01 VITALS
TEMPERATURE: 97.9 F | SYSTOLIC BLOOD PRESSURE: 116 MMHG | WEIGHT: 267 LBS | BODY MASS INDEX: 39.55 KG/M2 | DIASTOLIC BLOOD PRESSURE: 72 MMHG | HEART RATE: 73 BPM | OXYGEN SATURATION: 93 % | HEIGHT: 69 IN

## 2024-04-01 DIAGNOSIS — Z71.89 COMPLEX CARE COORDINATION: Primary | ICD-10-CM

## 2024-04-01 PROCEDURE — G0151 HHCP-SERV OF PT,EA 15 MIN: HCPCS

## 2024-04-01 PROCEDURE — 400013 VN SOC

## 2024-04-01 NOTE — CASE COMMUNICATION
[facility] has admitted your patient to Home Health service with the following disciplines:      PT and OT  Response needed, please respond via in basket messaging regarding pts medication listed below and major drug to drug medication interaction  Primary focus of home health care PULMONARY    Patient stated goals of care to get legs stronger and less pain in order to walk down to the first floor to get the mail, to be able to get  to doctors appointments and use the Uber    Anticipated visit pattern 2wk3  See medication list - meds in home differ from AVS  pt uses bubble pack for medication management.  Pt no longer uses Albuterol, no longer takes multivitamin    Pt needs to  at the pharmacy vit c 1000 mg tablet, loratadine 10mg 1 tablet daily, semaglutide, famotidine. This medication is listed in DC AVS from recent hospital dc.  Pt uses nebulaizer 2xs per  day with albuterol sulfate fluid    Major drug to drug medicatin interaction:  1) busPIRone and traZODone    Pt is taking Synjardy xr  oral tablet extended release, 1 daily  Pt is taking vit D2 500-1000 unit cap 1 daily  These 2 medications are in pts medication bubble pack    PC to pts current PCP, Dr Daysi Carter, concerning medication information listed above. Spoke with Kristin.    Significant clinical findings pt is a fall  risk, dec BLE strength, ataxic and unsteady gait and movement, currently use of personal DME for ambulation and mobility    Potential barriers to goal achievement lives alone, difficulty ambulating , pain    Other pertinent information pts neighbor is currently assisting pt with cooking and medicine management    Thank you for allowing us to participate in the care of your patient.      Mandi Philippe DPT

## 2024-04-01 NOTE — UTILIZATION REVIEW
NOTIFICATION OF ADMISSION DISCHARGE   This is a Notification of Discharge from [facility]. Please be advised that this patient has been discharge from our facility. Below you will find the admission and discharge date and time including the patient’s disposition.   UTILIZATION REVIEW CONTACT:  Emma Mathew  Utilization   Network Utilization Review Department  Phone: [phone] x carefully listen to the prompts. All voicemails are confidential.  Email: [email or web address]     ADMISSION INFORMATION  PRESENTATION DATE: 3/25/2024  5:26 PM  OBERVATION ADMISSION DATE:   INPATIENT ADMISSION DATE: 3/25/24  6:41 PM   DISCHARGE DATE: 3/31/2024  2:35 PM   DISPOSITION:Home with Home Health Care    Network Utilization Review Department  ATTENTION: Please call with any questions or concerns to [phone] and carefully listen to the prompts so that you are directed to the right person. All voicemails are confidential.   For Discharge needs, contact Care Management DC Support Team at [phone] opt. [phone]  Send all requests for admission clinical reviews, approved or denied determinations and any other requests to dedicated fax number below belonging to the campus where the patient is receiving treatment. List of dedicated fax numbers for the Facilities:  FACILITY NAME UR FAX NUMBER   ADMISSION DENIALS (Administrative/Medical Necessity) [phone]   DISCHARGE SUPPORT TEAM ([facility]) [phone]   PARENT CHILD HEALTH (Maternity/NICU/Pediatrics) [phone]   [facility] [phone]   [facility] [phone]   [facility] [phone]   [facility] [phone]   [facility] [phone]   [facility] [phone]   [facility] [phone]   [facility]  [phone]   [facility] [phone]   [facility] [phone]   [facility] [phone]   [facility] [phone]

## 2024-04-01 NOTE — CASE COMMUNICATION
Pts PCP:    [place] [phone] [address]  [place] [phone]  dr hubert echevarria   fax [phone]

## 2024-04-02 ENCOUNTER — PATIENT OUTREACH (OUTPATIENT)
Dept: FAMILY MEDICINE CLINIC | Facility: CLINIC | Age: 69
End: 2024-04-02

## 2024-04-02 NOTE — PROGRESS NOTES
Spoke with Henry using  [phone]. [facility]. Medications are pill packed thru [facility]. .   To see Rashida ISAAC tomorrow.   Patient reports he was given hand scripts and cannot find the. Has Lasix and is taking it. Would need order sent to pharmacy for Loratadine, Famotidine and Vitamin C.I will inform PCP office of this.   Henry reports he has list of his medications but does not know what they are for.  He would like further outreaches

## 2024-04-03 DIAGNOSIS — K21.9 GERD (GASTROESOPHAGEAL REFLUX DISEASE): ICD-10-CM

## 2024-04-03 DIAGNOSIS — T78.3XXD ANGIOEDEMA, SUBSEQUENT ENCOUNTER: ICD-10-CM

## 2024-04-03 DIAGNOSIS — U07.1 COVID-19: ICD-10-CM

## 2024-04-03 RX ORDER — LORATADINE 10 MG/1
10 TABLET ORAL DAILY
Qty: 30 TABLET | Refills: 0 | Status: SHIPPED | OUTPATIENT
Start: 2024-04-03

## 2024-04-03 RX ORDER — FAMOTIDINE 20 MG/1
20 TABLET, FILM COATED ORAL 2 TIMES DAILY
Qty: 30 TABLET | Refills: 0 | Status: SHIPPED | OUTPATIENT
Start: 2024-04-03

## 2024-04-03 RX ORDER — MULTIVIT WITH MINERALS/LUTEIN
1000 TABLET ORAL 2 TIMES DAILY
Qty: 20 TABLET | Refills: 0 | Status: SHIPPED | OUTPATIENT
Start: 2024-04-03

## 2024-04-04 ENCOUNTER — PATIENT OUTREACH (OUTPATIENT)
Dept: FAMILY MEDICINE CLINIC | Facility: CLINIC | Age: 69
End: 2024-04-04

## 2024-04-04 ENCOUNTER — TELEPHONE (OUTPATIENT)
Dept: FAMILY MEDICINE CLINIC | Facility: CLINIC | Age: 69
End: 2024-04-04

## 2024-04-04 ENCOUNTER — HOME CARE VISIT (OUTPATIENT)
Dept: HOME HEALTH SERVICES | Facility: HOME HEALTHCARE | Age: 69
End: 2024-04-04
Payer: COMMERCIAL

## 2024-04-04 VITALS — HEART RATE: 87 BPM | OXYGEN SATURATION: 94 % | SYSTOLIC BLOOD PRESSURE: 114 MMHG | DIASTOLIC BLOOD PRESSURE: 66 MMHG

## 2024-04-04 VITALS
OXYGEN SATURATION: 95 % | DIASTOLIC BLOOD PRESSURE: 62 MMHG | SYSTOLIC BLOOD PRESSURE: 110 MMHG | HEART RATE: 90 BPM | RESPIRATION RATE: 20 BRPM

## 2024-04-04 PROCEDURE — G0152 HHCP-SERV OF OT,EA 15 MIN: HCPCS | Performed by: OCCUPATIONAL THERAPIST

## 2024-04-04 NOTE — TELEPHONE ENCOUNTER
Received ADT for this patient and was present on [facility] even though he is not a patient of [facility].  Established with MARLIN Crhistensen on 4/2/24.

## 2024-04-04 NOTE — PROGRESS NOTES
Spiritual Care Progress Note    2024  Patient: Henry Mittal : 1955  Admission Date & Time: 3/25/2024 1726  MRN: [id number] CSN: [id number]      Fr Toledo met with the pt and provided prayers and blessings. No further needs were expressed at this time.    Chaplains still remain available.       24 0900   Clinical Encounter Type   Visited With Patient   Alevism Encounters   Alevism Needs Prayer

## 2024-04-04 NOTE — PROGRESS NOTES
Detailed message left for Henry using  [phone]. Patient instructed he can call [facility] to set up appointment with Pashto speaking provider if he would like

## 2024-04-05 ENCOUNTER — HOME CARE VISIT (OUTPATIENT)
Dept: HOME HEALTH SERVICES | Facility: HOME HEALTHCARE | Age: 69
End: 2024-04-05
Payer: COMMERCIAL

## 2024-04-05 PROCEDURE — G0155 HHCP-SVS OF CSW,EA 15 MIN: HCPCS

## 2024-04-08 ENCOUNTER — HOME CARE VISIT (OUTPATIENT)
Dept: HOME HEALTH SERVICES | Facility: HOME HEALTHCARE | Age: 69
End: 2024-04-08
Payer: COMMERCIAL

## 2024-04-08 VITALS — DIASTOLIC BLOOD PRESSURE: 67 MMHG | SYSTOLIC BLOOD PRESSURE: 118 MMHG | OXYGEN SATURATION: 96 % | HEART RATE: 77 BPM

## 2024-04-08 PROCEDURE — G0152 HHCP-SERV OF OT,EA 15 MIN: HCPCS

## 2024-04-09 ENCOUNTER — HOME CARE VISIT (OUTPATIENT)
Dept: HOME HEALTH SERVICES | Facility: HOME HEALTHCARE | Age: 69
End: 2024-04-09
Payer: COMMERCIAL

## 2024-04-10 ENCOUNTER — HOME CARE VISIT (OUTPATIENT)
Dept: HOME HEALTH SERVICES | Facility: HOME HEALTHCARE | Age: 69
End: 2024-04-10
Payer: COMMERCIAL

## 2024-04-11 PROCEDURE — G0180 MD CERTIFICATION HHA PATIENT: HCPCS | Performed by: STUDENT IN AN ORGANIZED HEALTH CARE EDUCATION/TRAINING PROGRAM

## 2024-04-12 ENCOUNTER — HOME CARE VISIT (OUTPATIENT)
Dept: HOME HEALTH SERVICES | Facility: HOME HEALTHCARE | Age: 69
End: 2024-04-12
Payer: COMMERCIAL

## 2024-04-12 VITALS
RESPIRATION RATE: 20 BRPM | BODY MASS INDEX: 37.95 KG/M2 | SYSTOLIC BLOOD PRESSURE: 110 MMHG | HEART RATE: 95 BPM | WEIGHT: 257 LBS | OXYGEN SATURATION: 95 % | DIASTOLIC BLOOD PRESSURE: 68 MMHG

## 2024-04-12 VITALS — DIASTOLIC BLOOD PRESSURE: 74 MMHG | SYSTOLIC BLOOD PRESSURE: 127 MMHG | HEART RATE: 92 BPM | OXYGEN SATURATION: 95 %

## 2024-04-12 PROCEDURE — G0152 HHCP-SERV OF OT,EA 15 MIN: HCPCS

## 2024-04-12 PROCEDURE — G0151 HHCP-SERV OF PT,EA 15 MIN: HCPCS

## 2024-04-14 ENCOUNTER — HOME CARE VISIT (OUTPATIENT)
Dept: HOME HEALTH SERVICES | Facility: HOME HEALTHCARE | Age: 69
End: 2024-04-14
Payer: COMMERCIAL

## 2024-04-14 PROCEDURE — G0152 HHCP-SERV OF OT,EA 15 MIN: HCPCS

## 2024-04-15 ENCOUNTER — HOME CARE VISIT (OUTPATIENT)
Dept: HOME HEALTH SERVICES | Facility: HOME HEALTHCARE | Age: 69
End: 2024-04-15
Payer: COMMERCIAL

## 2024-04-15 VITALS — OXYGEN SATURATION: 96 % | SYSTOLIC BLOOD PRESSURE: 118 MMHG | DIASTOLIC BLOOD PRESSURE: 64 MMHG | HEART RATE: 78 BPM

## 2024-04-15 DIAGNOSIS — K21.9 GERD (GASTROESOPHAGEAL REFLUX DISEASE): ICD-10-CM

## 2024-04-15 PROCEDURE — G0158 HHC OT ASSISTANT EA 15: HCPCS

## 2024-04-15 RX ORDER — FAMOTIDINE 20 MG/1
20 TABLET, FILM COATED ORAL 2 TIMES DAILY
Qty: 30 TABLET | Refills: 0 | Status: SHIPPED | OUTPATIENT
Start: 2024-04-15

## 2024-04-17 ENCOUNTER — HOME CARE VISIT (OUTPATIENT)
Dept: HOME HEALTH SERVICES | Facility: HOME HEALTHCARE | Age: 69
End: 2024-04-17
Payer: COMMERCIAL

## 2024-04-17 VITALS
RESPIRATION RATE: 20 BRPM | SYSTOLIC BLOOD PRESSURE: 120 MMHG | DIASTOLIC BLOOD PRESSURE: 76 MMHG | OXYGEN SATURATION: 96 % | HEART RATE: 76 BPM

## 2024-04-17 PROCEDURE — G0151 HHCP-SERV OF PT,EA 15 MIN: HCPCS

## 2024-04-19 ENCOUNTER — HOME CARE VISIT (OUTPATIENT)
Dept: HOME HEALTH SERVICES | Facility: HOME HEALTHCARE | Age: 69
End: 2024-04-19
Payer: COMMERCIAL

## 2024-04-19 VITALS
HEART RATE: 76 BPM | SYSTOLIC BLOOD PRESSURE: 138 MMHG | DIASTOLIC BLOOD PRESSURE: 72 MMHG | RESPIRATION RATE: 20 BRPM | OXYGEN SATURATION: 96 %

## 2024-04-19 PROCEDURE — G0151 HHCP-SERV OF PT,EA 15 MIN: HCPCS

## 2024-04-24 ENCOUNTER — PATIENT OUTREACH (OUTPATIENT)
Dept: FAMILY MEDICINE CLINIC | Facility: CLINIC | Age: 69
End: 2024-04-24

## 2024-04-24 NOTE — PROGRESS NOTES
Reviewed [facility]'s home health notes. Patient continues with Daysi ISAAC for primary care. Will close from care management at this time

## 2024-04-26 ENCOUNTER — HOME CARE VISIT (OUTPATIENT)
Dept: HOME HEALTH SERVICES | Facility: HOME HEALTHCARE | Age: 69
End: 2024-04-26
Payer: COMMERCIAL

## 2024-04-26 PROCEDURE — G0152 HHCP-SERV OF OT,EA 15 MIN: HCPCS

## 2024-06-19 DIAGNOSIS — K21.9 GERD (GASTROESOPHAGEAL REFLUX DISEASE): ICD-10-CM

## 2024-06-19 RX ORDER — FAMOTIDINE 20 MG/1
20 TABLET, FILM COATED ORAL 2 TIMES DAILY
Qty: 30 TABLET | Refills: 0 | Status: SHIPPED | OUTPATIENT
Start: 2024-06-19

## 2024-08-01 NOTE — PROGRESS NOTES
Outpatient Cardiology Consult Note - Henry Mittal 68 y.o. male MRN: [id number]    @ Encounter: [id number]        Patient Active Problem List    Diagnosis Date Noted    Dental infection 03/30/2024    Angioedema 03/25/2024    Lumbar back pain 05/03/2023    Lumbar radiculopathy 05/03/2023    Bronchitis 03/16/2023    Exacerbation of asthma 03/16/2023    Chronic obstructive pulmonary disease, unspecified COPD type (HCC) 02/28/2023    Hyperprolactinemia (HCC) 02/28/2023    Pain in left hip 03/21/2022    Left hip pain 03/18/2022    Cystitis 10/26/2021    Primary insomnia 10/26/2021    Chronic pain of left knee 10/26/2021    Other constipation 10/26/2021    Vitamin D deficiency 12/06/2019    Chronic gout of right knee 09/16/2019    Sebaceous cyst 06/18/2019    Osteoarthritis of both knees 12/03/2018    Hyperlipidemia 07/23/2018    Depression 07/23/2018    Essential hypertension 07/09/2018    Type 2 diabetes mellitus with other specified complication, without long-term current use of insulin (HCC) 07/09/2018    Asthma 07/09/2018    CKD (chronic kidney disease) stage 2, GFR 60-89 ml/min 07/09/2018    Obesity (BMI 30-39.9) 07/09/2018       Assessment:  # Chronic HFpEF  Diuretic:- change to torsemide 20 mg daily  SGLT2i: Snjardy 12.5/100 mg   Weight: 266 lbs  BNP:    Studies- personally reviewed by me  EKG- SR, 1st degree AV block    PFTs 2/1/24: no obstruction, mild restriction    Echo 9/27/18:  LVEF: 72%, mild LVH    # DM2- HgA1C 6.3% on 3/22/24  - Ozempic  # hyperlipidemia- atorvastatin 80 mg, Zetia 10 mg  # depression  # asthma  # HTN  # obesity    Today's Plan:  Sleep study  Change lasix to 20 mg to torsemide 20 mg daily  Drink less  Weight loss    HPI:      67 yo male referred for HFpEF. He was admitted to the hospital this past March with acute respiratory failure and angioedema after taking amoxicillin for dental infection. He was intubated.   He is coughing a lot, gets short of breath, occasionally gets  "edema.     Past Medical History:   Diagnosis Date    Anxiety     Asthma     Cardiac disease     COPD (chronic obstructive pulmonary disease) (HCC)     Diabetes mellitus (HCC)     Gout     Hyperlipidemia     Hypertension     Shortness of breath     with exertion       Review of Systems   Constitutional:  Negative for activity change, appetite change, fatigue and unexpected weight change.   HENT:  Negative for congestion and nosebleeds.    Eyes: Negative.    Respiratory:  Negative for cough, chest tightness and shortness of breath.    Cardiovascular:  Negative for chest pain, palpitations and leg swelling.   Gastrointestinal:  Negative for abdominal distention.   Endocrine: Negative.    Genitourinary: Negative.    Musculoskeletal: Negative.    Skin: Negative.    Neurological:  Negative for dizziness, syncope and weakness.   Hematological: Negative.    Psychiatric/Behavioral: Negative.         Allergies   Allergen Reactions    Amoxicillin Anaphylaxis     Hives, angioedema        .    Current Outpatient Medications:     albuterol (2.5 mg/3 mL) 0.083 % nebulizer solution, Take 2.5 mg by nebulization every 4 (four) hours as needed for shortness of breath. Indications: ., Disp: , Rfl:     albuterol (PROVENTIL HFA,VENTOLIN HFA) 90 mcg/act inhaler, Inhale 2 puffs every 6 (six) hours as needed for wheezing or shortness of breath, Disp: 8.5 g, Rfl: 3    Alcohol Swabs (ALCOHOL PADS) 70 % PADS, Use as directed, Disp: , Rfl: 0    allopurinol (ZYLOPRIM) 300 mg tablet, JENNIFER 1 TABLETA POR VIA ORAL DIARIAMENTE, Disp: , Rfl: 2    Ascorbic Acid (vitamin C) 1000 MG tablet, Take 1 tablet (1,000 mg total) by mouth 2 (two) times a day, Disp: 20 tablet, Rfl: 0    atorvastatin (LIPITOR) 80 mg tablet, TAKE ONE TABLET (80 MG TOTAL) BY MOUTH DAILY WITH DINNER., Disp: , Rfl:     B-D 3CC LUER-WHITNEY SYR 85LZ4-0/2 21G X 1-1/2\" 3 ML MISC, FOR TESTOSTERONE INJECTIONS EVERY 14 DAYS, Disp: , Rfl:     Blood Glucose Monitoring Suppl (FREESTYLE LITE) ANGE, " Use as directed, Disp: , Rfl: 0    busPIRone (BUSPAR) 7.5 mg tablet, JENNIFER 1 TABLETA POR VIA ORAL DOS VECES AL CORAL FOR ANXIETY, Disp: , Rfl:     Continuous Glucose Sensor (FreeStyle Dunia 3 Sensor) MISC, , Disp: , Rfl:     Empagliflozin-metFORMIN HCl (Synjardy) 12.5-1000 MG TABS, Take 1 tablet by mouth daily. Indications: Type 2 Diabetes, Disp: , Rfl:     ergocalciferol (VITAMIN D2) 50,000 units, Take 50,000 Units by mouth, Disp: , Rfl:     ezetimibe (ZETIA) 10 mg tablet, TAKE ONE TABLET (10 MG TOTAL) BY MOUTH DAILY., Disp: , Rfl:     famotidine (PEPCID) 20 mg tablet, TAKE 1 TABLET (20 MG TOTAL) BY MOUTH 2 (TWO) TIMES A DAY, Disp: 30 tablet, Rfl: 0    furosemide (LASIX) 20 mg tablet, TAKE 1 TABLET (20 MG TOTAL) BY MOUTH DAILY, Disp: 90 tablet, Rfl: 0    gabapentin (NEURONTIN) 300 mg capsule, TAKE ONE CAPSULE (300 MG TOTAL) BY MOUTH 2 (TWO) TIMES A DAY., Disp: , Rfl:     Lancets (OneTouch Delica Plus [id number]) MISC, USE TO TEST BLOOD SUGARS DAILY, Disp: , Rfl:     Multiple Vitamin (multivitamin) tablet, Take 1 tablet by mouth daily, Disp: 10 tablet, Rfl: 0    OneTouch Verio test strip, USE TO TEST BLOOD SUGARS ONCE DAILY, Disp: , Rfl:     polymyxin b-trimethoprim (POLYTRIM) ophthalmic solution, Administer 1 drop to both eyes every 6 (six) hours, Disp: , Rfl:     semaglutide, 2 mg/dose, (Ozempic, 2 MG/DOSE,) 8 mg/ mL injection pen, Inject 0.75 mL (2 mg total) under the skin every 7 days, Disp: , Rfl:     traZODone (DESYREL) 50 mg tablet, Take 1 tablet (50 mg total) by mouth daily at bedtime as needed for sleep, Disp: 30 tablet, Rfl: 1    Trelegy Ellipta 100-62.5-25 MCG/ACT inhaler, TOME ONE PUFF ONCE A DAY, Disp: , Rfl:     triamcinolone (KENALOG) 0.1 % cream, Apply topically 2 (two) times a day, Disp: , Rfl:     Vascepa 1 g CAPS, Take 1 capsule by mouth 2 (two) times a day, Disp: , Rfl:     cholecalciferol (VITAMIN D3) 1,000 units tablet, Take 2 tablets (2,000 Units total) by mouth daily (Patient not taking:  Reported on 2024), Disp: 180 tablet, Rfl: 3    docusate sodium (COLACE) 100 mg capsule, Take 100 mg by mouth 2 (two) times a day (Patient not taking: Reported on 2024), Disp: , Rfl:     doxycycline hyclate (VIBRAMYCIN) 100 mg capsule, Take 100 mg by mouth every 12 (twelve) hours (Patient not taking: Reported on 2024), Disp: , Rfl:     ibuprofen (MOTRIN) 800 mg tablet, Take 800 mg by mouth every 6 (six) hours as needed for mild pain (Patient not taking: Reported on 2024), Disp: , Rfl:     loratadine (CLARITIN) 10 mg tablet, Take 1 tablet (10 mg total) by mouth daily (Patient not taking: Reported on 2024), Disp: 30 tablet, Rfl: 0    Nexlizet 180-10 MG TABS, Take 1 each by mouth (Patient not taking: Reported on 2024), Disp: , Rfl:     tobramycin-dexamethasone (TOBRADEX) ophthalmic suspension, Administer 1 drop to both eyes every 4 (four) hours while awake (Patient not taking: Reported on 2024), Disp: , Rfl:     Xyosted 100 MG/0.5ML SOAJ, Inject 100 mg under the skin Once a week (Patient not taking: Reported on 2024), Disp: , Rfl:     Social History     Socioeconomic History    Marital status: Single     Spouse name: Not on file    Number of children: Not on file    Years of education: Not on file    Highest education level: Not asked   Occupational History    Not on file   Tobacco Use    Smoking status: Former     Current packs/day: 0.00     Average packs/day: 2.0 packs/day for 38.0 years (76.0 ttl pk-yrs)     Types: Cigarettes     Start date:      Quit date: 2006     Years since quittin.6     Passive exposure: Never    Smokeless tobacco: Never   Vaping Use    Vaping status: Never Used   Substance and Sexual Activity    Alcohol use: Yes     Comment: occasionally    Drug use: No    Sexual activity: Never   Other Topics Concern    Not on file   Social History Narrative    Not on file     Social Determinants of Health     Financial Resource Strain: Low Risk  (2023)    Overall  "Financial Resource Strain (CARDIA)     Difficulty of Paying Living Expenses: Not very hard   Food Insecurity: No Food Insecurity (3/27/2024)    Hunger Vital Sign     Worried About Running Out of Food in the Last Year: Never true     Ran Out of Food in the Last Year: Never true   Transportation Needs: No Transportation Needs (3/27/2024)    PRAPARE - Transportation     Lack of Transportation (Medical): No     Lack of Transportation (Non-Medical): No   Physical Activity: Not on file   Stress: Not on file   Social Connections: Unknown (2/1/2019)    Social Connection and Isolation Panel [NHANES]     Frequency of Communication with Friends and Family: Not on file     Frequency of Social Gatherings with Friends and Family: Not on file     Attends Jainism Services: Not on file     Active Member of Clubs or Organizations: Not on file     Attends Club or Organization Meetings: Not on file     Marital Status: Not asked   Intimate Partner Violence: Not on file   Housing Stability: Unknown (3/27/2024)    Housing Stability Vital Sign     Unable to Pay for Housing in the Last Year: No     Number of Times Moved in the Last Year: Not on file     Homeless in the Last Year: No       Family History   Problem Relation Age of Onset    Cancer Sister        Physical Exam:    Vitals: Blood pressure 110/60, pulse 100, height 5' 8\" (1.727 m), weight 123 kg (272 lb), SpO2 95%., Body mass index is 41.36 kg/m².,   Wt Readings from Last 3 Encounters:   08/05/24 123 kg (272 lb)   04/12/24 117 kg (257 lb)   04/01/24 121 kg (267 lb)         Physical Exam  Constitutional:       Appearance: He is well-developed.   HENT:      Head: Normocephalic and atraumatic.   Eyes:      Pupils: Pupils are equal, round, and reactive to light.   Neck:      Vascular: No JVD.   Cardiovascular:      Rate and Rhythm: Normal rate and regular rhythm.      Heart sounds: No murmur heard.  Pulmonary:      Effort: Pulmonary effort is normal. No respiratory distress.      " "Breath sounds: Normal breath sounds.   Abdominal:      General: There is no distension.      Palpations: Abdomen is soft.      Tenderness: There is no abdominal tenderness.   Musculoskeletal:         General: No edema. Normal range of motion.      Cervical back: Normal range of motion.   Skin:     General: Skin is warm and dry.      Findings: No rash.   Neurological:      Mental Status: He is alert and oriented to person, place, and time.   Psychiatric:         Mood and Affect: Mood and affect normal.         Labs & Results:    Lab Results   Component Value Date    WBC 12.67 (H) 03/31/2024    HGB 14.0 03/31/2024    HCT 43.4 03/31/2024    MCV 88 03/31/2024     03/31/2024     Lab Results   Component Value Date    SODIUM 139 03/31/2024    K 3.6 03/31/2024     03/31/2024    CO2 26 03/31/2024    BUN 22 03/31/2024    CREATININE 0.61 03/31/2024    GLUC 82 03/31/2024    CALCIUM 8.5 03/31/2024     No results found for: \"BNP\"   Lab Results   Component Value Date    CHOLESTEROL 212 (H) 12/02/2023    CHOLESTEROL 170 05/03/2023    CHOLESTEROL 207 (H) 01/26/2023     Lab Results   Component Value Date    HDL 48 12/02/2023    HDL 58 05/03/2023    HDL 48 01/26/2023     Lab Results   Component Value Date    TRIG 139 03/26/2024    TRIG 138 12/02/2023    TRIG 103 05/03/2023     Lab Results   Component Value Date    NONHDLC 164 12/02/2023    NONHDLC 112 05/03/2023    NONHDLC 159 01/26/2023             EKG personally reviewed by Eric Cabral DO.     Counseling / Coordination of Care  Time spent today 40 minutes.  Greater than 50% of total time was spent with the patient and / or family counseling and / or coordination of care. We discussed diagnoses, most recent studies and any changes in treatment  Thank you for the opportunity to participate in the care of this patient.    ERIC CABRAL D.O.  DIRECTOR OF HEART FAILURE/ PULMONARY HYPERTENSION  MEDICAL DIRECTOR OF LVAD PROGRAM  [facility]  "

## 2024-08-05 ENCOUNTER — CONSULT (OUTPATIENT)
Dept: CARDIOLOGY CLINIC | Facility: CLINIC | Age: 69
End: 2024-08-05
Payer: COMMERCIAL

## 2024-08-05 VITALS
BODY MASS INDEX: 41.22 KG/M2 | SYSTOLIC BLOOD PRESSURE: 110 MMHG | OXYGEN SATURATION: 95 % | HEART RATE: 100 BPM | HEIGHT: 68 IN | DIASTOLIC BLOOD PRESSURE: 60 MMHG | WEIGHT: 272 LBS

## 2024-08-05 DIAGNOSIS — I10 ESSENTIAL HYPERTENSION: Primary | ICD-10-CM

## 2024-08-05 DIAGNOSIS — I50.9 CHRONIC HEART FAILURE, UNSPECIFIED HEART FAILURE TYPE (HCC): ICD-10-CM

## 2024-08-05 DIAGNOSIS — E78.5 HYPERLIPIDEMIA, UNSPECIFIED HYPERLIPIDEMIA TYPE: ICD-10-CM

## 2024-08-05 DIAGNOSIS — I50.32 CHRONIC HEART FAILURE WITH PRESERVED EJECTION FRACTION (HCC): ICD-10-CM

## 2024-08-05 DIAGNOSIS — E78.00 PURE HYPERCHOLESTEROLEMIA: ICD-10-CM

## 2024-08-05 DIAGNOSIS — E66.01 OBESITY, MORBID (HCC): ICD-10-CM

## 2024-08-05 DIAGNOSIS — G47.33 OSA (OBSTRUCTIVE SLEEP APNEA): ICD-10-CM

## 2024-08-05 DIAGNOSIS — R00.2 PALPITATIONS: ICD-10-CM

## 2024-08-05 DIAGNOSIS — E66.9 OBESITY (BMI 30-39.9): ICD-10-CM

## 2024-08-05 PROCEDURE — 99204 OFFICE O/P NEW MOD 45 MIN: CPT | Performed by: INTERNAL MEDICINE

## 2024-08-05 RX ORDER — TRIAMCINOLONE ACETONIDE 1 MG/G
CREAM TOPICAL 2 TIMES DAILY
COMMUNITY

## 2024-08-05 RX ORDER — TESTOSTERONE ENANTHATE 100 MG/.5ML
100 INJECTION SUBCUTANEOUS WEEKLY
COMMUNITY
Start: 2024-07-12

## 2024-08-05 RX ORDER — ICOSAPENT ETHYL 1000 MG/1
1 CAPSULE ORAL 2 TIMES DAILY
COMMUNITY
Start: 2024-07-22

## 2024-08-05 RX ORDER — BEMPEDOIC ACID AND EZETIMIBE 180; 10 MG/1; MG/1
1 TABLET, FILM COATED ORAL
COMMUNITY
Start: 2024-07-12

## 2024-08-05 RX ORDER — TORSEMIDE 20 MG/1
20 TABLET ORAL DAILY
Qty: 30 TABLET | Refills: 5 | Status: SHIPPED | OUTPATIENT
Start: 2024-08-05

## 2024-08-06 ENCOUNTER — TELEPHONE (OUTPATIENT)
Age: 69
End: 2024-08-06

## 2024-08-15 ENCOUNTER — OFFICE VISIT (OUTPATIENT)
Dept: PULMONOLOGY | Facility: CLINIC | Age: 69
End: 2024-08-15
Payer: COMMERCIAL

## 2024-08-15 VITALS
SYSTOLIC BLOOD PRESSURE: 128 MMHG | HEART RATE: 70 BPM | DIASTOLIC BLOOD PRESSURE: 68 MMHG | WEIGHT: 272 LBS | TEMPERATURE: 98.8 F | OXYGEN SATURATION: 94 % | BODY MASS INDEX: 41.36 KG/M2

## 2024-08-15 DIAGNOSIS — J45.40 MODERATE PERSISTENT ASTHMA WITHOUT COMPLICATION: ICD-10-CM

## 2024-08-15 DIAGNOSIS — J44.9 CHRONIC OBSTRUCTIVE PULMONARY DISEASE, UNSPECIFIED COPD TYPE (HCC): Primary | ICD-10-CM

## 2024-08-15 DIAGNOSIS — E66.01 OBESITY, MORBID (HCC): ICD-10-CM

## 2024-08-15 DIAGNOSIS — R13.12 OROPHARYNGEAL DYSPHAGIA: ICD-10-CM

## 2024-08-15 DIAGNOSIS — R06.83 SNORING: ICD-10-CM

## 2024-08-15 PROBLEM — J40 BRONCHITIS: Status: RESOLVED | Noted: 2023-03-16 | Resolved: 2024-08-15

## 2024-08-15 PROBLEM — E66.9 OBESITY (BMI 30-39.9): Status: RESOLVED | Noted: 2018-07-09 | Resolved: 2024-08-15

## 2024-08-15 PROBLEM — J45.901 EXACERBATION OF ASTHMA: Status: RESOLVED | Noted: 2023-03-16 | Resolved: 2024-08-15

## 2024-08-15 PROCEDURE — G2211 COMPLEX E/M VISIT ADD ON: HCPCS | Performed by: INTERNAL MEDICINE

## 2024-08-15 PROCEDURE — 99214 OFFICE O/P EST MOD 30 MIN: CPT | Performed by: INTERNAL MEDICINE

## 2024-08-15 RX ORDER — PREDNISONE 20 MG/1
TABLET ORAL
COMMUNITY
Start: 2024-08-12 | End: 2024-08-15

## 2024-08-15 NOTE — ASSESSMENT & PLAN NOTE
Based on history that he had multiple exacerbations in the past and currently feels better on inhalers but still has some dyspnea on exertion I believe he has asthma.  Diffusion capacity was normal on PFTs with no obstruction.  Probably does not need the Trelegy and only Breo but for now we will keep it as before but when I spoke to the patient about the right way to use the Trelegy he was not doing it correctly as he was not taking deep inhalation and holding his breath so I counseled him and he was able to do the maneuver multiple times correctly.  In the future we can consider nebulized medications or to switch to Breo but for now we will continue the same regimen.  His caregiver also verbalized understanding.

## 2024-08-15 NOTE — ASSESSMENT & PLAN NOTE
There is no evidence of obstruction on spirometry, diffusion capacity is normal, patient does not have COPD.  Will cancel this diagnosis.

## 2024-08-15 NOTE — PROGRESS NOTES
Progress note - Pulmonary Medicine   Henry Mittal 68 y.o. male MRN: [id number]       Impression & Plan:     Chronic obstructive pulmonary disease, unspecified COPD type (HCC)  There is no evidence of obstruction on spirometry, diffusion capacity is normal, patient does not have COPD.  Will cancel this diagnosis.    Asthma  Based on history that he had multiple exacerbations in the past and currently feels better on inhalers but still has some dyspnea on exertion I believe he has asthma.  Diffusion capacity was normal on PFTs with no obstruction.  Probably does not need the Trelegy and only Breo but for now we will keep it as before but when I spoke to the patient about the right way to use the Trelegy he was not doing it correctly as he was not taking deep inhalation and holding his breath so I counseled him and he was able to do the maneuver multiple times correctly.  In the future we can consider nebulized medications or to switch to Breo but for now we will continue the same regimen.  His caregiver also verbalized understanding.    Snoring  Awaiting sleep study in November    Obesity, morbid (HCC)  Encouraged to decrease carb intake to lose weight      Return in about 6 months (around 2/15/2025).    Diagnoses and all orders for this visit:    Chronic obstructive pulmonary disease, unspecified COPD type (HCC)    Moderate persistent asthma without complication    Oropharyngeal dysphagia    Obesity, morbid (HCC)    Snoring    Other orders  -     Discontinue: predniSONE 20 mg tablet; TAKE TWO TABLETS BY MOUTH DAILY FOR 5 DAYS.      ______________________________________________________________________    HPI:    Henry Mittal presents today for follow-up of presumed asthma-COPD and chronic diastolic CHF.    Patient presents today complaining of some shortness of breath, he is on Trelegy and as needed albuterol, denies exacerbation, denies wheezing, denies cough or sputum production but he has  "dyspnea on exertion.  In general he walks using a rolling walker and is not very active.  Denies fever chills or night sweats, denies chest pain.  In the past he got multiple exacerbations.    Patient has snoring and sleep awakening/choking at night, he is due for sleep study in November.  He denies dysphagia.    Patient lives alone but he has a caregiver for few hours every day and she was accompanying him today to the office.      Current Medications:    Current Outpatient Medications:     albuterol (2.5 mg/3 mL) 0.083 % nebulizer solution, Take 2.5 mg by nebulization every 4 (four) hours as needed for shortness of breath. Indications: ., Disp: , Rfl:     Alcohol Swabs (ALCOHOL PADS) 70 % PADS, Use as directed, Disp: , Rfl: 0    allopurinol (ZYLOPRIM) 300 mg tablet, JENNIFER 1 TABLETA POR VIA ORAL DIARIAMENTE, Disp: , Rfl: 2    Ascorbic Acid (vitamin C) 1000 MG tablet, Take 1 tablet (1,000 mg total) by mouth 2 (two) times a day, Disp: 20 tablet, Rfl: 0    atorvastatin (LIPITOR) 80 mg tablet, TAKE ONE TABLET (80 MG TOTAL) BY MOUTH DAILY WITH DINNER., Disp: , Rfl:     B-D 3CC LUER-WHITNEY SYR 93WF1-8/2 21G X 1-1/2\" 3 ML MISC, FOR TESTOSTERONE INJECTIONS EVERY 14 DAYS, Disp: , Rfl:     Blood Glucose Monitoring Suppl (FREESTYLE LITE) ANGE, Use as directed, Disp: , Rfl: 0    busPIRone (BUSPAR) 7.5 mg tablet, JENNIFER 1 TABLETA POR VIA ORAL DOS VECES AL CORAL FOR ANXIETY, Disp: , Rfl:     Continuous Glucose Sensor (FreeStyle Dunia 3 Sensor) MISC, , Disp: , Rfl:     Empagliflozin-metFORMIN HCl (Synjardy) 12.5-1000 MG TABS, Take 1 tablet by mouth daily. Indications: Type 2 Diabetes, Disp: , Rfl:     ergocalciferol (VITAMIN D2) 50,000 units, Take 50,000 Units by mouth, Disp: , Rfl:     ezetimibe (ZETIA) 10 mg tablet, TAKE ONE TABLET (10 MG TOTAL) BY MOUTH DAILY., Disp: , Rfl:     famotidine (PEPCID) 20 mg tablet, TAKE 1 TABLET (20 MG TOTAL) BY MOUTH 2 (TWO) TIMES A DAY, Disp: 30 tablet, Rfl: 0    gabapentin (NEURONTIN) 300 mg capsule, " TAKE ONE CAPSULE (300 MG TOTAL) BY MOUTH 2 (TWO) TIMES A DAY., Disp: , Rfl:     Lancets (OneTouch Delica Plus [id number]) MISC, USE TO TEST BLOOD SUGARS DAILY, Disp: , Rfl:     OneTouch Verio test strip, USE TO TEST BLOOD SUGARS ONCE DAILY, Disp: , Rfl:     polymyxin b-trimethoprim (POLYTRIM) ophthalmic solution, Administer 1 drop to both eyes every 6 (six) hours, Disp: , Rfl:     predniSONE 20 mg tablet, TAKE TWO TABLETS BY MOUTH DAILY FOR 5 DAYS., Disp: , Rfl:     semaglutide, 2 mg/dose, (Ozempic, 2 MG/DOSE,) 8 mg/ mL injection pen, Inject 0.75 mL (2 mg total) under the skin every 7 days, Disp: , Rfl:     torsemide (DEMADEX) 20 mg tablet, Take 1 tablet (20 mg total) by mouth daily, Disp: 30 tablet, Rfl: 5    traZODone (DESYREL) 50 mg tablet, Take 1 tablet (50 mg total) by mouth daily at bedtime as needed for sleep, Disp: 30 tablet, Rfl: 1    Trelegy Ellipta 100-62.5-25 MCG/ACT inhaler, TOME ONE PUFF ONCE A DAY, Disp: , Rfl:     triamcinolone (KENALOG) 0.1 % cream, Apply topically 2 (two) times a day, Disp: , Rfl:     Vascepa 1 g CAPS, Take 1 capsule by mouth 2 (two) times a day, Disp: , Rfl:     albuterol (PROVENTIL HFA,VENTOLIN HFA) 90 mcg/act inhaler, Inhale 2 puffs every 6 (six) hours as needed for wheezing or shortness of breath (Patient not taking: Reported on 8/15/2024), Disp: 8.5 g, Rfl: 3    cholecalciferol (VITAMIN D3) 1,000 units tablet, Take 2 tablets (2,000 Units total) by mouth daily (Patient not taking: Reported on 8/5/2024), Disp: 180 tablet, Rfl: 3    docusate sodium (COLACE) 100 mg capsule, Take 100 mg by mouth 2 (two) times a day (Patient not taking: Reported on 8/5/2024), Disp: , Rfl:     doxycycline hyclate (VIBRAMYCIN) 100 mg capsule, Take 100 mg by mouth every 12 (twelve) hours (Patient not taking: Reported on 8/5/2024), Disp: , Rfl:     ibuprofen (MOTRIN) 800 mg tablet, Take 800 mg by mouth every 6 (six) hours as needed for mild pain (Patient not taking: Reported on 8/5/2024), Disp: , Rfl:      loratadine (CLARITIN) 10 mg tablet, Take 1 tablet (10 mg total) by mouth daily (Patient not taking: Reported on 2024), Disp: 30 tablet, Rfl: 0    Multiple Vitamin (multivitamin) tablet, Take 1 tablet by mouth daily (Patient not taking: Reported on 8/15/2024), Disp: 10 tablet, Rfl: 0    Nexlizet 180-10 MG TABS, Take 1 each by mouth (Patient not taking: Reported on 2024), Disp: , Rfl:     tobramycin-dexamethasone (TOBRADEX) ophthalmic suspension, Administer 1 drop to both eyes every 4 (four) hours while awake (Patient not taking: Reported on 2024), Disp: , Rfl:     Xyosted 100 MG/0.5ML SOAJ, Inject 100 mg under the skin Once a week (Patient not taking: Reported on 2024), Disp: , Rfl:     Review of Systems:  Review of Systems   Constitutional: Negative.    HENT: Negative.     Eyes: Negative.    Respiratory:  Positive for shortness of breath.    Cardiovascular: Negative.    Gastrointestinal: Negative.    Endocrine: Negative.    Genitourinary: Negative.    Musculoskeletal: Negative.    Skin: Negative.    Allergic/Immunologic: Negative.    Neurological: Negative.    Hematological: Negative.    Psychiatric/Behavioral: Negative.       Aside from what is mentioned in the HPI, the review of systems is otherwise negative    Past medical history, surgical history, and family history were reviewed and updated as appropriate    Social history updates:  Social History     Tobacco Use   Smoking Status Former    Current packs/day: 0.00    Average packs/day: 2.0 packs/day for 38.0 years (76.0 ttl pk-yrs)    Types: Cigarettes    Start date:     Quit date: 2006    Years since quittin.6    Passive exposure: Never   Smokeless Tobacco Never       PhysicalExamination:  Vitals:   /68 (BP Location: Left arm, Patient Position: Sitting, Cuff Size: Large)   Pulse 70   Temp 98.8 °F (37.1 °C) (Tympanic)   Wt 123 kg (272 lb)   SpO2 94%   BMI 41.36 kg/m²     Gen:  Comfortable on room air.  No conversational  dyspnea  HEENT:  Conjugate gaze.  sclerae anicteric.  Oropharynx moist  Neck: Trachea is midline. No JVD. No adenopathy  Chest: Equal breath sounds with no wheezing or crackles, clear to auscultation  Cardiac: S1-S2 regular. no murmur  Abdomen:  benign  Extremities: No edema  Neuro:  Normal speech and mentation    Diagnostic Data:  Labs:  I personally reviewed the most recent laboratory data pertinent to today's visit    Lab Results   Component Value Date    WBC 12.67 (H) 03/31/2024    HGB 14.0 03/31/2024    HCT 43.4 03/31/2024    MCV 88 03/31/2024     03/31/2024     Lab Results   Component Value Date    SODIUM 139 03/31/2024    K 3.6 03/31/2024    CO2 26 03/31/2024     03/31/2024    BUN 22 03/31/2024    CREATININE 0.61 03/31/2024    CALCIUM 8.5 03/31/2024       PFT results:  The most recent pulmonary function tests were reviewed.  2024:  Spirometry indicating no airflow obstruction     No significant improvement in airflow or forced vital capacity in response to the administration to bronchodilator per ATS standards.      Mild restriction as indicated by the lung volumes     Normal diffusion capacity     Flow-volume loop consistent with restriction    Imaging:  I personally reviewed the images on the PAC system pertinent to today's visit  Chest x-ray reviewed in PACS: Clear lungs but at that time was intubated due to dental procedure/anesthesia    Chest CT scan from 2019 reviewed in PACS: Normal lung parenchyma with no emphysematous changes or suspicious lesions, few atelectatic changes at the bases.x    Other studies:  Echocardiogram: LVEF 72%, grade 1 diastolic dysfunction, normal RV    Fartunk Hermelindo Townsend MD

## 2024-08-16 DIAGNOSIS — E66.01 OBESITY, MORBID (HCC): ICD-10-CM

## 2024-08-16 DIAGNOSIS — G47.33 OSA (OBSTRUCTIVE SLEEP APNEA): Primary | ICD-10-CM

## 2024-09-06 ENCOUNTER — TELEPHONE (OUTPATIENT)
Age: 69
End: 2024-09-06

## 2024-09-06 NOTE — TELEPHONE ENCOUNTER
"Behavioral Health Outpatient Intake Questions    Referred By   : PCP    Please advise interviewee that they need to answer all questions truthfully to allow for best care, and any misrepresentations of information may affect their ability to be seen at this clinic   => Was this discussed? Yes     If Minor Child (under age 18)    Who is/are the legal guardian(s) of the child?     Is there a custody agreement?      If \"YES\"- Custody orders must be obtained prior to scheduling the first appointment  In addition, Consent to Treatment must be signed by all legal guardians prior to scheduling the first appointment    If \"NO\"- Consent to Treatment must be signed by all legal guardians prior to scheduling the first appointment    Behavioral Health Outpatient Intake History -     Presenting Problem (in patient's own words): patient states has depression and is hyperactive    Are there any communication barriers for this patient?     No                                               If yes, please describe barriers:   If there is a unique situation, please refer to Arslan Rueda for final determination.    Are you taking any psychiatric medications? Yes     If \"YES\" -What are they Buspar     If \"YES\" -Who prescribes? PCP    Has the Patient previously received outpatient Talk Therapy or Medication Management from [facility]  No        If \"YES\"- When, Where and with Whom?         If \"NO\" -Has Patient received these services elsewhere?       If \"YES\" -When, Where, and with Whom?    Has the Patient abused alcohol or other substances in the last 6 months ? No       If \"YES\" -What substance, How much, How often?     If illegal substance: Refer to [facility] (for SUMAYA) or [facility].   If Alcohol in excess of 10 drinks per week:  Refer to [facility] (for SUMAYA) or [facility] Off[facility]    Legal History-     Is this treatment court ordered? No   If \"yes \"send to :  Talk Therapy : Send to Arslan Rueda for " "final determination   Med Management: Send to Dr Singh for final determination     Has the Patient been convicted of a felony?  NO   If \"Yes\" send to -When, What?  Talk Therapy: Send to Arslan Wright/Kathleen Rueda for final determination   Med Management: Send to Dr Singh for final determination     ACCEPTED as a patient Yes  If \"Yes\" Appointment Date: 12/12/24 at 10am    Referred Elsewhere? No  If “Yes” - (Where? Ex: [facility], [facility], [facility], [facility], etc.)       Name of Insurance Co:[employer]  Insurance ID# [id number]   Insurance Phone #[phone]  If ins is primary or secondary?Primary  If patient is a minor, parents information such as Name, D.O.B of guarantor.  Patient need lyft services and forms will be mailed to patient.  "

## 2024-09-23 ENCOUNTER — OFFICE VISIT (OUTPATIENT)
Dept: UROLOGY | Facility: AMBULATORY SURGERY CENTER | Age: 69
End: 2024-09-23
Payer: COMMERCIAL

## 2024-09-23 VITALS
WEIGHT: 270 LBS | DIASTOLIC BLOOD PRESSURE: 70 MMHG | SYSTOLIC BLOOD PRESSURE: 116 MMHG | OXYGEN SATURATION: 95 % | HEART RATE: 74 BPM | HEIGHT: 68 IN | BODY MASS INDEX: 40.92 KG/M2

## 2024-09-23 DIAGNOSIS — N40.1 BENIGN PROSTATIC HYPERPLASIA WITH LOWER URINARY TRACT SYMPTOMS, SYMPTOM DETAILS UNSPECIFIED: Primary | ICD-10-CM

## 2024-09-23 DIAGNOSIS — R36.1 HEMATOSPERMIA: ICD-10-CM

## 2024-09-23 DIAGNOSIS — R30.0 BURNING WITH URINATION: ICD-10-CM

## 2024-09-23 LAB
SL AMB  POCT GLUCOSE, UA: NORMAL
SL AMB LEUKOCYTE ESTERASE,UA: NORMAL
SL AMB POCT BILIRUBIN,UA: NORMAL
SL AMB POCT BLOOD,UA: NORMAL
SL AMB POCT CLARITY,UA: CLEAR
SL AMB POCT COLOR,UA: YELLOW
SL AMB POCT KETONES,UA: NORMAL
SL AMB POCT NITRITE,UA: NORMAL
SL AMB POCT PH,UA: 7
SL AMB POCT SPECIFIC GRAVITY,UA: 1.01
SL AMB POCT URINE PROTEIN: NORMAL
SL AMB POCT UROBILINOGEN: 0.2

## 2024-09-23 PROCEDURE — 99204 OFFICE O/P NEW MOD 45 MIN: CPT

## 2024-09-23 PROCEDURE — 81002 URINALYSIS NONAUTO W/O SCOPE: CPT

## 2024-09-23 RX ORDER — TAMSULOSIN HYDROCHLORIDE 0.4 MG/1
0.4 CAPSULE ORAL
Qty: 30 CAPSULE | Refills: 4 | Status: SHIPPED | OUTPATIENT
Start: 2024-09-23

## 2024-09-23 RX ORDER — FUROSEMIDE 20 MG
20 TABLET ORAL DAILY
COMMUNITY

## 2024-09-23 NOTE — ASSESSMENT & PLAN NOTE
Bothersome urinary symptoms of postvoid dribbling and burning at the end of urination  UA in office today unremarkable  Discussed diet lifestyle modifications such as limiting bladder irritants and stopping fluid consumption 2 hours prior to bedtime  Initiate Flomax 0.4 mg, side effect profile discussed  Follow-up in 10 weeks with AUA and PVR

## 2024-09-23 NOTE — ASSESSMENT & PLAN NOTE
1 episode of painless hematospermia noted 3 months ago.  Has not had any blood in the semen since this time.  Provided him with reassurance that hematospermia is self-limiting and usually no cause for concern.  If hematospermia were to become more consistent/persistent we could plan to order imaging and continue follow-up.  No follow-up for hematospermia needed at this time.  Patient understanding of this.
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
## 2024-09-30 ENCOUNTER — OFFICE VISIT (OUTPATIENT)
Dept: PODIATRY | Facility: CLINIC | Age: 69
End: 2024-09-30
Payer: COMMERCIAL

## 2024-09-30 VITALS — RESPIRATION RATE: 19 BRPM | HEIGHT: 68 IN | WEIGHT: 270 LBS | BODY MASS INDEX: 40.92 KG/M2

## 2024-09-30 DIAGNOSIS — E11.42 DIABETIC POLYNEUROPATHY ASSOCIATED WITH TYPE 2 DIABETES MELLITUS (HCC): Primary | ICD-10-CM

## 2024-09-30 DIAGNOSIS — M25.572 CHRONIC PAIN OF BOTH ANKLES: ICD-10-CM

## 2024-09-30 DIAGNOSIS — G89.29 CHRONIC PAIN OF BOTH ANKLES: ICD-10-CM

## 2024-09-30 DIAGNOSIS — M25.571 CHRONIC PAIN OF BOTH ANKLES: ICD-10-CM

## 2024-09-30 PROCEDURE — 99214 OFFICE O/P EST MOD 30 MIN: CPT | Performed by: PODIATRIST

## 2024-09-30 NOTE — PROGRESS NOTES
Ambulatory Visit  Name: Henry Mittal      : 1955      MRN: [id number]  Encounter Provider: Jarrett Bonilla DPM  Encounter Date: 2024   Encounter department: [facility] [place]    I personally reviewed x-rays of the right ankle.  They reveal degenerative changes consistent with osteoarthritis.    I personally read x-rays of the left ankle.  They reveal degenerative changes consistent with osteoarthritis.    Discussed principles of diabetic footcare.  There are no palpable pedal pulses and sensorium is markedly diminished.  Urged patient to refrain from walking barefoot.    Explained to patient that he has arthritis in his ankles.  Recommended Voltaren gel over oral medication.    Explained to patient that he has mycotic nails.  Reassured him that this is not a systemic issue.  Oral medication needed for correction but patient disinterested.  Nail trimming performed.  Reappoint 3 months for palliative care..          Assessment & Plan  Diabetic polyneuropathy associated with type 2 diabetes mellitus (HCC)    Lab Results   Component Value Date    HGBA1C 6.3 (H) 2024            Chronic pain of both ankles    Orders:    XR ankle 3+ vw right; Future    XR ankle 3+ vw left; Future      History of Present Illness     Henry Mittal is a 69 y.o. male who presents for diabetic foot exam and assessment.  Patient relates numbness in his feet.  He also relates pain in both ankles.  In the past, patient took gabapentin but is uncertain as to whether he is still on this medication as he takes multiple medications.  He notes the presence of fungus in his toenails as each nail is elongated thick and yellow.    I personally reviewed a comprehensive metabolic panel dated 3/26/2024.  Blood glucose was 162.    I personally viewed an A1c dated 3/22/2024.  It was 6.3.    Review of Systems   Musculoskeletal:  Positive for arthralgias.   Neurological:  Positive for numbness. Negative for  "tremors.   Psychiatric/Behavioral: Negative.                 Objective     Resp 19   Ht 5' 8\" (1.727 m)   Wt 122 kg (270 lb)   BMI 41.05 kg/m²     Physical Exam  Cardiovascular:      Pulses: Pulses are weak.           Dorsalis pedis pulses are 0 on the right side and 0 on the left side.        Posterior tibial pulses are 0 on the right side and 0 on the left side.   Feet:      Right foot:      Skin integrity: No ulcer, skin breakdown, erythema, warmth, callus or dry skin.      Left foot:      Skin integrity: No ulcer, skin breakdown, erythema, warmth, callus or dry skin.         Diabetic Foot Exam    Patient's shoes and socks removed.    Right Foot/Ankle   Right Foot Inspection  Skin Exam: skin normal and skin intact. No dry skin, no warmth, no callus, no erythema, no maceration, no abnormal color, no pre-ulcer, no ulcer and no callus.     Toe Exam: ROM and strength within normal limits.     Sensory   Vibration: absent  Proprioception: intact  Monofilament testing: diminished    Vascular  Capillary refills: < 3 seconds  The right DP pulse is 0. The right PT pulse is 0.     Right Toe  - Comprehensive Exam  Ecchymosis: none  Arch: normal  Hammertoes: absent  Claw Toes: absent  Swelling: none   Tenderness: none       Left Foot/Ankle  Left Foot Inspection  Skin Exam: skin normal and skin intact. No dry skin, no warmth, no erythema, no maceration, normal color, no pre-ulcer, no ulcer and no callus.     Toe Exam: ROM and strength within normal limits.     Sensory   Vibration: absent  Proprioception: intact  Monofilament testing: diminished    Vascular  Capillary refills: < 3 seconds  The left DP pulse is 0. The left PT pulse is 0.     Left Toe  - Comprehensive Exam  Ecchymosis: none  Arch: normal  Hammertoes: absent  Claw toes: absent  Swelling: none   Tenderness: none       Assign Risk Category  No deformity present  Loss of protective sensation  Weak pulses  Risk: 2      "

## 2024-10-18 ENCOUNTER — CONSULT (OUTPATIENT)
Dept: GASTROENTEROLOGY | Facility: CLINIC | Age: 69
End: 2024-10-18
Payer: COMMERCIAL

## 2024-10-18 VITALS
DIASTOLIC BLOOD PRESSURE: 82 MMHG | WEIGHT: 266 LBS | TEMPERATURE: 97.7 F | HEIGHT: 68 IN | SYSTOLIC BLOOD PRESSURE: 130 MMHG | BODY MASS INDEX: 40.32 KG/M2

## 2024-10-18 DIAGNOSIS — K59.09 CHRONIC CONSTIPATION: Primary | ICD-10-CM

## 2024-10-18 PROCEDURE — 99204 OFFICE O/P NEW MOD 45 MIN: CPT | Performed by: PHYSICIAN ASSISTANT

## 2024-10-18 NOTE — PATIENT INSTRUCTIONS
Make sure you continue to drink at least 70 ounces of water/day   Start linzess 145 mcg daily to be taken every morning 30-60 minutes before breakfast. It is normal to get diarrhea for the first two weeks but if the diarrhea lasts longer than 2 weeks, please call our office.   Get blood work done     Patient was given blood work to have done and he has a recall in for 3 month follow up  with Mayela.    HE stated that he will do the blood work just prior to his OV follow up

## 2024-10-18 NOTE — PROGRESS NOTES
"[facility] - Outpatient Consultation  Henry Mittal 69 y.o. male MRN: [id number]  Encounter: [id number]          ASSESSMENT AND PLAN:      Henry is a 70 y/o male with HTN, DM2, HLD who presents for consultation of \"bowel issues.    [employer]  used.     Chronic constipation   GLP-1 agonist use  Patient says that for many years since he was younger, he was always having problems with constipation.  He says that it never really bothered him prior but after talking to 2 people about this he said that they told him it was abnormal, which is why he wanted to be seen.  He explains that at he will go 3 to 4 days without moving his bowels despite getting anywhere from 6 to 10 16 ounce water bottles he denies any associated abdominal pain, nausea vomiting, heartburn, rectal bleeding a day.  He says he has tried MiraLAX both daily and twice a day overall weeks not found relief.  He denies abdominal pain, nausea vomiting, trouble eating, rectal bleeding, however says that he does get bloated.  Last colonoscopy in 2019 noted several polyps, TA on pathology and one hyperplastic. He is due for repeat at this time but he prefers to get a better handle of his constipation first.  -Explained to patient that as he is on Ozempic this may make his constipation worse so he should just watch out for that and keep us updated  -TSH and celiac panel ordered  -Continue to make sure you are drinking at least 70 ounces of water a day  -Start Linzess 145 mcg daily  ______________________________________________________________________    HPI:  Henry is a 70 y/o male with HTN, DM2, HLD who presents for consultation of \"bowel issues.\"Patient says that for many years since he was younger, he was always having problems with constipation.  He says that it never really bothered him prior but after talking to 2 people about this he said that they told him it was abnormal, which is why he wanted to be " seen.  He explains that at he will go 3 to 4 days without moving his bowels despite getting anywhere from 6 to 10 16 ounce water bottles he denies any associated abdominal pain, nausea vomiting, heartburn, rectal bleeding a day.  He says he has tried MiraLAX both daily and twice a day overall weeks not found relief. He denies abdominal pain, nausea vomiting, trouble eating or swallowing, bloody or black BM, family hx of colon cancer, unintentional weight loss, fevers, chills, night sweats. He denies prior abdominal surgical hx. Pt denies tobacco and alcohol use.       REVIEW OF SYSTEMS:    CONSTITUTIONAL: Denies any fever, chills, rigors, and weight loss.  HEENT: No earache or tinnitus. Denies hearing loss or visual disturbances.  CARDIOVASCULAR: No chest pain or palpitations.   RESPIRATORY: Denies any cough, hemoptysis, shortness of breath or dyspnea on exertion.  GASTROINTESTINAL: As noted in the History of Present Illness.   GENITOURINARY: No problems with urination. Denies any hematuria or dysuria.  NEUROLOGIC: No dizziness or vertigo, denies headaches.   MUSCULOSKELETAL: Denies any muscle or joint pain.   SKIN: Denies skin rashes or itching.   ENDOCRINE: Denies excessive thirst. Denies intolerance to heat or cold.  PSYCHOSOCIAL: Denies depression or anxiety. Denies any recent memory loss.       Historical Information   Past Medical History:   Diagnosis Date    Anxiety     Asthma     Cardiac disease     COPD (chronic obstructive pulmonary disease) (HCC)     Diabetes mellitus (HCC)     Gout     Hyperlipidemia     Hypertension     Shortness of breath     with exertion     Past Surgical History:   Procedure Laterality Date    CARPAL TUNNEL RELEASE      KNEE ARTHROSCOPY Bilateral     [place] COLONOSCOPY FLX DX W/COLLJ SPEC WHEN PFRMD N/A 1/10/2019    Procedure: COLONOSCOPY;  Surgeon: Nithin Carvajal MD;  Location: [facility];  Service: Gastroenterology    TOTAL HIP ARTHROPLASTY Left     TRACHEOSTOMY      from [facility]  "History   Social History     Substance and Sexual Activity   Alcohol Use Yes    Comment: occasionally     Social History     Substance and Sexual Activity   Drug Use No     Social History     Tobacco Use   Smoking Status Former    Current packs/day: 0.00    Average packs/day: 2.0 packs/day for 38.0 years (76.0 ttl pk-yrs)    Types: Cigarettes    Start date:     Quit date:     Years since quittin.8    Passive exposure: Never   Smokeless Tobacco Never     Family History   Problem Relation Age of Onset    Cancer Sister        Meds/Allergies       Current Outpatient Medications:     albuterol (2.5 mg/3 mL) 0.083 % nebulizer solution    albuterol (PROVENTIL HFA,VENTOLIN HFA) 90 mcg/act inhaler    Alcohol Swabs (ALCOHOL PADS) 70 % PADS    allopurinol (ZYLOPRIM) 300 mg tablet    Ascorbic Acid (vitamin C) 1000 MG tablet    atorvastatin (LIPITOR) 80 mg tablet    B-D 3CC LUER-WHITNEY SYR 92MW4-5/2 21G X 1-1/2\" 3 ML MISC    Blood Glucose Monitoring Suppl (FREESTYLE LITE) ANGE    busPIRone (BUSPAR) 7.5 mg tablet    cholecalciferol (VITAMIN D3) 1,000 units tablet    Continuous Glucose Sensor (FreeStyle Dunia 3 Sensor) MISC    docusate sodium (COLACE) 100 mg capsule    doxycycline hyclate (VIBRAMYCIN) 100 mg capsule    Empagliflozin-metFORMIN HCl (Synjardy) 12.5-1000 MG TABS    ergocalciferol (VITAMIN D2) 50,000 units    ezetimibe (ZETIA) 10 mg tablet    famotidine (PEPCID) 20 mg tablet    furosemide (LASIX) 20 mg tablet    gabapentin (NEURONTIN) 300 mg capsule    ibuprofen (MOTRIN) 800 mg tablet    Lancets (OneTouch Delica Plus [id number]) MISC    loratadine (CLARITIN) 10 mg tablet    Multiple Vitamin (multivitamin) tablet    Nexlizet 180-10 MG TABS    OneTouch Verio test strip    polymyxin b-trimethoprim (POLYTRIM) ophthalmic solution    semaglutide, 2 mg/dose, (Ozempic, 2 MG/DOSE,) 8 mg/ mL injection pen    tamsulosin (FLOMAX) 0.4 mg    tobramycin-dexamethasone (TOBRADEX) ophthalmic suspension    torsemide (DEMADEX) 20 " mg tablet    traZODone (DESYREL) 50 mg tablet    Trelegy Ellipta 100-62.5-25 MCG/ACT inhaler    triamcinolone (KENALOG) 0.1 % cream    Vascepa 1 g CAPS    Xyosted 100 MG/0.5ML SOAJ    Allergies   Allergen Reactions    Amoxicillin Anaphylaxis     Hives, angioedema              Objective     There were no vitals taken for this visit. There is no height or weight on file to calculate BMI.        PHYSICAL EXAM:      General Appearance:   Alert, cooperative, no distress   HEENT:   Normocephalic, atraumatic, anicteric.     Neck:  Supple, symmetrical, trachea midline   Lungs:   Clear to auscultation bilaterally; no rales, rhonchi or wheezing; respirations unlabored    Heart::   Regular rate and rhythm; no murmur, rub, or gallop.   Abdomen:   Soft, non-tender, non-distended; normal bowel sounds; no masses, no organomegaly    Genitalia:   Deferred    Rectal:   Deferred    Extremities:  No cyanosis, clubbing or edema    Pulses:  2+ and symmetric    Skin:  No jaundice, rashes, or lesions    Lymph nodes:  No palpable cervical lymphadenopathy        Lab Results:   No visits with results within 1 Day(s) from this visit.   Latest known visit with results is:   Office Visit on 09/23/2024   Component Date Value    LEUKOCYTE ESTERASE,UA 09/23/2024 -     NITRITE,UA 09/23/2024 -     SL AMB POCT UROBILINOGEN 09/23/2024 0.2     POCT URINE PROTEIN 09/23/2024 -      PH,UA 09/23/2024 7.0     BLOOD,UA 09/23/2024 -     SPECIFIC GRAVITY,UA 09/23/2024 1.015     KETONES,UA 09/23/2024 -     BILIRUBIN,UA 09/23/2024 -     GLUCOSE, UA 09/23/2024 -      COLOR,UA 09/23/2024 yellow     CLARITY,UA 09/23/2024 clear          Radiology Results:   XR ankle 3+ vw left    Result Date: 9/30/2024  Narrative: LEFT ANKLE INDICATION:   Pain in right ankle and joints of right foot. Other chronic pain. Pain in left ankle and joints of left foot. COMPARISON: 5/7/2019 VIEWS:  XR ANKLE 3+ VW LEFT Images: 3 FINDINGS: There is no acute fracture or dislocation. Mild ankle  joint and midfoot osteoarthritis. No lytic or blastic osseous lesion. Soft tissues are unremarkable.     Impression: No acute osseous abnormality. Degenerative changes as described. Electronically signed: 09/30/2024 10:51 AM Robi Tong MPH,MD    XR ankle 3+ vw right    Result Date: 9/30/2024  Narrative: RIGHT ANKLE INDICATION:   Pain in right ankle and joints of right foot. Other chronic pain. Pain in left ankle and joints of left foot. COMPARISON:  None. VIEWS:  XR ANKLE 3+ VW RIGHT Images: 3 FINDINGS: There is no acute fracture or dislocation. Well-corticated ossification inferior to the lateral malleolus consistent with the sequela of prior trauma. Mild ankle joint osteoarthritis. Small calcaneal spurs. Mild dorsal midfoot osteoarthritic change. No lytic or blastic osseous lesion. Soft tissues are unremarkable.     Impression: No acute osseous abnormality. Degenerative changes as described. Electronically signed: 09/30/2024 10:43 AM Robi Tong MPH,MD

## 2024-10-23 ENCOUNTER — OFFICE VISIT (OUTPATIENT)
Dept: OBGYN CLINIC | Facility: MEDICAL CENTER | Age: 69
End: 2024-10-23
Payer: COMMERCIAL

## 2024-10-23 VITALS
BODY MASS INDEX: 40.98 KG/M2 | WEIGHT: 270.4 LBS | HEIGHT: 68 IN | SYSTOLIC BLOOD PRESSURE: 116 MMHG | DIASTOLIC BLOOD PRESSURE: 70 MMHG | HEART RATE: 78 BPM

## 2024-10-23 DIAGNOSIS — M25.561 RIGHT KNEE PAIN, UNSPECIFIED CHRONICITY: ICD-10-CM

## 2024-10-23 DIAGNOSIS — M25.562 LEFT KNEE PAIN, UNSPECIFIED CHRONICITY: ICD-10-CM

## 2024-10-23 DIAGNOSIS — M17.0 BILATERAL PRIMARY OSTEOARTHRITIS OF KNEE: Primary | ICD-10-CM

## 2024-10-23 PROCEDURE — 20610 DRAIN/INJ JOINT/BURSA W/O US: CPT | Performed by: PHYSICIAN ASSISTANT

## 2024-10-23 PROCEDURE — 99213 OFFICE O/P EST LOW 20 MIN: CPT | Performed by: ORTHOPAEDIC SURGERY

## 2024-10-23 RX ORDER — TRIAMCINOLONE ACETONIDE 40 MG/ML
40 INJECTION, SUSPENSION INTRA-ARTICULAR; INTRAMUSCULAR
Status: COMPLETED | OUTPATIENT
Start: 2024-10-23 | End: 2024-10-23

## 2024-10-23 RX ORDER — BUPIVACAINE HYDROCHLORIDE 2.5 MG/ML
2 INJECTION, SOLUTION INFILTRATION; PERINEURAL
Status: COMPLETED | OUTPATIENT
Start: 2024-10-23 | End: 2024-10-23

## 2024-10-23 RX ADMIN — BUPIVACAINE HYDROCHLORIDE 2 ML: 2.5 INJECTION, SOLUTION INFILTRATION; PERINEURAL at 10:15

## 2024-10-23 RX ADMIN — TRIAMCINOLONE ACETONIDE 40 MG: 40 INJECTION, SUSPENSION INTRA-ARTICULAR; INTRAMUSCULAR at 10:15

## 2024-10-23 NOTE — PROGRESS NOTES
Assessment & Plan     1. Bilateral primary osteoarthritis of knee    2. Left knee pain, unspecified chronicity    3. Right knee pain, unspecified chronicity      Orders Placed This Encounter   Procedures    Large joint arthrocentesis         Patient has severe left knee and moderate to severe right knee osteoarthritis.   Injections: Patient received bilateral knee steroid injection today. Tolerated the procedure well. Post injection instructions reviewed including information on glucose monitoring for diabetic patients. Patient aware that they may repeat steroid injection every 3 months if needed.   Medications: Tylenol up to 3000 mg per day  PT:  encouraged home exercises  Bracing:  none  Activity: Continue activity as tolerated.   Plan for next appt:  repeat bilat knee CSI      Return in about 3 months (around 1/23/2025) for bilat knee CSI.    I answered all of the patient's questions during the visit and provided education of the patient's condition during the visit.  The patient verbalized understanding of the information given and agrees with the plan.  This note was dictated using We[employer] software.  It may contain errors including improperly dictated words.  Please contact physician directly for any questions.    History of Present Illness   Chief complaint:   Chief Complaint   Patient presents with    Left Knee - Follow-up    Right Knee - Follow-up       HPI: Henry Mittal is a 69 y.o. male that c/o bilateral knee pain.      Mechanism of Injury: none, no new injuries since last visit   Pain Description: bilateral knee pain, left knee worse than right knee, pain described as constant, located over the anterior aspect of the knees  Palliating Factors: nothing   Provoking Factors: stairs, weight bearing  Associated Symptoms: none  Medications: unsure what pain medicine he is taking, per medication review appears to be taking gabapentin   Able to take NSAIDs? If not, why: should limit due to previously  elevated kidney functions  Physical Therapy or Home Exercises: nothing at this time, has done PT in the past  Injections: received bilateral knee CSI on 2021 and recalls good relief from the injections, he does not recall how long they provided relief for   Bracing: none, does use a cane for assistance ambulating   Previous Surgery: none on either knee  Miscellaneous: diabetic, last A1C 6.3, states his am fasting glucose is typically 120-130s       ROS:    See HPI for musculoskeletal review.   All other systems reviewed are negative     Historical Information   Past Medical History:   Diagnosis Date    Anxiety     Asthma     Cardiac disease     COPD (chronic obstructive pulmonary disease) (HCC)     Diabetes mellitus (HCC)     Gout     Hyperlipidemia     Hypertension     Shortness of breath     with exertion     Past Surgical History:   Procedure Laterality Date    CARPAL TUNNEL RELEASE      KNEE ARTHROSCOPY Bilateral     [place] COLONOSCOPY FLX DX W/COLLJ SPEC WHEN PFRMD N/A 1/10/2019    Procedure: COLONOSCOPY;  Surgeon: Nithin Carvajal MD;  Location: [facility];  Service: Gastroenterology    TOTAL HIP ARTHROPLASTY Left     TRACHEOSTOMY      from [facility]     Social History   Social History     Substance and Sexual Activity   Alcohol Use Yes    Comment: occasionally     Social History     Substance and Sexual Activity   Drug Use No     Social History     Tobacco Use   Smoking Status Former    Current packs/day: 0.00    Average packs/day: 2.0 packs/day for 38.0 years (76.0 ttl pk-yrs)    Types: Cigarettes    Start date:     Quit date:     Years since quittin.8    Passive exposure: Never   Smokeless Tobacco Never     Family History:   Family History   Problem Relation Age of Onset    Cancer Sister        Current Outpatient Medications on File Prior to Visit   Medication Sig Dispense Refill    albuterol (2.5 mg/3 mL) 0.083 % nebulizer solution Take 2.5 mg by nebulization every 4 (four) hours as needed for  "shortness of breath. Indications: .      albuterol (PROVENTIL HFA,VENTOLIN HFA) 90 mcg/act inhaler Inhale 2 puffs every 6 (six) hours as needed for wheezing or shortness of breath (Patient not taking: Reported on 8/15/2024) 8.5 g 3    Alcohol Swabs (ALCOHOL PADS) 70 % PADS Use as directed  0    allopurinol (ZYLOPRIM) 300 mg tablet JENNIFER 1 TABLETA POR VIA ORAL DIARIAMENTE  2    Ascorbic Acid (vitamin C) 1000 MG tablet Take 1 tablet (1,000 mg total) by mouth 2 (two) times a day (Patient not taking: Reported on 9/23/2024) 20 tablet 0    atorvastatin (LIPITOR) 80 mg tablet TAKE ONE TABLET (80 MG TOTAL) BY MOUTH DAILY WITH DINNER.      B-D 3CC LUER-WHITNEY SYR 68XR1-5/2 21G X 1-1/2\" 3 ML MISC FOR TESTOSTERONE INJECTIONS EVERY 14 DAYS      Blood Glucose Monitoring Suppl (FREESTYLE LITE) ANGE Use as directed  0    busPIRone (BUSPAR) 7.5 mg tablet JENNIFER 1 TABLETA POR VIA ORAL DOS VECES AL CORAL FOR ANXIETY      cholecalciferol (VITAMIN D3) 1,000 units tablet Take 2 tablets (2,000 Units total) by mouth daily 180 tablet 3    Continuous Glucose Sensor (FreeStyle Dunia 3 Sensor) MISC       docusate sodium (COLACE) 100 mg capsule Take 100 mg by mouth 2 (two) times a day (Patient not taking: Reported on 8/5/2024)      doxycycline hyclate (VIBRAMYCIN) 100 mg capsule Take 100 mg by mouth every 12 (twelve) hours      Empagliflozin-metFORMIN HCl (Synjardy) 12.5-1000 MG TABS Take 1 tablet by mouth daily. Indications: Type 2 Diabetes      ergocalciferol (VITAMIN D2) 50,000 units Take 50,000 Units by mouth (Patient not taking: Reported on 9/23/2024)      ezetimibe (ZETIA) 10 mg tablet TAKE ONE TABLET (10 MG TOTAL) BY MOUTH DAILY.      famotidine (PEPCID) 20 mg tablet TAKE 1 TABLET (20 MG TOTAL) BY MOUTH 2 (TWO) TIMES A DAY 30 tablet 0    furosemide (LASIX) 20 mg tablet Take 20 mg by mouth daily      gabapentin (NEURONTIN) 300 mg capsule TAKE ONE CAPSULE (300 MG TOTAL) BY MOUTH 2 (TWO) TIMES A DAY.      ibuprofen (MOTRIN) 800 mg tablet Take " 800 mg by mouth every 6 (six) hours as needed for mild pain (Patient not taking: Reported on 8/5/2024)      Lancets (OneTouch Delica Plus [id number]) MISC USE TO TEST BLOOD SUGARS DAILY      linaCLOtide 145 MCG CAPS Take 1 capsule (145 mcg total) by mouth daily 30-60 minutes before breakfast 30 capsule 5    loratadine (CLARITIN) 10 mg tablet Take 1 tablet (10 mg total) by mouth daily (Patient not taking: Reported on 8/5/2024) 30 tablet 0    Multiple Vitamin (multivitamin) tablet Take 1 tablet by mouth daily (Patient not taking: Reported on 10/18/2024) 10 tablet 0    Nexlizet 180-10 MG TABS Take 1 each by mouth (Patient not taking: Reported on 8/5/2024)      OneTouch Verio test strip USE TO TEST BLOOD SUGARS ONCE DAILY      polymyxin b-trimethoprim (POLYTRIM) ophthalmic solution Administer 1 drop to both eyes every 6 (six) hours      semaglutide, 2 mg/dose, (Ozempic, 2 MG/DOSE,) 8 mg/ mL injection pen Inject 0.75 mL (2 mg total) under the skin every 7 days      tamsulosin (FLOMAX) 0.4 mg Take 1 capsule (0.4 mg total) by mouth daily with dinner 30 capsule 4    tobramycin-dexamethasone (TOBRADEX) ophthalmic suspension Administer 1 drop to both eyes every 4 (four) hours while awake (Patient not taking: Reported on 8/5/2024)      torsemide (DEMADEX) 20 mg tablet Take 1 tablet (20 mg total) by mouth daily (Patient not taking: Reported on 9/23/2024) 30 tablet 5    traZODone (DESYREL) 50 mg tablet Take 1 tablet (50 mg total) by mouth daily at bedtime as needed for sleep 30 tablet 1    Trelegy Ellipta 100-62.5-25 MCG/ACT inhaler TOME ONE PUFF ONCE A DAY      triamcinolone (KENALOG) 0.1 % cream Apply topically 2 (two) times a day      Vascepa 1 g CAPS Take 1 capsule by mouth 2 (two) times a day (Patient not taking: Reported on 9/23/2024)      Xyosted 100 MG/0.5ML SOAJ Inject 100 mg under the skin Once a week (Patient not taking: Reported on 8/5/2024)       No current facility-administered medications on file prior to visit.  "    Allergies   Allergen Reactions    Amoxicillin Anaphylaxis     Hives, angioedema          Current Outpatient Medications on File Prior to Visit   Medication Sig Dispense Refill    albuterol (2.5 mg/3 mL) 0.083 % nebulizer solution Take 2.5 mg by nebulization every 4 (four) hours as needed for shortness of breath. Indications: .      albuterol (PROVENTIL HFA,VENTOLIN HFA) 90 mcg/act inhaler Inhale 2 puffs every 6 (six) hours as needed for wheezing or shortness of breath (Patient not taking: Reported on 8/15/2024) 8.5 g 3    Alcohol Swabs (ALCOHOL PADS) 70 % PADS Use as directed  0    allopurinol (ZYLOPRIM) 300 mg tablet JENNIFER 1 TABLETA POR VIA ORAL DIARIAMENTE  2    Ascorbic Acid (vitamin C) 1000 MG tablet Take 1 tablet (1,000 mg total) by mouth 2 (two) times a day (Patient not taking: Reported on 9/23/2024) 20 tablet 0    atorvastatin (LIPITOR) 80 mg tablet TAKE ONE TABLET (80 MG TOTAL) BY MOUTH DAILY WITH DINNER.      B-D 3CC LUER-WHITNEY SYR 66RB0-1/2 21G X 1-1/2\" 3 ML MISC FOR TESTOSTERONE INJECTIONS EVERY 14 DAYS      Blood Glucose Monitoring Suppl (FREESTYLE LITE) ANGE Use as directed  0    busPIRone (BUSPAR) 7.5 mg tablet JENNIFER 1 TABLETA POR VIA ORAL DOS VECES AL CORAL FOR ANXIETY      cholecalciferol (VITAMIN D3) 1,000 units tablet Take 2 tablets (2,000 Units total) by mouth daily 180 tablet 3    Continuous Glucose Sensor (FreeStyle Dunia 3 Sensor) MISC       docusate sodium (COLACE) 100 mg capsule Take 100 mg by mouth 2 (two) times a day (Patient not taking: Reported on 8/5/2024)      doxycycline hyclate (VIBRAMYCIN) 100 mg capsule Take 100 mg by mouth every 12 (twelve) hours      Empagliflozin-metFORMIN HCl (Synjardy) 12.5-1000 MG TABS Take 1 tablet by mouth daily. Indications: Type 2 Diabetes      ergocalciferol (VITAMIN D2) 50,000 units Take 50,000 Units by mouth (Patient not taking: Reported on 9/23/2024)      ezetimibe (ZETIA) 10 mg tablet TAKE ONE TABLET (10 MG TOTAL) BY MOUTH DAILY.      famotidine " (PEPCID) 20 mg tablet TAKE 1 TABLET (20 MG TOTAL) BY MOUTH 2 (TWO) TIMES A DAY 30 tablet 0    furosemide (LASIX) 20 mg tablet Take 20 mg by mouth daily      gabapentin (NEURONTIN) 300 mg capsule TAKE ONE CAPSULE (300 MG TOTAL) BY MOUTH 2 (TWO) TIMES A DAY.      ibuprofen (MOTRIN) 800 mg tablet Take 800 mg by mouth every 6 (six) hours as needed for mild pain (Patient not taking: Reported on 8/5/2024)      Lancets (OneTouch Delica Plus [id number]) MISC USE TO TEST BLOOD SUGARS DAILY      linaCLOtide 145 MCG CAPS Take 1 capsule (145 mcg total) by mouth daily 30-60 minutes before breakfast 30 capsule 5    loratadine (CLARITIN) 10 mg tablet Take 1 tablet (10 mg total) by mouth daily (Patient not taking: Reported on 8/5/2024) 30 tablet 0    Multiple Vitamin (multivitamin) tablet Take 1 tablet by mouth daily (Patient not taking: Reported on 10/18/2024) 10 tablet 0    Nexlizet 180-10 MG TABS Take 1 each by mouth (Patient not taking: Reported on 8/5/2024)      OneTouch Verio test strip USE TO TEST BLOOD SUGARS ONCE DAILY      polymyxin b-trimethoprim (POLYTRIM) ophthalmic solution Administer 1 drop to both eyes every 6 (six) hours      semaglutide, 2 mg/dose, (Ozempic, 2 MG/DOSE,) 8 mg/ mL injection pen Inject 0.75 mL (2 mg total) under the skin every 7 days      tamsulosin (FLOMAX) 0.4 mg Take 1 capsule (0.4 mg total) by mouth daily with dinner 30 capsule 4    tobramycin-dexamethasone (TOBRADEX) ophthalmic suspension Administer 1 drop to both eyes every 4 (four) hours while awake (Patient not taking: Reported on 8/5/2024)      torsemide (DEMADEX) 20 mg tablet Take 1 tablet (20 mg total) by mouth daily (Patient not taking: Reported on 9/23/2024) 30 tablet 5    traZODone (DESYREL) 50 mg tablet Take 1 tablet (50 mg total) by mouth daily at bedtime as needed for sleep 30 tablet 1    Trelegy Ellipta 100-62.5-25 MCG/ACT inhaler TOME ONE PUFF ONCE A DAY      triamcinolone (KENALOG) 0.1 % cream Apply topically 2 (two) times a day    "   Vascepa 1 g CAPS Take 1 capsule by mouth 2 (two) times a day (Patient not taking: Reported on 9/23/2024)      Xyosted 100 MG/0.5ML SOAJ Inject 100 mg under the skin Once a week (Patient not taking: Reported on 8/5/2024)       No current facility-administered medications on file prior to visit.       Objective   Vitals: Blood pressure 116/70, pulse 78, height 5' 8\" (1.727 m), weight 123 kg (270 lb 6.4 oz).,Body mass index is 41.11 kg/m².    PE:  AAOx 3  WDWN  Hearing intact, no drainage from eyes  Regular rate  no audible wheezing  no abdominal distension  LE compartments soft, skin intact    bilateralknee:    Appearance:  Yes  swelling   No  ecchymosis  No  obvious joint deformity   No  effusion   Palpation/Tenderness:  No  TTP over medial joint line  No  TTP over lateral joint line   No  TTP over patella  No  TTP over patellar tendon  No  TTP over pes anserine bursa  Active Range of Motion:  AROM: 5- 105   Special Tests:  Valgus Stress Test: negative  Varus Stress Test: negative      Large joint arthrocentesis: bilateral knee  Universal Protocol:  procedure performed by consultantConsent: Verbal consent obtained.  Risks and benefits: risks, benefits and alternatives were discussed  Consent given by: patient  Site marked: the operative site was marked  Supporting Documentation  Indications: pain   Procedure Details  Location: knee - bilateral knee  Preparation: Patient was prepped and draped in the usual sterile fashion  Needle size: 22 G  Ultrasound guidance: no  Approach: anterolateral    Medications (Right): 2 mL bupivacaine 0.25 %; 40 mg triamcinolone acetonide 40 mg/mLMedications (Left): 2 mL bupivacaine 0.25 %; 40 mg triamcinolone acetonide 40 mg/mL   Patient tolerance: patient tolerated the procedure well with no immediate complications  Dressing:  Sterile dressing applied            Scribe Attestation      I,:  Meena Mendez PA-C am acting as a scribe while in the presence of the attending physician.:  "      I,:  Emma Castellon, DO personally performed the services described in this documentation    as scribed in my presence.:

## 2024-11-26 ENCOUNTER — OFFICE VISIT (OUTPATIENT)
Dept: SLEEP CENTER | Facility: CLINIC | Age: 69
End: 2024-11-26
Payer: COMMERCIAL

## 2024-11-26 VITALS
WEIGHT: 274.8 LBS | SYSTOLIC BLOOD PRESSURE: 124 MMHG | HEART RATE: 73 BPM | BODY MASS INDEX: 41.65 KG/M2 | DIASTOLIC BLOOD PRESSURE: 84 MMHG | HEIGHT: 68 IN | OXYGEN SATURATION: 93 %

## 2024-11-26 DIAGNOSIS — G47.39 SLEEP APNEA-LIKE BEHAVIOR: Primary | ICD-10-CM

## 2024-11-26 DIAGNOSIS — R06.83 SNORING: ICD-10-CM

## 2024-11-26 DIAGNOSIS — E66.01 OBESITY, MORBID (HCC): ICD-10-CM

## 2024-11-26 DIAGNOSIS — F51.01 PRIMARY INSOMNIA: ICD-10-CM

## 2024-11-26 DIAGNOSIS — I50.32 CHRONIC DIASTOLIC CONGESTIVE HEART FAILURE (HCC): ICD-10-CM

## 2024-11-26 DIAGNOSIS — I10 ESSENTIAL HYPERTENSION: ICD-10-CM

## 2024-11-26 PROCEDURE — 99205 OFFICE O/P NEW HI 60 MIN: CPT | Performed by: NURSE PRACTITIONER
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
## 2024-11-26 NOTE — PATIENT INSTRUCTIONS
Complete diagnostic sleep study.  Currently scheduled for February - will try to move up  The nurse will call with results and plan of treatment  Recommend scheduling the ECHO, ordered in May - call central scheduling - [phone]        Nursing Support:  When:  Monday through Friday 7:00am-5:00pm, except holidays  Where:  Direct phone line is [phone], option 3  If you are having a true emergency, please call 911.  In the event that the line is busy or it is after hours, please leave a voice mail message and we will return your call.  Please speak clearly, leaving your full name, birth date, best number to reach you and the reason for your call.  Medication refills:  We will need the name of the medication, the dosage, the ordering provider, whether you get a 30 day or 90 day refill and the pharmacy name and address.  Medications will be ordered by the providers only.  Nurses cannot call in prescriptions.    To reach the [facility] office staff:  Call [phone], option 1, followed by option 3

## 2024-12-04 ENCOUNTER — OFFICE VISIT (OUTPATIENT)
Dept: UROLOGY | Facility: AMBULATORY SURGERY CENTER | Age: 69
End: 2024-12-04

## 2024-12-04 VITALS
HEART RATE: 80 BPM | BODY MASS INDEX: 40.92 KG/M2 | HEIGHT: 68 IN | WEIGHT: 270 LBS | DIASTOLIC BLOOD PRESSURE: 80 MMHG | OXYGEN SATURATION: 98 % | SYSTOLIC BLOOD PRESSURE: 130 MMHG

## 2024-12-04 DIAGNOSIS — N40.1 BENIGN PROSTATIC HYPERPLASIA WITH LOWER URINARY TRACT SYMPTOMS, SYMPTOM DETAILS UNSPECIFIED: Primary | ICD-10-CM

## 2024-12-04 LAB — POST-VOID RESIDUAL VOLUME, ML POC: 237 ML

## 2024-12-04 RX ORDER — EMPAGLIFLOZIN, METFORMIN HYDROCHLORIDE 25; 1000 MG/1; MG/1
TABLET, EXTENDED RELEASE ORAL
COMMUNITY
Start: 2024-11-06

## 2024-12-04 NOTE — ASSESSMENT & PLAN NOTE
Patient was initiated on 0.4 mg of Flomax at last office visit, he is noticing good benefit with the medication and denies any significant urinary symptoms today in the office  PVR in office today 237 mL, patient voided 220 mL at the end of office visit  Continue with diet lifestyle modification such as limiting bladder irritants and stopping fluid consumption 2 hours prior to bedtime  Follow-up in 6 months with PVR    Orders:    POCT Measure PVR

## 2024-12-09 ENCOUNTER — HOSPITAL ENCOUNTER (OUTPATIENT)
Dept: NON INVASIVE DIAGNOSTICS | Facility: HOSPITAL | Age: 69
Discharge: HOME | End: 2024-12-09
Payer: COMMERCIAL

## 2024-12-09 VITALS
DIASTOLIC BLOOD PRESSURE: 80 MMHG | HEART RATE: 86 BPM | WEIGHT: 270 LBS | SYSTOLIC BLOOD PRESSURE: 130 MMHG | HEIGHT: 68 IN | BODY MASS INDEX: 40.92 KG/M2

## 2024-12-09 DIAGNOSIS — R00.2 PALPITATIONS: ICD-10-CM

## 2024-12-09 LAB
AORTIC ROOT: 3.1 CM
AORTIC VALVE MEAN VELOCITY: 9.3 M/S
APICAL FOUR CHAMBER EJECTION FRACTION: 62 %
ASCENDING AORTA: 3.3 CM
AV AREA BY CONTINUOUS VTI: 3.6 CM2
AV AREA PEAK VELOCITY: 3.3 CM2
AV LVOT MEAN GRADIENT: 3 MMHG
AV LVOT PEAK GRADIENT: 6 MMHG
AV MEAN GRADIENT: 4 MMHG
AV PEAK GRADIENT: 7 MMHG
AV VALVE AREA: 3.64 CM2
AV VELOCITY RATIO: 0.87
BSA FOR ECHO PROCEDURE: 2.32 M2
DOP CALC AO PEAK VEL: 1.36 M/S
DOP CALC AO VTI: 23.09 CM
DOP CALC LVOT AREA: 3.8 CM2
DOP CALC LVOT CARDIAC INDEX: 2.96 L/MIN/M2
DOP CALC LVOT CARDIAC OUTPUT: 6.86 L/MIN
DOP CALC LVOT DIAMETER: 2.2 CM
DOP CALC LVOT PEAK VEL VTI: 22.13 CM
DOP CALC LVOT PEAK VEL: 1.18 M/S
DOP CALC LVOT STROKE INDEX: 35.8 ML/M2
DOP CALC LVOT STROKE VOLUME: 83
E WAVE DECELERATION TIME: 187 MS
E/A RATIO: 0.66
FRACTIONAL SHORTENING: 42 (ref 28–44)
INTERVENTRICULAR SEPTUM IN DIASTOLE (PARASTERNAL SHORT AXIS VIEW): 1 CM
INTERVENTRICULAR SEPTUM: 1 CM (ref 0.6–1.1)
LAAS-AP2: 18.3 CM2
LAAS-AP4: 18.7 CM2
LEFT ATRIUM SIZE: 3.8 CM
LEFT ATRIUM VOLUME (MOD BIPLANE): 48 ML
LEFT ATRIUM VOLUME INDEX (MOD BIPLANE): 20.7 ML/M2
LEFT INTERNAL DIMENSION IN SYSTOLE: 2.8 CM (ref 2.1–4)
LEFT VENTRICULAR INTERNAL DIMENSION IN DIASTOLE: 4.8 CM (ref 3.5–6)
LEFT VENTRICULAR POSTERIOR WALL IN END DIASTOLE: 1.1 CM
LEFT VENTRICULAR STROKE VOLUME: 78 ML
LVSV (TEICH): 78 ML
MV E'TISSUE VEL-LAT: 8 CM/S
MV E'TISSUE VEL-SEP: 8 CM/S
MV PEAK A VEL: 0.85 M/S
MV PEAK E VEL: 56 CM/S
MV STENOSIS PRESSURE HALF TIME: 54 MS
MV VALVE AREA P 1/2 METHOD: 4.1
RA PRESSURE ESTIMATED: 5 MMHG
RIGHT ATRIAL 2D VOLUME: 53 ML
RIGHT ATRIUM AREA SYSTOLE A4C: 19.6 CM2
RV PSP: 31 MMHG
SL CV LEFT ATRIUM LENGTH A2C: 6.1 CM
SL CV LV EF: 65
SL CV PED ECHO LEFT VENTRICLE DIASTOLIC VOLUME (MOD BIPLANE) 2D: 108 ML
SL CV PED ECHO LEFT VENTRICLE SYSTOLIC VOLUME (MOD BIPLANE) 2D: 30 ML
TR MAX PG: 26 MMHG
TR PEAK VELOCITY: 2.6 M/S
TRICUSPID ANNULAR PLANE SYSTOLIC EXCURSION: 1.8 CM
TRICUSPID VALVE PEAK REGURGITATION VELOCITY: 2.55 M/S

## 2024-12-09 PROCEDURE — 93306 TTE W/DOPPLER COMPLETE: CPT | Performed by: STUDENT IN AN ORGANIZED HEALTH CARE EDUCATION/TRAINING PROGRAM

## 2024-12-09 PROCEDURE — 93306 TTE W/DOPPLER COMPLETE: CPT

## 2024-12-10 NOTE — PROGRESS NOTES
Outpatient Cardiology Note - Henry Mittal 69 y.o. male MRN: [id number]    @ Encounter: [id number]        Patient Active Problem List    Diagnosis Date Noted    Sleep apnea-like behavior 11/26/2024    Chronic diastolic congestive heart failure (HCC) 11/26/2024    Hematospermia 09/23/2024    Benign prostatic hyperplasia with lower urinary tract symptoms 09/23/2024    Oropharyngeal dysphagia 08/15/2024    Snoring 08/15/2024    Obesity, morbid (HCC) 08/05/2024    Dental infection 03/30/2024    Angioedema 03/25/2024    Lumbar back pain 05/03/2023    Lumbar radiculopathy 05/03/2023    Hyperprolactinemia (HCC) 02/28/2023    Pain in left hip 03/21/2022    Left hip pain 03/18/2022    Cystitis 10/26/2021    Primary insomnia 10/26/2021    Chronic pain of left knee 10/26/2021    Other constipation 10/26/2021    Vitamin D deficiency 12/06/2019    Chronic gout of right knee 09/16/2019    Sebaceous cyst 06/18/2019    Osteoarthritis of both knees 12/03/2018    Hyperlipidemia 07/23/2018    Depression 07/23/2018    Essential hypertension 07/09/2018    Type 2 diabetes mellitus with other specified complication, without long-term current use of insulin (HCC) 07/09/2018    Asthma 07/09/2018    CKD (chronic kidney disease) stage 2, GFR 60-89 ml/min 07/09/2018       Assessment:  # Chronic HFpEF  Diuretic:- furosemide 20 mg daily- change to torsemide 20 mg   SGLT2i: Snjardy 12.5/100 mg   Weight: 269 lbs  BNP:    Studies- personally reviewed by me  EKG- SR, 1st degree AV block    Echo 12/9/24:  LVEF: 65%  RV: normal  PASP: 31 mmhg    PFTs 2/1/24: no obstruction, mild restriction    Echo 9/27/18:  LVEF: 72%, mild LVH    # DM2- HgA1C 6.3% on 3/22/24  - Ozempic  # hyperlipidemia- atorvastatin 80 mg, Zetia 10 mg  # depression  # asthma  # HTN  # obesity- BMI 41  # FLAQUITA- needs sleep study    Today's Plan:  Sleep study  Continue lasix 20 mg daily- change to torsemide 20 mg daily  Drink less  Weight loss    HPI:      67 yo male referred  for HFpEF. He was admitted to the hospital this past March with acute respiratory failure and angioedema after taking amoxicillin for dental infection. He was intubated.   He is coughing a lot, gets short of breath, occasionally gets edema.     Interim:  Sleep study not done    Echo 12/9/24:  LVEF: 65%  RV: normal  PASP: 31 mmhg  Past Medical History:   Diagnosis Date    Anxiety     Asthma     Cardiac disease     COPD (chronic obstructive pulmonary disease) (HCC)     Diabetes mellitus (HCC)     Gout     Hyperlipidemia     Hypertension     Shortness of breath     with exertion       Review of Systems   Constitutional:  Negative for activity change, appetite change, fatigue and unexpected weight change.   HENT:  Negative for congestion and nosebleeds.    Eyes: Negative.    Respiratory:  Negative for cough, chest tightness and shortness of breath.    Cardiovascular:  Negative for chest pain, palpitations and leg swelling.   Gastrointestinal:  Negative for abdominal distention.   Endocrine: Negative.    Genitourinary: Negative.    Musculoskeletal: Negative.    Skin: Negative.    Neurological:  Negative for dizziness, syncope and weakness.   Hematological: Negative.    Psychiatric/Behavioral: Negative.         Allergies   Allergen Reactions    Amoxicillin Anaphylaxis     Hives, angioedema        .    Current Outpatient Medications:     albuterol (2.5 mg/3 mL) 0.083 % nebulizer solution, Take 2.5 mg by nebulization every 4 (four) hours as needed for shortness of breath. Indications: ., Disp: , Rfl:     albuterol (PROVENTIL HFA,VENTOLIN HFA) 90 mcg/act inhaler, Inhale 2 puffs every 6 (six) hours as needed for wheezing or shortness of breath, Disp: 8.5 g, Rfl: 3    Alcohol Swabs (ALCOHOL PADS) 70 % PADS, Use as directed, Disp: , Rfl: 0    allopurinol (ZYLOPRIM) 300 mg tablet, JENNIFER 1 TABLETA POR VIA ORAL DIARIAMENTE, Disp: , Rfl: 2    Ascorbic Acid (vitamin C) 1000 MG tablet, Take 1 tablet (1,000 mg total) by mouth 2 (two)  "times a day (Patient not taking: Reported on 9/23/2024), Disp: 20 tablet, Rfl: 0    atorvastatin (LIPITOR) 80 mg tablet, TAKE ONE TABLET (80 MG TOTAL) BY MOUTH DAILY WITH DINNER., Disp: , Rfl:     B-D 3CC LUER-WHITNEY SYR 43DY8-0/2 21G X 1-1/2\" 3 ML MISC, FOR TESTOSTERONE INJECTIONS EVERY 14 DAYS, Disp: , Rfl:     Blood Glucose Monitoring Suppl (FREESTYLE LITE) ANGE, Use as directed, Disp: , Rfl: 0    busPIRone (BUSPAR) 7.5 mg tablet, JENNIFER 1 TABLETA POR VIA ORAL DOS VECES AL CORAL FOR ANXIETY, Disp: , Rfl:     cholecalciferol (VITAMIN D3) 1,000 units tablet, Take 2 tablets (2,000 Units total) by mouth daily, Disp: 180 tablet, Rfl: 3    Continuous Glucose Sensor (FreeStyle Dunia 3 Sensor) MISC, , Disp: , Rfl:     docusate sodium (COLACE) 100 mg capsule, Take 100 mg by mouth 2 (two) times a day (Patient not taking: Reported on 8/5/2024), Disp: , Rfl:     doxycycline hyclate (VIBRAMYCIN) 100 mg capsule, Take 100 mg by mouth every 12 (twelve) hours, Disp: , Rfl:     Empagliflozin-metFORMIN HCl (Synjardy) 12.5-1000 MG TABS, Take 1 tablet by mouth daily. Indications: Type 2 Diabetes, Disp: , Rfl:     ergocalciferol (VITAMIN D2) 50,000 units, Take 50,000 Units by mouth (Patient not taking: Reported on 9/23/2024), Disp: , Rfl:     ezetimibe (ZETIA) 10 mg tablet, TAKE ONE TABLET (10 MG TOTAL) BY MOUTH DAILY., Disp: , Rfl:     famotidine (PEPCID) 20 mg tablet, TAKE 1 TABLET (20 MG TOTAL) BY MOUTH 2 (TWO) TIMES A DAY, Disp: 30 tablet, Rfl: 0    furosemide (LASIX) 20 mg tablet, Take 20 mg by mouth daily, Disp: , Rfl:     gabapentin (NEURONTIN) 300 mg capsule, TAKE ONE CAPSULE (300 MG TOTAL) BY MOUTH 2 (TWO) TIMES A DAY., Disp: , Rfl:     ibuprofen (MOTRIN) 800 mg tablet, Take 800 mg by mouth every 6 (six) hours as needed for mild pain (Patient not taking: Reported on 8/5/2024), Disp: , Rfl:     Lancets (OneTouch Delica Plus [id number]) MISC, USE TO TEST BLOOD SUGARS DAILY, Disp: , Rfl:     linaCLOtide 145 MCG CAPS, Take 1 capsule " (145 mcg total) by mouth daily 30-60 minutes before breakfast, Disp: 30 capsule, Rfl: 5    loratadine (CLARITIN) 10 mg tablet, Take 1 tablet (10 mg total) by mouth daily (Patient not taking: Reported on 8/5/2024), Disp: 30 tablet, Rfl: 0    Multiple Vitamin (multivitamin) tablet, Take 1 tablet by mouth daily (Patient not taking: Reported on 10/18/2024), Disp: 10 tablet, Rfl: 0    Nexlizet 180-10 MG TABS, Take 1 each by mouth (Patient not taking: Reported on 8/5/2024), Disp: , Rfl:     OneTouch Verio test strip, USE TO TEST BLOOD SUGARS ONCE DAILY, Disp: , Rfl:     polymyxin b-trimethoprim (POLYTRIM) ophthalmic solution, Administer 1 drop to both eyes every 6 (six) hours, Disp: , Rfl:     semaglutide, 2 mg/dose, (Ozempic, 2 MG/DOSE,) 8 mg/ mL injection pen, Inject 0.75 mL (2 mg total) under the skin every 7 days, Disp: , Rfl:     Synjardy XR  MG TB24, JENNIFER 1 TABLETA POR VIA ORAL DIARIAMENTE EN LA MA?MILLI, Disp: , Rfl:     tamsulosin (FLOMAX) 0.4 mg, Take 1 capsule (0.4 mg total) by mouth daily with dinner, Disp: 30 capsule, Rfl: 4    tobramycin-dexamethasone (TOBRADEX) ophthalmic suspension, Administer 1 drop to both eyes every 4 (four) hours while awake (Patient not taking: Reported on 8/5/2024), Disp: , Rfl:     torsemide (DEMADEX) 20 mg tablet, Take 1 tablet (20 mg total) by mouth daily (Patient not taking: Reported on 9/23/2024), Disp: 30 tablet, Rfl: 5    traZODone (DESYREL) 50 mg tablet, Take 1 tablet (50 mg total) by mouth daily at bedtime as needed for sleep, Disp: 30 tablet, Rfl: 1    Trelegy Ellipta 100-62.5-25 MCG/ACT inhaler, TOME ONE PUFF ONCE A DAY, Disp: , Rfl:     triamcinolone (KENALOG) 0.1 % cream, Apply topically 2 (two) times a day, Disp: , Rfl:     Vascepa 1 g CAPS, Take 1 capsule by mouth 2 (two) times a day (Patient not taking: Reported on 9/23/2024), Disp: , Rfl:     Xyosted 100 MG/0.5ML SOAJ, Inject 100 mg under the skin Once a week (Patient not taking: Reported on 8/5/2024), Disp: ,  Rfl:     Social History     Socioeconomic History    Marital status: Single     Spouse name: Not on file    Number of children: Not on file    Years of education: Not on file    Highest education level: Not asked   Occupational History    Not on file   Tobacco Use    Smoking status: Former     Current packs/day: 0.00     Average packs/day: 2.0 packs/day for 38.0 years (76.0 ttl pk-yrs)     Types: Cigarettes     Start date:      Quit date: 2006     Years since quittin.9     Passive exposure: Never    Smokeless tobacco: Never   Vaping Use    Vaping status: Never Used   Substance and Sexual Activity    Alcohol use: Yes     Comment: occasionally    Drug use: No    Sexual activity: Never   Other Topics Concern    Not on file   Social History Narrative    Not on file     Social Drivers of Health     Financial Resource Strain: Low Risk  (2023)    Overall Financial Resource Strain (CARDIA)     Difficulty of Paying Living Expenses: Not very hard   Food Insecurity: No Food Insecurity (3/27/2024)    Nursing - Inadequate Food Risk Classification     Worried About Running Out of Food in the Last Year: Never true     Ran Out of Food in the Last Year: Never true     Ran Out of Food in the Last Year: Not on file   Transportation Needs: No Transportation Needs (2024)    OASIS : Transportation     Lack of Transportation (Medical): No     Lack of Transportation (Non-Medical): No     Patient Unable or Declines to Respond: No   Physical Activity: Not on file   Stress: Not on file   Social Connections: Unknown (2019)    Social Connection and Isolation Panel [NHANES]     Frequency of Communication with Friends and Family: Not on file     Frequency of Social Gatherings with Friends and Family: Not on file     Attends Evangelical Services: Not on file     Active Member of Clubs or Organizations: Not on file     Attends Club or Organization Meetings: Not on file     Marital Status: Not asked   Intimate Partner  "Violence: Not on file   Housing Stability: Unknown (3/27/2024)    Housing Stability Vital Sign     Unable to Pay for Housing in the Last Year: No     Number of Times Moved in the Last Year: Not on file     Homeless in the Last Year: No       Family History   Problem Relation Age of Onset    Cancer Sister        Physical Exam:    Vitals: There were no vitals taken for this visit., There is no height or weight on file to calculate BMI.,   Wt Readings from Last 3 Encounters:   12/09/24 122 kg (270 lb)   12/04/24 122 kg (270 lb)   11/26/24 125 kg (274 lb 12.8 oz)         Physical Exam  Constitutional:       Appearance: He is well-developed.   HENT:      Head: Normocephalic and atraumatic.   Eyes:      Pupils: Pupils are equal, round, and reactive to light.   Neck:      Vascular: No JVD.   Cardiovascular:      Rate and Rhythm: Normal rate and regular rhythm.      Heart sounds: No murmur heard.  Pulmonary:      Effort: Pulmonary effort is normal. No respiratory distress.      Breath sounds: Normal breath sounds.   Abdominal:      General: There is no distension.      Palpations: Abdomen is soft.      Tenderness: There is no abdominal tenderness.   Musculoskeletal:         General: Normal range of motion.      Cervical back: Normal range of motion.   Skin:     General: Skin is warm and dry.      Findings: No rash.   Neurological:      Mental Status: He is alert and oriented to person, place, and time.         Labs & Results:    Lab Results   Component Value Date    WBC 12.67 (H) 03/31/2024    HGB 14.0 03/31/2024    HCT 43.4 03/31/2024    MCV 88 03/31/2024     03/31/2024     Lab Results   Component Value Date    SODIUM 139 03/31/2024    K 3.6 03/31/2024     03/31/2024    CO2 26 03/31/2024    BUN 22 03/31/2024    CREATININE 0.61 03/31/2024    GLUC 82 03/31/2024    CALCIUM 8.5 03/31/2024     No results found for: \"BNP\"   Lab Results   Component Value Date    CHOLESTEROL 212 (H) 12/02/2023    CHOLESTEROL 170 " 05/03/2023    CHOLESTEROL 207 (H) 01/26/2023     Lab Results   Component Value Date    HDL 48 12/02/2023    HDL 58 05/03/2023    HDL 48 01/26/2023     Lab Results   Component Value Date    TRIG 139 03/26/2024    TRIG 138 12/02/2023    TRIG 103 05/03/2023     Lab Results   Component Value Date    NONHDLC 164 12/02/2023    NONHDLC 112 05/03/2023    NONHDLC 159 01/26/2023             EKG personally reviewed by Eric Cabral DO.     Counseling / Coordination of Care  Time spent today 40 minutes.  Greater than 50% of total time was spent with the patient and / or family counseling and / or coordination of care. We discussed diagnoses, most recent studies and any changes in treatment  Thank you for the opportunity to participate in the care of this patient.    ERIC CABRAL D.O.  DIRECTOR OF HEART FAILURE/ PULMONARY HYPERTENSION  MEDICAL DIRECTOR OF LVAD PROGRAM  [facility]

## 2024-12-11 ENCOUNTER — OFFICE VISIT (OUTPATIENT)
Dept: CARDIOLOGY CLINIC | Facility: CLINIC | Age: 69
End: 2024-12-11
Payer: COMMERCIAL

## 2024-12-11 VITALS
WEIGHT: 269.5 LBS | SYSTOLIC BLOOD PRESSURE: 118 MMHG | HEIGHT: 68 IN | BODY MASS INDEX: 40.84 KG/M2 | HEART RATE: 86 BPM | OXYGEN SATURATION: 94 % | DIASTOLIC BLOOD PRESSURE: 80 MMHG

## 2024-12-11 DIAGNOSIS — I50.32 CHRONIC DIASTOLIC CONGESTIVE HEART FAILURE (HCC): Primary | ICD-10-CM

## 2024-12-11 DIAGNOSIS — G47.39 SLEEP APNEA-LIKE BEHAVIOR: ICD-10-CM

## 2024-12-11 DIAGNOSIS — I10 ESSENTIAL HYPERTENSION: ICD-10-CM

## 2024-12-11 DIAGNOSIS — I50.32 CHRONIC HEART FAILURE WITH PRESERVED EJECTION FRACTION (HCC): ICD-10-CM

## 2024-12-11 PROCEDURE — 99214 OFFICE O/P EST MOD 30 MIN: CPT | Performed by: INTERNAL MEDICINE

## 2024-12-11 RX ORDER — TORSEMIDE 20 MG/1
20 TABLET ORAL DAILY
Qty: 30 TABLET | Refills: 5 | Status: SHIPPED | OUTPATIENT
Start: 2024-12-11

## 2024-12-12 ENCOUNTER — APPOINTMENT (OUTPATIENT)
Dept: LAB | Facility: CLINIC | Age: 69
End: 2024-12-12
Payer: COMMERCIAL

## 2024-12-12 DIAGNOSIS — E78.2 MIXED HYPERLIPIDEMIA: ICD-10-CM

## 2024-12-12 DIAGNOSIS — E11.69 DIABETES MELLITUS ASSOCIATED WITH HORMONAL ETIOLOGY (HCC): ICD-10-CM

## 2024-12-12 DIAGNOSIS — K59.09 CHRONIC CONSTIPATION: ICD-10-CM

## 2024-12-12 DIAGNOSIS — E29.1 3-OXO-5 ALPHA-STEROID DELTA 4-DEHYDROGENASE DEFICIENCY: ICD-10-CM

## 2024-12-12 DIAGNOSIS — M10.9 GOUT OF BOTH FEET: ICD-10-CM

## 2024-12-12 DIAGNOSIS — I10 ESSENTIAL HYPERTENSION, MALIGNANT: ICD-10-CM

## 2024-12-12 LAB
25(OH)D3 SERPL-MCNC: 53.3 NG/ML (ref 30–100)
ALBUMIN SERPL BCG-MCNC: 4 G/DL (ref 3.5–5)
ALP SERPL-CCNC: 45 U/L (ref 34–104)
ALT SERPL W P-5'-P-CCNC: 23 U/L (ref 7–52)
ANION GAP SERPL CALCULATED.3IONS-SCNC: 7 MMOL/L (ref 4–13)
AST SERPL W P-5'-P-CCNC: 18 U/L (ref 13–39)
BASOPHILS # BLD AUTO: 0.09 THOUSANDS/ÂΜL (ref 0–0.1)
BASOPHILS NFR BLD AUTO: 1 % (ref 0–1)
BILIRUB SERPL-MCNC: 0.53 MG/DL (ref 0.2–1)
BUN SERPL-MCNC: 20 MG/DL (ref 5–25)
CALCIUM SERPL-MCNC: 9.3 MG/DL (ref 8.4–10.2)
CHLORIDE SERPL-SCNC: 103 MMOL/L (ref 96–108)
CHOLEST SERPL-MCNC: 97 MG/DL (ref ?–200)
CO2 SERPL-SCNC: 29 MMOL/L (ref 21–32)
CREAT SERPL-MCNC: 1.14 MG/DL (ref 0.6–1.3)
CREAT UR-MCNC: 189.3 MG/DL
EOSINOPHIL # BLD AUTO: 0.16 THOUSAND/ÂΜL (ref 0–0.61)
EOSINOPHIL NFR BLD AUTO: 2 % (ref 0–6)
ERYTHROCYTE [DISTWIDTH] IN BLOOD BY AUTOMATED COUNT: 14.1 % (ref 11.6–15.1)
EST. AVERAGE GLUCOSE BLD GHB EST-MCNC: 134 MG/DL
GFR SERPL CREATININE-BSD FRML MDRD: 64 ML/MIN/1.73SQ M
GLUCOSE P FAST SERPL-MCNC: 88 MG/DL (ref 65–99)
HBA1C MFR BLD: 6.3 %
HCT VFR BLD AUTO: 51.6 % (ref 36.5–49.3)
HDLC SERPL-MCNC: 38 MG/DL
HGB BLD-MCNC: 16.4 G/DL (ref 12–17)
IGA SERPL-MCNC: 68 MG/DL (ref 66–433)
IMM GRANULOCYTES # BLD AUTO: 0.02 THOUSAND/UL (ref 0–0.2)
IMM GRANULOCYTES NFR BLD AUTO: 0 % (ref 0–2)
LDLC SERPL CALC-MCNC: 38 MG/DL (ref 0–100)
LYMPHOCYTES # BLD AUTO: 2.14 THOUSANDS/ÂΜL (ref 0.6–4.47)
LYMPHOCYTES NFR BLD AUTO: 25 % (ref 14–44)
MCH RBC QN AUTO: 27.8 PG (ref 26.8–34.3)
MCHC RBC AUTO-ENTMCNC: 31.8 G/DL (ref 31.4–37.4)
MCV RBC AUTO: 88 FL (ref 82–98)
MICROALBUMIN UR-MCNC: 9.9 MG/L
MICROALBUMIN/CREAT 24H UR: 5 MG/G CREATININE (ref 0–30)
MONOCYTES # BLD AUTO: 0.78 THOUSAND/ÂΜL (ref 0.17–1.22)
MONOCYTES NFR BLD AUTO: 9 % (ref 4–12)
NEUTROPHILS # BLD AUTO: 5.3 THOUSANDS/ÂΜL (ref 1.85–7.62)
NEUTS SEG NFR BLD AUTO: 63 % (ref 43–75)
NONHDLC SERPL-MCNC: 59 MG/DL
NRBC BLD AUTO-RTO: 0 /100 WBCS
PLATELET # BLD AUTO: 228 THOUSANDS/UL (ref 149–390)
PMV BLD AUTO: 11.7 FL (ref 8.9–12.7)
POTASSIUM SERPL-SCNC: 3.6 MMOL/L (ref 3.5–5.3)
PROLACTIN SERPL-MCNC: 34.63 NG/ML (ref 2.64–13.13)
PROT SERPL-MCNC: 6.5 G/DL (ref 6.4–8.4)
RBC # BLD AUTO: 5.9 MILLION/UL (ref 3.88–5.62)
SODIUM SERPL-SCNC: 139 MMOL/L (ref 135–147)
TESTOST SERPL-MSCNC: 405 NG/DL (ref 175–781)
TRIGL SERPL-MCNC: 104 MG/DL (ref ?–150)
TSH SERPL DL<=0.05 MIU/L-ACNC: 1.65 UIU/ML (ref 0.45–4.5)
TTG IGA SER IA-ACNC: <0.4 U/ML (ref ?–10)
URATE SERPL-MCNC: 5 MG/DL (ref 3.5–8.5)
WBC # BLD AUTO: 8.49 THOUSAND/UL (ref 4.31–10.16)

## 2024-12-12 PROCEDURE — 36415 COLL VENOUS BLD VENIPUNCTURE: CPT

## 2024-12-12 PROCEDURE — 82784 ASSAY IGA/IGD/IGG/IGM EACH: CPT

## 2024-12-12 PROCEDURE — 82570 ASSAY OF URINE CREATININE: CPT

## 2024-12-12 PROCEDURE — 80061 LIPID PANEL: CPT

## 2024-12-12 PROCEDURE — 83036 HEMOGLOBIN GLYCOSYLATED A1C: CPT

## 2024-12-12 PROCEDURE — 82306 VITAMIN D 25 HYDROXY: CPT

## 2024-12-12 PROCEDURE — 85025 COMPLETE CBC W/AUTO DIFF WBC: CPT

## 2024-12-12 PROCEDURE — 84550 ASSAY OF BLOOD/URIC ACID: CPT

## 2024-12-12 PROCEDURE — 84146 ASSAY OF PROLACTIN: CPT

## 2024-12-12 PROCEDURE — 80053 COMPREHEN METABOLIC PANEL: CPT

## 2024-12-12 PROCEDURE — 84443 ASSAY THYROID STIM HORMONE: CPT

## 2024-12-12 PROCEDURE — 82043 UR ALBUMIN QUANTITATIVE: CPT

## 2024-12-12 PROCEDURE — 86364 TISS TRNSGLTMNASE EA IG CLAS: CPT

## 2024-12-12 PROCEDURE — 84403 ASSAY OF TOTAL TESTOSTERONE: CPT

## 2024-12-28 ENCOUNTER — APPOINTMENT (EMERGENCY)
Dept: RADIOLOGY | Facility: HOSPITAL | Age: 69
End: 2024-12-28
Payer: COMMERCIAL

## 2024-12-28 ENCOUNTER — HOSPITAL ENCOUNTER (EMERGENCY)
Facility: HOSPITAL | Age: 69
Discharge: HOME | End: 2024-12-29
Attending: EMERGENCY MEDICINE | Admitting: EMERGENCY MEDICINE
Payer: COMMERCIAL

## 2024-12-28 DIAGNOSIS — J18.9 PNEUMONIA: Primary | ICD-10-CM

## 2024-12-28 DIAGNOSIS — R06.02 SHORTNESS OF BREATH: ICD-10-CM

## 2024-12-28 LAB
ANION GAP SERPL CALCULATED.3IONS-SCNC: 10 MMOL/L (ref 4–13)
BASOPHILS # BLD AUTO: 0.07 THOUSANDS/ÂΜL (ref 0–0.1)
BASOPHILS NFR BLD AUTO: 1 % (ref 0–1)
BNP SERPL-MCNC: 26 PG/ML (ref 0–100)
BUN SERPL-MCNC: 15 MG/DL (ref 5–25)
CALCIUM SERPL-MCNC: 9.6 MG/DL (ref 8.4–10.2)
CARDIAC TROPONIN I PNL SERPL HS: 6 NG/L (ref ?–50)
CHLORIDE SERPL-SCNC: 98 MMOL/L (ref 96–108)
CO2 SERPL-SCNC: 28 MMOL/L (ref 21–32)
CREAT SERPL-MCNC: 1.15 MG/DL (ref 0.6–1.3)
EOSINOPHIL # BLD AUTO: 0.33 THOUSAND/ÂΜL (ref 0–0.61)
EOSINOPHIL NFR BLD AUTO: 4 % (ref 0–6)
ERYTHROCYTE [DISTWIDTH] IN BLOOD BY AUTOMATED COUNT: 14.5 % (ref 11.6–15.1)
GFR SERPL CREATININE-BSD FRML MDRD: 63 ML/MIN/1.73SQ M
GLUCOSE SERPL-MCNC: 109 MG/DL (ref 65–140)
HCT VFR BLD AUTO: 53.1 % (ref 36.5–49.3)
HGB BLD-MCNC: 17.3 G/DL (ref 12–17)
IMM GRANULOCYTES # BLD AUTO: 0.03 THOUSAND/UL (ref 0–0.2)
IMM GRANULOCYTES NFR BLD AUTO: 0 % (ref 0–2)
LYMPHOCYTES # BLD AUTO: 1.11 THOUSANDS/ÂΜL (ref 0.6–4.47)
LYMPHOCYTES NFR BLD AUTO: 12 % (ref 14–44)
MCH RBC QN AUTO: 28.4 PG (ref 26.8–34.3)
MCHC RBC AUTO-ENTMCNC: 32.6 G/DL (ref 31.4–37.4)
MCV RBC AUTO: 87 FL (ref 82–98)
MONOCYTES # BLD AUTO: 0.98 THOUSAND/ÂΜL (ref 0.17–1.22)
MONOCYTES NFR BLD AUTO: 10 % (ref 4–12)
NEUTROPHILS # BLD AUTO: 6.93 THOUSANDS/ÂΜL (ref 1.85–7.62)
NEUTS SEG NFR BLD AUTO: 73 % (ref 43–75)
NRBC BLD AUTO-RTO: 0 /100 WBCS
PLATELET # BLD AUTO: 244 THOUSANDS/UL (ref 149–390)
PMV BLD AUTO: 11.2 FL (ref 8.9–12.7)
POTASSIUM SERPL-SCNC: 3.8 MMOL/L (ref 3.5–5.3)
RBC # BLD AUTO: 6.09 MILLION/UL (ref 3.88–5.62)
SODIUM SERPL-SCNC: 136 MMOL/L (ref 135–147)
WBC # BLD AUTO: 9.45 THOUSAND/UL (ref 4.31–10.16)

## 2024-12-28 PROCEDURE — 99284 EMERGENCY DEPT VISIT MOD MDM: CPT | Performed by: EMERGENCY MEDICINE

## 2024-12-28 PROCEDURE — 85025 COMPLETE CBC W/AUTO DIFF WBC: CPT

## 2024-12-28 PROCEDURE — 99285 EMERGENCY DEPT VISIT HI MDM: CPT

## 2024-12-28 PROCEDURE — 83880 ASSAY OF NATRIURETIC PEPTIDE: CPT

## 2024-12-28 PROCEDURE — 94644 CONT INHLJ TX 1ST HOUR: CPT

## 2024-12-28 PROCEDURE — 36415 COLL VENOUS BLD VENIPUNCTURE: CPT

## 2024-12-28 PROCEDURE — 84484 ASSAY OF TROPONIN QUANT: CPT

## 2024-12-28 PROCEDURE — 71250 CT THORAX DX C-: CPT

## 2024-12-28 PROCEDURE — 80048 BASIC METABOLIC PNL TOTAL CA: CPT

## 2024-12-28 RX ORDER — IPRATROPIUM BROMIDE AND ALBUTEROL SULFATE .5; 3 MG/3ML; MG/3ML
1 SOLUTION RESPIRATORY (INHALATION) ONCE
Status: COMPLETED | OUTPATIENT
Start: 2024-12-28 | End: 2024-12-28

## 2024-12-28 RX ORDER — PREDNISONE 20 MG/1
40 TABLET ORAL ONCE
Status: COMPLETED | OUTPATIENT
Start: 2024-12-28 | End: 2024-12-28

## 2024-12-28 RX ORDER — ALBUTEROL SULFATE 5 MG/ML
10 SOLUTION RESPIRATORY (INHALATION) ONCE
Status: COMPLETED | OUTPATIENT
Start: 2024-12-28 | End: 2024-12-28

## 2024-12-28 RX ORDER — IPRATROPIUM BROMIDE AND ALBUTEROL SULFATE .5; 3 MG/3ML; MG/3ML
1 SOLUTION RESPIRATORY (INHALATION) ONCE
Status: DISCONTINUED | OUTPATIENT
Start: 2024-12-28 | End: 2024-12-29 | Stop reason: HOSPADM

## 2024-12-28 RX ORDER — SODIUM CHLORIDE FOR INHALATION 0.9 %
12 VIAL, NEBULIZER (ML) INHALATION ONCE
Status: COMPLETED | OUTPATIENT
Start: 2024-12-28 | End: 2024-12-28

## 2024-12-28 RX ADMIN — ALBUTEROL SULFATE 10 MG: 2.5 SOLUTION RESPIRATORY (INHALATION) at 22:58

## 2024-12-28 RX ADMIN — IPRATROPIUM BROMIDE 1 MG: 0.5 SOLUTION RESPIRATORY (INHALATION) at 22:58

## 2024-12-28 RX ADMIN — PREDNISONE 40 MG: 20 TABLET ORAL at 22:58

## 2024-12-28 RX ADMIN — ISODIUM CHLORIDE 12 ML: 0.03 SOLUTION RESPIRATORY (INHALATION) at 22:58

## 2024-12-29 VITALS
OXYGEN SATURATION: 94 % | DIASTOLIC BLOOD PRESSURE: 54 MMHG | TEMPERATURE: 98 F | RESPIRATION RATE: 21 BRPM | HEART RATE: 97 BPM | SYSTOLIC BLOOD PRESSURE: 121 MMHG

## 2024-12-29 RX ORDER — DOXYCYCLINE 100 MG/1
100 CAPSULE ORAL 2 TIMES DAILY
Qty: 10 CAPSULE | Refills: 0 | Status: SHIPPED | OUTPATIENT
Start: 2024-12-29 | End: 2025-01-03

## 2024-12-29 RX ORDER — DOXYCYCLINE 100 MG/1
100 CAPSULE ORAL ONCE
Status: COMPLETED | OUTPATIENT
Start: 2024-12-29 | End: 2024-12-29

## 2024-12-29 RX ADMIN — DOXYCYCLINE HYCLATE 100 MG: 100 CAPSULE ORAL at 02:08

## 2024-12-29 NOTE — DISCHARGE INSTRUCTIONS
You are seen and evaluated in the emergency department for shortness of breath, your blood work was unremarkable for any acute abnormalities.  Your CT of your chest showed possible bilateral lower lobe pneumonia.  A prescription was sent to your pharmacy for an antibiotic.  Please take the medication as prescribed.  Please continue to take all your medications as prescribed.  Please also continue using your nebulizer treatments and albuterol inhaler.  Please follow-up with your primary care physician next week since you were seen and evaluated in the emergency department.    Please return to the emergency department if you experience any worsening symptoms, chest pain, shortness of breath or any new or concerning symptoms.
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
## 2024-12-29 NOTE — ED ATTENDING ATTESTATION
"I, Chaparro Parada MD, saw and evaluated the patient. I have discussed the patient with the resident and agree with the resident's findings, Plan of Care, and MDM as documented in the resident's note, except where noted. All available labs and Radiology studies were reviewed.  I was present for key portions of any procedure(s) performed by the resident and I was immediately available to provide assistance.    At this point I agree with the current assessment done in the Emergency Department.  I have conducted an independent evaluation of this patient a history and physical is as follows:    68 yo morbidly obese male with a history of asthma, HTN, gout, hyperlipidemia, DM, anxiety, and COPD brought to the ED by EMS for evaluation of a cough and shortness of breath x \"a few\" days. The patient reports a cough productive of \"white\" sputum. Shortness of breath is mostly associated with the cough and exertion. (+) Mild relief with his home albuterol. No chest pain, nausea, vomiting, or diaphoresis. He says he was seen by his primary doctor yesterday and given \"pills for the throat\" but does not know the name of the medication. No new LE swelling or pain. He denies hemoptysis. No fevers or chills. No other specific complaints.    ROS: per resident physician note    Gen: NAD, AA&Ox3  HEENT: PERRL, EOMI  Neck: supple  CV: RRR  Lungs: CTA B/L  Abdomen: soft, NT/ND  Ext: no swelling or deformity  Neuro: 5/5 strength all extremities, sensation grossly intact  Skin: no rash    ED Course  The patient is comfortable appearing with stable vital signs and a benign physical examination. Lungs are CTA B/L. Unclear etiology of symptoms. Viral URI vs pneumonia vs COPD/asthma exacerbation vs ACS vs CHF vs bronchitis? Will check EKG, CXR, basic labs, troponin, BNP, and CT thorax. Duo-neb and oral steroid administered, will continue to monitor in the ED. Disposition per workup and reassessment.      Critical Care Time  Procedures   "

## 2024-12-30 ENCOUNTER — OFFICE VISIT (OUTPATIENT)
Dept: PODIATRY | Facility: CLINIC | Age: 69
End: 2024-12-30
Payer: COMMERCIAL

## 2024-12-30 ENCOUNTER — VBI (OUTPATIENT)
Dept: FAMILY MEDICINE CLINIC | Facility: CLINIC | Age: 69
End: 2024-12-30

## 2024-12-30 VITALS — BODY MASS INDEX: 39.86 KG/M2 | WEIGHT: 263 LBS | RESPIRATION RATE: 18 BRPM | HEIGHT: 68 IN

## 2024-12-30 DIAGNOSIS — B35.1 ONYCHOMYCOSIS: ICD-10-CM

## 2024-12-30 DIAGNOSIS — E11.42 DIABETIC POLYNEUROPATHY ASSOCIATED WITH TYPE 2 DIABETES MELLITUS (HCC): Primary | ICD-10-CM

## 2024-12-30 PROCEDURE — RECHECK: Performed by: PODIATRIST

## 2024-12-30 PROCEDURE — 11721 DEBRIDE NAIL 6 OR MORE: CPT | Performed by: PODIATRIST

## 2024-12-30 NOTE — TELEPHONE ENCOUNTER
12/30/24 3:05 PM    Patient contacted post ED visit, first outreach attempt made. Message was left for patient to return a call to the [facility] Department at [facility]: Phone [phone].    Thank you.  [facility] Gentile MA  [facility]

## 2024-12-31 NOTE — TELEPHONE ENCOUNTER
12/31/24 9:39 AM    Patient contacted post ED visit, second outreach attempt made. Message was left for patient to return a call to the [facility] Department at [facility]: Phone [phone].    Thank you.  [facility] Gentile MA  [facility]

## 2025-01-03 NOTE — TELEPHONE ENCOUNTER
01/03/25 12:08 PM    Patient contacted post ED visit, phone outreaches were unsuccessful; patient does not have MyChart, a MyChart letter has not been sent.     Thank you.  [facility] Gentile MA  [facility]

## 2025-01-16 DIAGNOSIS — N40.1 BENIGN PROSTATIC HYPERPLASIA WITH LOWER URINARY TRACT SYMPTOMS, SYMPTOM DETAILS UNSPECIFIED: ICD-10-CM

## 2025-01-16 DIAGNOSIS — R30.0 BURNING WITH URINATION: ICD-10-CM

## 2025-01-16 RX ORDER — TAMSULOSIN HYDROCHLORIDE 0.4 MG/1
0.4 CAPSULE ORAL
Qty: 30 CAPSULE | Refills: 1 | Status: SHIPPED | OUTPATIENT
Start: 2025-01-16

## 2025-01-23 ENCOUNTER — OFFICE VISIT (OUTPATIENT)
Dept: OBGYN CLINIC | Facility: MEDICAL CENTER | Age: 70
End: 2025-01-23
Payer: COMMERCIAL

## 2025-01-23 VITALS — HEIGHT: 68 IN | WEIGHT: 271 LBS | BODY MASS INDEX: 41.07 KG/M2

## 2025-01-23 DIAGNOSIS — E66.01 OBESITY, MORBID (HCC): ICD-10-CM

## 2025-01-23 DIAGNOSIS — M17.0 BILATERAL PRIMARY OSTEOARTHRITIS OF KNEE: Primary | ICD-10-CM

## 2025-01-23 DIAGNOSIS — E11.42 DIABETIC POLYNEUROPATHY ASSOCIATED WITH TYPE 2 DIABETES MELLITUS (HCC): ICD-10-CM

## 2025-01-23 PROCEDURE — 99213 OFFICE O/P EST LOW 20 MIN: CPT | Performed by: ORTHOPAEDIC SURGERY

## 2025-01-23 PROCEDURE — 20610 DRAIN/INJ JOINT/BURSA W/O US: CPT | Performed by: ORTHOPAEDIC SURGERY

## 2025-01-23 RX ORDER — TRIAMCINOLONE ACETONIDE 40 MG/ML
40 INJECTION, SUSPENSION INTRA-ARTICULAR; INTRAMUSCULAR
Status: COMPLETED | OUTPATIENT
Start: 2025-01-23 | End: 2025-01-23

## 2025-01-23 RX ORDER — BUPIVACAINE HYDROCHLORIDE 2.5 MG/ML
2 INJECTION, SOLUTION INFILTRATION; PERINEURAL
Status: COMPLETED | OUTPATIENT
Start: 2025-01-23 | End: 2025-01-23

## 2025-01-23 RX ADMIN — BUPIVACAINE HYDROCHLORIDE 2 ML: 2.5 INJECTION, SOLUTION INFILTRATION; PERINEURAL at 09:30

## 2025-01-23 RX ADMIN — TRIAMCINOLONE ACETONIDE 40 MG: 40 INJECTION, SUSPENSION INTRA-ARTICULAR; INTRAMUSCULAR at 09:30
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
## 2025-01-28 RX ORDER — DYCLONINE HCL 2 MG
LOZENGE MUCOUS MEMBRANE
COMMUNITY
Start: 2024-12-26

## 2025-01-28 RX ORDER — MONTELUKAST SODIUM 10 MG/1
TABLET ORAL
COMMUNITY
Start: 2025-01-21

## 2025-01-28 RX ORDER — SILDENAFIL 50 MG/1
50 TABLET, FILM COATED ORAL DAILY PRN
COMMUNITY
Start: 2025-01-02 | End: 2026-01-02

## 2025-01-28 RX ORDER — FUROSEMIDE 20 MG/1
1 TABLET ORAL DAILY
COMMUNITY
Start: 2025-01-03

## 2025-01-29 ENCOUNTER — OFFICE VISIT (OUTPATIENT)
Dept: GASTROENTEROLOGY | Facility: CLINIC | Age: 70
End: 2025-01-29
Payer: COMMERCIAL

## 2025-01-29 VITALS
TEMPERATURE: 98.5 F | WEIGHT: 262.8 LBS | BODY MASS INDEX: 39.83 KG/M2 | SYSTOLIC BLOOD PRESSURE: 118 MMHG | DIASTOLIC BLOOD PRESSURE: 72 MMHG | HEIGHT: 68 IN

## 2025-01-29 DIAGNOSIS — K59.09 CHRONIC CONSTIPATION: ICD-10-CM

## 2025-01-29 DIAGNOSIS — Z12.11 SCREENING FOR COLON CANCER: Primary | ICD-10-CM

## 2025-01-29 PROCEDURE — 99214 OFFICE O/P EST MOD 30 MIN: CPT | Performed by: INTERNAL MEDICINE

## 2025-01-29 PROCEDURE — G2211 COMPLEX E/M VISIT ADD ON: HCPCS | Performed by: INTERNAL MEDICINE

## 2025-01-29 NOTE — PROGRESS NOTES
Name: Henry Mittal      : 1955      MRN: [id number]  Encounter Provider: Komal Bustos DO  Encounter Date: 2025   Encounter department: [facility] [place]  :  Assessment & Plan  Chronic constipation  Robust response with 145mcg Linzess leading him to not take the medication daily; having BM every 2-3 days but is uncomfortable in between and would like to have more regular BM; will decrease to 72mcg daily. Instructed to take prior to eating. Continue with good water intake. Discussed adequate fiber intake, provided with handout   Orders:    linaCLOtide 72 MCG CAPS; Take 72 mcg by mouth daily    Screening for colon cancer  Overdue for repeat colonoscopy, previous normal per patient but many years ago. He has no symptoms apart from chronic constipation and no FH. He would like to defer colonoscopy if possible but was amenable to cologuard. Discussed that if positive, next step would be colonoscopy which he was agreeable to.   Orders:    Cologuard      RTC 6 months for follow up on constipation     History of Present Illness   HPI  Henry Mittal is a 69 y.o. male who presents for follow up on constipation. PMH includes CHF, HTN, asthma, DM2, gout, OA, CKD2, and constipation.     Last visit with Demetrius Sorensen in 2024. Started on Linzess 145mcg daily. Sometimes goes 2-3 days without BM but doesn't take Linzess every day, robust response.     Denies blood in stool, abdominal pain, heartburn, nausea, vomiting. Has been losing weight due to Ozempic, approx 12-15 lbs. No other concerns today. Drinks a lot of water.     No FH colon cancer   Last colonoscopy many years ago, no polyps or findings per patient. Would prefer to avoid colonoscopy if possible but amenable to cologuard.     History obtained from: patient    Review of Systems  Medical History Reviewed by provider this encounter:     . PMH, FH      Objective   /72 (BP Location: Left arm,  "Patient Position: Sitting, Cuff Size: Adult)   Temp 98.5 °F (36.9 °C) (Tympanic)   Ht 5' 8\" (1.727 m)   Wt 119 kg (262 lb 12.8 oz)   BMI 39.96 kg/m²      Physical Exam  Vitals and nursing note reviewed.   HENT:      Head: Normocephalic and atraumatic.      Mouth/Throat:      Mouth: Mucous membranes are moist.   Eyes:      Conjunctiva/sclera: Conjunctivae normal.   Cardiovascular:      Rate and Rhythm: Normal rate and regular rhythm.      Pulses: Normal pulses.      Heart sounds: No murmur heard.  Pulmonary:      Effort: Pulmonary effort is normal. No respiratory distress.   Abdominal:      Palpations: Abdomen is soft.      Tenderness: There is no abdominal tenderness.   Skin:     General: Skin is warm and dry.   Neurological:      Mental Status: He is alert.      Comments: Ambulates with cane    Psychiatric:         Mood and Affect: Mood normal.         "

## 2025-02-20 LAB — COLOGUARD RESULT REPORTABLE: NEGATIVE

## 2025-03-22 DIAGNOSIS — N40.1 BENIGN PROSTATIC HYPERPLASIA WITH LOWER URINARY TRACT SYMPTOMS, SYMPTOM DETAILS UNSPECIFIED: ICD-10-CM

## 2025-03-22 DIAGNOSIS — R30.0 BURNING WITH URINATION: ICD-10-CM

## 2025-03-24 RX ORDER — TAMSULOSIN HYDROCHLORIDE 0.4 MG/1
0.4 CAPSULE ORAL
Qty: 30 CAPSULE | Refills: 1 | Status: SHIPPED | OUTPATIENT
Start: 2025-03-24

## 2025-03-27 ENCOUNTER — PREP FOR PROCEDURE (OUTPATIENT)
Age: 70
End: 2025-03-27

## 2025-03-27 ENCOUNTER — TELEPHONE (OUTPATIENT)
Age: 70
End: 2025-03-27

## 2025-03-27 DIAGNOSIS — Z12.11 SCREENING FOR COLON CANCER: Primary | ICD-10-CM

## 2025-03-27 NOTE — TELEPHONE ENCOUNTER
03/27/25  Screened by: Radha Bowman    Referring Provider MARLIN Christensen    Pre- Screening:     There is no height or weight on file to calculate BMI.   39.96  Has patient been referred for a routine screening Colonoscopy? yes  Is the patient between 45-75 years old? yes      Previous Colonoscopy yes   If yes:    Date:     Facility:     Reason:           Does the patient want to see a Gastroenterologist prior to their procedure OR are they having any GI symptoms? no    Has the patient been hospitalized or had abdominal surgery in the past 6 months? no    Does the patient use supplemental oxygen? no    Does the patient take Coumadin, Lovenox, Plavix, Elliquis, Xarelto, or other blood thinning medication? no    Has the patient had a stroke, cardiac event, or stent placed in the past year? no        If patient is between 45yrs - 49yrs, please advise patient that we will have to confirm benefits & coverage with their insurance company for a routine screening colonoscopy.

## 2025-03-27 NOTE — TELEPHONE ENCOUNTER
Scheduled date of colonoscopy (as of today):  5/8/25    Physician performing colonoscopy:  Dr Arroyo    Location of colonoscopy:  [facility]    Bowel prep reviewed with patient:      kulwant*    JASPER/ANNABELLE

## 2025-04-15 ENCOUNTER — TELEPHONE (OUTPATIENT)
Age: 70
End: 2025-04-15

## 2025-04-15 NOTE — TELEPHONE ENCOUNTER
Caller: Phil Patients caregiver    Doctor: Dr. Maloney    Reason for call: Rescheduled Visco Injection     Casimiro # [id number]

## 2025-04-30 ENCOUNTER — PROCEDURE VISIT (OUTPATIENT)
Dept: OBGYN CLINIC | Facility: MEDICAL CENTER | Age: 70
End: 2025-04-30
Payer: COMMERCIAL

## 2025-04-30 VITALS — BODY MASS INDEX: 40.53 KG/M2 | WEIGHT: 267.4 LBS | HEIGHT: 68 IN

## 2025-04-30 DIAGNOSIS — M17.0 BILATERAL PRIMARY OSTEOARTHRITIS OF KNEE: Primary | ICD-10-CM

## 2025-04-30 DIAGNOSIS — M25.561 RIGHT KNEE PAIN, UNSPECIFIED CHRONICITY: ICD-10-CM

## 2025-04-30 DIAGNOSIS — M25.562 LEFT KNEE PAIN, UNSPECIFIED CHRONICITY: ICD-10-CM

## 2025-04-30 PROCEDURE — 20610 DRAIN/INJ JOINT/BURSA W/O US: CPT | Performed by: PHYSICIAN ASSISTANT

## 2025-04-30 NOTE — PROGRESS NOTES
"  1. Bilateral primary osteoarthritis of knee        2. Left knee pain, unspecified chronicity        3. Right knee pain, unspecified chronicity            Patient has bilateral knee osteoarthritis.  Patient is here for his first injection of Synvisc into the bilateral knee.   Physical exam of the knee shows no effusion no ecchymosis.  Patient tolerated procedure follow up 1 week injection #2 bilateral knees    Large joint arthrocentesis: R knee  [place] Protocol:  procedure performed by consultantConsent: Verbal consent obtained. Written consent not obtained.  Risks and benefits: risks, benefits and alternatives were discussed  Consent given by: patient  Time out: Immediately prior to procedure a \"time out\" was called to verify the correct patient, procedure, equipment, support staff and site/side marked as required.  Patient understanding: patient states understanding of the procedure being performed  Patient consent: the patient's understanding of the procedure matches consent given  Site marked: the operative site was marked  Patient identity confirmed: verbally with patient  Supporting Documentation  Indications: pain and joint swelling     Is this a Visco injection? Yes  Non-Pharmacologic Treatments Attempted: Diet  Pharmacologic Treatments Attempted: Tylenol, cortisone injection  Pain Score: 5Procedure Details  Location: knee - R knee  Needle size: 22 G  Ultrasound guidance: no  Approach: anterolateral  Medications administered: 16 mg hylan 16 MG/2ML    Patient tolerance: patient tolerated the procedure well with no immediate complications  Dressing:  Sterile dressing applied               "

## 2025-05-01 ENCOUNTER — TELEPHONE (OUTPATIENT)
Age: 70
End: 2025-05-01

## 2025-05-01 NOTE — TELEPHONE ENCOUNTER
Patients GI provider:  Dr. Bustos    Number to return call: ([phone]     Reason for call: Pt caregiver calling , the pt gave permission to speak with her regarding the colonoscopy, verified prep instructions and emailed to [email or web address].  Stated pt is taking mounjaro aware 7 day hold, synjardy 4 day hold,     Scheduled procedure/appointment date if applicable: procedure 5/8

## 2025-05-06 NOTE — TELEPHONE ENCOUNTER
Pts care giver calling to ask for the prep instructions to be emailed to her email at  [employer]. I emailed the prep

## 2025-05-07 ENCOUNTER — PROCEDURE VISIT (OUTPATIENT)
Dept: OBGYN CLINIC | Facility: MEDICAL CENTER | Age: 70
End: 2025-05-07
Payer: COMMERCIAL

## 2025-05-07 VITALS — WEIGHT: 263.2 LBS | BODY MASS INDEX: 39.89 KG/M2 | HEIGHT: 68 IN

## 2025-05-07 DIAGNOSIS — M17.0 BILATERAL PRIMARY OSTEOARTHRITIS OF KNEE: Primary | ICD-10-CM

## 2025-05-07 PROCEDURE — 20610 DRAIN/INJ JOINT/BURSA W/O US: CPT | Performed by: PHYSICIAN ASSISTANT

## 2025-05-07 NOTE — PROGRESS NOTES
1. Bilateral primary osteoarthritis of knee            Patient has bilateral knee osteoarthritis.  Patient is here for his second injection of Synvisc into the bilateral knee.   Physical exam of the knee shows no effusion no ecchymosis.  Patient tolerated procedure follow up 1 week for injection #3 bilateral Synvisc    Large joint arthrocentesis: bilateral knee    Performed by: Rosales Jara PA-C  Authorized by: Rosales Jara PA-C    [place] Protocol:  procedure performed by consultantConsent: Verbal consent obtained. Written consent not obtained.  Risks and benefits: risks, benefits and alternatives were discussed  Consent given by: patient  Patient understanding: patient states understanding of the procedure being performed  Patient consent: the patient's understanding of the procedure matches consent given  Patient identity confirmed: verbally with patient  Supporting Documentation  Indications: pain and joint swelling     Is this a Visco injection? Yes  Non-Pharmacologic Treatments Attempted: Diet  Pharmacologic Treatments Attempted: Cortisone injections Tylenol  Pain Score: 5Procedure Details  Location: knee - bilateral knee  Needle size: 22 G  Ultrasound guidance: no  Approach: anterolateral    Medications (Right): 16 mg hylan 16 MG/2MLMedications (Left): 16 mg hylan 16 MG/2ML   Patient tolerance: patient tolerated the procedure well with no immediate complications  Dressing:  Sterile dressing applied

## 2025-05-08 ENCOUNTER — TELEPHONE (OUTPATIENT)
Age: 70
End: 2025-05-08

## 2025-05-08 NOTE — TELEPHONE ENCOUNTER
Scheduled date of colonoscopy (as of today):06/26/2025  Physician performing colonoscopy:  Location of colonoscopy:[facility]  Bowel prep reviewed with patient:oc/dul pt confirmed having prep instructions  Instructions reviewed with patient by:KALI  Clearances: N/A    Advised pt of Mounjaro hold for 7 days and Synjary for 4 days.     Informed [facility] of rescheduled procedure for today 05/08.  Pt needs arrival time in AM , as he was previously scheduled for arrival time of 2pm  .

## 2025-05-08 NOTE — TELEPHONE ENCOUNTER
Connected to Language Line -  ID [id number].  Pt's friend calling to reschedule colonoscopy because he did the prep and only passed stool once.  Warm transferred to PEP Regine for assistance.

## 2025-05-15 ENCOUNTER — PROCEDURE VISIT (OUTPATIENT)
Dept: OBGYN CLINIC | Facility: MEDICAL CENTER | Age: 70
End: 2025-05-15
Payer: COMMERCIAL

## 2025-05-15 VITALS — WEIGHT: 261.4 LBS | HEIGHT: 68 IN | BODY MASS INDEX: 39.62 KG/M2

## 2025-05-15 DIAGNOSIS — M17.0 BILATERAL PRIMARY OSTEOARTHRITIS OF KNEE: Primary | ICD-10-CM

## 2025-05-15 DIAGNOSIS — N40.1 BENIGN PROSTATIC HYPERPLASIA WITH LOWER URINARY TRACT SYMPTOMS, SYMPTOM DETAILS UNSPECIFIED: ICD-10-CM

## 2025-05-15 DIAGNOSIS — R30.0 BURNING WITH URINATION: ICD-10-CM

## 2025-05-15 PROCEDURE — 20610 DRAIN/INJ JOINT/BURSA W/O US: CPT | Performed by: ORTHOPAEDIC SURGERY

## 2025-05-15 RX ORDER — TAMSULOSIN HYDROCHLORIDE 0.4 MG/1
0.4 CAPSULE ORAL
Qty: 30 CAPSULE | Refills: 1 | Status: SHIPPED | OUTPATIENT
Start: 2025-05-15

## 2025-05-15 NOTE — PROGRESS NOTES
1. Bilateral primary osteoarthritis of knee  Large joint arthrocentesis: bilateral knee            Patient has severe left knee moderate to severe right knee osteoarthritis.  Patient is here for his Third injection of Synvisc into the bilateral knee.   Physical exam of the knee shows no effusion no ecchymosis.  May follow-up in 2 months for bilateral knee CSI.     Large joint arthrocentesis: bilateral knee    Performed by: Emma Castellon DO  Authorized by: Emma Castellon DO    Universal Protocol:  procedure performed by consultantConsent: Verbal consent obtained  Risks and benefits: risks, benefits and alternatives were discussed  Consent given by: patient  Patient understanding: patient states understanding of the procedure being performed  Site marked: the operative site was marked  Patient identity confirmed: verbally with patient  Supporting Documentation  Indications: pain     Is this a Visco injection? Yes  Non-Pharmacologic Treatments Attempted: Home Exercise and Physical Therapy  Pharmacologic Treatments Attempted: OTC medications, HEP  Pain Score: 5Procedure Details  Location: knee - bilateral knee  Needle size: 22 G  Ultrasound guidance: no  Approach: anterolateral    Medications (Right): 16 mg hylan 16 MG/2MLMedications (Left): 16 mg hylan 16 MG/2ML   Patient tolerance: patient tolerated the procedure well with no immediate complications  Dressing:  Sterile dressing applied

## 2025-05-16 DIAGNOSIS — I50.32 CHRONIC HEART FAILURE WITH PRESERVED EJECTION FRACTION (HCC): ICD-10-CM

## 2025-05-16 RX ORDER — TORSEMIDE 20 MG/1
20 TABLET ORAL DAILY
Qty: 30 TABLET | Refills: 5 | Status: SHIPPED | OUTPATIENT
Start: 2025-05-16

## 2025-07-01 ENCOUNTER — HOSPITAL ENCOUNTER (OUTPATIENT)
Dept: SLEEP CENTER | Facility: CLINIC | Age: 70
Discharge: HOME | End: 2025-07-01
Attending: NURSE PRACTITIONER
Payer: COMMERCIAL

## 2025-07-01 DIAGNOSIS — G47.39 SLEEP APNEA-LIKE BEHAVIOR: ICD-10-CM

## 2025-07-01 DIAGNOSIS — R06.83 SNORING: ICD-10-CM

## 2025-07-01 DIAGNOSIS — F51.01 PRIMARY INSOMNIA: ICD-10-CM

## 2025-07-01 DIAGNOSIS — E66.01 OBESITY, MORBID (HCC): ICD-10-CM

## 2025-07-01 DIAGNOSIS — I10 ESSENTIAL HYPERTENSION: ICD-10-CM

## 2025-07-01 PROCEDURE — 95810 POLYSOM 6/> YRS 4/> PARAM: CPT

## 2025-07-01 PROCEDURE — 95810 POLYSOM 6/> YRS 4/> PARAM: CPT | Performed by: PSYCHIATRY & NEUROLOGY

## 2025-07-02 PROBLEM — G47.33 OSA (OBSTRUCTIVE SLEEP APNEA): Status: ACTIVE | Noted: 2025-07-02

## 2025-07-02 NOTE — PROGRESS NOTES
Sleep Study Documentation    Pre-Sleep Study       Sleep testing procedure explained to patient:YES    Patient napped prior to study:NO    Caffeine:Dayshift worker after 12PM.  Caffeine use:NO    Alcohol:Dayshift workers after 5PM: Alcohol use:NO    Typical day for patient:YES         Study Documentation    Sleep Study Indications: Sleep apnea-like behavior, Essential hypertension, Snoring, Primary insomnia, Obesity, morbid    Montage used: Standard    Transcutaneous CO2 used: NO    Sleep Study Type:     Diagnostic Sleep Study     Oxygen Data  Supplemental O2 used: No      EKG/EEG/Abnormal Behaviors   EKG abnormalities: no None    EEG abnormalities: no    Were abnormal behaviors in sleep observed:NO  No    Snoring    Character:  Very loud    Frequency: Frequent        Is Total Sleep Study Recording Time < 2 hours: N/A    Is Total Sleep Study Recording Time > 2 hours but study is incomplete: N/A    Is Total Sleep Study Recording Time 6 hours or more but sleep was not obtained: NO        Post-Sleep Study    Medication used at bedtime or during sleep study:NO    Patient reports time it took to fall asleep:20 to 30 minutes    Patient reports waking up during study:3 or more times.  Patient reports returning to sleep in 10 to 30 minutes.    Patient reports sleeping 2 to 4 hours without dreaming.    Does the Patient feel this is a typical night of sleep:typical    Patient rated sleepiness: Not sleepy or tired

## 2025-07-07 DIAGNOSIS — N40.1 BENIGN PROSTATIC HYPERPLASIA WITH LOWER URINARY TRACT SYMPTOMS, SYMPTOM DETAILS UNSPECIFIED: ICD-10-CM

## 2025-07-07 DIAGNOSIS — R30.0 BURNING WITH URINATION: ICD-10-CM

## 2025-07-08 RX ORDER — TAMSULOSIN HYDROCHLORIDE 0.4 MG/1
0.4 CAPSULE ORAL
Qty: 30 CAPSULE | Refills: 1 | Status: SHIPPED | OUTPATIENT
Start: 2025-07-08

## 2025-07-09 ENCOUNTER — RESULTS FOLLOW-UP (OUTPATIENT)
Dept: SLEEP CENTER | Facility: CLINIC | Age: 70
End: 2025-07-09

## 2025-07-09 DIAGNOSIS — I10 ESSENTIAL HYPERTENSION: ICD-10-CM

## 2025-07-09 DIAGNOSIS — I50.32 CHRONIC DIASTOLIC CONGESTIVE HEART FAILURE (HCC): ICD-10-CM

## 2025-07-09 DIAGNOSIS — G47.33 OSA (OBSTRUCTIVE SLEEP APNEA): Primary | ICD-10-CM

## 2025-07-10 ENCOUNTER — TELEPHONE (OUTPATIENT)
Age: 70
End: 2025-07-10

## 2025-07-10 NOTE — TELEPHONE ENCOUNTER
Doctor office calling to see if we have performed colonoscopy.  Advised one is ordered but not done.

## 2025-07-10 NOTE — TELEPHONE ENCOUNTER
Sleep study resulted and shows severe to moderate sleep apnea, worse in REM sleep with event related desaturations. CPAP titration study ordered.     Call to patient using  Josué # [id number], voicemail message left for patient to return call to [phone] Option 3, then option 1.     Patient does not have MyChart

## 2025-07-10 NOTE — TELEPHONE ENCOUNTER
----- Message from MARLIN Bustillos sent at 7/9/2025  8:23 AM EDT -----  Moderate to severe obstructive sleep apnea was confirmed during the diagnostic sleep study and is likely contributing to symptoms.  Recommend a CPAP titration study, due to history of heart failure.    Study ordered.    ----- Message -----  From: Interface, Radiology Results In  Sent: 7/5/2025   3:46 PM EDT  To: MARLIN Bustillos

## 2025-07-25 RX ORDER — CLOTRIMAZOLE 1 %
CREAM (GRAM) TOPICAL
COMMUNITY
Start: 2025-06-12

## 2025-07-25 RX ORDER — FLUCONAZOLE 200 MG/1
TABLET ORAL
COMMUNITY
Start: 2025-06-26

## 2025-07-28 ENCOUNTER — OFFICE VISIT (OUTPATIENT)
Dept: GASTROENTEROLOGY | Facility: CLINIC | Age: 70
End: 2025-07-28
Payer: COMMERCIAL

## 2025-07-28 VITALS
WEIGHT: 266 LBS | DIASTOLIC BLOOD PRESSURE: 77 MMHG | SYSTOLIC BLOOD PRESSURE: 128 MMHG | HEIGHT: 68 IN | BODY MASS INDEX: 40.32 KG/M2 | TEMPERATURE: 98.9 F

## 2025-07-28 DIAGNOSIS — K59.04 CHRONIC IDIOPATHIC CONSTIPATION: ICD-10-CM

## 2025-07-28 DIAGNOSIS — Z12.11 SCREENING FOR COLON CANCER: Primary | ICD-10-CM

## 2025-07-28 PROCEDURE — 99213 OFFICE O/P EST LOW 20 MIN: CPT | Performed by: PHYSICIAN ASSISTANT

## 2025-08-14 ENCOUNTER — TELEPHONE (OUTPATIENT)
Age: 70
End: 2025-08-14